# Patient Record
Sex: FEMALE | Race: BLACK OR AFRICAN AMERICAN | Employment: OTHER | URBAN - METROPOLITAN AREA
[De-identification: names, ages, dates, MRNs, and addresses within clinical notes are randomized per-mention and may not be internally consistent; named-entity substitution may affect disease eponyms.]

---

## 2017-02-04 LAB
ALBUMIN SERPL-MCNC: 4.5 G/DL (ref 3.2–5.3)
ALK PHOSPHATASE: 55 IU/L (ref 35–121)
ALT SERPL-CCNC: 22 IU/L (ref 5–59)
ANION GAP SERPL CALCULATED.3IONS-SCNC: 13 MMOL/L
AST SERPL-CCNC: 21 IU/L (ref 10–42)
BILIRUB SERPL-MCNC: 0.8 MG/DL (ref 0.2–1.3)
BUN BLDV-MCNC: 29 MG/DL (ref 9–24)
CALCIUM SERPL-MCNC: 10.3 MG/DL (ref 8.7–10.8)
CHLORIDE BLD-SCNC: 107 MMOL/L (ref 95–111)
CHOLESTEROL/HDL RATIO: 3
CHOLESTEROL: 168 MG/DL
CO2: 28 MMOL/L (ref 21–32)
CREAT SERPL-MCNC: 1.2 MG/DL (ref 0.5–1.3)
EGFR AFRICAN AMERICAN: 53
EGFR IF NONAFRICAN AMERICAN: 43
GLUCOSE: 97 MG/DL (ref 70–100)
HDLC SERPL-MCNC: 56 MG/DL (ref 40–60)
LDL CHOLESTEROL CALCULATED: 101 MG/DL
LDL/HDL RATIO: 1.8
POTASSIUM SERPL-SCNC: 5.1 MMOL/L (ref 3.5–5.4)
SODIUM BLD-SCNC: 143 MMOL/L (ref 134–147)
TOTAL PROTEIN: 7.5 G/DL (ref 5.8–8)
TRIGL SERPL-MCNC: 56 MG/DL
VLDLC SERPL CALC-MCNC: 11 MG/DL

## 2017-02-07 DIAGNOSIS — E78.5 HYPERLIPIDEMIA, UNSPECIFIED HYPERLIPIDEMIA TYPE: ICD-10-CM

## 2017-02-07 DIAGNOSIS — I10 ESSENTIAL HYPERTENSION: ICD-10-CM

## 2017-02-07 LAB
ALBUMIN SERPL-MCNC: 4.5 G/DL
ALP BLD-CCNC: 55 U/L
ALT SERPL-CCNC: 22 U/L
AST SERPL-CCNC: 21 U/L
BILIRUB SERPL-MCNC: 0.8 MG/DL (ref 0.1–1.4)
BUN BLDV-MCNC: 29 MG/DL
CALCIUM SERPL-MCNC: 10.3 MG/DL
CHLORIDE BLD-SCNC: 107 MMOL/L
CHOLESTEROL, TOTAL: 168 MG/DL
CHOLESTEROL/HDL RATIO: 1.8
CO2: 28 MMOL/L
CREAT SERPL-MCNC: 1.2 MG/DL
GFR CALCULATED: NORMAL
GLUCOSE BLD-MCNC: 97 MG/DL
HDLC SERPL-MCNC: 56 MG/DL (ref 35–70)
LDL CHOLESTEROL CALCULATED: 101 MG/DL (ref 0–160)
POTASSIUM SERPL-SCNC: 5.1 MMOL/L
SODIUM BLD-SCNC: 143 MMOL/L
TOTAL PROTEIN: 7.5
TRIGL SERPL-MCNC: 56 MG/DL
TSH SERPL DL<=0.05 MIU/L-ACNC: 1.74 UIU/ML
TSH SERPL DL<=0.05 MIU/L-ACNC: 1.74 UIU/ML (ref 0.4–4.4)
VLDLC SERPL CALC-MCNC: 11 MG/DL

## 2017-02-09 ENCOUNTER — OFFICE VISIT (OUTPATIENT)
Dept: FAMILY MEDICINE CLINIC | Age: 77
End: 2017-02-09

## 2017-02-09 VITALS
SYSTOLIC BLOOD PRESSURE: 136 MMHG | RESPIRATION RATE: 14 BRPM | HEART RATE: 56 BPM | DIASTOLIC BLOOD PRESSURE: 64 MMHG | WEIGHT: 153.25 LBS | BODY MASS INDEX: 28.2 KG/M2 | HEIGHT: 62 IN

## 2017-02-09 DIAGNOSIS — F02.80 LATE ONSET ALZHEIMER'S DISEASE WITHOUT BEHAVIORAL DISTURBANCE (HCC): ICD-10-CM

## 2017-02-09 DIAGNOSIS — I10 ESSENTIAL HYPERTENSION: Primary | ICD-10-CM

## 2017-02-09 DIAGNOSIS — G30.1 LATE ONSET ALZHEIMER'S DISEASE WITHOUT BEHAVIORAL DISTURBANCE (HCC): ICD-10-CM

## 2017-02-09 DIAGNOSIS — E78.5 HYPERLIPIDEMIA, UNSPECIFIED HYPERLIPIDEMIA TYPE: ICD-10-CM

## 2017-02-09 PROCEDURE — 99213 OFFICE O/P EST LOW 20 MIN: CPT | Performed by: EMERGENCY MEDICINE

## 2017-02-09 RX ORDER — ATORVASTATIN CALCIUM 20 MG/1
TABLET, FILM COATED ORAL
Qty: 90 TABLET | Refills: 1 | Status: SHIPPED | OUTPATIENT
Start: 2017-02-09 | End: 2017-10-19 | Stop reason: SDUPTHER

## 2017-02-09 RX ORDER — TRIAMTERENE AND HYDROCHLOROTHIAZIDE 37.5; 25 MG/1; MG/1
TABLET ORAL
Qty: 90 TABLET | Refills: 1 | Status: SHIPPED | OUTPATIENT
Start: 2017-02-09 | End: 2017-10-19 | Stop reason: SDUPTHER

## 2017-02-09 RX ORDER — NAPROXEN 500 MG/1
500 TABLET ORAL 2 TIMES DAILY WITH MEALS
Qty: 180 TABLET | Refills: 1 | Status: SHIPPED | OUTPATIENT
Start: 2017-02-09 | End: 2018-01-30 | Stop reason: SDUPTHER

## 2017-02-09 RX ORDER — LISINOPRIL 10 MG/1
TABLET ORAL
Qty: 90 TABLET | Refills: 1 | Status: SHIPPED | OUTPATIENT
Start: 2017-02-09 | End: 2017-10-18 | Stop reason: SDUPTHER

## 2017-02-09 RX ORDER — CLOPIDOGREL BISULFATE 75 MG/1
TABLET ORAL
Qty: 90 TABLET | Refills: 1 | Status: SHIPPED | OUTPATIENT
Start: 2017-02-09 | End: 2017-10-18 | Stop reason: SDUPTHER

## 2017-02-09 RX ORDER — DONEPEZIL HYDROCHLORIDE 10 MG/1
TABLET, FILM COATED ORAL
Qty: 90 TABLET | Refills: 1 | Status: SHIPPED | OUTPATIENT
Start: 2017-02-09 | End: 2017-10-19 | Stop reason: SDUPTHER

## 2017-02-09 ASSESSMENT — ENCOUNTER SYMPTOMS
ABDOMINAL PAIN: 0
ORTHOPNEA: 0
SHORTNESS OF BREATH: 0
TROUBLE SWALLOWING: 0
RHINORRHEA: 0
CONSTIPATION: 0
COUGH: 0
WHEEZING: 0
NAUSEA: 0
SORE THROAT: 0
BACK PAIN: 0
CHEST TIGHTNESS: 0
VOMITING: 0
SINUS PRESSURE: 0
DIARRHEA: 0
VOICE CHANGE: 0

## 2017-06-08 ENCOUNTER — OFFICE VISIT (OUTPATIENT)
Dept: FAMILY MEDICINE CLINIC | Age: 77
End: 2017-06-08

## 2017-06-08 VITALS
WEIGHT: 156.6 LBS | DIASTOLIC BLOOD PRESSURE: 66 MMHG | RESPIRATION RATE: 16 BRPM | HEIGHT: 62 IN | BODY MASS INDEX: 28.82 KG/M2 | SYSTOLIC BLOOD PRESSURE: 124 MMHG | HEART RATE: 60 BPM

## 2017-06-08 DIAGNOSIS — I10 ESSENTIAL HYPERTENSION: Primary | ICD-10-CM

## 2017-06-08 DIAGNOSIS — H61.23 IMPACTED CERUMEN OF BOTH EARS: ICD-10-CM

## 2017-06-08 DIAGNOSIS — F03.90 DEMENTIA WITHOUT BEHAVIORAL DISTURBANCE, UNSPECIFIED DEMENTIA TYPE: ICD-10-CM

## 2017-06-08 DIAGNOSIS — E78.5 HYPERLIPIDEMIA, UNSPECIFIED HYPERLIPIDEMIA TYPE: ICD-10-CM

## 2017-06-08 PROCEDURE — 99213 OFFICE O/P EST LOW 20 MIN: CPT | Performed by: EMERGENCY MEDICINE

## 2017-06-08 PROCEDURE — 69210 REMOVE IMPACTED EAR WAX UNI: CPT | Performed by: EMERGENCY MEDICINE

## 2017-06-08 ASSESSMENT — ENCOUNTER SYMPTOMS
VOMITING: 0
NAUSEA: 0
BACK PAIN: 0
CHEST TIGHTNESS: 0
ABDOMINAL PAIN: 0
SINUS PRESSURE: 0
DIARRHEA: 0
VOICE CHANGE: 0
SORE THROAT: 0
CONSTIPATION: 0
ORTHOPNEA: 0
WHEEZING: 0
SHORTNESS OF BREATH: 0
RHINORRHEA: 0
TROUBLE SWALLOWING: 0
COUGH: 0

## 2017-10-15 LAB
ALBUMIN SERPL-MCNC: 4.6 G/DL (ref 3.2–5.3)
ALK PHOSPHATASE: 55 IU/L (ref 35–121)
ALT SERPL-CCNC: 37 IU/L (ref 5–59)
ANION GAP SERPL CALCULATED.3IONS-SCNC: 11 MMOL/L
AST SERPL-CCNC: 28 IU/L (ref 10–42)
BILIRUB SERPL-MCNC: 0.8 MG/DL (ref 0.2–1.3)
BUN BLDV-MCNC: 25 MG/DL (ref 9–24)
CALCIUM SERPL-MCNC: 10 MG/DL (ref 8.7–10.8)
CHLORIDE BLD-SCNC: 103 MMOL/L (ref 95–111)
CHOLESTEROL/HDL RATIO: 3
CHOLESTEROL: 148 MG/DL
CO2: 28 MMOL/L (ref 21–32)
CREAT SERPL-MCNC: 1 MG/DL (ref 0.5–1.3)
EGFR AFRICAN AMERICAN: 65
EGFR IF NONAFRICAN AMERICAN: 53
GLUCOSE: 89 MG/DL (ref 70–100)
HDLC SERPL-MCNC: 50 MG/DL (ref 40–60)
LDL CHOLESTEROL CALCULATED: 79 MG/DL
LDL/HDL RATIO: 1.6
POTASSIUM SERPL-SCNC: 4.2 MMOL/L (ref 3.5–5.4)
SODIUM BLD-SCNC: 138 MMOL/L (ref 134–147)
TOTAL PROTEIN: 7.7 G/DL (ref 5.8–8)
TRIGL SERPL-MCNC: 93 MG/DL
VLDLC SERPL CALC-MCNC: 19 MG/DL

## 2017-10-18 RX ORDER — CLOPIDOGREL BISULFATE 75 MG/1
TABLET ORAL
Qty: 90 TABLET | Refills: 1 | Status: SHIPPED | OUTPATIENT
Start: 2017-10-18 | End: 2018-01-30 | Stop reason: SDUPTHER

## 2017-10-18 RX ORDER — LISINOPRIL 10 MG/1
TABLET ORAL
Qty: 90 TABLET | Refills: 1 | Status: SHIPPED | OUTPATIENT
Start: 2017-10-18 | End: 2018-01-30 | Stop reason: SDUPTHER

## 2017-10-19 ENCOUNTER — OFFICE VISIT (OUTPATIENT)
Dept: FAMILY MEDICINE CLINIC | Age: 77
End: 2017-10-19

## 2017-10-19 VITALS
BODY MASS INDEX: 28.89 KG/M2 | HEIGHT: 62 IN | SYSTOLIC BLOOD PRESSURE: 136 MMHG | RESPIRATION RATE: 12 BRPM | HEART RATE: 66 BPM | DIASTOLIC BLOOD PRESSURE: 66 MMHG | WEIGHT: 157 LBS

## 2017-10-19 DIAGNOSIS — I10 ESSENTIAL HYPERTENSION: Primary | ICD-10-CM

## 2017-10-19 DIAGNOSIS — Z23 IMMUNIZATION DUE: ICD-10-CM

## 2017-10-19 DIAGNOSIS — R00.1 BRADYCARDIA, SINUS: ICD-10-CM

## 2017-10-19 DIAGNOSIS — F03.90 DEMENTIA WITHOUT BEHAVIORAL DISTURBANCE, UNSPECIFIED DEMENTIA TYPE: ICD-10-CM

## 2017-10-19 DIAGNOSIS — E78.5 HYPERLIPIDEMIA, UNSPECIFIED HYPERLIPIDEMIA TYPE: ICD-10-CM

## 2017-10-19 PROCEDURE — G0008 ADMIN INFLUENZA VIRUS VAC: HCPCS | Performed by: EMERGENCY MEDICINE

## 2017-10-19 PROCEDURE — 99213 OFFICE O/P EST LOW 20 MIN: CPT | Performed by: EMERGENCY MEDICINE

## 2017-10-19 RX ORDER — ATORVASTATIN CALCIUM 20 MG/1
TABLET, FILM COATED ORAL
Qty: 90 TABLET | Refills: 1 | Status: SHIPPED | OUTPATIENT
Start: 2017-10-19 | End: 2018-01-30 | Stop reason: SDUPTHER

## 2017-10-19 RX ORDER — TRIAMTERENE AND HYDROCHLOROTHIAZIDE 37.5; 25 MG/1; MG/1
TABLET ORAL
Qty: 90 TABLET | Refills: 1 | Status: SHIPPED | OUTPATIENT
Start: 2017-10-19 | End: 2018-01-30 | Stop reason: SDUPTHER

## 2017-10-19 RX ORDER — DONEPEZIL HYDROCHLORIDE 10 MG/1
TABLET, FILM COATED ORAL
Qty: 90 TABLET | Refills: 1 | Status: SHIPPED | OUTPATIENT
Start: 2017-10-19 | End: 2018-09-10 | Stop reason: SDUPTHER

## 2017-10-19 RX ORDER — MOMETASONE FUROATE 50 UG/1
2 SPRAY, METERED NASAL DAILY
Qty: 1 INHALER | Refills: 0 | Status: SHIPPED | OUTPATIENT
Start: 2017-10-19 | End: 2018-01-30 | Stop reason: SDUPTHER

## 2017-10-19 ASSESSMENT — ENCOUNTER SYMPTOMS
VOICE CHANGE: 0
SORE THROAT: 0
SHORTNESS OF BREATH: 0
WHEEZING: 0
CHEST TIGHTNESS: 0
SINUS PRESSURE: 0
CONSTIPATION: 0
VOMITING: 0
NAUSEA: 0
RHINORRHEA: 0
ABDOMINAL PAIN: 0
TROUBLE SWALLOWING: 0
BACK PAIN: 0
ORTHOPNEA: 0
DIARRHEA: 0
COUGH: 0

## 2017-10-19 ASSESSMENT — PATIENT HEALTH QUESTIONNAIRE - PHQ9
1. LITTLE INTEREST OR PLEASURE IN DOING THINGS: 0
1. LITTLE INTEREST OR PLEASURE IN DOING THINGS: 0
2. FEELING DOWN, DEPRESSED OR HOPELESS: 0
SUM OF ALL RESPONSES TO PHQ9 QUESTIONS 1 & 2: 0
SUM OF ALL RESPONSES TO PHQ QUESTIONS 1-9: 0
SUM OF ALL RESPONSES TO PHQ9 QUESTIONS 1 & 2: 0
1. LITTLE INTEREST OR PLEASURE IN DOING THINGS: 0
SUM OF ALL RESPONSES TO PHQ9 QUESTIONS 1 & 2: 0
2. FEELING DOWN, DEPRESSED OR HOPELESS: 0
2. FEELING DOWN, DEPRESSED OR HOPELESS: 0
SUM OF ALL RESPONSES TO PHQ QUESTIONS 1-9: 0
SUM OF ALL RESPONSES TO PHQ QUESTIONS 1-9: 0

## 2017-10-19 NOTE — PROGRESS NOTES
Visit Date: 10/19/2017    Subjective: Grace Brown is a 76 y.o. female who presents today for:  Chief Complaint   Patient presents with    Hypertension    Dementia         HPI:     Doing yard work , no SOB, no chest pain      Hypertension   This is a chronic problem. The problem is controlled. Pertinent negatives include no chest pain, headaches, malaise/fatigue, neck pain, orthopnea, palpitations, peripheral edema or shortness of breath. Hyperlipidemia   This is a chronic problem. The problem is controlled. Recent lipid tests were reviewed and are normal. Pertinent negatives include no chest pain, focal sensory loss, leg pain, myalgias or shortness of breath. Coronary Artery Disease   Pertinent negatives include no chest pain, chest tightness, dizziness, leg swelling, palpitations or shortness of breath. Risk factors include hyperlipidemia. Current Home Medications:  Current Outpatient Prescriptions   Medication Sig Dispense Refill    atorvastatin (LIPITOR) 20 MG tablet TAKE ONE TABLET BY MOUTH ONCE DAILY 90 tablet 1    donepezil (ARICEPT) 10 MG tablet TAKE ONE TABLET BY MOUTH ONCE NIGHTLY 90 tablet 1    mometasone (NASONEX) 50 MCG/ACT nasal spray 2 sprays by Nasal route daily 1 Inhaler 0    triamterene-hydrochlorothiazide (MAXZIDE-25) 37.5-25 MG per tablet TAKE ONE TABLET BY MOUTH EVERY DAY 90 tablet 1    clopidogrel (PLAVIX) 75 MG tablet TAKE ONE TABLET BY MOUTH ONCE DAILY 90 tablet 1    lisinopril (PRINIVIL;ZESTRIL) 10 MG tablet TAKE ONE TABLET BY MOUTH ONCE DAILY 90 tablet 1    naproxen (NAPROSYN) 500 MG tablet Take 1 tablet by mouth 2 times daily (with meals) 180 tablet 1    VIGAMOX 0.5 % ophthalmic solution Place 1 drop into the left eye 3 times daily      Ascorbic Acid (VITAMIN C) 250 MG tablet Take 500 mg by mouth daily      aspirin 81 MG tablet Take 81 mg by mouth daily.  Multiple Vitamin (MULTIVITAMIN PO) Take  by mouth.          No current facility-administered medications for this visit. Subjective:      Review of Systems   Constitutional: Negative for appetite change, chills, diaphoresis, fatigue, fever and malaise/fatigue. HENT: Negative for congestion, ear pain, postnasal drip, rhinorrhea, sinus pressure, sneezing, sore throat, trouble swallowing and voice change. Respiratory: Negative for cough, chest tightness, shortness of breath and wheezing. Cardiovascular: Negative for chest pain, palpitations, orthopnea and leg swelling. Gastrointestinal: Negative for abdominal pain, constipation, diarrhea, nausea and vomiting. Musculoskeletal: Negative for arthralgias, back pain, joint swelling, myalgias, neck pain and neck stiffness. Neurological: Negative for dizziness, syncope, weakness, light-headedness, numbness and headaches. Objective:     /66 (Site: Right Arm, Position: Sitting, Cuff Size: Medium Adult)   Pulse 66   Resp 12   Ht 5' 2\" (1.575 m)   Wt 157 lb (71.2 kg)   Breastfeeding? No   BMI 28.72 kg/m²   BP Readings from Last 3 Encounters:   10/19/17 136/66   06/08/17 124/66   02/09/17 136/64     Wt Readings from Last 3 Encounters:   10/19/17 157 lb (71.2 kg)   06/08/17 156 lb 9.6 oz (71 kg)   02/09/17 153 lb 4 oz (69.5 kg)       Physical Exam   Constitutional: She is oriented to person, place, and time. She appears well-developed and well-nourished. She is cooperative. HENT:   Head: Normocephalic and atraumatic. Right Ear: External ear normal.   Left Ear: External ear normal.   Nose: Nose normal.   Mouth/Throat: Oropharynx is clear and moist.   Eyes: Conjunctivae and EOM are normal. Pupils are equal, round, and reactive to light. No scleral icterus. Neck: Normal range of motion. Neck supple. No JVD present. No thyromegaly present. Cardiovascular: Regular rhythm and intact distal pulses. Bradycardia present. Exam reveals no friction rub. No murmur heard.   Pulmonary/Chest: Effort normal and breath sounds normal. She

## 2017-11-03 ENCOUNTER — HOSPITAL ENCOUNTER (OUTPATIENT)
Dept: MAMMOGRAPHY | Age: 77
Discharge: HOME | End: 2017-11-03
Payer: MEDICARE

## 2017-11-03 DIAGNOSIS — Z12.39 BREAST SCREENING: ICD-10-CM

## 2017-11-03 PROCEDURE — 77063 BREAST TOMOSYNTHESIS BI: CPT

## 2018-01-30 ENCOUNTER — OFFICE VISIT (OUTPATIENT)
Dept: FAMILY MEDICINE CLINIC | Age: 78
End: 2018-01-30

## 2018-01-30 VITALS
RESPIRATION RATE: 12 BRPM | HEART RATE: 52 BPM | BODY MASS INDEX: 28.49 KG/M2 | WEIGHT: 154.8 LBS | HEIGHT: 62 IN | SYSTOLIC BLOOD PRESSURE: 142 MMHG | DIASTOLIC BLOOD PRESSURE: 78 MMHG

## 2018-01-30 DIAGNOSIS — I10 ESSENTIAL HYPERTENSION: ICD-10-CM

## 2018-01-30 DIAGNOSIS — F03.90 DEMENTIA WITHOUT BEHAVIORAL DISTURBANCE, UNSPECIFIED DEMENTIA TYPE: ICD-10-CM

## 2018-01-30 DIAGNOSIS — E78.5 HYPERLIPIDEMIA, UNSPECIFIED HYPERLIPIDEMIA TYPE: ICD-10-CM

## 2018-01-30 PROCEDURE — 99213 OFFICE O/P EST LOW 20 MIN: CPT | Performed by: EMERGENCY MEDICINE

## 2018-01-30 RX ORDER — CLOPIDOGREL BISULFATE 75 MG/1
75 TABLET ORAL DAILY
Qty: 90 TABLET | Refills: 1 | Status: SHIPPED | OUTPATIENT
Start: 2018-01-30 | End: 2019-02-10 | Stop reason: SDUPTHER

## 2018-01-30 RX ORDER — TRIAMTERENE AND HYDROCHLOROTHIAZIDE 37.5; 25 MG/1; MG/1
TABLET ORAL
Qty: 90 TABLET | Refills: 1 | Status: SHIPPED | OUTPATIENT
Start: 2018-01-30 | End: 2019-02-10 | Stop reason: SDUPTHER

## 2018-01-30 RX ORDER — NAPROXEN 500 MG/1
500 TABLET ORAL 2 TIMES DAILY WITH MEALS
Qty: 180 TABLET | Refills: 1 | Status: SHIPPED | OUTPATIENT
Start: 2018-01-30 | End: 2019-02-10 | Stop reason: SDUPTHER

## 2018-01-30 RX ORDER — ATORVASTATIN CALCIUM 20 MG/1
TABLET, FILM COATED ORAL
Qty: 90 TABLET | Refills: 1 | Status: SHIPPED | OUTPATIENT
Start: 2018-01-30 | End: 2019-02-10 | Stop reason: SDUPTHER

## 2018-01-30 RX ORDER — LISINOPRIL 10 MG/1
10 TABLET ORAL DAILY
Qty: 90 TABLET | Refills: 1 | Status: SHIPPED | OUTPATIENT
Start: 2018-01-30 | End: 2019-02-10 | Stop reason: SDUPTHER

## 2018-01-30 RX ORDER — MOMETASONE FUROATE 50 UG/1
2 SPRAY, METERED NASAL DAILY
Qty: 1 INHALER | Refills: 3 | Status: SHIPPED | OUTPATIENT
Start: 2018-01-30 | End: 2018-05-03 | Stop reason: ALTCHOICE

## 2018-01-30 RX ORDER — FLUTICASONE PROPIONATE 50 MCG
2 SPRAY, SUSPENSION (ML) NASAL DAILY
Qty: 1 BOTTLE | Refills: 3 | Status: SHIPPED | OUTPATIENT
Start: 2018-01-30 | End: 2020-05-28 | Stop reason: SDUPTHER

## 2018-01-30 ASSESSMENT — ENCOUNTER SYMPTOMS
ORTHOPNEA: 0
DIARRHEA: 0
CONSTIPATION: 0
SINUS PRESSURE: 0
RHINORRHEA: 0
COUGH: 0
BACK PAIN: 0
NAUSEA: 0
CHEST TIGHTNESS: 0
VOICE CHANGE: 0
WHEEZING: 0
SHORTNESS OF BREATH: 0
TROUBLE SWALLOWING: 0
SORE THROAT: 0
VOMITING: 0
ABDOMINAL PAIN: 0

## 2018-01-30 NOTE — PROGRESS NOTES
Visit Date: 1/30/2018    Subjective: Philip Coombs is a 76 y.o. female who presents today for:  Chief Complaint   Patient presents with    Hypertension    Hyperlipidemia         HPI:     doing well, No SOB, No chest pain , No fatigue. No nausea nor abdominal pain      Hypertension   This is a chronic problem. The problem is controlled. Pertinent negatives include no chest pain, headaches, malaise/fatigue, neck pain, orthopnea, palpitations, peripheral edema or shortness of breath. Hyperlipidemia   This is a chronic problem. The problem is controlled. Recent lipid tests were reviewed and are normal. Pertinent negatives include no chest pain, focal sensory loss, leg pain, myalgias or shortness of breath. Coronary Artery Disease   Pertinent negatives include no chest pain, chest tightness, dizziness, leg swelling, palpitations or shortness of breath. Risk factors include hyperlipidemia. Current Home Medications:  Current Outpatient Prescriptions   Medication Sig Dispense Refill    atorvastatin (LIPITOR) 20 MG tablet TAKE ONE TABLET BY MOUTH ONCE DAILY 90 tablet 1    clopidogrel (PLAVIX) 75 MG tablet Take 1 tablet by mouth daily 90 tablet 1    lisinopril (PRINIVIL;ZESTRIL) 10 MG tablet Take 1 tablet by mouth daily 90 tablet 1    triamterene-hydrochlorothiazide (MAXZIDE-25) 37.5-25 MG per tablet TAKE ONE TABLET BY MOUTH EVERY DAY 90 tablet 1    naproxen (NAPROSYN) 500 MG tablet Take 1 tablet by mouth 2 times daily (with meals) 180 tablet 1    mometasone (NASONEX) 50 MCG/ACT nasal spray 2 sprays by Nasal route daily 1 Inhaler 3    donepezil (ARICEPT) 10 MG tablet TAKE ONE TABLET BY MOUTH ONCE NIGHTLY 90 tablet 1    VIGAMOX 0.5 % ophthalmic solution Place 1 drop into the left eye 3 times daily      Ascorbic Acid (VITAMIN C) 250 MG tablet Take 500 mg by mouth daily      aspirin 81 MG tablet Take 81 mg by mouth daily.  Multiple Vitamin (MULTIVITAMIN PO) Take  by mouth.          No current

## 2018-02-12 ENCOUNTER — TELEPHONE (OUTPATIENT)
Dept: FAMILY MEDICINE CLINIC | Age: 78
End: 2018-02-12

## 2018-02-12 ENCOUNTER — OFFICE VISIT (OUTPATIENT)
Dept: FAMILY MEDICINE CLINIC | Age: 78
End: 2018-02-12

## 2018-02-12 VITALS
HEART RATE: 64 BPM | DIASTOLIC BLOOD PRESSURE: 62 MMHG | WEIGHT: 154.25 LBS | SYSTOLIC BLOOD PRESSURE: 122 MMHG | HEIGHT: 62 IN | RESPIRATION RATE: 14 BRPM | BODY MASS INDEX: 28.39 KG/M2

## 2018-02-12 DIAGNOSIS — H91.93 BILATERAL HEARING LOSS, UNSPECIFIED HEARING LOSS TYPE: Primary | ICD-10-CM

## 2018-02-12 DIAGNOSIS — S50.01XS CONTUSION OF RIGHT ELBOW, SEQUELA: ICD-10-CM

## 2018-02-12 DIAGNOSIS — S80.01XA CONTUSION OF RIGHT KNEE, INITIAL ENCOUNTER: Primary | ICD-10-CM

## 2018-02-12 PROCEDURE — 99213 OFFICE O/P EST LOW 20 MIN: CPT | Performed by: FAMILY MEDICINE

## 2018-02-12 ASSESSMENT — ENCOUNTER SYMPTOMS
SHORTNESS OF BREATH: 0
WHEEZING: 0
CHEST TIGHTNESS: 0
SORE THROAT: 0
NAUSEA: 0
COUGH: 0
CONSTIPATION: 0
EYE PAIN: 0
ABDOMINAL PAIN: 0

## 2018-02-12 NOTE — PROGRESS NOTES
C) 250 MG tablet Take 500 mg by mouth daily      aspirin 81 MG tablet Take 81 mg by mouth daily.  Multiple Vitamin (MULTIVITAMIN PO) Take  by mouth. No current facility-administered medications for this visit. No orders of the defined types were placed in this encounter. No results found for this visit on 02/12/18.        See prn

## 2018-02-13 NOTE — TELEPHONE ENCOUNTER
Internal referral completed. They will call patient to schedule appointment. Left mom for Eric Littlejohn to return call.

## 2018-02-28 ENCOUNTER — HOSPITAL ENCOUNTER (OUTPATIENT)
Dept: AUDIOLOGY | Age: 78
Discharge: HOME | End: 2018-02-28
Payer: MEDICARE

## 2018-02-28 PROCEDURE — 9990000010 HC NO CHARGE VISIT: Performed by: AUDIOLOGIST

## 2018-03-06 ENCOUNTER — OFFICE VISIT (OUTPATIENT)
Dept: FAMILY MEDICINE CLINIC | Age: 78
End: 2018-03-06

## 2018-03-06 VITALS
RESPIRATION RATE: 12 BRPM | DIASTOLIC BLOOD PRESSURE: 60 MMHG | BODY MASS INDEX: 28.52 KG/M2 | WEIGHT: 155 LBS | HEART RATE: 54 BPM | HEIGHT: 62 IN | SYSTOLIC BLOOD PRESSURE: 120 MMHG

## 2018-03-06 DIAGNOSIS — H61.23 IMPACTED CERUMEN OF BOTH EARS: Primary | ICD-10-CM

## 2018-03-06 DIAGNOSIS — H91.93 BILATERAL HEARING LOSS, UNSPECIFIED HEARING LOSS TYPE: ICD-10-CM

## 2018-03-06 DIAGNOSIS — I10 ESSENTIAL HYPERTENSION: ICD-10-CM

## 2018-03-06 PROCEDURE — 69210 REMOVE IMPACTED EAR WAX UNI: CPT | Performed by: EMERGENCY MEDICINE

## 2018-03-06 PROCEDURE — 99213 OFFICE O/P EST LOW 20 MIN: CPT | Performed by: EMERGENCY MEDICINE

## 2018-03-06 ASSESSMENT — ENCOUNTER SYMPTOMS
NAUSEA: 0
ABDOMINAL PAIN: 0
CONSTIPATION: 0
VOMITING: 0
TROUBLE SWALLOWING: 0
SINUS PRESSURE: 0
BACK PAIN: 0
VOICE CHANGE: 0
SORE THROAT: 0
RHINORRHEA: 0
SHORTNESS OF BREATH: 0
COUGH: 0
WHEEZING: 0
CHEST TIGHTNESS: 0
DIARRHEA: 0

## 2018-03-06 NOTE — PROGRESS NOTES
3. Essential hypertension         Plan:      Medications Prescribed:  No orders of the defined types were placed in this encounter. Orders Placed:  Orders Placed This Encounter   Procedures    MA REMOVAL IMPACTED CERUMEN INSTRUMENTATION UNILAT     Ear wax was removed using an ear curette on both ears       No Follow-up on file. Discussed use, benefit, and side effects of prescribed medications. All patient questions answered. Pt voiced understanding. Instructed to continue current medications, diet and exercise. Patient agreed with treatment plan.

## 2018-03-11 LAB
ABSOLUTE BASO #: 0.1 K/UL (ref 0–0.1)
ABSOLUTE EOS #: 0.1 K/UL (ref 0.1–0.4)
ABSOLUTE LYMPH #: 1.8 K/UL (ref 0.8–5.2)
ABSOLUTE MONO #: 0.4 K/UL (ref 0.1–0.9)
ABSOLUTE NEUT #: 1.9 K/UL (ref 1.3–9.1)
ALBUMIN SERPL-MCNC: 4.6 G/DL (ref 3.5–5.2)
ALK PHOSPHATASE: 61 U/L (ref 30–146)
ALT SERPL-CCNC: 20 U/L (ref 5–40)
ANION GAP SERPL CALCULATED.3IONS-SCNC: 15 MEQ/L (ref 10–19)
AST SERPL-CCNC: 22 U/L (ref 9–40)
BASOPHILS RELATIVE PERCENT: 1.2 %
BILIRUB SERPL-MCNC: 1.1 MG/DL
BUN BLDV-MCNC: 18 MG/DL (ref 8–23)
CALCIUM SERPL-MCNC: 9.8 MG/DL (ref 8.5–10.5)
CHLORIDE BLD-SCNC: 100 MEQ/L (ref 95–107)
CHOLESTEROL/HDL RATIO: 3.6
CHOLESTEROL: 189 MG/DL
CO2: 25 MEQ/L (ref 19–31)
CREAT SERPL-MCNC: 1.4 MG/DL (ref 0.6–1.3)
EGFR AFRICAN AMERICAN: 41 ML/MIN/1.73 M2
EGFR IF NONAFRICAN AMERICAN: 35.4 ML/MIN/1.73 M2
EOSINOPHILS RELATIVE PERCENT: 2.3 %
GLUCOSE: 90 MG/DL (ref 70–99)
HCT VFR BLD CALC: 37.3 % (ref 36–48)
HDLC SERPL-MCNC: 53 MG/DL
HEMOGLOBIN: 12 G/DL (ref 12–16)
LDL CHOLESTEROL CALCULATED: 115 MG/DL
LDL/HDL RATIO: 2.2
LYMPHOCYTE %: 41.9 %
MCH RBC QN AUTO: 28.8 PG (ref 27–34)
MCHC RBC AUTO-ENTMCNC: 32.2 G/DL (ref 31–36)
MCV RBC AUTO: 89.4 FL (ref 80–100)
MONOCYTES # BLD: 9.7 %
NEUTROPHILS RELATIVE PERCENT: 44.7 %
PDW BLD-RTO: 13.2 % (ref 10.8–14.8)
PLATELETS: 197 K/UL (ref 150–450)
POTASSIUM SERPL-SCNC: 4.5 MEQ/L (ref 3.5–5.4)
RBC: 4.17 M/UL (ref 4–5.5)
SODIUM BLD-SCNC: 140 MEQ/L (ref 135–146)
TOTAL PROTEIN: 7.5 G/DL (ref 6.1–8.3)
TRIGL SERPL-MCNC: 106 MG/DL
VLDLC SERPL CALC-MCNC: 21 MG/DL
WBC: 4.3 K/UL (ref 3.7–10.8)

## 2018-03-13 ENCOUNTER — TELEPHONE (OUTPATIENT)
Dept: FAMILY MEDICINE CLINIC | Age: 78
End: 2018-03-13

## 2018-03-13 NOTE — TELEPHONE ENCOUNTER
----- Message from Jodi Olivo MD sent at 3/13/2018  1:29 PM EDT -----  Please call patient, all of her laboratories are normal except mildly elevated creatinine of 1.4.   Patient needs to drink a lot of water, 6 glasses per day

## 2018-05-03 ENCOUNTER — OFFICE VISIT (OUTPATIENT)
Dept: FAMILY MEDICINE CLINIC | Age: 78
End: 2018-05-03

## 2018-05-03 VITALS
WEIGHT: 152.2 LBS | HEART RATE: 48 BPM | RESPIRATION RATE: 14 BRPM | HEIGHT: 62 IN | SYSTOLIC BLOOD PRESSURE: 122 MMHG | DIASTOLIC BLOOD PRESSURE: 68 MMHG | BODY MASS INDEX: 28.01 KG/M2

## 2018-05-03 DIAGNOSIS — I10 ESSENTIAL HYPERTENSION: ICD-10-CM

## 2018-05-03 DIAGNOSIS — R10.11 RIGHT UPPER QUADRANT ABDOMINAL PAIN: ICD-10-CM

## 2018-05-03 DIAGNOSIS — E78.5 HYPERLIPIDEMIA, UNSPECIFIED HYPERLIPIDEMIA TYPE: ICD-10-CM

## 2018-05-03 DIAGNOSIS — F03.90 DEMENTIA WITHOUT BEHAVIORAL DISTURBANCE, UNSPECIFIED DEMENTIA TYPE: Primary | ICD-10-CM

## 2018-05-03 PROCEDURE — 99214 OFFICE O/P EST MOD 30 MIN: CPT | Performed by: EMERGENCY MEDICINE

## 2018-05-03 ASSESSMENT — ENCOUNTER SYMPTOMS
SORE THROAT: 0
DIARRHEA: 0
VOMITING: 0
SINUS PRESSURE: 0
WHEEZING: 0
VOICE CHANGE: 0
NAUSEA: 0
COUGH: 0
BACK PAIN: 0
ABDOMINAL PAIN: 0
CHEST TIGHTNESS: 0
RHINORRHEA: 0
CONSTIPATION: 0
SHORTNESS OF BREATH: 0
TROUBLE SWALLOWING: 0

## 2018-06-15 ENCOUNTER — HOSPITAL ENCOUNTER (OUTPATIENT)
Dept: AUDIOLOGY | Age: 78
Discharge: HOME | End: 2018-06-15
Payer: MEDICARE

## 2018-06-15 PROCEDURE — 92557 COMPREHENSIVE HEARING TEST: CPT | Performed by: AUDIOLOGIST

## 2018-08-07 ENCOUNTER — OFFICE VISIT (OUTPATIENT)
Dept: FAMILY MEDICINE CLINIC | Age: 78
End: 2018-08-07

## 2018-08-07 VITALS
WEIGHT: 154.8 LBS | BODY MASS INDEX: 28.49 KG/M2 | HEIGHT: 62 IN | HEART RATE: 72 BPM | RESPIRATION RATE: 14 BRPM | DIASTOLIC BLOOD PRESSURE: 68 MMHG | SYSTOLIC BLOOD PRESSURE: 130 MMHG

## 2018-08-07 DIAGNOSIS — G30.8 ALZHEIMER'S DISEASE OF OTHER ONSET WITHOUT BEHAVIORAL DISTURBANCE: ICD-10-CM

## 2018-08-07 DIAGNOSIS — F02.80 ALZHEIMER'S DISEASE OF OTHER ONSET WITHOUT BEHAVIORAL DISTURBANCE: ICD-10-CM

## 2018-08-07 DIAGNOSIS — E78.5 HYPERLIPIDEMIA, UNSPECIFIED HYPERLIPIDEMIA TYPE: ICD-10-CM

## 2018-08-07 DIAGNOSIS — I10 ESSENTIAL HYPERTENSION: ICD-10-CM

## 2018-08-07 PROCEDURE — 1101F PT FALLS ASSESS-DOCD LE1/YR: CPT | Performed by: EMERGENCY MEDICINE

## 2018-08-07 PROCEDURE — 99213 OFFICE O/P EST LOW 20 MIN: CPT | Performed by: EMERGENCY MEDICINE

## 2018-08-07 ASSESSMENT — ENCOUNTER SYMPTOMS
SINUS PRESSURE: 0
DIARRHEA: 0
CHEST TIGHTNESS: 0
COUGH: 0
ABDOMINAL PAIN: 0
CONSTIPATION: 0
TROUBLE SWALLOWING: 0
BACK PAIN: 0
VOMITING: 0
NAUSEA: 0
ORTHOPNEA: 0
RHINORRHEA: 0
SORE THROAT: 0
SHORTNESS OF BREATH: 0
WHEEZING: 0
VOICE CHANGE: 0

## 2018-08-07 ASSESSMENT — PATIENT HEALTH QUESTIONNAIRE - PHQ9
2. FEELING DOWN, DEPRESSED OR HOPELESS: 0
1. LITTLE INTEREST OR PLEASURE IN DOING THINGS: 0
SUM OF ALL RESPONSES TO PHQ QUESTIONS 1-9: 0
SUM OF ALL RESPONSES TO PHQ9 QUESTIONS 1 & 2: 0

## 2018-08-07 NOTE — PROGRESS NOTES
Subjective:      Review of Systems   Constitutional: Negative for appetite change, chills, diaphoresis, fatigue, fever and malaise/fatigue. HENT: Negative for congestion, ear pain, postnasal drip, rhinorrhea, sinus pressure, sneezing, sore throat, trouble swallowing and voice change. Respiratory: Negative for cough, chest tightness, shortness of breath and wheezing. Cardiovascular: Negative for chest pain, palpitations, orthopnea and leg swelling. Gastrointestinal: Negative for abdominal pain, constipation, diarrhea, nausea and vomiting. Musculoskeletal: Negative for arthralgias, back pain, joint swelling, myalgias, neck pain and neck stiffness. Neurological: Negative for dizziness, syncope, weakness, light-headedness, numbness and headaches. Objective:     /68 (Site: Left Arm, Position: Sitting, Cuff Size: Medium Adult)   Pulse 72   Resp 14   Ht 5' 2\" (1.575 m)   Wt 154 lb 12.8 oz (70.2 kg)   Breastfeeding? No   BMI 28.31 kg/m²   BP Readings from Last 3 Encounters:   08/07/18 130/68   05/03/18 122/68   03/06/18 120/60     Wt Readings from Last 3 Encounters:   08/07/18 154 lb 12.8 oz (70.2 kg)   05/03/18 152 lb 3.2 oz (69 kg)   03/06/18 155 lb (70.3 kg)       Physical Exam   Constitutional: She is oriented to person, place, and time. She appears well-developed and well-nourished. She is cooperative. HENT:   Head: Normocephalic and atraumatic. Right Ear: External ear normal.   Left Ear: External ear normal.   Nose: Nose normal.   Mouth/Throat: Oropharynx is clear and moist.   Eyes: Conjunctivae and EOM are normal. Pupils are equal, round, and reactive to light. No scleral icterus. Neck: Normal range of motion. Neck supple. No JVD present. No thyromegaly present. Cardiovascular: Normal rate, regular rhythm and intact distal pulses. Exam reveals no friction rub. No murmur heard. Pulmonary/Chest: Effort normal and breath sounds normal. She has no wheezes.  She has no rales. She exhibits no tenderness. Abdominal: Soft. Bowel sounds are normal. She exhibits no mass. There is no tenderness. Musculoskeletal: She exhibits no edema. Lymphadenopathy:     She has no cervical adenopathy. Neurological: She is alert and oriented to person, place, and time. Skin: No rash noted. Vitals reviewed. Assessment:       Diagnosis Orders   1. Alzheimer's disease of other onset without behavioral disturbance     2. Essential hypertension  Comprehensive Metabolic Panel    CBC Auto Differential   3. Hyperlipidemia, unspecified hyperlipidemia type  CBC Auto Differential    Lipid Panel       Plan:      Medications Prescribed:  No orders of the defined types were placed in this encounter. Orders Placed:  Orders Placed This Encounter   Procedures    Comprehensive Metabolic Panel     Laboratory Test to be done in 3 months     Standing Status:   Future     Standing Expiration Date:   8/7/2019    CBC Auto Differential     Laboratory Test to be done in 3 months     Standing Status:   Future     Standing Expiration Date:   8/7/2019    Lipid Panel     Laboratory Test to be done in 3 months     Standing Status:   Future     Standing Expiration Date:   8/7/2019     Order Specific Question:   Is Patient Fasting?/# of Hours     Answer:   12     Lab Results   Component Value Date    LABA1C 5.8 04/22/2014    LABA1C 6.2 01/21/2014    LABA1C 6.5 10/17/2013     Lab Results   Component Value Date    LDLCALC 115 03/10/2018    CREATININE 1.4 (H) 03/10/2018       Continue to monitor blood sugars 1 times a day. Return in about 3 months (around 11/8/2018) for HTN. Discussed use, benefit, and side effects of prescribed medications. All patient questions answered. Pt voiced understanding. Instructed to continue current medications, diet and exercise. Patient agreed with treatment plan.

## 2018-09-11 RX ORDER — DONEPEZIL HYDROCHLORIDE 10 MG/1
TABLET, FILM COATED ORAL
Qty: 90 TABLET | Refills: 1 | Status: SHIPPED | OUTPATIENT
Start: 2018-09-11 | End: 2019-05-28 | Stop reason: SDUPTHER

## 2018-11-08 ENCOUNTER — OFFICE VISIT (OUTPATIENT)
Dept: FAMILY MEDICINE CLINIC | Age: 78
End: 2018-11-08

## 2018-11-08 VITALS
WEIGHT: 151 LBS | BODY MASS INDEX: 27.79 KG/M2 | RESPIRATION RATE: 14 BRPM | SYSTOLIC BLOOD PRESSURE: 138 MMHG | HEART RATE: 54 BPM | DIASTOLIC BLOOD PRESSURE: 80 MMHG | HEIGHT: 62 IN

## 2018-11-08 DIAGNOSIS — F01.50 VASCULAR DEMENTIA WITHOUT BEHAVIORAL DISTURBANCE (HCC): ICD-10-CM

## 2018-11-08 DIAGNOSIS — I10 ESSENTIAL HYPERTENSION: Primary | ICD-10-CM

## 2018-11-08 DIAGNOSIS — E78.5 HYPERLIPIDEMIA, UNSPECIFIED HYPERLIPIDEMIA TYPE: ICD-10-CM

## 2018-11-08 DIAGNOSIS — Z23 IMMUNIZATION DUE: ICD-10-CM

## 2018-11-08 PROCEDURE — 1101F PT FALLS ASSESS-DOCD LE1/YR: CPT | Performed by: EMERGENCY MEDICINE

## 2018-11-08 PROCEDURE — 99213 OFFICE O/P EST LOW 20 MIN: CPT | Performed by: EMERGENCY MEDICINE

## 2018-11-08 PROCEDURE — G0008 ADMIN INFLUENZA VIRUS VAC: HCPCS | Performed by: EMERGENCY MEDICINE

## 2018-11-08 PROCEDURE — 90656 IIV3 VACC NO PRSV 0.5 ML IM: CPT | Performed by: EMERGENCY MEDICINE

## 2018-11-08 ASSESSMENT — ENCOUNTER SYMPTOMS
TROUBLE SWALLOWING: 0
ORTHOPNEA: 0
CHEST TIGHTNESS: 0
VOICE CHANGE: 0
WHEEZING: 0
ABDOMINAL PAIN: 0
SINUS PRESSURE: 0
CONSTIPATION: 0
BACK PAIN: 0
SORE THROAT: 0
VOMITING: 0
SHORTNESS OF BREATH: 0
NAUSEA: 0
COUGH: 0
RHINORRHEA: 0
DIARRHEA: 0

## 2018-11-08 NOTE — PROGRESS NOTES
Immunizations     Name Date Dose Route    Influenza, Heidi Garcia, 3 Years and older, IM (Fluzone 3 yrs and older or Afluria 5 yrs and older) 11/8/2018 0.5 mL Intramuscular    Site: Deltoid- Left    Lot: [id number]    Merlyn Levy 45: [id number]

## 2018-11-08 NOTE — PROGRESS NOTES
for this visit. Subjective:      Review of Systems   Constitutional: Negative for appetite change, chills, diaphoresis, fatigue, fever and malaise/fatigue. HENT: Negative for congestion, ear pain, postnasal drip, rhinorrhea, sinus pressure, sneezing, sore throat, trouble swallowing and voice change. Respiratory: Negative for cough, chest tightness, shortness of breath and wheezing. Cardiovascular: Negative for chest pain, palpitations, orthopnea and leg swelling. Gastrointestinal: Negative for abdominal pain, constipation, diarrhea, nausea and vomiting. Musculoskeletal: Negative for arthralgias, back pain, joint swelling, myalgias, neck pain and neck stiffness. Neurological: Negative for dizziness, syncope, weakness, light-headedness, numbness and headaches. Objective:     /80   Pulse 54   Resp 14   Ht 5' 2\" (1.575 m)   Wt 151 lb (68.5 kg)   Breastfeeding? No   BMI 27.62 kg/m²   BP Readings from Last 3 Encounters:   11/08/18 138/80   08/07/18 130/68   05/03/18 122/68     Wt Readings from Last 3 Encounters:   11/08/18 151 lb (68.5 kg)   08/07/18 154 lb 12.8 oz (70.2 kg)   05/03/18 152 lb 3.2 oz (69 kg)       Physical Exam   Constitutional: She is oriented to person, place, and time. She appears well-developed and well-nourished. She is cooperative. HENT:   Head: Normocephalic and atraumatic. Right Ear: External ear normal.   Left Ear: External ear normal.   Nose: Nose normal.   Mouth/Throat: Oropharynx is clear and moist.   Eyes: Pupils are equal, round, and reactive to light. Conjunctivae and EOM are normal. No scleral icterus. Neck: Normal range of motion. Neck supple. No JVD present. No thyromegaly present. Cardiovascular: Normal rate, regular rhythm and intact distal pulses. Exam reveals no friction rub. No murmur heard. Pulmonary/Chest: Effort normal and breath sounds normal. She has no wheezes. She has no rales. She exhibits no tenderness. Abdominal: Soft. Bowel sounds are normal. She exhibits no mass. There is no tenderness. Musculoskeletal: She exhibits no edema. Lymphadenopathy:     She has no cervical adenopathy. Neurological: She is alert and oriented to person, place, and time. Skin: No rash noted. Vitals reviewed. Assessment:         Diagnosis Orders   1. Essential hypertension     2. Vascular dementia without behavioral disturbance     3. Hyperlipidemia, unspecified hyperlipidemia type     4. Immunization due  INFLUENZA, QUADV, 5 YRS AND OLDER, IM, MDV, 0.5ML (AFLURIA QUADV)       Plan:      Medications Prescribed:  No orders of the defined types were placed in this encounter. Orders Placed:  Orders Placed This Encounter   Procedures    INFLUENZA, QUADV, 5 YRS AND OLDER, IM, MDV, 0.5ML (Meme Norris)       Return in about 3 months (around 2/8/2019) for HTN. Discussed use, benefit, and side effects of prescribedmedications. All patient questions answered. Pt voiced understanding. Instructedto continue current medications, diet and exercise. Patient agreed with treatmentplan.

## 2018-11-13 LAB
ABSOLUTE BASO #: 0 K/UL (ref 0–0.1)
ABSOLUTE EOS #: 0.1 K/UL (ref 0.1–0.4)
ABSOLUTE LYMPH #: 1.5 K/UL (ref 0.8–5.2)
ABSOLUTE MONO #: 0.4 K/UL (ref 0.1–0.9)
ABSOLUTE NEUT #: 1.6 K/UL (ref 1.3–9.1)
ALBUMIN SERPL-MCNC: 4.4 G/DL (ref 3.5–5.2)
ALK PHOSPHATASE: 54 U/L (ref 30–146)
ALT SERPL-CCNC: 14 U/L (ref 5–40)
ANION GAP SERPL CALCULATED.3IONS-SCNC: 10 MEQ/L (ref 10–19)
AST SERPL-CCNC: 21 U/L (ref 9–40)
BASOPHILS RELATIVE PERCENT: 1.1 %
BILIRUB SERPL-MCNC: 0.9 MG/DL
BUN BLDV-MCNC: 28 MG/DL (ref 8–23)
CALCIUM SERPL-MCNC: 9.9 MG/DL (ref 8.5–10.5)
CHLORIDE BLD-SCNC: 104 MEQ/L (ref 95–107)
CHOLESTEROL/HDL RATIO: 3.5
CHOLESTEROL: 174 MG/DL
CO2: 28 MEQ/L (ref 19–31)
CREAT SERPL-MCNC: 1.4 MG/DL (ref 0.6–1.3)
EGFR AFRICAN AMERICAN: 41 ML/MIN/1.73 M2
EGFR IF NONAFRICAN AMERICAN: 35.4 ML/MIN/1.73 M2
EOSINOPHILS RELATIVE PERCENT: 1.9 %
GLUCOSE: 86 MG/DL (ref 70–99)
HCT VFR BLD CALC: 35.9 % (ref 36–48)
HDLC SERPL-MCNC: 50.2 MG/DL
HEMOGLOBIN: 11.9 G/DL (ref 12–16)
LDL CHOLESTEROL CALCULATED: 108 MG/DL
LDL/HDL RATIO: 2.1
LYMPHOCYTE %: 41.8 %
MCH RBC QN AUTO: 29.1 PG (ref 27–34)
MCHC RBC AUTO-ENTMCNC: 33.1 G/DL (ref 31–36)
MCV RBC AUTO: 87.8 FL (ref 80–100)
MONOCYTES # BLD: 10.7 %
NEUTROPHILS RELATIVE PERCENT: 44.2 %
PDW BLD-RTO: 13.1 % (ref 10.8–14.8)
PLATELETS: 207 K/UL (ref 150–450)
POTASSIUM SERPL-SCNC: 5.4 MEQ/L (ref 3.5–5.4)
RBC: 4.09 M/UL (ref 4–5.5)
SODIUM BLD-SCNC: 142 MEQ/L (ref 135–146)
TOTAL PROTEIN: 7.5 G/DL (ref 6.1–8.3)
TRIGL SERPL-MCNC: 80 MG/DL
VLDLC SERPL CALC-MCNC: 16 MG/DL
WBC: 3.6 K/UL (ref 3.7–10.8)

## 2018-11-14 ENCOUNTER — HOSPITAL ENCOUNTER (OUTPATIENT)
Dept: WOMENS IMAGING | Age: 78
Discharge: HOME | End: 2018-11-14
Payer: MEDICARE

## 2018-11-14 DIAGNOSIS — Z12.31 VISIT FOR SCREENING MAMMOGRAM: ICD-10-CM

## 2018-11-14 PROCEDURE — 77067 SCR MAMMO BI INCL CAD: CPT

## 2019-02-11 RX ORDER — TRIAMTERENE AND HYDROCHLOROTHIAZIDE 37.5; 25 MG/1; MG/1
TABLET ORAL
Qty: 90 TABLET | Refills: 1 | Status: SHIPPED | OUTPATIENT
Start: 2019-02-11 | End: 2019-05-28 | Stop reason: SDUPTHER

## 2019-02-11 RX ORDER — NAPROXEN 500 MG/1
TABLET ORAL
Qty: 180 TABLET | Refills: 1 | Status: SHIPPED | OUTPATIENT
Start: 2019-02-11 | End: 2020-01-09

## 2019-02-11 RX ORDER — CLOPIDOGREL BISULFATE 75 MG/1
TABLET ORAL
Qty: 90 TABLET | Refills: 1 | Status: SHIPPED | OUTPATIENT
Start: 2019-02-11 | End: 2019-05-28 | Stop reason: SDUPTHER

## 2019-02-11 RX ORDER — LISINOPRIL 10 MG/1
TABLET ORAL
Qty: 90 TABLET | Refills: 1 | Status: SHIPPED | OUTPATIENT
Start: 2019-02-11 | End: 2019-05-28 | Stop reason: SDUPTHER

## 2019-02-11 RX ORDER — ATORVASTATIN CALCIUM 20 MG/1
TABLET, FILM COATED ORAL
Qty: 90 TABLET | Refills: 1 | Status: SHIPPED | OUTPATIENT
Start: 2019-02-11 | End: 2019-05-28 | Stop reason: SDUPTHER

## 2019-02-21 ENCOUNTER — OFFICE VISIT (OUTPATIENT)
Dept: FAMILY MEDICINE CLINIC | Age: 79
End: 2019-02-21

## 2019-02-21 VITALS
SYSTOLIC BLOOD PRESSURE: 130 MMHG | HEART RATE: 62 BPM | WEIGHT: 155 LBS | BODY MASS INDEX: 28.52 KG/M2 | RESPIRATION RATE: 14 BRPM | HEIGHT: 62 IN | DIASTOLIC BLOOD PRESSURE: 72 MMHG

## 2019-02-21 DIAGNOSIS — I10 ESSENTIAL HYPERTENSION: ICD-10-CM

## 2019-02-21 DIAGNOSIS — F01.50 VASCULAR DEMENTIA WITHOUT BEHAVIORAL DISTURBANCE (HCC): ICD-10-CM

## 2019-02-21 DIAGNOSIS — F03.90 DEMENTIA WITHOUT BEHAVIORAL DISTURBANCE, UNSPECIFIED DEMENTIA TYPE: ICD-10-CM

## 2019-02-21 DIAGNOSIS — E78.5 HYPERLIPIDEMIA, UNSPECIFIED HYPERLIPIDEMIA TYPE: ICD-10-CM

## 2019-02-21 PROCEDURE — 1123F ACP DISCUSS/DSCN MKR DOCD: CPT | Performed by: EMERGENCY MEDICINE

## 2019-02-21 PROCEDURE — G8427 DOCREV CUR MEDS BY ELIG CLIN: HCPCS | Performed by: EMERGENCY MEDICINE

## 2019-02-21 PROCEDURE — G8598 ASA/ANTIPLAT THER USED: HCPCS | Performed by: EMERGENCY MEDICINE

## 2019-02-21 PROCEDURE — G8419 CALC BMI OUT NRM PARAM NOF/U: HCPCS | Performed by: EMERGENCY MEDICINE

## 2019-02-21 PROCEDURE — 99213 OFFICE O/P EST LOW 20 MIN: CPT | Performed by: EMERGENCY MEDICINE

## 2019-02-21 PROCEDURE — G8400 PT W/DXA NO RESULTS DOC: HCPCS | Performed by: EMERGENCY MEDICINE

## 2019-02-21 PROCEDURE — 1101F PT FALLS ASSESS-DOCD LE1/YR: CPT | Performed by: EMERGENCY MEDICINE

## 2019-02-21 PROCEDURE — 4040F PNEUMOC VAC/ADMIN/RCVD: CPT | Performed by: EMERGENCY MEDICINE

## 2019-02-21 PROCEDURE — 1090F PRES/ABSN URINE INCON ASSESS: CPT | Performed by: EMERGENCY MEDICINE

## 2019-02-21 PROCEDURE — 1036F TOBACCO NON-USER: CPT | Performed by: EMERGENCY MEDICINE

## 2019-02-21 ASSESSMENT — PATIENT HEALTH QUESTIONNAIRE - PHQ9
SUM OF ALL RESPONSES TO PHQ QUESTIONS 1-9: 0
1. LITTLE INTEREST OR PLEASURE IN DOING THINGS: 0
SUM OF ALL RESPONSES TO PHQ QUESTIONS 1-9: 0
2. FEELING DOWN, DEPRESSED OR HOPELESS: 0
SUM OF ALL RESPONSES TO PHQ9 QUESTIONS 1 & 2: 0

## 2019-02-21 ASSESSMENT — ENCOUNTER SYMPTOMS
COUGH: 0
VOICE CHANGE: 0
ORTHOPNEA: 0
VOMITING: 0
WHEEZING: 0
SINUS PRESSURE: 0
NAUSEA: 0
SORE THROAT: 0
ABDOMINAL PAIN: 0
CHEST TIGHTNESS: 0
DIARRHEA: 0
SHORTNESS OF BREATH: 0
RHINORRHEA: 0
CONSTIPATION: 0
TROUBLE SWALLOWING: 0
BACK PAIN: 0

## 2019-02-22 LAB
ABSOLUTE BASO #: 0 K/UL (ref 0–0.1)
ABSOLUTE EOS #: 0 K/UL (ref 0.1–0.4)
ABSOLUTE LYMPH #: 1.8 K/UL (ref 0.8–5.2)
ABSOLUTE MONO #: 0.4 K/UL (ref 0.1–0.9)
ABSOLUTE NEUT #: 2.2 K/UL (ref 1.3–9.1)
ALBUMIN SERPL-MCNC: 4.5 G/DL (ref 3.5–5.2)
ALK PHOSPHATASE: 52 U/L (ref 30–146)
ALT SERPL-CCNC: 20 U/L (ref 5–40)
ANION GAP SERPL CALCULATED.3IONS-SCNC: 11 MEQ/L (ref 10–19)
AST SERPL-CCNC: 21 U/L (ref 9–40)
BASOPHILS RELATIVE PERCENT: 0.7 %
BILIRUB SERPL-MCNC: 0.9 MG/DL
BUN BLDV-MCNC: 24 MG/DL (ref 8–23)
CALCIUM SERPL-MCNC: 10.2 MG/DL (ref 8.5–10.5)
CHLORIDE BLD-SCNC: 102 MEQ/L (ref 95–107)
CHOLESTEROL/HDL RATIO: 3.2
CHOLESTEROL: 166 MG/DL
CO2: 27 MEQ/L (ref 19–31)
CREAT SERPL-MCNC: 1.4 MG/DL (ref 0.6–1.3)
EGFR AFRICAN AMERICAN: 40.7 ML/MIN/1.73 M2
EGFR IF NONAFRICAN AMERICAN: 35.1 ML/MIN/1.73 M2
EOSINOPHILS RELATIVE PERCENT: 0.9 %
GLUCOSE: 82 MG/DL (ref 70–99)
HCT VFR BLD CALC: 34.7 % (ref 36–48)
HDLC SERPL-MCNC: 51.6 MG/DL
HEMOGLOBIN: 11.4 G/DL (ref 12–16)
LDL CHOLESTEROL CALCULATED: 94 MG/DL
LDL/HDL RATIO: 1.8
LYMPHOCYTE %: 39.4 %
MCH RBC QN AUTO: 29.2 PG (ref 27–34)
MCHC RBC AUTO-ENTMCNC: 32.9 G/DL (ref 31–36)
MCV RBC AUTO: 88.7 FL (ref 80–100)
MONOCYTES # BLD: 8.6 %
NEUTROPHILS RELATIVE PERCENT: 50.2 %
PDW BLD-RTO: 13.1 % (ref 10.8–14.8)
PLATELETS: 211 K/UL (ref 150–450)
POTASSIUM SERPL-SCNC: 4.6 MEQ/L (ref 3.5–5.4)
RBC: 3.91 M/UL (ref 4–5.5)
SODIUM BLD-SCNC: 140 MEQ/L (ref 135–146)
TOTAL PROTEIN: 7.3 G/DL (ref 6.1–8.3)
TRIGL SERPL-MCNC: 104 MG/DL
VLDLC SERPL CALC-MCNC: 21 MG/DL
WBC: 4.4 K/UL (ref 3.7–10.8)

## 2019-02-27 ENCOUNTER — TELEPHONE (OUTPATIENT)
Dept: FAMILY MEDICINE CLINIC | Age: 79
End: 2019-02-27

## 2019-04-10 ENCOUNTER — TELEPHONE (OUTPATIENT)
Dept: FAMILY MEDICINE CLINIC | Age: 79
End: 2019-04-10

## 2019-04-10 NOTE — TELEPHONE ENCOUNTER
Connie Clements called stating pt has had diarrhea several times since yesterday. Offered appt, Connie Clements cannot get pt here today or tomorrow  per Connie Clements. .    Informed Connie Clements to try BRAT diet, push fluids and check with pharmacist if probiotic ok with pt's meds.     Connie Clements informed again appt needed if no better tomorrow

## 2019-04-15 ENCOUNTER — TELEPHONE (OUTPATIENT)
Dept: FAMILY MEDICINE CLINIC | Age: 79
End: 2019-04-15

## 2019-04-15 NOTE — LETTER
[facility]  [address],[place] [id number]  Phone: [phone]  Fax: [phone]    Annalisa Lee MD        April 15, 2019     Patient: Ran Gonzalez   YOB: 1940   Date of Visit: 4/15/2019       To Whom It May Concern: It is my medical opinion that Netta Mckee requires a disability parking placard for the following reasons:  She has limited walking ability due to an arthritic and a neurologic condition. Duration of need: 5 years    If you have any questions or concerns, please don't hesitate to call.     Sincerely,        Annalisa Lee MD

## 2019-05-28 ENCOUNTER — OFFICE VISIT (OUTPATIENT)
Dept: FAMILY MEDICINE CLINIC | Age: 79
End: 2019-05-28

## 2019-05-28 VITALS
RESPIRATION RATE: 14 BRPM | WEIGHT: 152.2 LBS | SYSTOLIC BLOOD PRESSURE: 130 MMHG | HEIGHT: 62 IN | BODY MASS INDEX: 28.01 KG/M2 | HEART RATE: 66 BPM | DIASTOLIC BLOOD PRESSURE: 62 MMHG

## 2019-05-28 DIAGNOSIS — I25.10 CORONARY ARTERY DISEASE INVOLVING NATIVE CORONARY ARTERY WITHOUT ANGINA PECTORIS, UNSPECIFIED WHETHER NATIVE OR TRANSPLANTED HEART: ICD-10-CM

## 2019-05-28 DIAGNOSIS — I10 ESSENTIAL HYPERTENSION: Primary | ICD-10-CM

## 2019-05-28 DIAGNOSIS — F03.90 DEMENTIA WITHOUT BEHAVIORAL DISTURBANCE, UNSPECIFIED DEMENTIA TYPE: ICD-10-CM

## 2019-05-28 DIAGNOSIS — E78.5 HYPERLIPIDEMIA, UNSPECIFIED HYPERLIPIDEMIA TYPE: ICD-10-CM

## 2019-05-28 PROCEDURE — G8598 ASA/ANTIPLAT THER USED: HCPCS | Performed by: EMERGENCY MEDICINE

## 2019-05-28 PROCEDURE — G8419 CALC BMI OUT NRM PARAM NOF/U: HCPCS | Performed by: EMERGENCY MEDICINE

## 2019-05-28 PROCEDURE — G8400 PT W/DXA NO RESULTS DOC: HCPCS | Performed by: EMERGENCY MEDICINE

## 2019-05-28 PROCEDURE — 1123F ACP DISCUSS/DSCN MKR DOCD: CPT | Performed by: EMERGENCY MEDICINE

## 2019-05-28 PROCEDURE — 4040F PNEUMOC VAC/ADMIN/RCVD: CPT | Performed by: EMERGENCY MEDICINE

## 2019-05-28 PROCEDURE — 1090F PRES/ABSN URINE INCON ASSESS: CPT | Performed by: EMERGENCY MEDICINE

## 2019-05-28 PROCEDURE — G8427 DOCREV CUR MEDS BY ELIG CLIN: HCPCS | Performed by: EMERGENCY MEDICINE

## 2019-05-28 PROCEDURE — 99213 OFFICE O/P EST LOW 20 MIN: CPT | Performed by: EMERGENCY MEDICINE

## 2019-05-28 PROCEDURE — 1036F TOBACCO NON-USER: CPT | Performed by: EMERGENCY MEDICINE

## 2019-05-28 RX ORDER — CLOPIDOGREL BISULFATE 75 MG/1
75 TABLET ORAL DAILY
Qty: 90 TABLET | Refills: 1 | Status: SHIPPED | OUTPATIENT
Start: 2019-05-28 | End: 2020-05-12

## 2019-05-28 RX ORDER — LISINOPRIL 10 MG/1
10 TABLET ORAL DAILY
Qty: 90 TABLET | Refills: 1 | Status: SHIPPED | OUTPATIENT
Start: 2019-05-28 | End: 2020-05-12

## 2019-05-28 RX ORDER — DONEPEZIL HYDROCHLORIDE 10 MG/1
10 TABLET, FILM COATED ORAL NIGHTLY
Qty: 90 TABLET | Refills: 1 | Status: SHIPPED | OUTPATIENT
Start: 2019-05-28 | End: 2019-10-08 | Stop reason: SDUPTHER

## 2019-05-28 RX ORDER — TRIAMTERENE AND HYDROCHLOROTHIAZIDE 37.5; 25 MG/1; MG/1
1 TABLET ORAL DAILY
Qty: 90 TABLET | Refills: 1 | Status: SHIPPED | OUTPATIENT
Start: 2019-05-28 | End: 2020-05-12

## 2019-05-28 RX ORDER — ATORVASTATIN CALCIUM 20 MG/1
20 TABLET, FILM COATED ORAL DAILY
Qty: 90 TABLET | Refills: 1 | Status: SHIPPED | OUTPATIENT
Start: 2019-05-28 | End: 2020-05-12

## 2019-05-28 ASSESSMENT — ENCOUNTER SYMPTOMS
VOICE CHANGE: 0
SORE THROAT: 0
COUGH: 0
SINUS PRESSURE: 0
CONSTIPATION: 0
BACK PAIN: 1
SHORTNESS OF BREATH: 0
CHEST TIGHTNESS: 0
NAUSEA: 0
RHINORRHEA: 0
TROUBLE SWALLOWING: 0
VOMITING: 0
ORTHOPNEA: 0
DIARRHEA: 0
ABDOMINAL PAIN: 0
WHEEZING: 0

## 2019-05-28 NOTE — PROGRESS NOTES
No components found for: CHLPL  Lab Results   Component Value Date    TRIG 104 02/21/2019     Lab Results   Component Value Date    HDL 51.6 02/21/2019     Lab Results   Component Value Date    LDLCALC 94 02/21/2019     Lab Results   Component Value Date    LABVLDL 21 02/21/2019       Lab Results   Component Value Date    ALT 20 02/21/2019    AST 21 02/21/2019    ALKPHOS 52 02/21/2019    BILITOT 0.9 02/21/2019           Is patient currently taking any cholesterol medications? Yes   If yes, see med list as above    Is the patient reporting any side effects of cholesterol medications? No    Is the patient taking any over the counter medications? Yes   If yes, see med list as above    Is the patient taking a daily aspirin? Yes      Patient Self-Management Goal for Chronic Condition  Goal: I will take all medications as prescribed by my doctor, and I will call the office if I am having any medication problems. Barriers to success: none  Plan for overcoming my barriers: N/A     Confidence: 10/10  Date goal set: 5/28/19  Date goal attained:     Have you seen any other physician or provider since your last visit no    Have you had any other diagnostic tests since your last visit? no    Have you changed or stopped any medications since your last visit including any over-the-counter medicines, vitamins, or herbal medicines? no     Are you taking all your prescribed medications?  Yes    If NO, why?

## 2019-05-28 NOTE — PROGRESS NOTES
Visit Date: 5/28/2019    Subjective: Lacy Bingham is a 78 y. o.female who presents today for:  Chief Complaint   Patient presents with    Hypertension    Hyperlipidemia         HPI:     Took Naproxen due to intermittentpain on back of thigh     Came in with daughter     Hypertension   This is a chronic problem. The problem is controlled. Pertinent negatives include no chest pain, headaches, malaise/fatigue, neck pain, orthopnea, palpitations, peripheral edema or shortness of breath. Hyperlipidemia   This is a chronic problem. The problem is controlled. Recent lipid tests were reviewed and are normal. Pertinent negatives include no chest pain, focal sensory loss, leg pain, myalgias or shortness of breath. Coronary Artery Disease   Pertinent negatives include no chest pain, chest tightness, dizziness, leg swelling, palpitations or shortness of breath. Risk factors include hyperlipidemia. CurrentHome Medications:  Current Outpatient Medications   Medication Sig Dispense Refill    lisinopril (PRINIVIL;ZESTRIL) 10 MG tablet Take 1 tablet by mouth daily 90 tablet 1    donepezil (ARICEPT) 10 MG tablet Take 1 tablet by mouth nightly 90 tablet 1    atorvastatin (LIPITOR) 20 MG tablet Take 1 tablet by mouth daily 90 tablet 1    clopidogrel (PLAVIX) 75 MG tablet Take 1 tablet by mouth daily 90 tablet 1    triamterene-hydrochlorothiazide (MAXZIDE-25) 37.5-25 MG per tablet Take 1 tablet by mouth daily 90 tablet 1    Carboxymethylcell-Hypromellose (GENTEAL OP) Apply to eye      naproxen (NAPROSYN) 500 MG tablet TAKE ONE TABLET BY MOUTH TWICE DAILY WITH MEALS 180 tablet 1    fluticasone (FLONASE) 50 MCG/ACT nasal spray 2 sprays by Nasal route daily 1 Bottle 3    Ascorbic Acid (VITAMIN C) 250 MG tablet Take 500 mg by mouth daily      aspirin 81 MG tablet Take 81 mg by mouth daily.  Multiple Vitamin (MULTIVITAMIN PO) Take  by mouth. No current facility-administered medications for this visit. Subjective:      Review of Systems   Constitutional: Negative for appetite change, chills, diaphoresis, fatigue, fever and malaise/fatigue. HENT: Negative for congestion, ear pain, postnasal drip, rhinorrhea, sinus pressure, sneezing, sore throat, trouble swallowing and voice change. Respiratory: Negative for cough, chest tightness, shortness of breath and wheezing. Cardiovascular: Negative for chest pain, palpitations, orthopnea and leg swelling. Gastrointestinal: Negative for abdominal pain, constipation, diarrhea, nausea and vomiting. Musculoskeletal: Positive for back pain. Negative for arthralgias, joint swelling, myalgias, neck pain and neck stiffness. Neurological: Negative for dizziness, syncope, weakness, light-headedness, numbness and headaches. Objective:     /62 (Site: Right Upper Arm, Position: Sitting, Cuff Size: Medium Adult)   Pulse 66   Resp 14   Ht 5' 2\" (1.575 m)   Wt 152 lb 3.2 oz (69 kg)   BMI 27.84 kg/m²   BP Readings from Last 3 Encounters:   05/28/19 130/62   02/21/19 130/72   11/08/18 138/80     Wt Readings from Last 3 Encounters:   05/28/19 152 lb 3.2 oz (69 kg)   02/21/19 155 lb (70.3 kg)   11/08/18 151 lb (68.5 kg)       Physical Exam   Constitutional: She is oriented to person, place, and time. She appears well-developed and well-nourished. She is cooperative. HENT:   Head: Normocephalic and atraumatic. Right Ear: External ear normal.   Left Ear: External ear normal.   Nose: Nose normal.   Mouth/Throat: Oropharynx is clear and moist.   Eyes: Pupils are equal, round, and reactive to light. Conjunctivae and EOM are normal. No scleral icterus. Neck: Normal range of motion. Neck supple. No JVD present. No thyromegaly present. Cardiovascular: Normal rate, regular rhythm and intact distal pulses. Exam reveals no friction rub. No murmur heard. Pulmonary/Chest: Effort normal and breath sounds normal. She has no wheezes. She has no rales.  She exhibits no tenderness. Abdominal: Soft. Bowel sounds are normal. She exhibits no mass. There is no tenderness. Musculoskeletal: She exhibits no edema. Lymphadenopathy:     She has no cervical adenopathy. Neurological: She is alert and oriented to person, place, and time. Skin: No rash noted. Vitals reviewed. Assessment:         Diagnosis Orders   1. Essential hypertension  Comprehensive Metabolic Panel    CBC Auto Differential    Lipid Panel   2. Dementia without behavioral disturbance, unspecified dementia type     3. Hyperlipidemia, unspecified hyperlipidemia type  Comprehensive Metabolic Panel    CBC Auto Differential    Lipid Panel   4.  Coronary artery disease involving native coronary artery without angina pectoris, unspecified whether native or transplanted heart         Plan:      Medications Prescribed:  Orders Placed This Encounter   Medications    lisinopril (PRINIVIL;ZESTRIL) 10 MG tablet     Sig: Take 1 tablet by mouth daily     Dispense:  90 tablet     Refill:  1    donepezil (ARICEPT) 10 MG tablet     Sig: Take 1 tablet by mouth nightly     Dispense:  90 tablet     Refill:  1    atorvastatin (LIPITOR) 20 MG tablet     Sig: Take 1 tablet by mouth daily     Dispense:  90 tablet     Refill:  1    clopidogrel (PLAVIX) 75 MG tablet     Sig: Take 1 tablet by mouth daily     Dispense:  90 tablet     Refill:  1    triamterene-hydrochlorothiazide (MAXZIDE-25) 37.5-25 MG per tablet     Sig: Take 1 tablet by mouth daily     Dispense:  90 tablet     Refill:  1     Orders Placed:  Orders Placed This Encounter   Procedures    Comprehensive Metabolic Panel     Laboratory Test to be done in 3 months     Standing Status:   Future     Standing Expiration Date:   5/27/2020    CBC Auto Differential     Laboratory Test to be done in 3 months     Standing Status:   Future     Standing Expiration Date:   5/27/2020    Lipid Panel     Laboratory Test to be done in 3 months     Standing Status: Future     Standing Expiration Date:   5/27/2020     Order Specific Question:   Is Patient Fasting?/# of Hours     Answer:   12        Return in about 3 months (around 8/28/2019), or HTN Dementia. Discussed use, benefit, and side effects of prescribedmedications. All patient questions answered. Pt voiced understanding. Instructedto continue current medications, diet and exercise. Patient agreed with treatmentplan.

## 2019-05-30 LAB
ABSOLUTE BASO #: 0.1 K/UL (ref 0–0.1)
ABSOLUTE EOS #: 0.1 K/UL (ref 0.1–0.4)
ABSOLUTE LYMPH #: 1.9 K/UL (ref 0.8–5.2)
ABSOLUTE MONO #: 0.5 K/UL (ref 0.1–0.9)
ABSOLUTE NEUT #: 2.3 K/UL (ref 1.3–9.1)
ALBUMIN SERPL-MCNC: 4.2 G/DL (ref 3.2–5.3)
ALK PHOSPHATASE: 57 U/L (ref 39–130)
ALT SERPL-CCNC: 14 U/L (ref 0–31)
ANION GAP SERPL CALCULATED.3IONS-SCNC: 9 MMOL/L (ref 4–12)
AST SERPL-CCNC: 16 U/L (ref 0–41)
BASOPHILS RELATIVE PERCENT: 1 %
BILIRUB SERPL-MCNC: 0.8 MG/DL (ref 0.3–1.2)
BUN BLDV-MCNC: 32 MG/DL (ref 5–27)
CALCIUM SERPL-MCNC: 9.6 MG/DL (ref 8.5–10.5)
CHLORIDE BLD-SCNC: 108 MMOL/L (ref 98–109)
CHOLESTEROL/HDL RATIO: 3.3 (ref 1–5)
CHOLESTEROL: 148 MG/DL (ref 150–200)
CO2: 25 MMOL/L (ref 22–32)
CREAT SERPL-MCNC: 1.22 MG/DL (ref 0.4–1)
EGFR AFRICAN AMERICAN: 51 ML/MIN/1.73SQ.M
EGFR IF NONAFRICAN AMERICAN: 42 ML/MIN/1.73SQ.M
EOSINOPHILS RELATIVE PERCENT: 1.3 %
GLUCOSE: 82 MG/DL (ref 65–99)
HCT VFR BLD CALC: 34.1 % (ref 36–48)
HDLC SERPL-MCNC: 45 MG/DL
HEMOGLOBIN: 11.1 G/DL (ref 12–16)
LDL CHOLESTEROL CALCULATED: 83 MG/DL
LDL/HDL RATIO: 1.8
LYMPHOCYTE %: 39.5 %
MCH RBC QN AUTO: 29.2 PG (ref 27–34)
MCHC RBC AUTO-ENTMCNC: 32.6 G/DL (ref 31–36)
MCV RBC AUTO: 89.7 FL (ref 80–100)
MONOCYTES # BLD: 9.8 %
NEUTROPHILS RELATIVE PERCENT: 48.2 %
PDW BLD-RTO: 13.1 % (ref 10.8–14.8)
PLATELETS: 218 K/UL (ref 150–450)
POTASSIUM SERPL-SCNC: 4.7 MMOL/L (ref 3.5–5)
RBC: 3.8 M/UL (ref 4–5.5)
SODIUM BLD-SCNC: 142 MMOL/L (ref 134–146)
TOTAL PROTEIN: 7.3 G/DL (ref 6–8)
TRIGL SERPL-MCNC: 100 MG/DL (ref 27–150)
VLDLC SERPL CALC-MCNC: 20 MG/DL (ref 0–30)
WBC: 4.8 K/UL (ref 3.7–10.8)

## 2019-08-15 ENCOUNTER — OFFICE VISIT (OUTPATIENT)
Dept: FAMILY MEDICINE CLINIC | Age: 79
End: 2019-08-15

## 2019-08-15 VITALS
BODY MASS INDEX: 27.79 KG/M2 | HEIGHT: 62 IN | HEART RATE: 84 BPM | WEIGHT: 151 LBS | RESPIRATION RATE: 16 BRPM | DIASTOLIC BLOOD PRESSURE: 88 MMHG | SYSTOLIC BLOOD PRESSURE: 136 MMHG

## 2019-08-15 DIAGNOSIS — I10 ESSENTIAL HYPERTENSION: Primary | ICD-10-CM

## 2019-08-15 DIAGNOSIS — E78.5 HYPERLIPIDEMIA, UNSPECIFIED HYPERLIPIDEMIA TYPE: ICD-10-CM

## 2019-08-15 DIAGNOSIS — F03.90 DEMENTIA WITHOUT BEHAVIORAL DISTURBANCE, UNSPECIFIED DEMENTIA TYPE: ICD-10-CM

## 2019-08-15 PROCEDURE — 1123F ACP DISCUSS/DSCN MKR DOCD: CPT | Performed by: EMERGENCY MEDICINE

## 2019-08-15 PROCEDURE — G8419 CALC BMI OUT NRM PARAM NOF/U: HCPCS | Performed by: EMERGENCY MEDICINE

## 2019-08-15 PROCEDURE — 4040F PNEUMOC VAC/ADMIN/RCVD: CPT | Performed by: EMERGENCY MEDICINE

## 2019-08-15 PROCEDURE — G8427 DOCREV CUR MEDS BY ELIG CLIN: HCPCS | Performed by: EMERGENCY MEDICINE

## 2019-08-15 PROCEDURE — 1090F PRES/ABSN URINE INCON ASSESS: CPT | Performed by: EMERGENCY MEDICINE

## 2019-08-15 PROCEDURE — G8598 ASA/ANTIPLAT THER USED: HCPCS | Performed by: EMERGENCY MEDICINE

## 2019-08-15 PROCEDURE — G8400 PT W/DXA NO RESULTS DOC: HCPCS | Performed by: EMERGENCY MEDICINE

## 2019-08-15 PROCEDURE — 1036F TOBACCO NON-USER: CPT | Performed by: EMERGENCY MEDICINE

## 2019-08-15 PROCEDURE — 99214 OFFICE O/P EST MOD 30 MIN: CPT | Performed by: EMERGENCY MEDICINE

## 2019-08-15 RX ORDER — MEMANTINE HYDROCHLORIDE 5 MG-10 MG
KIT ORAL SEE ADMIN INSTRUCTIONS
Qty: 1 PACKAGE | Refills: 0 | Status: SHIPPED | OUTPATIENT
Start: 2019-08-15 | End: 2019-10-07 | Stop reason: ALTCHOICE

## 2019-08-15 ASSESSMENT — ENCOUNTER SYMPTOMS
ORTHOPNEA: 0
ABDOMINAL PAIN: 0
VOMITING: 0
COUGH: 0
SHORTNESS OF BREATH: 0
BACK PAIN: 0
CHEST TIGHTNESS: 0
SORE THROAT: 0
DIARRHEA: 0
SINUS PRESSURE: 0
CONSTIPATION: 0
NAUSEA: 0
TROUBLE SWALLOWING: 0
VOICE CHANGE: 0
WHEEZING: 0
RHINORRHEA: 0

## 2019-08-15 NOTE — PROGRESS NOTES
Faxed Namenda RX to [facility] to [phone]
 Ascorbic Acid (VITAMIN C) 250 MG tablet Take 500 mg by mouth daily      aspirin 81 MG tablet Take 81 mg by mouth daily.  Multiple Vitamin (MULTIVITAMIN PO) Take  by mouth. No current facility-administered medications for this visit. Subjective:      Review of Systems   Constitutional: Negative for appetite change, chills, diaphoresis, fatigue, fever and malaise/fatigue. HENT: Negative for congestion, ear pain, postnasal drip, rhinorrhea, sinus pressure, sneezing, sore throat, trouble swallowing and voice change. Respiratory: Negative for cough, chest tightness, shortness of breath and wheezing. Cardiovascular: Negative for chest pain, palpitations, orthopnea and leg swelling. Gastrointestinal: Negative for abdominal pain, constipation, diarrhea, nausea and vomiting. Musculoskeletal: Negative for arthralgias, back pain, joint swelling, myalgias, neck pain and neck stiffness. Neurological: Negative for dizziness, syncope, weakness, light-headedness, numbness and headaches. Objective:     /88 (Site: Right Upper Arm, Position: Sitting, Cuff Size: Medium Adult)   Pulse 84   Resp 16   Ht 5' 2\" (1.575 m)   Wt 151 lb (68.5 kg)   BMI 27.62 kg/m²   BP Readings from Last 3 Encounters:   08/15/19 136/88   05/28/19 130/62   02/21/19 130/72     Wt Readings from Last 3 Encounters:   08/15/19 151 lb (68.5 kg)   05/28/19 152 lb 3.2 oz (69 kg)   02/21/19 155 lb (70.3 kg)       Physical Exam   Constitutional: She is oriented to person, place, and time. She appears well-developed and well-nourished. She is cooperative. HENT:   Head: Normocephalic and atraumatic. Right Ear: External ear normal.   Left Ear: External ear normal.   Nose: Nose normal.   Mouth/Throat: Oropharynx is clear and moist.   Eyes: Pupils are equal, round, and reactive to light. Conjunctivae and EOM are normal. No scleral icterus. Neck: Normal range of motion. Neck supple. No JVD present.  No thyromegaly

## 2019-08-20 ENCOUNTER — TELEPHONE (OUTPATIENT)
Dept: FAMILY MEDICINE CLINIC | Age: 79
End: 2019-08-20

## 2019-08-20 NOTE — TELEPHONE ENCOUNTER
Mick Goldberg called stating that pt was started on Namenda on 08/15/19. They are worried about pt having to take the medication morning and evening. Pt has difficultly taking medication in the evening and hard for someone to get over there to make sure patient is taking the medication in the evening. They have her set up where she is taking all her medication in the morning. Mick Goldberg is wanting to know if pt would be able to take both pills in the morning or if there is something that pt would be able to only take once daily in it's place.     Mick Goldberg may be reached at [phone]

## 2019-10-07 ENCOUNTER — TELEPHONE (OUTPATIENT)
Dept: FAMILY MEDICINE CLINIC | Age: 79
End: 2019-10-07

## 2019-10-07 ENCOUNTER — HOSPITAL ENCOUNTER (OUTPATIENT)
Dept: MAMMOGRAPHY | Age: 79
Discharge: HOME | End: 2019-10-07
Payer: MEDICARE

## 2019-10-07 DIAGNOSIS — Z12.39 SCREENING BREAST EXAMINATION: ICD-10-CM

## 2019-10-07 RX ORDER — MEMANTINE HYDROCHLORIDE 10 MG/1
10 TABLET ORAL 2 TIMES DAILY
Qty: 180 TABLET | Refills: 1 | Status: SHIPPED | OUTPATIENT
Start: 2019-10-07 | End: 2020-05-28 | Stop reason: SDUPTHER

## 2019-10-08 RX ORDER — DONEPEZIL HYDROCHLORIDE 10 MG/1
10 TABLET, FILM COATED ORAL NIGHTLY
Qty: 90 TABLET | Refills: 1 | Status: SHIPPED | OUTPATIENT
Start: 2019-10-08 | End: 2020-05-28 | Stop reason: SDUPTHER

## 2019-11-01 ENCOUNTER — HOSPITAL ENCOUNTER (OUTPATIENT)
Dept: WOMENS IMAGING | Age: 79
Discharge: HOME | End: 2019-11-01
Payer: MEDICARE

## 2019-11-01 DIAGNOSIS — Z12.31 VISIT FOR SCREENING MAMMOGRAM: ICD-10-CM

## 2019-11-01 PROCEDURE — 77063 BREAST TOMOSYNTHESIS BI: CPT

## 2019-11-13 ENCOUNTER — HOSPITAL ENCOUNTER (OUTPATIENT)
Dept: WOMENS IMAGING | Age: 79
Discharge: HOME | End: 2019-11-13
Payer: MEDICARE

## 2019-11-13 DIAGNOSIS — R92.1 BREAST CALCIFICATIONS: ICD-10-CM

## 2019-11-13 PROCEDURE — G0279 TOMOSYNTHESIS, MAMMO: HCPCS

## 2019-11-19 ENCOUNTER — OFFICE VISIT (OUTPATIENT)
Dept: FAMILY MEDICINE CLINIC | Age: 79
End: 2019-11-19

## 2019-11-19 VITALS
SYSTOLIC BLOOD PRESSURE: 98 MMHG | HEIGHT: 62 IN | DIASTOLIC BLOOD PRESSURE: 60 MMHG | RESPIRATION RATE: 14 BRPM | WEIGHT: 144 LBS | HEART RATE: 52 BPM | BODY MASS INDEX: 26.5 KG/M2

## 2019-11-19 DIAGNOSIS — E78.5 HYPERLIPIDEMIA, UNSPECIFIED HYPERLIPIDEMIA TYPE: ICD-10-CM

## 2019-11-19 DIAGNOSIS — F03.90 DEMENTIA WITHOUT BEHAVIORAL DISTURBANCE, UNSPECIFIED DEMENTIA TYPE: ICD-10-CM

## 2019-11-19 DIAGNOSIS — Z23 IMMUNIZATION DUE: ICD-10-CM

## 2019-11-19 DIAGNOSIS — I10 ESSENTIAL HYPERTENSION: Primary | ICD-10-CM

## 2019-11-19 DIAGNOSIS — H61.23 IMPACTED CERUMEN OF BOTH EARS: ICD-10-CM

## 2019-11-19 PROCEDURE — G8417 CALC BMI ABV UP PARAM F/U: HCPCS | Performed by: EMERGENCY MEDICINE

## 2019-11-19 PROCEDURE — 1036F TOBACCO NON-USER: CPT | Performed by: EMERGENCY MEDICINE

## 2019-11-19 PROCEDURE — G0008 ADMIN INFLUENZA VIRUS VAC: HCPCS | Performed by: EMERGENCY MEDICINE

## 2019-11-19 PROCEDURE — 4040F PNEUMOC VAC/ADMIN/RCVD: CPT | Performed by: EMERGENCY MEDICINE

## 2019-11-19 PROCEDURE — 1123F ACP DISCUSS/DSCN MKR DOCD: CPT | Performed by: EMERGENCY MEDICINE

## 2019-11-19 PROCEDURE — G8598 ASA/ANTIPLAT THER USED: HCPCS | Performed by: EMERGENCY MEDICINE

## 2019-11-19 PROCEDURE — 99214 OFFICE O/P EST MOD 30 MIN: CPT | Performed by: EMERGENCY MEDICINE

## 2019-11-19 PROCEDURE — G8427 DOCREV CUR MEDS BY ELIG CLIN: HCPCS | Performed by: EMERGENCY MEDICINE

## 2019-11-19 PROCEDURE — 69210 REMOVE IMPACTED EAR WAX UNI: CPT | Performed by: EMERGENCY MEDICINE

## 2019-11-19 PROCEDURE — 1090F PRES/ABSN URINE INCON ASSESS: CPT | Performed by: EMERGENCY MEDICINE

## 2019-11-19 PROCEDURE — G8400 PT W/DXA NO RESULTS DOC: HCPCS | Performed by: EMERGENCY MEDICINE

## 2019-11-19 PROCEDURE — 90756 CCIIV4 VACC ABX FREE IM: CPT | Performed by: EMERGENCY MEDICINE

## 2019-11-19 RX ORDER — MOXIFLOXACIN 5 MG/ML
SOLUTION/ DROPS OPHTHALMIC
COMMUNITY
Start: 2019-11-18 | End: 2020-02-27 | Stop reason: ALTCHOICE

## 2019-11-19 ASSESSMENT — ENCOUNTER SYMPTOMS
CONSTIPATION: 0
SORE THROAT: 0
DIARRHEA: 0
ABDOMINAL PAIN: 0
BACK PAIN: 0
WHEEZING: 0
NAUSEA: 0
ORTHOPNEA: 0
VOMITING: 0
SINUS PRESSURE: 0
TROUBLE SWALLOWING: 0
RHINORRHEA: 0
COUGH: 0
CHEST TIGHTNESS: 0
SHORTNESS OF BREATH: 0
VOICE CHANGE: 0

## 2019-11-21 LAB
ABSOLUTE BASO #: 0 X10E9/L (ref 0–0.9)
ABSOLUTE EOS #: 0.1 X10E9/L (ref 0–0.4)
ABSOLUTE LYMPH #: 1.7 X10E9/L (ref 1–3.5)
ABSOLUTE MONO #: 0.4 X10E9/L (ref 0–0.9)
ABSOLUTE NEUT #: 2.1 X10E9/L (ref 1.5–6.6)
ALBUMIN SERPL-MCNC: 4.4 G/DL (ref 3.2–5.3)
ALK PHOSPHATASE: 50 U/L (ref 39–130)
ALT SERPL-CCNC: 21 U/L (ref 0–31)
ANION GAP SERPL CALCULATED.3IONS-SCNC: 10 MMOL/L (ref 4–12)
AST SERPL-CCNC: 22 U/L (ref 0–41)
BASOPHILS RELATIVE PERCENT: 0.4 %
BILIRUB SERPL-MCNC: 0.9 MG/DL (ref 0.3–1.2)
BUN BLDV-MCNC: 43 MG/DL (ref 5–27)
CALCIUM SERPL-MCNC: 9.8 MG/DL (ref 8.5–10.5)
CHLORIDE BLD-SCNC: 107 MMOL/L (ref 98–109)
CHOLESTEROL/HDL RATIO: 3.9 (ref 1–5)
CHOLESTEROL: 144 MG/DL (ref 150–200)
CO2: 24 MMOL/L (ref 22–32)
CREAT SERPL-MCNC: 1.56 MG/DL (ref 0.4–1)
EGFR AFRICAN AMERICAN: 39 ML/MIN/1.73SQ.M
EGFR IF NONAFRICAN AMERICAN: 32 ML/MIN/1.73SQ.M
EOSINOPHILS RELATIVE PERCENT: 1.5 %
GLUCOSE: 91 MG/DL (ref 65–99)
HCT VFR BLD CALC: 35.5 % (ref 34–48)
HDLC SERPL-MCNC: 37 MG/DL
HEMOGLOBIN: 11.8 G/DL (ref 11.7–16.1)
LDL CHOLESTEROL CALCULATED: 86 MG/DL
LDL/HDL RATIO: 2.3
LYMPHOCYTE %: 39.3 %
MCH RBC QN AUTO: 30.5 PG (ref 27–35)
MCHC RBC AUTO-ENTMCNC: 33.3 G/DL (ref 31–36)
MCV RBC AUTO: 92 FL (ref 81–101)
MONOCYTES # BLD: 9.5 %
NEUTROPHILS RELATIVE PERCENT: 49.3 %
PDW BLD-RTO: 14.3 % (ref 11.5–14.7)
PLATELETS: 214 X10E9/L (ref 150–450)
PMV BLD AUTO: 10.3 FL (ref 7–12)
POTASSIUM SERPL-SCNC: 4.2 MMOL/L (ref 3.5–5)
RBC: 3.88 X10E12/L (ref 3.3–5.5)
SODIUM BLD-SCNC: 141 MMOL/L (ref 134–146)
TOTAL PROTEIN: 7.7 G/DL (ref 6–8)
TRIGL SERPL-MCNC: 103 MG/DL (ref 27–150)
VLDLC SERPL CALC-MCNC: 21 MG/DL (ref 0–30)
WBC: 4.3 X10E9/L (ref 4–11.8)

## 2019-11-27 ENCOUNTER — TELEPHONE (OUTPATIENT)
Dept: FAMILY MEDICINE CLINIC | Age: 79
End: 2019-11-27

## 2020-01-09 RX ORDER — NAPROXEN 500 MG/1
500 TABLET ORAL 2 TIMES DAILY PRN
Qty: 90 TABLET | Refills: 1 | Status: SHIPPED | OUTPATIENT
Start: 2020-01-09 | End: 2020-05-28 | Stop reason: SDUPTHER

## 2020-02-27 ENCOUNTER — OFFICE VISIT (OUTPATIENT)
Dept: FAMILY MEDICINE CLINIC | Age: 80
End: 2020-02-27

## 2020-02-27 VITALS
DIASTOLIC BLOOD PRESSURE: 74 MMHG | BODY MASS INDEX: 26.18 KG/M2 | SYSTOLIC BLOOD PRESSURE: 124 MMHG | HEART RATE: 52 BPM | RESPIRATION RATE: 14 BRPM | WEIGHT: 142.25 LBS | HEIGHT: 62 IN

## 2020-02-27 PROCEDURE — G0438 PPPS, INITIAL VISIT: HCPCS | Performed by: EMERGENCY MEDICINE

## 2020-02-27 PROCEDURE — 4040F PNEUMOC VAC/ADMIN/RCVD: CPT | Performed by: EMERGENCY MEDICINE

## 2020-02-27 PROCEDURE — 1123F ACP DISCUSS/DSCN MKR DOCD: CPT | Performed by: EMERGENCY MEDICINE

## 2020-02-27 ASSESSMENT — ENCOUNTER SYMPTOMS
NAUSEA: 0
ABDOMINAL PAIN: 0
SINUS PRESSURE: 0
VOMITING: 0
CHEST TIGHTNESS: 0
DIARRHEA: 0
VOICE CHANGE: 0
WHEEZING: 0
SHORTNESS OF BREATH: 0
COUGH: 0
SORE THROAT: 0
BACK PAIN: 0
CONSTIPATION: 0
TROUBLE SWALLOWING: 0
RHINORRHEA: 0

## 2020-02-27 ASSESSMENT — LIFESTYLE VARIABLES: HOW OFTEN DO YOU HAVE A DRINK CONTAINING ALCOHOL: 0

## 2020-02-27 ASSESSMENT — PATIENT HEALTH QUESTIONNAIRE - PHQ9
SUM OF ALL RESPONSES TO PHQ QUESTIONS 1-9: 0
SUM OF ALL RESPONSES TO PHQ QUESTIONS 1-9: 0

## 2020-02-27 NOTE — PATIENT INSTRUCTIONS
Personalized Preventive Plan for Edith Pale - 2/27/2020  Medicare offers a range of preventive health benefits. Some of the tests and screenings are paid in full while other may be subject to a deductible, co-insurance, and/or copay. Some of these benefits include a comprehensive review of your medical history including lifestyle, illnesses that may run in your family, and various assessments and screenings as appropriate. After reviewing your medical record and screening and assessments performed today your provider may have ordered immunizations, labs, imaging, and/or referrals for you. A list of these orders (if applicable) as well as your Preventive Care list are included within your After Visit Summary for your review. Other Preventive Recommendations:    · A preventive eye exam performed by an eye specialist is recommended every 1-2 years to screen for glaucoma; cataracts, macular degeneration, and other eye disorders. · A preventive dental visit is recommended every 6 months. · Try to get at least 150 minutes of exercise per week or 10,000 steps per day on a pedometer . · Order or download the FREE \"Exercise & Physical Activity: Your Everyday Guide\" from [employer]. Call 8-595.260.3947 or search [employer] online. · You need 1441-2508 mg of calcium and 4857-3912 IU of vitamin D per day. It is possible to meet your calcium requirement with diet alone, but a vitamin D supplement is usually necessary to meet this goal.  · When exposed to the sun, use a sunscreen that protects against both UVA and UVB radiation with an SPF of 30 or greater. Reapply every 2 to 3 hours or after sweating, drying off with a towel, or swimming. · Always wear a seat belt when traveling in a car. Always wear a helmet when riding a bicycle or motorcycle.

## 2020-03-04 LAB
ALBUMIN SERPL-MCNC: 4.8 G/DL (ref 3.2–5.3)
ALK PHOSPHATASE: 52 U/L (ref 39–130)
ALT SERPL-CCNC: 20 U/L (ref 0–31)
ANION GAP SERPL CALCULATED.3IONS-SCNC: 8 MMOL/L (ref 5–15)
AST SERPL-CCNC: 21 U/L (ref 0–41)
BILIRUB SERPL-MCNC: 0.5 MG/DL (ref 0.3–1.2)
BUN BLDV-MCNC: 29 MG/DL (ref 5–27)
CALCIUM SERPL-MCNC: 9.8 MG/DL (ref 8.5–10.5)
CHLORIDE BLD-SCNC: 106 MMOL/L (ref 98–109)
CHOLESTEROL/HDL RATIO: 3.1 (ref 1–5)
CHOLESTEROL: 152 MG/DL (ref 150–200)
CO2: 25 MMOL/L (ref 22–32)
CREAT SERPL-MCNC: 1.27 MG/DL (ref 0.4–1)
EGFR AFRICAN AMERICAN: 49 ML/MIN/1.73SQ.M
EGFR IF NONAFRICAN AMERICAN: 40 ML/MIN/1.73SQ.M
FOLATE: 10.1 NG/ML
GLUCOSE: 79 MG/DL (ref 65–99)
HDLC SERPL-MCNC: 49 MG/DL
LDL CHOLESTEROL CALCULATED: 82 MG/DL
POTASSIUM SERPL-SCNC: 4.4 MMOL/L (ref 3.5–5)
SODIUM BLD-SCNC: 139 MMOL/L (ref 134–146)
TOTAL PROTEIN: 7.9 G/DL (ref 6–8)
TRIGL SERPL-MCNC: 104 MG/DL (ref 27–150)
VITAMIN B-12: 295 PG/ML (ref 180–914)
VLDLC SERPL CALC-MCNC: 21 MG/DL (ref 0–30)

## 2020-03-09 ENCOUNTER — TELEPHONE (OUTPATIENT)
Dept: FAMILY MEDICINE CLINIC | Age: 80
End: 2020-03-09

## 2020-03-09 NOTE — TELEPHONE ENCOUNTER
----- Message from Aman Logan MD sent at 3/6/2020  4:02 PM EST -----  Please call the patient and daughter, laboratories are within normal limits kidney function tests is much better with a creatinine of 1.27  from 1.56

## 2020-05-11 NOTE — TELEPHONE ENCOUNTER
Date of last visit:  2/27/2020  Date of next visit:  5/28/2020    Requested Prescriptions     Pending Prescriptions Disp Refills    triamterene-hydrochlorothiazide (MAXZIDE-25) 37.5-25 MG per tablet [Pharmacy Med Name: Triamterene-HCTZ 37.5-25 MG Oral Tablet] 90 tablet 0     Sig: Take 1 tablet by mouth once daily    clopidogrel (PLAVIX) 75 MG tablet [Pharmacy Med Name: Clopidogrel Bisulfate 75 MG Oral Tablet] 90 tablet 0     Sig: Take 1 tablet by mouth once daily    lisinopril (PRINIVIL;ZESTRIL) 10 MG tablet [Pharmacy Med Name: Lisinopril 10 MG Oral Tablet] 90 tablet 0     Sig: Take 1 tablet by mouth once daily    atorvastatin (LIPITOR) 20 MG tablet [Pharmacy Med Name: Atorvastatin Calcium 20 MG Oral Tablet] 90 tablet 0     Sig: Take 1 tablet by mouth once daily

## 2020-05-12 RX ORDER — CLOPIDOGREL BISULFATE 75 MG/1
TABLET ORAL
Qty: 90 TABLET | Refills: 0 | Status: SHIPPED | OUTPATIENT
Start: 2020-05-12 | End: 2020-05-28 | Stop reason: SDUPTHER

## 2020-05-12 RX ORDER — TRIAMTERENE AND HYDROCHLOROTHIAZIDE 37.5; 25 MG/1; MG/1
TABLET ORAL
Qty: 90 TABLET | Refills: 0 | Status: SHIPPED | OUTPATIENT
Start: 2020-05-12 | End: 2020-05-28 | Stop reason: SDUPTHER

## 2020-05-12 RX ORDER — LISINOPRIL 10 MG/1
TABLET ORAL
Qty: 90 TABLET | Refills: 0 | Status: SHIPPED | OUTPATIENT
Start: 2020-05-12 | End: 2020-05-28 | Stop reason: SDUPTHER

## 2020-05-12 RX ORDER — ATORVASTATIN CALCIUM 20 MG/1
TABLET, FILM COATED ORAL
Qty: 90 TABLET | Refills: 0 | Status: SHIPPED | OUTPATIENT
Start: 2020-05-12 | End: 2020-05-28 | Stop reason: SDUPTHER

## 2020-05-28 ENCOUNTER — OFFICE VISIT (OUTPATIENT)
Dept: FAMILY MEDICINE CLINIC | Age: 80
End: 2020-05-28

## 2020-05-28 VITALS
HEART RATE: 58 BPM | BODY MASS INDEX: 26.87 KG/M2 | DIASTOLIC BLOOD PRESSURE: 56 MMHG | RESPIRATION RATE: 16 BRPM | WEIGHT: 146 LBS | SYSTOLIC BLOOD PRESSURE: 138 MMHG | HEIGHT: 62 IN

## 2020-05-28 PROCEDURE — G8417 CALC BMI ABV UP PARAM F/U: HCPCS | Performed by: EMERGENCY MEDICINE

## 2020-05-28 PROCEDURE — 4040F PNEUMOC VAC/ADMIN/RCVD: CPT | Performed by: EMERGENCY MEDICINE

## 2020-05-28 PROCEDURE — 1090F PRES/ABSN URINE INCON ASSESS: CPT | Performed by: EMERGENCY MEDICINE

## 2020-05-28 PROCEDURE — 1036F TOBACCO NON-USER: CPT | Performed by: EMERGENCY MEDICINE

## 2020-05-28 PROCEDURE — 99213 OFFICE O/P EST LOW 20 MIN: CPT | Performed by: EMERGENCY MEDICINE

## 2020-05-28 PROCEDURE — G8400 PT W/DXA NO RESULTS DOC: HCPCS | Performed by: EMERGENCY MEDICINE

## 2020-05-28 PROCEDURE — G8427 DOCREV CUR MEDS BY ELIG CLIN: HCPCS | Performed by: EMERGENCY MEDICINE

## 2020-05-28 PROCEDURE — 1123F ACP DISCUSS/DSCN MKR DOCD: CPT | Performed by: EMERGENCY MEDICINE

## 2020-05-28 RX ORDER — CLOPIDOGREL BISULFATE 75 MG/1
TABLET ORAL
Qty: 90 TABLET | Refills: 1 | Status: SHIPPED | OUTPATIENT
Start: 2020-05-28 | End: 2021-02-22

## 2020-05-28 RX ORDER — MEMANTINE HYDROCHLORIDE 10 MG/1
10 TABLET ORAL 2 TIMES DAILY
Qty: 180 TABLET | Refills: 1 | Status: SHIPPED | OUTPATIENT
Start: 2020-05-28 | End: 2021-01-01

## 2020-05-28 RX ORDER — LISINOPRIL 10 MG/1
TABLET ORAL
Qty: 90 TABLET | Refills: 1 | Status: SHIPPED | OUTPATIENT
Start: 2020-05-28 | End: 2021-02-22

## 2020-05-28 RX ORDER — TRIAMTERENE AND HYDROCHLOROTHIAZIDE 37.5; 25 MG/1; MG/1
TABLET ORAL
Qty: 90 TABLET | Refills: 1 | Status: SHIPPED | OUTPATIENT
Start: 2020-05-28 | End: 2021-02-22

## 2020-05-28 RX ORDER — DONEPEZIL HYDROCHLORIDE 10 MG/1
10 TABLET, FILM COATED ORAL NIGHTLY
Qty: 90 TABLET | Refills: 1 | Status: SHIPPED | OUTPATIENT
Start: 2020-05-28 | End: 2021-02-22

## 2020-05-28 RX ORDER — FLUTICASONE PROPIONATE 50 MCG
2 SPRAY, SUSPENSION (ML) NASAL DAILY
Qty: 1 BOTTLE | Refills: 3 | Status: SHIPPED | OUTPATIENT
Start: 2020-05-28

## 2020-05-28 RX ORDER — ATORVASTATIN CALCIUM 20 MG/1
TABLET, FILM COATED ORAL
Qty: 90 TABLET | Refills: 1 | Status: SHIPPED | OUTPATIENT
Start: 2020-05-28 | End: 2021-02-22

## 2020-05-28 RX ORDER — NAPROXEN 500 MG/1
500 TABLET ORAL 2 TIMES DAILY PRN
Qty: 90 TABLET | Refills: 1 | Status: SHIPPED | OUTPATIENT
Start: 2020-05-28 | End: 2021-02-22

## 2020-05-28 ASSESSMENT — ENCOUNTER SYMPTOMS
CHEST TIGHTNESS: 0
SHORTNESS OF BREATH: 0
ORTHOPNEA: 0

## 2020-05-28 NOTE — PROGRESS NOTES
No components found for: CHLPL  Lab Results   Component Value Date    TRIG 104 03/04/2020     Lab Results   Component Value Date    HDL 49 03/04/2020     Lab Results   Component Value Date    LDLCALC 82 03/04/2020     Lab Results   Component Value Date    LABVLDL 21 03/04/2020       Lab Results   Component Value Date    ALT 20 03/04/2020    AST 21 03/04/2020    ALKPHOS 52 03/04/2020    BILITOT 0.5 03/04/2020           Is patient currently taking any cholesterol medications? Yes   If yes, see med list as above    Is the patient reporting any side effects of cholesterol medications? No    Is the patient taking any over the counter medications? Yes   If yes, see med list as above    Is the patient taking a daily aspirin? Yes      Patient Self-Management Goal for Chronic Condition  Goal: I will take all medications as prescribed by my doctor, and I will call the office if I am having any medication problems. Barriers to success: none  Plan for overcoming my barriers: N/A     Confidence: 10/10  Date goal set: 5/28/20  Date goal attained:     Have you seen any other physician or provider since your last visit no    Have you had any other diagnostic tests since your last visit? no    Have you changed or stopped any medications since your last visit including any over-the-counter medicines, vitamins, or herbal medicines? no     Are you taking all your prescribed medications?  Yes    If NO, why?

## 2020-05-28 NOTE — PROGRESS NOTES
Visit Date: 5/28/2020    Subjective: Lynetta Gottron is a 78 y. o.female who presents today for:  Chief Complaint   Patient presents with    Check-Up         HPI:     Arthritis low back, hip and arm Rx with Naproxen    Hyperlipidemia   This is a chronic problem. The problem is controlled. Recent lipid tests were reviewed and are normal. Pertinent negatives include no chest pain, focal sensory loss, leg pain, myalgias or shortness of breath. Hypertension   This is a chronic problem. The problem is controlled. Pertinent negatives include no chest pain, headaches, malaise/fatigue, neck pain, orthopnea, palpitations, peripheral edema or shortness of breath. Coronary Artery Disease   Pertinent negatives include no chest pain, chest tightness, dizziness, leg swelling, palpitations or shortness of breath. Risk factors include hyperlipidemia. CurrentHome Medications:  Current Outpatient Medications   Medication Sig Dispense Refill    lisinopril (PRINIVIL;ZESTRIL) 10 MG tablet Take 1 tablet by mouth once daily 90 tablet 1    triamterene-hydroCHLOROthiazide (MAXZIDE-25) 37.5-25 MG per tablet Take 1 tablet by mouth once daily 90 tablet 1    naproxen (NAPROSYN) 500 MG tablet Take 1 tablet by mouth 2 times daily as needed for Pain (arthritic pain, take medications with food) 90 tablet 1    clopidogrel (PLAVIX) 75 MG tablet Take 1 tablet by mouth once daily 90 tablet 1    atorvastatin (LIPITOR) 20 MG tablet Take 1 tablet by mouth once daily 90 tablet 1    donepezil (ARICEPT) 10 MG tablet Take 1 tablet by mouth nightly 90 tablet 1    fluticasone (FLONASE) 50 MCG/ACT nasal spray 2 sprays by Nasal route daily 1 Bottle 3    memantine (NAMENDA) 10 MG tablet Take 1 tablet by mouth 2 times daily 180 tablet 1    Carboxymethylcell-Hypromellose (GENTEAL OP) Apply to eye      Ascorbic Acid (VITAMIN C) 250 MG tablet Take 500 mg by mouth daily      aspirin 81 MG tablet Take 81 mg by mouth daily.        No current facility-administered medications for this visit. Subjective:      Review of Systems   Constitutional: Negative for malaise/fatigue. Respiratory: Negative for chest tightness and shortness of breath. Cardiovascular: Negative for chest pain, palpitations, orthopnea and leg swelling. Musculoskeletal: Negative for myalgias and neck pain. Neurological: Negative for dizziness and headaches. Objective:     BP (!) 138/56 (Site: Right Upper Arm, Position: Sitting, Cuff Size: Medium Adult)   Pulse 58   Resp 16   Ht 5' 2\" (1.575 m)   Wt 146 lb (66.2 kg)   BMI 26.70 kg/m²   BP Readings from Last 3 Encounters:   05/28/20 (!) 138/56   02/27/20 124/74   11/19/19 98/60     Wt Readings from Last 3 Encounters:   05/28/20 146 lb (66.2 kg)   02/27/20 142 lb 4 oz (64.5 kg)   11/19/19 144 lb (65.3 kg)       Physical Exam    Assessment:         Diagnosis Orders   1. Essential hypertension  Comprehensive Metabolic Panel    Lipid Panel   2. Hyperlipidemia, unspecified hyperlipidemia type  Comprehensive Metabolic Panel    Lipid Panel   3.  Alzheimer's disease of other onset without behavioral disturbance ([facility]         Plan:      Medications Prescribed:  Orders Placed This Encounter   Medications    lisinopril (PRINIVIL;ZESTRIL) 10 MG tablet     Sig: Take 1 tablet by mouth once daily     Dispense:  90 tablet     Refill:  1    triamterene-hydroCHLOROthiazide (MAXZIDE-25) 37.5-25 MG per tablet     Sig: Take 1 tablet by mouth once daily     Dispense:  90 tablet     Refill:  1    naproxen (NAPROSYN) 500 MG tablet     Sig: Take 1 tablet by mouth 2 times daily as needed for Pain (arthritic pain, take medications with food)     Dispense:  90 tablet     Refill:  1    clopidogrel (PLAVIX) 75 MG tablet     Sig: Take 1 tablet by mouth once daily     Dispense:  90 tablet     Refill:  1    atorvastatin (LIPITOR) 20 MG tablet     Sig: Take 1 tablet by mouth once daily     Dispense:  90 tablet     Refill:  1    donepezil (ARICEPT) 10 MG tablet     Sig: Take 1 tablet by mouth nightly     Dispense:  90 tablet     Refill:  1    fluticasone (FLONASE) 50 MCG/ACT nasal spray     Si sprays by Nasal route daily     Dispense:  1 Bottle     Refill:  3    memantine (NAMENDA) 10 MG tablet     Sig: Take 1 tablet by mouth 2 times daily     Dispense:  180 tablet     Refill:  1     Orders Placed:  Orders Placed This Encounter   Procedures    Comprehensive Metabolic Panel     Laboratory Test to be done in 3 months     Standing Status:   Future     Standing Expiration Date:   2021    Lipid Panel     Laboratory Test to be done in 3 months     Standing Status:   Future     Standing Expiration Date:   2021     Order Specific Question:   Is Patient Fasting?/# of Hours     Answer:   12       Results of Laboratory tests taken 3/4/20  were reviewed with the patient. Results were w/in  acceptable range     Return in about 4 months (around 2020) for HTN. Discussed use, benefit, and side effects of prescribedmedications. All patient questions answered. Pt voiced understanding. Instructedto continue current medications, diet and exercise. Patient agreed with treatmentplan.

## 2020-06-29 ENCOUNTER — TELEMEDICINE (OUTPATIENT)
Dept: FAMILY MEDICINE CLINIC | Age: 80
End: 2020-06-29
Payer: MEDICARE

## 2020-06-29 PROCEDURE — 1090F PRES/ABSN URINE INCON ASSESS: CPT | Performed by: FAMILY MEDICINE

## 2020-06-29 PROCEDURE — G8417 CALC BMI ABV UP PARAM F/U: HCPCS | Performed by: FAMILY MEDICINE

## 2020-06-29 PROCEDURE — 99204 OFFICE O/P NEW MOD 45 MIN: CPT | Performed by: FAMILY MEDICINE

## 2020-06-29 PROCEDURE — 1123F ACP DISCUSS/DSCN MKR DOCD: CPT | Performed by: FAMILY MEDICINE

## 2020-06-29 PROCEDURE — 4040F PNEUMOC VAC/ADMIN/RCVD: CPT | Performed by: FAMILY MEDICINE

## 2020-06-29 PROCEDURE — G8427 DOCREV CUR MEDS BY ELIG CLIN: HCPCS | Performed by: FAMILY MEDICINE

## 2020-06-29 PROCEDURE — 1036F TOBACCO NON-USER: CPT | Performed by: FAMILY MEDICINE

## 2020-06-29 PROCEDURE — G8400 PT W/DXA NO RESULTS DOC: HCPCS | Performed by: FAMILY MEDICINE

## 2020-06-29 NOTE — LETTER
[address] [place] [address],[address] [facility] [address]  Phone: [phone]  Fax: [phone]    Jessica Lara MD        June 29, 2020    [facility] [address]      Dear Rodrigo Estrada:    Here are the lab orders and video notes    If you have any questions or concerns, please don't hesitate to call.     Sincerely,        Jessica Lara MD

## 2020-06-29 NOTE — PROGRESS NOTES
older) 10/19/2017    Influenza, MDCK Brunilda Mayankler, with preserv IM (Flucelvax 4 yrs and older) 11/19/2019    Influenza, Quadv, IM, (6 mo and older Fluzone, Flulaval, Fluarix and 3 yrs and older Afluria) 11/08/2018    Influenza, Quadv, IM, PF (6 mo and older Fluzone, Flulaval, Fluarix, and 3 yrs and older Afluria) 11/03/2016    Pneumococcal Conjugate 13-valent ([id number]) 12/03/2015    Pneumococcal Polysaccharide ([id number]) 08/23/2011        History of PresentIllness:     Nicol's had concerns including Memory Loss (on meds); Numbness; Hypertension; Hyperlipidemia; and Aphasia. Marie Balbuena  presents to the [address] [place] [address] today for;   Chief Complaint   Patient presents with    Memory Loss     on meds    Numbness    Hypertension    Hyperlipidemia    Aphasia   , ,  abnormal labs follow up and these conditions as she  Is looking today for:     1. Essential hypertension    2. Hyperlipidemia, unspecified hyperlipidemia type    3. Alzheimer's disease of other onset without behavioral disturbance ([address]    4. Routine general medical examination at a health care facility    5. Dementia without behavioral disturbance, unspecified dementia type ([address]    6. Coronary artery disease involving native coronary artery without angina pectoris, unspecified whether native or transplanted heart    7. Coronary artery disease involving native coronary artery of native heart without angina pectoris    8. Cerebrovascular accident (CVA), unspecified mechanism ([address]    9. Mixed hyperlipidemia    10. S/P cardiac cath 2012- mild nonobstructive CAD    11. Other intestinal malabsorption    12. Low glucose level      HPI    Subjective:     Review of Systems   Musculoskeletal: Positive for gait problem. Psychiatric/Behavioral: Positive for decreased concentration. All other systems reviewed and are negative. Objective: There were no vitals taken for this visit.   Physical Exam  Constitutional:       Appearance: Normal appearance. She is obese. HENT:      Head: Normocephalic. Pulmonary:      Effort: Pulmonary effort is normal.   Neurological:      Mental Status: She is alert. Psychiatric:         Mood and Affect: Mood normal.         Thought Content: Thought content normal.            Laboratory Data:   Lab results were searched in Care Everywhere and/or those brought by the pateint were reviewed today with Gregor Martinez and she has a copy of their most recent labs to take home with them as notedbelow;       Imaging Data:   Imaging Data:       Assessment & Plan:       Impression:  1. Essential hypertension    2. Hyperlipidemia, unspecified hyperlipidemia type    3. Alzheimer's disease of other onset without behavioral disturbance ([address]    4. Routine general medical examination at a health care facility    5. Dementia without behavioral disturbance, unspecified dementia type ([address]    6. Coronary artery disease involving native coronary artery without angina pectoris, unspecified whether native or transplanted heart    7. Coronary artery disease involving native coronary artery of native heart without angina pectoris    8. Cerebrovascular accident (CVA), unspecified mechanism ([address]    9. Mixed hyperlipidemia    10. S/P cardiac cath 2012- mild nonobstructive CAD    11. Other intestinal malabsorption    12. Low glucose level      Assessment and Plan:  After reviewing the patients chief complaints, reviewing their labfindings in great detail (with the patient and those accompanying them) which correlate to their chief complaints, symptoms, and or medical conditions; suggestions were made relating to changes in diet and or supplementswhich may improve the complaints and which will be reflected in their future lab findings;   Chief Complaint   Patient presents with    Memory Loss     on meds    Numbness    Hypertension    Hyperlipidemia    Aphasia   ;    Plans for the next visits:  - Abnormal and non-optimal Labs were ordered and oats abstracts sheet reviewed and given to the patient today    - 23 Foods containing Latex-like proteins was reviewed and copy to be taken if desired     - Nutrient Supplements list provided and copyto be taken if desired    - [employer] web site offered to patient to review at their convenience by staff with login information    Note:  I have discussed with the patient that with all nutraceuticals, there is often mixed data and emerging research which needs to be monitored; as well as an array of NIHfact sheets on nutrients and supplements. If I have recommended cinnamon at the request of this patient to assist them in control of their blood sugar, triglyceride and or weight issues. I discussed that thepatient's clinical use of cinnamon bark, calcium, magnesium, Vitamin D and pharmaceutical grade CVS #123860 fish oil or triple-strength fish oil, and B-75 two phase time-released B complex by Antionette Glass will be for atime-limited trial to determine their individual effectiveness and safety in this patient. I also referred the patient to the NMCD: Nutrition, Metabolism, and Cardiovascular Diseases (journal) and concerns about long-termuse and hepatotoxicity of cinnamon and other nutrients and suggest they frequently search nih.gov for the latest non-proprietary information on nutriceuticals as well as consider a subscription to [employer] fordetails on reviewed supplements, or at the least review the nutrient files at [address] at [facility], [facility], an insulin mimetic, reduces some High Carbohydrate Dietary Impacts. Methylhydroxychalcone polymers insulin-enhancing properties in fat cells are responsible for enhanced glucose uptake, inhibiting hepatic HMG-CoA reductase and lowers lipids. www.jacn. org/content/20/4/327.full     But cinnamon with additivessuch as Cinnamon Extract are not effective as insulin mimetics. :eStoreDirectory.at     Nutrients for Start up from [employer] or [employer] for ease to get started now ;  Yang Mcneil has some useable products;  - Triple Strength Fish Oil, enteric coated  - Vit D 3 5000 IU gel caps  - Iron ferrous sulfat 325 mg tabs  - Centrum Silver look-a-like for most patients, or  - Centrum plain look-a-like if need iron    Localpharmacies or chains such as [employer], [employer], Wal-mart, have;  - Triple Strength Fish Oil (enteric coated ifavailable) or    If not enteric coated, can take from freezer for less burps  - B-50 or B-100 released balanced B complex tabs  - Cinnamon bark 500 mg (without Chromium or extracts)   some brands list 1000 mg / serving of 2 capsules,    some brands have 1000 mg caps with the undesireable chromium / extract  - Calcium carbonate/citrate, magnesium oxide/citrate, Vit D 3  as 3-4 tabs/caps/serving     Some Local Brands may contain Zincwhich is acceptable for the first bottle or two  - Magnesium oxide 250 mg tabs for those having < 2 bowel movements daily  - Magnesium citrate 200 mg if having > 2bowel movement/day  - Centrum Silver or look-a-like for most patients, Centrum plain or look-a-like with iron  - Vitamin D-3 comes as 1,000 IU or 2,000 IU or 5,000 IU gel caps or Liquid drops      Some brands containing or derived from soy oil or corn oil are OK if not allergic to soy  - Elemental Iron 65 mg tabsat bedtime is available over the counter if need more iron     Usually turns bowel movements grey, green or black but not a concern  - Apricot Kernel Oil (by Now) for dry skin sensitive perineal or perianal area skin    Nutrients for ongoing use by Mail order for less expense from www. [employer] ;  - Strength Fish Oil , 240 Softgels Item [id number]  -B-100 time released balanced B complex Item #[id number]  - Cinnamon bark 500 mg without Chromium or extract Item #[id number]  - Calcium carbonate 1000 mg, Magnesium oxide 500 mg, Vit D 3  400 IU Item #[id number]  - Magnesium oxide 500 mg tabs Item #722676 if less than 2 bowel movements daily  - [employer] Item #665678 for mostpatients, One Daily Item #190961 with iron  - Vit D 3  1,000 Item #076375      2,000 IU Item #894935  ,000 IU Item #[id number]     Some brands containing orderived from soy oil or corn oil are OK if not allergic to soy    Nutrients for Special Needs by Calvin Falk for less expense from www. puritan.com ;  -Elemental Iron 65 mg tabs Item #782816 if need more iron for low iron on labs    Usually turns bowel movements grey, green or black but not a concern  - Time released Niacin 250 mg Item #229162 for cold intolerance, low libido or impotence  - DHEA 50 mg Item #862038 for improving DHEA levels on labs if having Fatigue    If stools too loose substitute for your Magnesium oxide using;   Magnesium citrate 200 mg tabs(NOT liquid) at [employer]   Magnesium gluconate 550 mgby Steffen at [employer] or amazon. com  Magnesium chloride foot soaks or body sprays  www.[employer]   Magnesium chloride flakes 14.99 Item #: [id number] if Backordered get spray    Food Drink Symptom Log;  I asked this patient to track these items and any other symptoms on their list on a weekly basis to documenttheir progress or lack of same. This can be done on the symptom tracking sheet I gave them at today's visit but looks like this:                                                      Rate on scale of 0-10 with zero = notnoticeable  Symptom:                            Week 1               2                 3                 4               Etc            Hair loss    Foot cramps    Paresthesia    Aches    IBS (irritable bowel)    Constipation    Diarrhea  Nocturia    (up to bathroom at night)    Fatigue/Energy level  Stress      On the other side of the sheet they can track their food, drink, environment, activity, symptoms etc      Avoiding Latex-like proteins inmy foods;     Avocados, Bananas, Celery, Figs & Kiwi proteins have latex-like proteins to inflame our immunesystems  How Can I Have A Latex Allergy? Eating foods with latex-like protein exposes us to latex allergies. Our body cannot tell the differencebetween these latex-like proteins and latex from rubber products since many people are allergic to fruit, vegetables and latex. Read labels on pre-packaged foods. This list to avoid is only a guide if you are known allergicto latex or have a latex rash on your chin, cheeks and lines on your neck and chest. The amount of latex is different in each food product or fruit variety. to Avoid out of Season if not grown locally: Melon, Nectarine, Papaya, Cherry, Passion fruit, Plum, Chestnuts, and Tomato. Avocado, Banana, Celery, Figs, and Kiwi always contain Latex-like protein. Whats in Season? Strawberries taste better in June than December because June is strawberry season so buy locally grown produce \"in season\" for the best flavor, cost and less Latex. Locally grown produce notonly tastes great requires little of no ethylene exposure in food distribution so has less latex content. Out of season, use canned, frozen or dried sinceprocessed ripe and are latex lower!!!   Month     [place] Locally[employer]rown Produce  January, February, March: use canned, frozen or dried fruits since lower in latex  April; asparagus, radishes  May; asparagus, broccoli, green onions, greens, peas, radishes,rhubarb  June; asparagus, beets, beans, broccoli, cabbage, cantaloupe, carrots, green onions, greens, lettuce,onions, parsley, peas, radishes, rhubarb, strawberries, watermelons  July; beans, beets, blueberries,broccoli, cabbage, cantaloupe, carrots, cauliflower, celery, cucumbers, eggplant, grapes, green onions, greens, lettuce, onions, parsley, peas, peaches, bell peppers, potatoes, radishes, summer raspberries, squash, sweetcorn, tomatoes, turnips, watermelons  August; apples, beans, beets, blueberries, cabbage, cantaloupe,

## 2020-06-30 LAB
ABSOLUTE BASO #: 0.1 X10E9/L (ref 0–0.9)
ABSOLUTE EOS #: 0 X10E9/L (ref 0–0.4)
ABSOLUTE LYMPH #: 1.4 X10E9/L (ref 1–3.5)
ABSOLUTE MONO #: 0.4 X10E9/L (ref 0–0.9)
ABSOLUTE NEUT #: 3.1 X10E9/L (ref 1.5–6.6)
ALBUMIN SERUM: 4.4 G/DL (ref 3.6–5.1)
AMYLASE: 134 U/L (ref 28–100)
ANA PATTERN: NORMAL
ANA TITER: NORMAL TITER
ANION GAP SERPL CALCULATED.3IONS-SCNC: 10 MMOL/L (ref 5–15)
ANTI-NUCLEAR ANTIBODY (ANA): POSITIVE
AVERAGE GLUCOSE: 123 MG/DL
BASOPHILS RELATIVE PERCENT: 1 %
BUN BLDV-MCNC: 29 MG/DL (ref 5–27)
CALCIUM SERPL-MCNC: 9.6 MG/DL (ref 8.5–10.5)
CHLORIDE BLD-SCNC: 107 MMOL/L (ref 98–109)
CHOLESTEROL/HDL RATIO: 3.1 (ref 1–5)
CHOLESTEROL: 129 MG/DL (ref 150–200)
CO2: 24 MMOL/L (ref 22–32)
CREAT SERPL-MCNC: 1.37 MG/DL (ref 0.4–1)
D-DIMER QUANTITATIVE: 187 NG/ML DDU
DEHYDROEPIANDROSTERONE: 0.7 NG/ML (ref 1.3–9.8)
DHEAS (DHEA SULFATE): 40 UG/DL (ref 7–177)
EGFR AFRICAN AMERICAN: 45 ML/MIN/1.73SQ.M
EGFR IF NONAFRICAN AMERICAN: 37 ML/MIN/1.73SQ.M
EOSINOPHILS RELATIVE PERCENT: 0.9 %
ESTRADIOL LEVEL: <15 PG/ML
FOLLICLE STIMULATING HORMONE: 52.4 MIU/ML
GLIADIN ANTIBODIES IGG: <1 U/ML
GLUCOSE: 80 MG/DL (ref 65–99)
HBA1C MFR BLD: 5.9 % (ref 4.4–6.4)
HCT VFR BLD CALC: 34.9 % (ref 34–48)
HDLC SERPL-MCNC: 42 MG/DL
HEMOGLOBIN: 11.4 G/DL (ref 11.7–16.1)
HIGH SENSITIVE C-REACTIVE PROTEIN: 0.13 MG/DL (ref 0–0.74)
HOMOCYSTINE, SERUM: 17.47 MCMOL/L (ref 3.36–20.44)
IGE: 12 IU/ML (ref 0–165)
IRON SATURATION: 18 % SATURATION (ref 15–50)
IRON, SERUM: 56 UG/DL (ref 50–170)
LDL CHOLESTEROL CALCULATED: 66 MG/DL
LDL/HDL RATIO: 1.6
LH: 32.6 MIU/ML
LIPASE: 91 U/L (ref 11–82)
LYMPHOCYTE %: 28.4 %
MAGNESIUM: 1.8 MG/DL (ref 1.8–2.6)
MCH RBC QN AUTO: 29.6 PG (ref 27–35)
MCHC RBC AUTO-ENTMCNC: 32.7 G/DL (ref 31–36)
MCV RBC AUTO: 91 FL (ref 81–101)
MONOCYTES # BLD: 7.4 %
NEUTROPHILS RELATIVE PERCENT: 62.3 %
PARATHYROID HORMONE INTACT: 108 PG/ML (ref 12–88)
PDW BLD-RTO: 13.9 % (ref 11.5–14.7)
PLATELETS: 186 X10E9/L (ref 150–450)
PMV BLD AUTO: 11 FL (ref 7–12)
POTASSIUM SERPL-SCNC: 4.4 MMOL/L (ref 3.5–5)
PROGESTERONE LEVEL: 0.3 NG/ML
RBC # BLD: ABNORMAL 10*6/UL
RBC: 3.84 X10E12/L (ref 3.3–5.5)
RHEUMATOID FACTOR: <10 IU/ML
SEDIMENTATION RATE, ERYTHROCYTE: 38 MM/H (ref 0–30)
SEX HORMONE BINDING GLOBULIN: 84.8 NMOL/L (ref 17.3–125)
SODIUM BLD-SCNC: 141 MMOL/L (ref 134–146)
T3 FREE: 2.52 PG/ML (ref 2.5–3.9)
T3 TOTAL: 88 NG/DL (ref 87–178)
T4 FREE: 0.79 NG/DL (ref 0.61–1.6)
T4 TOTAL: 7 UG/DL (ref 6.1–12.2)
TESTOSTERONE FREE: 1.4 PG/ML (ref 0.6–3.8)
TESTOSTERONE, LCMS: 15 NG/DL (ref 5–32)
THYROID PEROXIDASE ANTIBODY: 18 IU/ML
TOTAL IRON BINDING CAPACITY: 318 UG/DL (ref 250–425)
TRIGL SERPL-MCNC: 106 MG/DL (ref 27–150)
TSH SERPL DL<=0.05 MIU/L-ACNC: 2 UIU/ML (ref 0.49–4.67)
URIC ACID: 6 MG/DL (ref 2.6–7.2)
VITAMIN D 25-HYDROXY: 12.1 NG/ML (ref 30–100)
VLDLC SERPL CALC-MCNC: 21 MG/DL (ref 0–30)
WBC: 4.9 X10E9/L (ref 4–11.8)

## 2020-08-14 LAB
ALBUMIN SERPL-MCNC: 4.3 G/DL (ref 3.2–5.3)
ALK PHOSPHATASE: 53 U/L (ref 39–130)
ALT SERPL-CCNC: 37 U/L (ref 0–31)
ANION GAP SERPL CALCULATED.3IONS-SCNC: 10 MMOL/L (ref 5–15)
AST SERPL-CCNC: 27 U/L (ref 0–41)
BILIRUB SERPL-MCNC: 0.7 MG/DL (ref 0.3–1.2)
BUN BLDV-MCNC: 25 MG/DL (ref 5–27)
CALCIUM SERPL-MCNC: 9.8 MG/DL (ref 8.5–10.5)
CHLORIDE BLD-SCNC: 107 MMOL/L (ref 98–109)
CO2: 25 MMOL/L (ref 22–32)
CREAT SERPL-MCNC: 1.36 MG/DL (ref 0.4–1)
EGFR AFRICAN AMERICAN: 45 ML/MIN/1.73SQ.M
EGFR IF NONAFRICAN AMERICAN: 37 ML/MIN/1.73SQ.M
GLUCOSE: 121 MG/DL (ref 65–99)
POTASSIUM SERPL-SCNC: 4.8 MMOL/L (ref 3.5–5)
SODIUM BLD-SCNC: 142 MMOL/L (ref 134–146)
TOTAL PROTEIN: 7.4 G/DL (ref 6–8)

## 2020-08-18 ENCOUNTER — OFFICE VISIT (OUTPATIENT)
Dept: FAMILY MEDICINE CLINIC | Age: 80
End: 2020-08-18

## 2020-08-18 VITALS
DIASTOLIC BLOOD PRESSURE: 66 MMHG | WEIGHT: 146.38 LBS | RESPIRATION RATE: 16 BRPM | HEIGHT: 62 IN | BODY MASS INDEX: 26.94 KG/M2 | HEART RATE: 64 BPM | TEMPERATURE: 97.4 F | SYSTOLIC BLOOD PRESSURE: 136 MMHG

## 2020-08-18 ASSESSMENT — ENCOUNTER SYMPTOMS
ABDOMINAL PAIN: 0
CHEST TIGHTNESS: 0
NAUSEA: 0
TROUBLE SWALLOWING: 0
VOMITING: 0
SORE THROAT: 0
BACK PAIN: 0
WHEEZING: 0
SHORTNESS OF BREATH: 0
SINUS PRESSURE: 0
COUGH: 0
RHINORRHEA: 0
CONSTIPATION: 0
VOICE CHANGE: 0
DIARRHEA: 0

## 2020-08-18 NOTE — PROGRESS NOTES
Visit Date: 8/18/2020    Subjective: Kye Smith is a 78 y. o.female who presents today for:  Chief Complaint   Patient presents with    Laceration     right thumb         HPI:     HPI     Laceration of the right thumb yesterday which keep on bleeding      CurrentHome Medications:  Current Outpatient Medications   Medication Sig Dispense Refill    lisinopril (PRINIVIL;ZESTRIL) 10 MG tablet Take 1 tablet by mouth once daily 90 tablet 1    triamterene-hydroCHLOROthiazide (MAXZIDE-25) 37.5-25 MG per tablet Take 1 tablet by mouth once daily 90 tablet 1    naproxen (NAPROSYN) 500 MG tablet Take 1 tablet by mouth 2 times daily as needed for Pain (arthritic pain, take medications with food) 90 tablet 1    clopidogrel (PLAVIX) 75 MG tablet Take 1 tablet by mouth once daily 90 tablet 1    atorvastatin (LIPITOR) 20 MG tablet Take 1 tablet by mouth once daily 90 tablet 1    donepezil (ARICEPT) 10 MG tablet Take 1 tablet by mouth nightly 90 tablet 1    fluticasone (FLONASE) 50 MCG/ACT nasal spray 2 sprays by Nasal route daily 1 Bottle 3    memantine (NAMENDA) 10 MG tablet Take 1 tablet by mouth 2 times daily 180 tablet 1    Carboxymethylcell-Hypromellose (GENTEAL OP) Apply to eye      Ascorbic Acid (VITAMIN C) 250 MG tablet Take 500 mg by mouth daily      aspirin 81 MG tablet Take 81 mg by mouth daily. No current facility-administered medications for this visit. Subjective:      Review of Systems   Constitutional: Negative for appetite change, chills, diaphoresis, fatigue and fever. HENT: Negative for congestion, ear pain, postnasal drip, rhinorrhea, sinus pressure, sneezing, sore throat, trouble swallowing and voice change. Respiratory: Negative for cough, chest tightness, shortness of breath and wheezing. Cardiovascular: Negative for chest pain, palpitations and leg swelling. Gastrointestinal: Negative for abdominal pain, constipation, diarrhea, nausea and vomiting.    Musculoskeletal: Negative for arthralgias, back pain, joint swelling, myalgias, neck pain and neck stiffness. Lacerated thumb right   Neurological: Negative for dizziness, syncope, weakness, light-headedness, numbness and headaches. Objective:     /66 (Site: Left Upper Arm, Position: Sitting, Cuff Size: Medium Adult)   Pulse 64   Temp 97.4 °F (36.3 °C) (Skin)   Resp 16   Ht 5' 2\" (1.575 m)   Wt 146 lb 6 oz (66.4 kg)   BMI 26.77 kg/m²   BP Readings from Last 3 Encounters:   08/18/20 136/66   05/28/20 (!) 138/56   02/27/20 124/74     Wt Readings from Last 3 Encounters:   08/18/20 146 lb 6 oz (66.4 kg)   05/28/20 146 lb (66.2 kg)   02/27/20 142 lb 4 oz (64.5 kg)       Physical Exam  Constitutional:       General: She is not in acute distress. Appearance: She is well-developed. HENT:      Head: Normocephalic and atraumatic. Eyes:      Pupils: Pupils are equal, round, and reactive to light. Neck:      Musculoskeletal: Normal range of motion and neck supple. Cardiovascular:      Rate and Rhythm: Normal rate and regular rhythm. Pulses:           Radial pulses are 2+ on the right side and 2+ on the left side. Dorsalis pedis pulses are 2+ on the right side and 2+ on the left side. Posterior tibial pulses are 2+ on the right side and 2+ on the left side. Heart sounds: Normal heart sounds. Pulmonary:      Effort: Pulmonary effort is normal.      Breath sounds: Normal breath sounds. Abdominal:      Palpations: Abdomen is soft. Tenderness: There is no abdominal tenderness. Musculoskeletal:         General: No tenderness. Comments: Right thumb . 5 cm wound V shaped   Skin:     Findings: No rash. Neurological:      Mental Status: She is alert. Assessment:         Diagnosis Orders   1. Laceration of right thumb without foreign body without damage to nail, initial encounter     2.  Essential hypertension         Plan:      Medications Prescribed:  No orders of the defined types were placed in this encounter. Orders Placed:  No orders of the defined types were placed in this encounter. Repair Lacerated wound:  Informed verbal consent was taken, and patient consented w/ the procedure: The wound was cleaned with Betadine, irrigated the wound with saline. Local anesthesia using 1% Lidocaine was infiltrated around the wound, infiltrating .1 ml. The wound was again cleansed with Betadine and the wound was repaired using # 5 nylon applying 2 Interrupted sutures . The wound edges was approximated well. Blood loss is very minimal. The wound was again cleansed and dressing was applied. Patient tolerated well the procedure. Sent home in stable condition. Complication: None     Return in about 9 days (around 8/27/2020). Discussed use, benefit, and side effects of prescribedmedications. All patient questions answered. Pt voiced understanding. Instructedto continue current medications, diet and exercise. Patient agreed with treatmentplan.

## 2020-08-27 ENCOUNTER — OFFICE VISIT (OUTPATIENT)
Dept: FAMILY MEDICINE CLINIC | Age: 80
End: 2020-08-27

## 2020-08-27 VITALS
SYSTOLIC BLOOD PRESSURE: 102 MMHG | WEIGHT: 148.38 LBS | HEART RATE: 56 BPM | TEMPERATURE: 96.8 F | DIASTOLIC BLOOD PRESSURE: 58 MMHG | RESPIRATION RATE: 16 BRPM | HEIGHT: 61 IN | BODY MASS INDEX: 28.01 KG/M2

## 2020-08-27 PROCEDURE — 99213 OFFICE O/P EST LOW 20 MIN: CPT | Performed by: EMERGENCY MEDICINE

## 2020-08-27 PROCEDURE — 4040F PNEUMOC VAC/ADMIN/RCVD: CPT | Performed by: EMERGENCY MEDICINE

## 2020-08-27 PROCEDURE — 1036F TOBACCO NON-USER: CPT | Performed by: EMERGENCY MEDICINE

## 2020-08-27 PROCEDURE — G8417 CALC BMI ABV UP PARAM F/U: HCPCS | Performed by: EMERGENCY MEDICINE

## 2020-08-27 PROCEDURE — G8400 PT W/DXA NO RESULTS DOC: HCPCS | Performed by: EMERGENCY MEDICINE

## 2020-08-27 PROCEDURE — G8427 DOCREV CUR MEDS BY ELIG CLIN: HCPCS | Performed by: EMERGENCY MEDICINE

## 2020-08-27 PROCEDURE — 1123F ACP DISCUSS/DSCN MKR DOCD: CPT | Performed by: EMERGENCY MEDICINE

## 2020-08-27 PROCEDURE — 1090F PRES/ABSN URINE INCON ASSESS: CPT | Performed by: EMERGENCY MEDICINE

## 2020-08-27 ASSESSMENT — ENCOUNTER SYMPTOMS
NAUSEA: 0
SHORTNESS OF BREATH: 0
VOICE CHANGE: 0
BACK PAIN: 0
WHEEZING: 0
TROUBLE SWALLOWING: 0
SINUS PRESSURE: 0
ABDOMINAL PAIN: 0
RHINORRHEA: 0
CONSTIPATION: 0
CHEST TIGHTNESS: 0
VOMITING: 0
SORE THROAT: 0
DIARRHEA: 0
COUGH: 0

## 2020-08-27 NOTE — PROGRESS NOTES
Visit Date: 8/27/2020    Subjective: Rohan Farris is a 78 y. o.female who presents today for:  Chief Complaint   Patient presents with    Other     follow up from laceration on right thumb. HPI:     HPI    Therefore suture removal. Patient apparently removed the suture by herself. Patient otherwise been doing well she lives alone, she has dementia however she has a caretaker that I tend to occur during the day      CurrentHome Medications:  Current Outpatient Medications   Medication Sig Dispense Refill    lisinopril (PRINIVIL;ZESTRIL) 10 MG tablet Take 1 tablet by mouth once daily 90 tablet 1    triamterene-hydroCHLOROthiazide (MAXZIDE-25) 37.5-25 MG per tablet Take 1 tablet by mouth once daily 90 tablet 1    naproxen (NAPROSYN) 500 MG tablet Take 1 tablet by mouth 2 times daily as needed for Pain (arthritic pain, take medications with food) 90 tablet 1    clopidogrel (PLAVIX) 75 MG tablet Take 1 tablet by mouth once daily 90 tablet 1    atorvastatin (LIPITOR) 20 MG tablet Take 1 tablet by mouth once daily 90 tablet 1    donepezil (ARICEPT) 10 MG tablet Take 1 tablet by mouth nightly 90 tablet 1    fluticasone (FLONASE) 50 MCG/ACT nasal spray 2 sprays by Nasal route daily 1 Bottle 3    memantine (NAMENDA) 10 MG tablet Take 1 tablet by mouth 2 times daily 180 tablet 1    Carboxymethylcell-Hypromellose (GENTEAL OP) Apply to eye      Ascorbic Acid (VITAMIN C) 250 MG tablet Take 500 mg by mouth daily      aspirin 81 MG tablet Take 81 mg by mouth daily. No current facility-administered medications for this visit. Subjective:      Review of Systems   Constitutional: Negative for appetite change, chills, diaphoresis, fatigue and fever. HENT: Negative for congestion, ear pain, postnasal drip, rhinorrhea, sinus pressure, sneezing, sore throat, trouble swallowing and voice change. Respiratory: Negative for cough, chest tightness, shortness of breath and wheezing. Cardiovascular: Negative for chest pain, palpitations and leg swelling. Gastrointestinal: Negative for abdominal pain, constipation, diarrhea, nausea and vomiting. Musculoskeletal: Negative for arthralgias, back pain, joint swelling, myalgias, neck pain and neck stiffness. Lacerated the right thumb   Neurological: Negative for dizziness, syncope, weakness, light-headedness, numbness and headaches. Objective:     BP (!) 102/58 (Site: Left Upper Arm, Position: Sitting, Cuff Size: Medium Adult)   Pulse 56   Temp 96.8 °F (36 °C) (Skin)   Resp 16   Ht 5' 1\" (1.549 m)   Wt 148 lb 6 oz (67.3 kg)   BMI 28.04 kg/m²   BP Readings from Last 3 Encounters:   08/27/20 (!) 102/58   08/18/20 136/66   05/28/20 (!) 138/56     Wt Readings from Last 3 Encounters:   08/27/20 148 lb 6 oz (67.3 kg)   08/18/20 146 lb 6 oz (66.4 kg)   05/28/20 146 lb (66.2 kg)       Physical Exam  Vitals signs reviewed. Constitutional:       Appearance: She is well-developed. HENT:      Head: Normocephalic and atraumatic. Right Ear: External ear normal.      Left Ear: External ear normal.      Nose: Nose normal.   Eyes:      General: No scleral icterus. Conjunctiva/sclera: Conjunctivae normal.      Pupils: Pupils are equal, round, and reactive to light. Neck:      Musculoskeletal: Normal range of motion and neck supple. Thyroid: No thyromegaly. Vascular: No JVD. Cardiovascular:      Rate and Rhythm: Normal rate and regular rhythm. Heart sounds: No murmur. No friction rub. Pulmonary:      Effort: Pulmonary effort is normal.      Breath sounds: Normal breath sounds. No wheezing or rales. Chest:      Chest wall: No tenderness. Abdominal:      General: Bowel sounds are normal.      Palpations: Abdomen is soft. There is no mass. Tenderness: There is no abdominal tenderness.    Musculoskeletal:      Comments: Right thumb laceration healed well without any suture   Lymphadenopathy:      Cervical:

## 2020-08-27 NOTE — PROGRESS NOTES
Visit Date: 8/27/2020    Subjective: Negro Flores is a 78 y. o.female who presents today for:  Chief Complaint   Patient presents with    Other     follow up from laceration on right thumb. HPI:     HPI    CurrentHome Medications:  Current Outpatient Medications   Medication Sig Dispense Refill    lisinopril (PRINIVIL;ZESTRIL) 10 MG tablet Take 1 tablet by mouth once daily 90 tablet 1    triamterene-hydroCHLOROthiazide (MAXZIDE-25) 37.5-25 MG per tablet Take 1 tablet by mouth once daily 90 tablet 1    naproxen (NAPROSYN) 500 MG tablet Take 1 tablet by mouth 2 times daily as needed for Pain (arthritic pain, take medications with food) 90 tablet 1    clopidogrel (PLAVIX) 75 MG tablet Take 1 tablet by mouth once daily 90 tablet 1    atorvastatin (LIPITOR) 20 MG tablet Take 1 tablet by mouth once daily 90 tablet 1    donepezil (ARICEPT) 10 MG tablet Take 1 tablet by mouth nightly 90 tablet 1    fluticasone (FLONASE) 50 MCG/ACT nasal spray 2 sprays by Nasal route daily 1 Bottle 3    memantine (NAMENDA) 10 MG tablet Take 1 tablet by mouth 2 times daily 180 tablet 1    Carboxymethylcell-Hypromellose (GENTEAL OP) Apply to eye      Ascorbic Acid (VITAMIN C) 250 MG tablet Take 500 mg by mouth daily      aspirin 81 MG tablet Take 81 mg by mouth daily. No current facility-administered medications for this visit. Subjective:      Review of Systems   Constitutional: Negative for appetite change, chills, diaphoresis, fatigue and fever. HENT: Negative for congestion, ear pain, postnasal drip, rhinorrhea, sinus pressure, sneezing, sore throat, trouble swallowing and voice change. Respiratory: Negative for cough, chest tightness, shortness of breath and wheezing. Cardiovascular: Negative for chest pain, palpitations and leg swelling. Gastrointestinal: Negative for abdominal pain, constipation, diarrhea, nausea and vomiting.    Musculoskeletal: Negative for arthralgias, back pain, joint swelling, myalgias, neck pain and neck stiffness. Neurological: Negative for dizziness, syncope, weakness, light-headedness, numbness and headaches. Objective:     BP (!) 102/58 (Site: Left Upper Arm, Position: Sitting, Cuff Size: Medium Adult)   Pulse 56   Temp 96.8 °F (36 °C) (Skin)   Resp 16   Ht 5' 1\" (1.549 m)   Wt 148 lb 6 oz (67.3 kg)   BMI 28.04 kg/m²   BP Readings from Last 3 Encounters:   08/27/20 (!) 102/58   08/18/20 136/66   05/28/20 (!) 138/56     Wt Readings from Last 3 Encounters:   08/27/20 148 lb 6 oz (67.3 kg)   08/18/20 146 lb 6 oz (66.4 kg)   05/28/20 146 lb (66.2 kg)       Physical Exam  Vitals signs reviewed. Constitutional:       Appearance: She is well-developed. HENT:      Head: Normocephalic and atraumatic. Right Ear: External ear normal.      Left Ear: External ear normal.      Nose: Nose normal.   Eyes:      General: No scleral icterus. Conjunctiva/sclera: Conjunctivae normal.      Pupils: Pupils are equal, round, and reactive to light. Neck:      Musculoskeletal: Normal range of motion and neck supple. Thyroid: No thyromegaly. Vascular: No JVD. Cardiovascular:      Rate and Rhythm: Normal rate and regular rhythm. Heart sounds: No murmur. No friction rub. Pulmonary:      Effort: Pulmonary effort is normal.      Breath sounds: Normal breath sounds. No wheezing or rales. Chest:      Chest wall: No tenderness. Abdominal:      General: Bowel sounds are normal.      Palpations: Abdomen is soft. There is no mass. Tenderness: There is no abdominal tenderness. Lymphadenopathy:      Cervical: No cervical adenopathy. Skin:     Findings: No rash. Neurological:      Mental Status: She is alert and oriented to person, place, and time. Psychiatric:         Behavior: Behavior is cooperative. Assessment:         Diagnosis Orders   1. Laceration of right thumb without foreign body without damage to nail, initial encounter     2. Essential hypertension     3. Alzheimer's disease of other onset without behavioral disturbance ([facility] [address]         Plan:      Medications Prescribed:  No orders of the defined types were placed in this encounter. Orders Placed:  No orders of the defined types were placed in this encounter. Return in about 1 month (around 9/29/2020). Discussed use, benefit, and side effects of prescribedmedications. All patient questions answered. Pt voiced understanding. Instructedto continue current medications, diet and exercise. Patient agreed with treatmentplan.

## 2020-10-01 ENCOUNTER — OFFICE VISIT (OUTPATIENT)
Dept: FAMILY MEDICINE CLINIC | Age: 80
End: 2020-10-01

## 2020-10-01 VITALS
SYSTOLIC BLOOD PRESSURE: 132 MMHG | WEIGHT: 148.25 LBS | RESPIRATION RATE: 16 BRPM | BODY MASS INDEX: 27.28 KG/M2 | TEMPERATURE: 97.1 F | HEIGHT: 62 IN | DIASTOLIC BLOOD PRESSURE: 58 MMHG | HEART RATE: 45 BPM

## 2020-10-01 PROCEDURE — G8400 PT W/DXA NO RESULTS DOC: HCPCS | Performed by: EMERGENCY MEDICINE

## 2020-10-01 PROCEDURE — 4040F PNEUMOC VAC/ADMIN/RCVD: CPT | Performed by: EMERGENCY MEDICINE

## 2020-10-01 PROCEDURE — G8417 CALC BMI ABV UP PARAM F/U: HCPCS | Performed by: EMERGENCY MEDICINE

## 2020-10-01 PROCEDURE — 1036F TOBACCO NON-USER: CPT | Performed by: EMERGENCY MEDICINE

## 2020-10-01 PROCEDURE — 1090F PRES/ABSN URINE INCON ASSESS: CPT | Performed by: EMERGENCY MEDICINE

## 2020-10-01 PROCEDURE — 99214 OFFICE O/P EST MOD 30 MIN: CPT | Performed by: EMERGENCY MEDICINE

## 2020-10-01 PROCEDURE — 90688 IIV4 VACCINE SPLT 0.5 ML IM: CPT | Performed by: EMERGENCY MEDICINE

## 2020-10-01 PROCEDURE — G0008 ADMIN INFLUENZA VIRUS VAC: HCPCS | Performed by: EMERGENCY MEDICINE

## 2020-10-01 PROCEDURE — G8427 DOCREV CUR MEDS BY ELIG CLIN: HCPCS | Performed by: EMERGENCY MEDICINE

## 2020-10-01 PROCEDURE — 1123F ACP DISCUSS/DSCN MKR DOCD: CPT | Performed by: EMERGENCY MEDICINE

## 2020-10-01 RX ORDER — ERGOCALCIFEROL 1.25 MG/1
50000 CAPSULE ORAL
Qty: 24 CAPSULE | Refills: 1 | Status: SHIPPED | OUTPATIENT
Start: 2020-10-01 | End: 2020-12-21

## 2020-10-01 ASSESSMENT — ENCOUNTER SYMPTOMS
CHEST TIGHTNESS: 0
NAUSEA: 0
ABDOMINAL PAIN: 0
SORE THROAT: 0
WHEEZING: 0
VOICE CHANGE: 0
RHINORRHEA: 0
TROUBLE SWALLOWING: 0
DIARRHEA: 0
SINUS PRESSURE: 0
COUGH: 0
CONSTIPATION: 0
SHORTNESS OF BREATH: 0
VOMITING: 0
BACK PAIN: 1
ORTHOPNEA: 0

## 2020-10-01 NOTE — PROGRESS NOTES
Immunizations Administered     Name Date Dose Route    Influenza, Quadv, IM, (6 mo and older Fluzone, Flulaval, Fluarix and 3 yrs and older Afluria) 10/1/2020 0.5 mL Intramuscular    Site: Deltoid- Right    Lot: [id number]    NDC: 59499-701-85

## 2020-10-01 NOTE — PROGRESS NOTES
Visit Date: 10/1/2020    Subjective: Yoli Mcguire is a 78 y. o.female who presents today for:  Chief Complaint   Patient presents with    Check-Up         HPI:     doing well, No SOB, No chest pain , No fatigue. No nausea nor abdominal pain    Back and hip pain from time to time    No meds side effects    Eating and drink better    Hyperlipidemia   This is a chronic problem. The problem is controlled. Recent lipid tests were reviewed and are normal. Pertinent negatives include no chest pain, focal sensory loss, leg pain, myalgias or shortness of breath. Hypertension   This is a chronic problem. The problem is controlled. Pertinent negatives include no chest pain, headaches, malaise/fatigue, neck pain, orthopnea, palpitations, peripheral edema or shortness of breath. Coronary Artery Disease   Pertinent negatives include no chest pain, chest tightness, dizziness, leg swelling, palpitations or shortness of breath. Risk factors include hyperlipidemia.         CurrentHome Medications:  Current Outpatient Medications   Medication Sig Dispense Refill    vitamin D (ERGOCALCIFEROL) 1.25 MG (62828 UT) CAPS capsule Take 1 capsule by mouth twice a week 24 capsule 1    lisinopril (PRINIVIL;ZESTRIL) 10 MG tablet Take 1 tablet by mouth once daily 90 tablet 1    triamterene-hydroCHLOROthiazide (MAXZIDE-25) 37.5-25 MG per tablet Take 1 tablet by mouth once daily 90 tablet 1    naproxen (NAPROSYN) 500 MG tablet Take 1 tablet by mouth 2 times daily as needed for Pain (arthritic pain, take medications with food) 90 tablet 1    clopidogrel (PLAVIX) 75 MG tablet Take 1 tablet by mouth once daily 90 tablet 1    atorvastatin (LIPITOR) 20 MG tablet Take 1 tablet by mouth once daily 90 tablet 1    donepezil (ARICEPT) 10 MG tablet Take 1 tablet by mouth nightly 90 tablet 1    fluticasone (FLONASE) 50 MCG/ACT nasal spray 2 sprays by Nasal route daily 1 Bottle 3    memantine (NAMENDA) 10 MG tablet Take 1 tablet by mouth 2 times daily 180 tablet 1    Carboxymethylcell-Hypromellose (GENTEAL OP) Apply to eye      Ascorbic Acid (VITAMIN C) 250 MG tablet Take 500 mg by mouth daily      aspirin 81 MG tablet Take 81 mg by mouth daily. No current facility-administered medications for this visit. Subjective:      Review of Systems   Constitutional: Negative for appetite change, chills, diaphoresis, fatigue, fever and malaise/fatigue. HENT: Negative for congestion, ear pain, postnasal drip, rhinorrhea, sinus pressure, sneezing, sore throat, trouble swallowing and voice change. Respiratory: Negative for cough, chest tightness, shortness of breath and wheezing. Cardiovascular: Negative for chest pain, palpitations, orthopnea and leg swelling. Gastrointestinal: Negative for abdominal pain, constipation, diarrhea, nausea and vomiting. Musculoskeletal: Positive for arthralgias and back pain. Negative for joint swelling, myalgias, neck pain and neck stiffness. Neurological: Negative for dizziness, syncope, weakness, light-headedness, numbness and headaches. Objective:     BP (!) 132/58 (Site: Right Upper Arm, Position: Sitting, Cuff Size: Medium Adult)   Pulse (!) 45   Temp 97.1 °F (36.2 °C) (Skin)   Resp 16   Ht 5' 2\" (1.575 m)   Wt 148 lb 4 oz (67.2 kg)   BMI 27.12 kg/m²   BP Readings from Last 3 Encounters:   10/01/20 (!) 132/58   08/27/20 (!) 102/58   08/18/20 136/66     Wt Readings from Last 3 Encounters:   10/01/20 148 lb 4 oz (67.2 kg)   08/27/20 148 lb 6 oz (67.3 kg)   08/18/20 146 lb 6 oz (66.4 kg)       Physical Exam  Vitals signs reviewed. Constitutional:       Appearance: She is well-developed. HENT:      Head: Normocephalic and atraumatic. Right Ear: External ear normal.      Left Ear: External ear normal.      Nose: Nose normal.   Eyes:      General: No scleral icterus. Conjunctiva/sclera: Conjunctivae normal.      Pupils: Pupils are equal, round, and reactive to light.    Neck: Musculoskeletal: Normal range of motion and neck supple. Thyroid: No thyromegaly. Vascular: No JVD. Cardiovascular:      Rate and Rhythm: Normal rate and regular rhythm. Heart sounds: No murmur. No friction rub. Pulmonary:      Effort: Pulmonary effort is normal.      Breath sounds: Normal breath sounds. No wheezing or rales. Chest:      Chest wall: No tenderness. Abdominal:      General: Bowel sounds are normal.      Palpations: Abdomen is soft. There is no mass. Tenderness: There is no abdominal tenderness. Lymphadenopathy:      Cervical: No cervical adenopathy. Skin:     Findings: No rash. Neurological:      Mental Status: She is alert and oriented to person, place, and time. Psychiatric:         Behavior: Behavior is cooperative. Assessment:         Diagnosis Orders   1. Essential hypertension     2. Need for influenza vaccination  INFLUENZA, QUADV, 0.5ML, 6 MO AND OLDER, IM, MDV, (Angelique Leeds)   3. Alzheimer's disease of other onset without behavioral disturbance ([facility] [address]     4. Hyperlipidemia, unspecified hyperlipidemia type     5. Lupus ([address]  Kristine Farrell MD, Rheumatology, [facility] [place] [address]   6. Hyperglycemia         Plan:      Medications Prescribed:  Orders Placed This Encounter   Medications    vitamin D (ERGOCALCIFEROL) 1.25 MG (92838 UT) CAPS capsule     Sig: Take 1 capsule by mouth twice a week     Dispense:  24 capsule     Refill:  1     Orders Placed:  Orders Placed This Encounter   Procedures    INFLUENZA, QUADV, 0.5ML, 6 MO AND OLDER, IM, MDV, (Angelique Leeds)   Madge Paget, MD, Rheumatology, [facility] [address]     Referral Priority:   Routine     Referral Type:   Eval and Treat     Referral Reason:   Specialty Services Required     Referred to Provider:   Odalis Eddy MD     Requested Specialty:   Rheumatology     Number of Visits Requested:   1       Results of Laboratory tests taken  6/29/20 were reviewed with the patient.  Results were w/in  acceptable

## 2020-11-04 ENCOUNTER — HOSPITAL ENCOUNTER (OUTPATIENT)
Dept: MAMMOGRAPHY | Age: 80
Discharge: HOME | End: 2020-11-04
Payer: MEDICARE

## 2020-11-04 PROCEDURE — 77063 BREAST TOMOSYNTHESIS BI: CPT

## 2021-01-01 ENCOUNTER — HOSPITAL ENCOUNTER (OUTPATIENT)
Dept: WOMENS IMAGING | Age: 81
Discharge: HOME | End: 2021-11-19
Payer: MEDICARE

## 2021-01-01 ENCOUNTER — HOSPITAL ENCOUNTER (OUTPATIENT)
Dept: WOMENS IMAGING | Age: 81
Discharge: HOME | End: 2021-11-08
Payer: MEDICARE

## 2021-01-01 ENCOUNTER — HOSPITAL ENCOUNTER (OUTPATIENT)
Dept: AUDIOLOGY | Age: 81
Discharge: HOME | End: 2021-12-17
Payer: MEDICARE

## 2021-01-01 ENCOUNTER — TELEPHONE (OUTPATIENT)
Dept: FAMILY MEDICINE CLINIC | Age: 81
End: 2021-01-01

## 2021-01-01 ENCOUNTER — OFFICE VISIT (OUTPATIENT)
Dept: FAMILY MEDICINE CLINIC | Age: 81
End: 2021-01-01

## 2021-01-01 VITALS
WEIGHT: 147.25 LBS | HEART RATE: 66 BPM | SYSTOLIC BLOOD PRESSURE: 122 MMHG | DIASTOLIC BLOOD PRESSURE: 60 MMHG | TEMPERATURE: 96.3 F | RESPIRATION RATE: 14 BRPM | HEIGHT: 62 IN | BODY MASS INDEX: 27.1 KG/M2

## 2021-01-01 VITALS
TEMPERATURE: 97.2 F | DIASTOLIC BLOOD PRESSURE: 60 MMHG | RESPIRATION RATE: 16 BRPM | SYSTOLIC BLOOD PRESSURE: 120 MMHG | WEIGHT: 151.13 LBS | HEART RATE: 56 BPM | BODY MASS INDEX: 27.81 KG/M2 | HEIGHT: 62 IN

## 2021-01-01 VITALS
SYSTOLIC BLOOD PRESSURE: 108 MMHG | DIASTOLIC BLOOD PRESSURE: 56 MMHG | HEIGHT: 61 IN | WEIGHT: 148 LBS | HEART RATE: 54 BPM | BODY MASS INDEX: 27.94 KG/M2 | RESPIRATION RATE: 16 BRPM

## 2021-01-01 DIAGNOSIS — E78.5 HYPERLIPIDEMIA, UNSPECIFIED HYPERLIPIDEMIA TYPE: ICD-10-CM

## 2021-01-01 DIAGNOSIS — H61.21 IMPACTED CERUMEN OF RIGHT EAR: Primary | ICD-10-CM

## 2021-01-01 DIAGNOSIS — H90.3 SENSORINEURAL HEARING LOSS (SNHL) OF BOTH EARS: Primary | ICD-10-CM

## 2021-01-01 DIAGNOSIS — I10 ESSENTIAL HYPERTENSION: ICD-10-CM

## 2021-01-01 DIAGNOSIS — F03.90 DEMENTIA WITHOUT BEHAVIORAL DISTURBANCE, UNSPECIFIED DEMENTIA TYPE: ICD-10-CM

## 2021-01-01 DIAGNOSIS — H61.23 IMPACTED CERUMEN OF BOTH EARS: Primary | ICD-10-CM

## 2021-01-01 DIAGNOSIS — R92.0 MICROCALCIFICATION OF LEFT BREAST ON MAMMOGRAM: ICD-10-CM

## 2021-01-01 DIAGNOSIS — Z12.31 VISIT FOR SCREENING MAMMOGRAM: ICD-10-CM

## 2021-01-01 DIAGNOSIS — Z23 NEED FOR INFLUENZA VACCINATION: ICD-10-CM

## 2021-01-01 DIAGNOSIS — I10 ESSENTIAL HYPERTENSION: Primary | ICD-10-CM

## 2021-01-01 LAB
ALBUMIN SERPL-MCNC: 4.1 G/DL (ref 3.2–5.3)
ALK PHOSPHATASE: 32 U/L (ref 39–130)
ALT SERPL-CCNC: 27 U/L (ref 0–31)
ANION GAP SERPL CALCULATED.3IONS-SCNC: 9 MMOL/L (ref 5–15)
AST SERPL-CCNC: 20 U/L (ref 0–41)
BILIRUB SERPL-MCNC: 0.7 MG/DL (ref 0.3–1.2)
BUN BLDV-MCNC: 33 MG/DL (ref 5–27)
CALCIUM SERPL-MCNC: 9.7 MG/DL (ref 8.5–10.5)
CHLORIDE BLD-SCNC: 111 MMOL/L (ref 98–109)
CHOLESTEROL/HDL RATIO: 3.5 (ref 1–5)
CHOLESTEROL: 140 MG/DL (ref 150–200)
CO2: 25 MMOL/L (ref 22–32)
CREAT SERPL-MCNC: 1.72 MG/DL (ref 0.4–1)
EGFR AFRICAN AMERICAN: 34 ML/MIN/1.73SQ.M
EGFR IF NONAFRICAN AMERICAN: 28 ML/MIN/1.73SQ.M
GLUCOSE: 104 MG/DL (ref 65–99)
HDLC SERPL-MCNC: 40 MG/DL
LDL CHOLESTEROL CALCULATED: 77 MG/DL
LDL/HDL RATIO: 1.9
POTASSIUM SERPL-SCNC: 4.9 MMOL/L (ref 3.5–5)
SODIUM BLD-SCNC: 145 MMOL/L (ref 134–146)
TOTAL PROTEIN: 7.2 G/DL (ref 6–8)
TRIGL SERPL-MCNC: 114 MG/DL (ref 27–150)
VLDLC SERPL CALC-MCNC: 23 MG/DL (ref 0–30)

## 2021-01-01 PROCEDURE — 92567 TYMPANOMETRY: CPT | Performed by: AUDIOLOGIST

## 2021-01-01 PROCEDURE — G0008 ADMIN INFLUENZA VIRUS VAC: HCPCS | Performed by: EMERGENCY MEDICINE

## 2021-01-01 PROCEDURE — 4040F PNEUMOC VAC/ADMIN/RCVD: CPT | Performed by: EMERGENCY MEDICINE

## 2021-01-01 PROCEDURE — 90688 IIV4 VACCINE SPLT 0.5 ML IM: CPT | Performed by: EMERGENCY MEDICINE

## 2021-01-01 PROCEDURE — 1123F ACP DISCUSS/DSCN MKR DOCD: CPT | Performed by: EMERGENCY MEDICINE

## 2021-01-01 PROCEDURE — G8427 DOCREV CUR MEDS BY ELIG CLIN: HCPCS | Performed by: EMERGENCY MEDICINE

## 2021-01-01 PROCEDURE — G8400 PT W/DXA NO RESULTS DOC: HCPCS | Performed by: EMERGENCY MEDICINE

## 2021-01-01 PROCEDURE — 1090F PRES/ABSN URINE INCON ASSESS: CPT | Performed by: EMERGENCY MEDICINE

## 2021-01-01 PROCEDURE — G8417 CALC BMI ABV UP PARAM F/U: HCPCS | Performed by: EMERGENCY MEDICINE

## 2021-01-01 PROCEDURE — 92556 SPEECH AUDIOMETRY COMPLETE: CPT | Performed by: AUDIOLOGIST

## 2021-01-01 PROCEDURE — 69210 REMOVE IMPACTED EAR WAX UNI: CPT | Performed by: EMERGENCY MEDICINE

## 2021-01-01 PROCEDURE — 99213 OFFICE O/P EST LOW 20 MIN: CPT | Performed by: EMERGENCY MEDICINE

## 2021-01-01 PROCEDURE — 99214 OFFICE O/P EST MOD 30 MIN: CPT | Performed by: EMERGENCY MEDICINE

## 2021-01-01 PROCEDURE — 77063 BREAST TOMOSYNTHESIS BI: CPT

## 2021-01-01 PROCEDURE — 1036F TOBACCO NON-USER: CPT | Performed by: EMERGENCY MEDICINE

## 2021-01-01 PROCEDURE — G0279 TOMOSYNTHESIS, MAMMO: HCPCS

## 2021-01-01 RX ORDER — ACETAMINOPHEN 500 MG
500 TABLET ORAL EVERY 6 HOURS PRN
Status: ON HOLD | COMMUNITY
End: 2022-01-01

## 2021-01-01 RX ORDER — DONEPEZIL HYDROCHLORIDE 10 MG/1
10 TABLET, FILM COATED ORAL NIGHTLY
Qty: 90 TABLET | Refills: 1 | Status: SHIPPED | OUTPATIENT
Start: 2021-01-01

## 2021-01-01 SDOH — ECONOMIC STABILITY: FOOD INSECURITY: WITHIN THE PAST 12 MONTHS, YOU WORRIED THAT YOUR FOOD WOULD RUN OUT BEFORE YOU GOT MONEY TO BUY MORE.: NEVER TRUE

## 2021-01-01 SDOH — ECONOMIC STABILITY: FOOD INSECURITY: WITHIN THE PAST 12 MONTHS, THE FOOD YOU BOUGHT JUST DIDN'T LAST AND YOU DIDN'T HAVE MONEY TO GET MORE.: NEVER TRUE

## 2021-01-01 ASSESSMENT — PATIENT HEALTH QUESTIONNAIRE - PHQ9
1. LITTLE INTEREST OR PLEASURE IN DOING THINGS: 0
2. FEELING DOWN, DEPRESSED OR HOPELESS: 0
SUM OF ALL RESPONSES TO PHQ QUESTIONS 1-9: 0
SUM OF ALL RESPONSES TO PHQ QUESTIONS 1-9: 0
SUM OF ALL RESPONSES TO PHQ9 QUESTIONS 1 & 2: 0
SUM OF ALL RESPONSES TO PHQ QUESTIONS 1-9: 0

## 2021-01-01 ASSESSMENT — ENCOUNTER SYMPTOMS
RHINORRHEA: 0
VOMITING: 0
NAUSEA: 0
VOICE CHANGE: 0
DIARRHEA: 0
WHEEZING: 0
DIARRHEA: 0
CHEST TIGHTNESS: 0
SORE THROAT: 0
ORTHOPNEA: 0
SHORTNESS OF BREATH: 0
ABDOMINAL PAIN: 0
CONSTIPATION: 0
NAUSEA: 0
VOMITING: 0
BACK PAIN: 0
ABDOMINAL PAIN: 0
NAUSEA: 0
WHEEZING: 0
BACK PAIN: 0
SINUS PRESSURE: 0
TROUBLE SWALLOWING: 0
SINUS PRESSURE: 0
SORE THROAT: 0
VOICE CHANGE: 0
CONSTIPATION: 0
TROUBLE SWALLOWING: 0
COUGH: 0
VOMITING: 0
SINUS PRESSURE: 0
CHEST TIGHTNESS: 0
RHINORRHEA: 0
CONSTIPATION: 0
ABDOMINAL PAIN: 0
RHINORRHEA: 0
VOICE CHANGE: 0
TROUBLE SWALLOWING: 0
SHORTNESS OF BREATH: 0
SHORTNESS OF BREATH: 0
COUGH: 0
WHEEZING: 0
DIARRHEA: 0
CHEST TIGHTNESS: 0
BACK PAIN: 0
COUGH: 0
SORE THROAT: 0

## 2021-01-01 ASSESSMENT — SOCIAL DETERMINANTS OF HEALTH (SDOH): HOW HARD IS IT FOR YOU TO PAY FOR THE VERY BASICS LIKE FOOD, HOUSING, MEDICAL CARE, AND HEATING?: NOT HARD AT ALL

## 2021-01-07 ENCOUNTER — OFFICE VISIT (OUTPATIENT)
Dept: FAMILY MEDICINE CLINIC | Age: 81
End: 2021-01-07

## 2021-01-07 VITALS
DIASTOLIC BLOOD PRESSURE: 60 MMHG | HEART RATE: 60 BPM | BODY MASS INDEX: 26.39 KG/M2 | TEMPERATURE: 96.8 F | WEIGHT: 143.38 LBS | SYSTOLIC BLOOD PRESSURE: 132 MMHG | RESPIRATION RATE: 16 BRPM | HEIGHT: 62 IN

## 2021-01-07 DIAGNOSIS — F03.90 DEMENTIA WITHOUT BEHAVIORAL DISTURBANCE, UNSPECIFIED DEMENTIA TYPE: ICD-10-CM

## 2021-01-07 DIAGNOSIS — R73.9 HYPERGLYCEMIA: ICD-10-CM

## 2021-01-07 DIAGNOSIS — E78.5 HYPERLIPIDEMIA, UNSPECIFIED HYPERLIPIDEMIA TYPE: ICD-10-CM

## 2021-01-07 DIAGNOSIS — E55.9 VITAMIN D DEFICIENCY: ICD-10-CM

## 2021-01-07 DIAGNOSIS — I10 ESSENTIAL HYPERTENSION: Primary | ICD-10-CM

## 2021-01-07 LAB
CHP ED QC CHECK: ABNORMAL
GLUCOSE BLD-MCNC: 142 MG/DL
HBA1C MFR BLD: 6.1 %

## 2021-01-07 PROCEDURE — G8417 CALC BMI ABV UP PARAM F/U: HCPCS | Performed by: EMERGENCY MEDICINE

## 2021-01-07 PROCEDURE — 4040F PNEUMOC VAC/ADMIN/RCVD: CPT | Performed by: EMERGENCY MEDICINE

## 2021-01-07 PROCEDURE — 1090F PRES/ABSN URINE INCON ASSESS: CPT | Performed by: EMERGENCY MEDICINE

## 2021-01-07 PROCEDURE — 82962 GLUCOSE BLOOD TEST: CPT | Performed by: EMERGENCY MEDICINE

## 2021-01-07 PROCEDURE — G8400 PT W/DXA NO RESULTS DOC: HCPCS | Performed by: EMERGENCY MEDICINE

## 2021-01-07 PROCEDURE — 83036 HEMOGLOBIN GLYCOSYLATED A1C: CPT | Performed by: EMERGENCY MEDICINE

## 2021-01-07 PROCEDURE — G8427 DOCREV CUR MEDS BY ELIG CLIN: HCPCS | Performed by: EMERGENCY MEDICINE

## 2021-01-07 PROCEDURE — 1123F ACP DISCUSS/DSCN MKR DOCD: CPT | Performed by: EMERGENCY MEDICINE

## 2021-01-07 PROCEDURE — 1036F TOBACCO NON-USER: CPT | Performed by: EMERGENCY MEDICINE

## 2021-01-07 PROCEDURE — 99213 OFFICE O/P EST LOW 20 MIN: CPT | Performed by: EMERGENCY MEDICINE

## 2021-01-07 RX ORDER — PHENOL 1.4 %
1 AEROSOL, SPRAY (ML) MUCOUS MEMBRANE DAILY
COMMUNITY

## 2021-01-07 RX ORDER — POTASSIUM GLUCONATE 595(99)MG
TABLET ORAL
Status: ON HOLD | COMMUNITY
End: 2022-01-01

## 2021-01-07 ASSESSMENT — PATIENT HEALTH QUESTIONNAIRE - PHQ9
1. LITTLE INTEREST OR PLEASURE IN DOING THINGS: 0
SUM OF ALL RESPONSES TO PHQ QUESTIONS 1-9: 0
SUM OF ALL RESPONSES TO PHQ9 QUESTIONS 1 & 2: 0
SUM OF ALL RESPONSES TO PHQ QUESTIONS 1-9: 0

## 2021-01-07 ASSESSMENT — ENCOUNTER SYMPTOMS
BACK PAIN: 0
ABDOMINAL PAIN: 0
ORTHOPNEA: 0
RHINORRHEA: 0
WHEEZING: 0
VOMITING: 0
COUGH: 0
SHORTNESS OF BREATH: 0
DIARRHEA: 0
SINUS PRESSURE: 0
CHEST TIGHTNESS: 0
NAUSEA: 0
CONSTIPATION: 0
TROUBLE SWALLOWING: 0
VOICE CHANGE: 0
SORE THROAT: 0

## 2021-01-07 NOTE — PROGRESS NOTES
Date: 1/7/2021    : Eleanor Maravilla is a 78 y. o.female who presents today for:  Chief Complaint   Patient presents with    Check-Up    Hyperglycemia         HPI:     Like V8 drink    No meds side effects    Eating and drink better    Hyperglycemia  Pertinent negatives include no abdominal pain, arthralgias, chest pain, chills, congestion, coughing, diaphoresis, fatigue, fever, headaches, joint swelling, myalgias, nausea, neck pain, numbness, sore throat, vomiting or weakness. Hyperlipidemia  This is a chronic problem. The problem is controlled. Recent lipid tests were reviewed and are normal. Pertinent negatives include no chest pain, focal sensory loss, leg pain, myalgias or shortness of breath. Hypertension  This is a chronic problem. The problem is controlled. Pertinent negatives include no chest pain, headaches, malaise/fatigue, neck pain, orthopnea, palpitations, peripheral edema or shortness of breath. Coronary Artery Disease  Pertinent negatives include no chest pain, chest tightness, dizziness, leg swelling, palpitations or shortness of breath. Risk factors include hyperlipidemia.        CurrentHome Medications:  Current Outpatient Medications   Medication Sig Dispense Refill    calcium carbonate 600 MG TABS tablet Take 1 tablet by mouth daily      Cod Liver Oil 10 MINIM CAPS Take by mouth      vitamin D (ERGOCALCIFEROL) 1.25 MG (53277 UT) CAPS capsule TAKE 1 CAPSULE BY MOUTH TWICE A WEEK 24 capsule 1    lisinopril (PRINIVIL;ZESTRIL) 10 MG tablet Take 1 tablet by mouth once daily 90 tablet 1    triamterene-hydroCHLOROthiazide (MAXZIDE-25) 37.5-25 MG per tablet Take 1 tablet by mouth once daily 90 tablet 1    naproxen (NAPROSYN) 500 MG tablet Take 1 tablet by mouth 2 times daily as needed for Pain (arthritic pain, take medications with food) 90 tablet 1    clopidogrel (PLAVIX) 75 MG tablet Take 1 tablet by mouth once daily 90 tablet 1    atorvastatin (LIPITOR) 20 MG tablet Take 1 tablet by ear normal.      Left Ear: External ear normal.      Nose: Nose normal.   Eyes:      General: No scleral icterus. Conjunctiva/sclera: Conjunctivae normal.      Pupils: Pupils are equal, round, and reactive to light. Neck:      Musculoskeletal: Normal range of motion and neck supple. Thyroid: No thyromegaly. Vascular: No JVD. Cardiovascular:      Rate and Rhythm: Normal rate and regular rhythm. Heart sounds: No murmur. No friction rub. Pulmonary:      Effort: Pulmonary effort is normal.      Breath sounds: Normal breath sounds. No wheezing or rales. Chest:      Chest wall: No tenderness. Abdominal:      General: Bowel sounds are normal.      Palpations: Abdomen is soft. There is no mass. Tenderness: There is no abdominal tenderness. Lymphadenopathy:      Cervical: No cervical adenopathy. Skin:     Findings: No rash. Neurological:      Mental Status: She is alert and oriented to person, place, and time. Psychiatric:         Behavior: Behavior is cooperative. Assessment:         Diagnosis Orders   1. Essential hypertension  CBC Auto Differential    Comprehensive Metabolic Panel    Lipid Panel   2. Hyperglycemia  POCT Glucose    POCT glycosylated hemoglobin (Hb A1C)   3. Hyperlipidemia, unspecified hyperlipidemia type  Comprehensive Metabolic Panel    Lipid Panel   4. Dementia without behavioral disturbance, unspecified dementia type [facility]     5. Vitamin D deficiency  Vitamin D 25 Hydroxy       :      Medications Prescribed:  No orders of the defined types were placed in this encounter.     Orders Placed:  Orders Placed This Encounter   Procedures    CBC Auto Differential     Laboratory Test to be done in 3 months     Standing Status:   Future     Standing Expiration Date:   1/7/2022    Comprehensive Metabolic Panel     Laboratory Test to be done in 3 months     Standing Status:   Future     Standing Expiration Date:   1/7/2022    Lipid Panel     Laboratory Test to be done in 3 months     Standing Status:   Future     Standing Expiration Date:   1/7/2022     Order Specific Question:   Is Patient Fasting?/# of Hours     Answer:   15    Vitamin D 25 Hydroxy     To be done in 3 months. Standing Status:   Future     Standing Expiration Date:   1/7/2022    POCT Glucose    POCT glycosylated hemoglobin (Hb A1C)       Lab Results   Component Value Date    LABA1C 6.1 01/07/2021    LABA1C 5.9 06/29/2020    LABA1C 5.8 04/22/2014     Lab Results   Component Value Date    LDLCALC 66 06/29/2020    CREATININE 1.36 (H) 08/13/2020       Return in about 13 weeks (around 4/8/2021) for HTN Dementia. Discussed use, benefit, and side effects of prescribedmedications. All patient questions answered. Pt voiced understanding. Instructedto continue current medications, diet and exercise. Patient agreed with treatmentplan.

## 2021-04-17 LAB
ABSOLUTE BASO #: 0.1 X10E9/L (ref 0–0.2)
ABSOLUTE EOS #: 0.1 X10E9/L (ref 0–0.4)
ABSOLUTE LYMPH #: 1.7 X10E9/L (ref 1–3.5)
ABSOLUTE MONO #: 0.3 X10E9/L (ref 0–0.9)
ABSOLUTE NEUT #: 2.4 X10E9/L (ref 1.5–6.6)
ALBUMIN SERPL-MCNC: 4.2 G/DL (ref 3.2–5.3)
ALK PHOSPHATASE: 37 U/L (ref 39–130)
ALT SERPL-CCNC: 21 U/L (ref 0–31)
ANION GAP SERPL CALCULATED.3IONS-SCNC: 10 MMOL/L (ref 5–15)
AST SERPL-CCNC: 21 U/L (ref 0–41)
BASOPHILS RELATIVE PERCENT: 1.4 %
BILIRUB SERPL-MCNC: 0.7 MG/DL (ref 0.3–1.2)
BUN BLDV-MCNC: 36 MG/DL (ref 5–27)
CALCIUM SERPL-MCNC: 9.4 MG/DL (ref 8.5–10.5)
CHLORIDE BLD-SCNC: 105 MMOL/L (ref 98–109)
CHOLESTEROL/HDL RATIO: 3.1 (ref 1–5)
CHOLESTEROL: 134 MG/DL (ref 150–200)
CO2: 25 MMOL/L (ref 22–32)
CREAT SERPL-MCNC: 1.58 MG/DL (ref 0.4–1)
EGFR AFRICAN AMERICAN: 38 ML/MIN/1.73SQ.M
EGFR IF NONAFRICAN AMERICAN: 31 ML/MIN/1.73SQ.M
EOSINOPHILS RELATIVE PERCENT: 1.2 %
GLUCOSE: 127 MG/DL (ref 65–99)
HCT VFR BLD CALC: 33.7 % (ref 35–47)
HDLC SERPL-MCNC: 43 MG/DL
HEMOGLOBIN: 11.1 G/DL (ref 11.7–15.5)
LDL CHOLESTEROL CALCULATED: 65 MG/DL
LDL/HDL RATIO: 1.5
LYMPHOCYTE %: 36.9 %
MCH RBC QN AUTO: 30.9 PG (ref 27–34)
MCHC RBC AUTO-ENTMCNC: 32.9 G/DL (ref 32–36)
MCV RBC AUTO: 94 FL (ref 80–100)
MONOCYTES # BLD: 7.5 %
NEUTROPHILS RELATIVE PERCENT: 53 %
PDW BLD-RTO: 14.7 % (ref 11.5–15)
PLATELETS: 200 X10E9/L (ref 150–450)
PMV BLD AUTO: 9.8 FL (ref 7–12)
POTASSIUM SERPL-SCNC: 4.1 MMOL/L (ref 3.5–5)
RBC: 3.59 X10E12/L (ref 3.8–5.2)
SODIUM BLD-SCNC: 140 MMOL/L (ref 134–146)
TOTAL PROTEIN: 7.3 G/DL (ref 6–8)
TRIGL SERPL-MCNC: 131 MG/DL (ref 27–150)
VITAMIN D 25-HYDROXY: 80.6 NG/ML (ref 30–100)
VLDLC SERPL CALC-MCNC: 26 MG/DL (ref 0–30)
WBC: 4.5 X10E9/L (ref 4–11)

## 2021-04-29 ENCOUNTER — OFFICE VISIT (OUTPATIENT)
Dept: FAMILY MEDICINE CLINIC | Age: 81
End: 2021-04-29

## 2021-04-29 VITALS
RESPIRATION RATE: 14 BRPM | BODY MASS INDEX: 27.1 KG/M2 | TEMPERATURE: 95.9 F | HEART RATE: 66 BPM | WEIGHT: 147.25 LBS | SYSTOLIC BLOOD PRESSURE: 130 MMHG | DIASTOLIC BLOOD PRESSURE: 68 MMHG | HEIGHT: 62 IN

## 2021-04-29 DIAGNOSIS — E78.2 MIXED HYPERLIPIDEMIA: ICD-10-CM

## 2021-04-29 DIAGNOSIS — I25.10 CORONARY ARTERY DISEASE INVOLVING NATIVE CORONARY ARTERY OF NATIVE HEART WITHOUT ANGINA PECTORIS: ICD-10-CM

## 2021-04-29 DIAGNOSIS — R73.9 HYPERGLYCEMIA: Primary | ICD-10-CM

## 2021-04-29 DIAGNOSIS — F03.90 DEMENTIA WITHOUT BEHAVIORAL DISTURBANCE, UNSPECIFIED DEMENTIA TYPE: ICD-10-CM

## 2021-04-29 DIAGNOSIS — I10 ESSENTIAL HYPERTENSION: ICD-10-CM

## 2021-04-29 PROBLEM — Z23 ENCOUNTER FOR IMMUNIZATION: Status: ACTIVE | Noted: 2021-03-15

## 2021-04-29 LAB
CHP ED QC CHECK: NORMAL
GLUCOSE BLD-MCNC: 78 MG/DL
HBA1C MFR BLD: 5.9 %

## 2021-04-29 PROCEDURE — 83036 HEMOGLOBIN GLYCOSYLATED A1C: CPT | Performed by: EMERGENCY MEDICINE

## 2021-04-29 PROCEDURE — G8427 DOCREV CUR MEDS BY ELIG CLIN: HCPCS | Performed by: EMERGENCY MEDICINE

## 2021-04-29 PROCEDURE — 99213 OFFICE O/P EST LOW 20 MIN: CPT | Performed by: EMERGENCY MEDICINE

## 2021-04-29 PROCEDURE — 82962 GLUCOSE BLOOD TEST: CPT | Performed by: EMERGENCY MEDICINE

## 2021-04-29 PROCEDURE — 1090F PRES/ABSN URINE INCON ASSESS: CPT | Performed by: EMERGENCY MEDICINE

## 2021-04-29 PROCEDURE — G8417 CALC BMI ABV UP PARAM F/U: HCPCS | Performed by: EMERGENCY MEDICINE

## 2021-04-29 PROCEDURE — 1036F TOBACCO NON-USER: CPT | Performed by: EMERGENCY MEDICINE

## 2021-04-29 PROCEDURE — 1123F ACP DISCUSS/DSCN MKR DOCD: CPT | Performed by: EMERGENCY MEDICINE

## 2021-04-29 PROCEDURE — G8400 PT W/DXA NO RESULTS DOC: HCPCS | Performed by: EMERGENCY MEDICINE

## 2021-04-29 PROCEDURE — 4040F PNEUMOC VAC/ADMIN/RCVD: CPT | Performed by: EMERGENCY MEDICINE

## 2021-04-29 ASSESSMENT — ENCOUNTER SYMPTOMS
CHEST TIGHTNESS: 0
SHORTNESS OF BREATH: 0
TROUBLE SWALLOWING: 0
NAUSEA: 0
DIARRHEA: 0
COUGH: 0
WHEEZING: 0
CONSTIPATION: 0
VOICE CHANGE: 0
VOMITING: 0
RHINORRHEA: 0
BACK PAIN: 0
SINUS PRESSURE: 0
ABDOMINAL PAIN: 0
SORE THROAT: 0
ORTHOPNEA: 0

## 2021-04-29 NOTE — PROGRESS NOTES
Visit Date: 4/29/2021    Subjective: Caesar Hawkins is a 78 y. o.female who presents today for:  Chief Complaint   Patient presents with    Dementia         HPI:       doing well, No SOB, No chest pain , No fatigue. No nausea nor abdominal pain      Hyperglycemia  Pertinent negatives include no abdominal pain, arthralgias, chest pain, chills, congestion, coughing, diaphoresis, fatigue, fever, headaches, joint swelling, myalgias, nausea, neck pain, numbness, sore throat, vomiting or weakness. Hyperlipidemia  This is a chronic problem. The problem is controlled. Recent lipid tests were reviewed and are normal. Pertinent negatives include no chest pain, focal sensory loss, leg pain, myalgias or shortness of breath. Hypertension  This is a chronic problem. The problem is controlled. Pertinent negatives include no chest pain, headaches, malaise/fatigue, neck pain, orthopnea, palpitations, peripheral edema or shortness of breath. Coronary Artery Disease  Pertinent negatives include no chest pain, chest tightness, dizziness, leg swelling, palpitations or shortness of breath. Risk factors include hyperlipidemia.        CurrentHome Medications:  Current Outpatient Medications   Medication Sig Dispense Refill    triamterene-hydroCHLOROthiazide (MAXZIDE-25) 37.5-25 MG per tablet Take 1 tablet by mouth once daily 90 tablet 1    lisinopril (PRINIVIL;ZESTRIL) 10 MG tablet Take 1 tablet by mouth once daily 90 tablet 1    atorvastatin (LIPITOR) 20 MG tablet Take 1 tablet by mouth once daily 90 tablet 1    clopidogrel (PLAVIX) 75 MG tablet Take 1 tablet by mouth once daily 90 tablet 1    donepezil (ARICEPT) 10 MG tablet Take 1 tablet by mouth nightly 90 tablet 1    naproxen (NAPROSYN) 500 MG tablet TAKE 1 TABLET BY MOUTH TWICE DAILY AS NEEDED FOR PAIN (ARTHRITIC  PAIN,  TAKE  MEDICATIONS  WITH  FOOD) 90 tablet 1    calcium carbonate 600 MG TABS tablet Take 1 tablet by mouth daily      vitamin D (ERGOCALCIFEROL) Right Ear: External ear normal.      Left Ear: External ear normal.      Nose: Nose normal.   Eyes:      General: No scleral icterus. Conjunctiva/sclera: Conjunctivae normal.      Pupils: Pupils are equal, round, and reactive to light. Neck:      Musculoskeletal: Normal range of motion and neck supple. Thyroid: No thyromegaly. Vascular: No JVD. Cardiovascular:      Rate and Rhythm: Normal rate and regular rhythm. Heart sounds: No murmur. No friction rub. Pulmonary:      Effort: Pulmonary effort is normal.      Breath sounds: Normal breath sounds. No wheezing or rales. Chest:      Chest wall: No tenderness. Abdominal:      General: Bowel sounds are normal.      Palpations: Abdomen is soft. There is no mass. Tenderness: There is no abdominal tenderness. Lymphadenopathy:      Cervical: No cervical adenopathy. Skin:     Findings: No rash. Neurological:      Mental Status: She is alert and oriented to person, place, and time. Psychiatric:         Behavior: Behavior is cooperative. Assessment:         Diagnosis Orders   1. Hyperglycemia  POCT glycosylated hemoglobin (Hb A1C)    POCT Glucose   2. Essential hypertension     3. Dementia without behavioral disturbance, unspecified dementia type ([facility] [address]     4. Mixed hyperlipidemia     5. Coronary artery disease involving native coronary artery of native heart without angina pectoris         Plan:      Medications Prescribed:  No orders of the defined types were placed in this encounter. Orders Placed:  Orders Placed This Encounter   Procedures    POCT glycosylated hemoglobin (Hb A1C)    POCT Glucose     Lab Results   Component Value Date    LABA1C 5.9 04/29/2021    LABA1C 6.1 01/07/2021    LABA1C 5.9 06/29/2020     Lab Results   Component Value Date    LDLCALC 65 04/16/2021    CREATININE 1.58 (H) 04/16/2021     Cut down Naproxen change to Tylenol    Results of Laboratory tests taken  4/16/20  were reviewed with the patient.

## 2021-07-29 NOTE — PROGRESS NOTES
Review of Systems   Constitutional: Negative for appetite change, chills, diaphoresis, fatigue and fever. HENT: Positive for hearing loss. Negative for congestion, ear pain, postnasal drip, rhinorrhea, sinus pressure, sneezing, sore throat, trouble swallowing and voice change. Respiratory: Negative for cough, chest tightness, shortness of breath and wheezing. Cardiovascular: Negative for chest pain, palpitations and leg swelling. Gastrointestinal: Negative for abdominal pain, constipation, diarrhea, nausea and vomiting. Musculoskeletal: Negative for arthralgias, back pain, joint swelling, myalgias, neck pain and neck stiffness. Neurological: Negative for dizziness, syncope, weakness, light-headedness, numbness and headaches. Objective:     /60   Pulse 56   Temp 97.2 °F (36.2 °C) (Skin)   Resp 16   Ht 5' 2\" (1.575 m)   Wt 151 lb 2 oz (68.5 kg)   BMI 27.64 kg/m²   BP Readings from Last 3 Encounters:   07/29/21 120/60   04/29/21 130/68   01/07/21 132/60     Wt Readings from Last 3 Encounters:   07/29/21 151 lb 2 oz (68.5 kg)   04/29/21 147 lb 4 oz (66.8 kg)   01/07/21 143 lb 6 oz (65 kg)       Physical Exam  Vitals reviewed. Constitutional:       Appearance: She is well-developed. HENT:      Head: Normocephalic and atraumatic. Right Ear: External ear normal. There is impacted cerumen. Left Ear: External ear normal. There is impacted cerumen. Nose: Nose normal.   Eyes:      General: No scleral icterus. Conjunctiva/sclera: Conjunctivae normal.      Pupils: Pupils are equal, round, and reactive to light. Neck:      Thyroid: No thyromegaly. Vascular: No JVD. Cardiovascular:      Rate and Rhythm: Normal rate and regular rhythm. Heart sounds: No murmur heard. No friction rub. Pulmonary:      Effort: Pulmonary effort is normal.      Breath sounds: Normal breath sounds. No wheezing or rales. Chest:      Chest wall: No tenderness.    Abdominal: questions answered. Pt voiced understanding. Instructedto continue current medications, diet and exercise. Patient agreed with treatmentplan.

## 2021-08-12 NOTE — PROGRESS NOTES
Visit Date: 8/12/2021    Subjective: Jessica Emerson is a 78 y. o.female who presents today for:  Chief Complaint   Patient presents with    Cerumen Impaction         HPI:     HPI     Right ear cerumen waxes treated with Debrox. Patient can hear better feeling well. Patient is going to an assisted living according to the daughter      CurrentHome Medications:  Current Outpatient Medications   Medication Sig Dispense Refill    acetaminophen (TYLENOL) 500 MG tablet Take 500 mg by mouth every 6 hours as needed for Pain      carbamide peroxide (DEBROX) 6.5 % otic solution Place 4 drops into the right ear 2 times daily 15 mL 0    memantine (NAMENDA) 10 MG tablet Take 1 tablet by mouth twice daily 180 tablet 1    triamterene-hydroCHLOROthiazide (MAXZIDE-25) 37.5-25 MG per tablet Take 1 tablet by mouth once daily 90 tablet 1    lisinopril (PRINIVIL;ZESTRIL) 10 MG tablet Take 1 tablet by mouth once daily 90 tablet 1    atorvastatin (LIPITOR) 20 MG tablet Take 1 tablet by mouth once daily 90 tablet 1    clopidogrel (PLAVIX) 75 MG tablet Take 1 tablet by mouth once daily 90 tablet 1    donepezil (ARICEPT) 10 MG tablet Take 1 tablet by mouth nightly 90 tablet 1    naproxen (NAPROSYN) 500 MG tablet TAKE 1 TABLET BY MOUTH TWICE DAILY AS NEEDED FOR PAIN (ARTHRITIC  PAIN,  TAKE  MEDICATIONS  WITH  FOOD) 90 tablet 1    calcium carbonate 600 MG TABS tablet Take 1 tablet by mouth daily      Cod Liver Oil 10 MINIM CAPS Take by mouth      vitamin D (ERGOCALCIFEROL) 1.25 MG (13104 UT) CAPS capsule TAKE 1 CAPSULE BY MOUTH TWICE A WEEK 24 capsule 1    fluticasone (FLONASE) 50 MCG/ACT nasal spray 2 sprays by Nasal route daily 1 Bottle 3    Carboxymethylcell-Hypromellose (GENTEAL OP) Apply to eye      Ascorbic Acid (VITAMIN C) 250 MG tablet Take 500 mg by mouth daily      aspirin 81 MG tablet Take 81 mg by mouth daily. No current facility-administered medications for this visit.        Subjective:      Review of Systems   Constitutional: Negative for appetite change, chills, diaphoresis, fatigue and fever. HENT: Negative for congestion, ear pain, postnasal drip, rhinorrhea, sinus pressure, sneezing, sore throat, trouble swallowing and voice change. Respiratory: Negative for cough, chest tightness, shortness of breath and wheezing. Cardiovascular: Negative for chest pain, palpitations and leg swelling. Gastrointestinal: Negative for abdominal pain, constipation, diarrhea, nausea and vomiting. Musculoskeletal: Negative for arthralgias, back pain, joint swelling, myalgias, neck pain and neck stiffness. Neurological: Negative for dizziness, syncope, weakness, light-headedness, numbness and headaches. Objective:     /60 (Site: Right Upper Arm, Position: Sitting, Cuff Size: Medium Adult)   Pulse 66   Temp 96.3 °F (35.7 °C) (Skin)   Resp 14   Ht 5' 2\" (1.575 m)   Wt 147 lb 4 oz (66.8 kg)   BMI 26.93 kg/m²   BP Readings from Last 3 Encounters:   08/12/21 122/60   07/29/21 120/60   04/29/21 130/68     Wt Readings from Last 3 Encounters:   08/12/21 147 lb 4 oz (66.8 kg)   07/29/21 151 lb 2 oz (68.5 kg)   04/29/21 147 lb 4 oz (66.8 kg)       Physical Exam  Vitals reviewed. Constitutional:       Appearance: She is well-developed. HENT:      Head: Normocephalic and atraumatic. Right Ear: External ear normal.      Left Ear: External ear normal.      Ears:      Comments: Right ear showed a small amount of wax about 10% at the bottom otherwise tympanic membrane is free and normal     Nose: Nose normal.   Eyes:      General: No scleral icterus. Conjunctiva/sclera: Conjunctivae normal.      Pupils: Pupils are equal, round, and reactive to light. Neck:      Thyroid: No thyromegaly. Vascular: No JVD. Cardiovascular:      Rate and Rhythm: Normal rate and regular rhythm. Heart sounds: No murmur heard. No friction rub.    Pulmonary:      Effort: Pulmonary effort is normal.      Breath sounds: Normal breath sounds. No wheezing or rales. Chest:      Chest wall: No tenderness. Abdominal:      General: Bowel sounds are normal.      Palpations: Abdomen is soft. There is no mass. Tenderness: There is no abdominal tenderness. Musculoskeletal:      Cervical back: Normal range of motion and neck supple. Lymphadenopathy:      Cervical: No cervical adenopathy. Skin:     Findings: No rash. Neurological:      Mental Status: She is alert and oriented to person, place, and time. Psychiatric:         Behavior: Behavior is cooperative. Assessment:         Diagnosis Orders   1. Impacted cerumen of right ear     2. Essential hypertension     3. Dementia without behavioral disturbance, unspecified dementia type ([facility]         Plan:      Medications Prescribed:  No orders of the defined types were placed in this encounter. Orders Placed:  No orders of the defined types were placed in this encounter. Return in about 3 months (around 11/12/2021) for awv. Discussed use, benefit, and side effects of prescribedmedications. All patient questions answered. Pt voiced understanding. Instructedto continue current medications, diet and exercise. Patient agreed with treatmentplan.

## 2021-09-13 NOTE — TELEPHONE ENCOUNTER
Date of last visit:  8/12/2021   Date of next visit:  10/14/2021    Requested Prescriptions     Pending Prescriptions Disp Refills    donepezil (ARICEPT) 10 MG tablet [Pharmacy Med Name: Donepezil HCl 10 MG Oral Tablet] 90 tablet 1     Sig: Take 1 tablet by mouth nightly

## 2021-10-14 NOTE — PROGRESS NOTES
Immunizations Administered     Name Date Dose Route    Influenza, Quadv, IM, (6 mo and older Fluzone, Flulaval, Fluarix and 3 yrs and older Afluria) 10/14/2021 0.5 mL Intramuscular    Site: Deltoid- Right    Lot: [id number]    NDC: 08530-386-77

## 2021-10-14 NOTE — PROGRESS NOTES
Visit Date: 10/14/2021    Subjective: Bell Wren is a 78 y. o.female who presents today for:  Chief Complaint   Patient presents with    Check-Up    Immunizations     flu         HPI:     Came here with daughter    Hyperglycemia  Pertinent negatives include no abdominal pain, arthralgias, chest pain, chills, congestion, coughing, diaphoresis, fatigue, fever, headaches, joint swelling, myalgias, nausea, neck pain, numbness, sore throat, vomiting or weakness. Hyperlipidemia  This is a chronic problem. The problem is controlled. Recent lipid tests were reviewed and are normal. Pertinent negatives include no chest pain, focal sensory loss, leg pain, myalgias or shortness of breath. Hypertension  This is a chronic problem. The problem is controlled. Pertinent negatives include no chest pain, headaches, malaise/fatigue, neck pain, orthopnea, palpitations, peripheral edema or shortness of breath. Coronary Artery Disease  Pertinent negatives include no chest pain, chest tightness, dizziness, leg swelling, palpitations or shortness of breath. Risk factors include hyperlipidemia.        CurrentHome Medications:  Current Outpatient Medications   Medication Sig Dispense Refill    donepezil (ARICEPT) 10 MG tablet Take 1 tablet by mouth nightly 90 tablet 1    triamterene-hydroCHLOROthiazide (MAXZIDE-25) 37.5-25 MG per tablet Take 1 tablet by mouth once daily 90 tablet 1    vitamin D (ERGOCALCIFEROL) 1.25 MG (40751 UT) CAPS capsule TAKE 1 CAPSULE BY MOUTH TWICE A WEEK 24 capsule 1    atorvastatin (LIPITOR) 20 MG tablet Take 1 tablet by mouth once daily 90 tablet 1    lisinopril (PRINIVIL;ZESTRIL) 10 MG tablet Take 1 tablet by mouth once daily 90 tablet 1    clopidogrel (PLAVIX) 75 MG tablet Take 1 tablet by mouth once daily 90 tablet 1    acetaminophen (TYLENOL) 500 MG tablet Take 500 mg by mouth every 6 hours as needed for Pain      memantine (NAMENDA) 10 MG tablet Take 1 tablet by mouth twice daily 180 tablet 1    naproxen (NAPROSYN) 500 MG tablet TAKE 1 TABLET BY MOUTH TWICE DAILY AS NEEDED FOR PAIN (ARTHRITIC  PAIN,  TAKE  MEDICATIONS  WITH  FOOD) 90 tablet 1    calcium carbonate 600 MG TABS tablet Take 1 tablet by mouth daily      Cod Liver Oil 10 MINIM CAPS Take by mouth      fluticasone (FLONASE) 50 MCG/ACT nasal spray 2 sprays by Nasal route daily 1 Bottle 3    Carboxymethylcell-Hypromellose (GENTEAL OP) Apply to eye      Ascorbic Acid (VITAMIN C) 250 MG tablet Take 500 mg by mouth daily      aspirin 81 MG tablet Take 81 mg by mouth daily. No current facility-administered medications for this visit. Subjective:      Review of Systems   Constitutional: Negative for appetite change, chills, diaphoresis, fatigue, fever and malaise/fatigue. HENT: Negative for congestion, ear pain, postnasal drip, rhinorrhea, sinus pressure, sneezing, sore throat, trouble swallowing and voice change. Respiratory: Negative for cough, chest tightness, shortness of breath and wheezing. Cardiovascular: Negative for chest pain, palpitations, orthopnea and leg swelling. Gastrointestinal: Negative for abdominal pain, constipation, diarrhea, nausea and vomiting. Musculoskeletal: Negative for arthralgias, back pain, joint swelling, myalgias, neck pain and neck stiffness. Neurological: Negative for dizziness, syncope, weakness, light-headedness, numbness and headaches. Objective:     BP (!) 108/56 (Site: Right Upper Arm, Position: Sitting, Cuff Size: Medium Adult)   Pulse 54   Resp 16   Ht 5' 1\" (1.549 m)   Wt 148 lb (67.1 kg)   BMI 27.96 kg/m²   BP Readings from Last 3 Encounters:   10/14/21 (!) 108/56   08/12/21 122/60   07/29/21 120/60     Wt Readings from Last 3 Encounters:   10/14/21 148 lb (67.1 kg)   08/12/21 147 lb 4 oz (66.8 kg)   07/29/21 151 lb 2 oz (68.5 kg)       Physical Exam  Vitals reviewed. Constitutional:       Appearance: She is well-developed.    HENT:      Head: Normocephalic and atraumatic. Right Ear: External ear normal.      Left Ear: External ear normal.      Nose: Nose normal.   Eyes:      General: No scleral icterus. Conjunctiva/sclera: Conjunctivae normal.      Pupils: Pupils are equal, round, and reactive to light. Neck:      Thyroid: No thyromegaly. Vascular: No JVD. Cardiovascular:      Rate and Rhythm: Normal rate and regular rhythm. Heart sounds: No murmur heard. No friction rub. Pulmonary:      Effort: Pulmonary effort is normal.      Breath sounds: Normal breath sounds. No wheezing or rales. Chest:      Chest wall: No tenderness. Abdominal:      General: Bowel sounds are normal.      Palpations: Abdomen is soft. There is no mass. Tenderness: There is no abdominal tenderness. Musculoskeletal:      Cervical back: Normal range of motion and neck supple. Lymphadenopathy:      Cervical: No cervical adenopathy. Skin:     Findings: No rash. Neurological:      Mental Status: She is alert and oriented to person, place, and time. Psychiatric:         Behavior: Behavior is cooperative. Assessment:         Diagnosis Orders   1. Essential hypertension     2. Need for influenza vaccination  INFLUENZA, QUADV, 3 YRS AND OLDER, IM, MDV, 0.5ML ([facility] [place])   3. Hyperlipidemia, unspecified hyperlipidemia type     4. Dementia without behavioral disturbance, unspecified dementia type ([facility]         Plan:      Medications Prescribed:  No orders of the defined types were placed in this encounter. Orders Placed:  Orders Placed This Encounter   Procedures    INFLUENZA, QUADV, 3 YRS AND OLDER, IM, MDV, 0.5ML ([facility] [place])       Results of Laboratory tests taken  8/9/21  were reviewed with the patient. Results were w/in  acceptable range     Return in about 3 months (around 1/18/2022) for HTN, awv. Discussed use, benefit, and side effects of prescribedmedications. All patient questions answered. Pt voiced understanding. Instructedto continue current medications, diet and exercise. Patient agreed with treatmentplan.

## 2021-12-07 NOTE — TELEPHONE ENCOUNTER
Pt daughter, [facility], called. Stated they are noticing the pt having increased hearing loss. Stated since pt is in assisted living now they think it would increase quality of life for pt to have a hearing eval and potentially hearing aides. [facility] is asking for a referral to [facility] and to have them call [facility] to schedule.  [phone]

## 2021-12-17 NOTE — PROGRESS NOTES
AUDIOLOGICAL EVALUATION      REASON FOR TESTING:  Audiometric evaluation per the request of Chris Bourne MD, due to the diagnosis of hearing loss. The patient was accompanied today by her daughter as well as a caretaker, who reported concerns for recently decreased hearing sensitivity due to lack of responsiveness from the patient. They noted she has a history of cerumen impaction requiring ear cleaning approximately 2-3 times per year. The daughter denied any known history of ear infections or previous ear surgery. OTOSCOPY: Mostly-occluding cerumen obstructing visualization of the tympanic membrane, bilaterally. AUDIOGRAM            Reliability: Poor  Audiometer Used:  [id number]        COMMENTS: Patient unresponsive to pure tone stimuli. Questionable speech reception threshold in the right ear is consistent with a mild degree of hearing loss at the primary speech frequencies. Fair word recognition ability in the right ear with speech presented at an average conversation level in quiet. Speech audiometry was attempted on the left ear. A minimal response was obtained on the order of 45dBHL with some subjective conversational ability at 75dBHL; unfortunately, the patient's word understanding score could not be reliably calculated due to decreasing patient responsiveness as she appeared to be falling asleep during the evaluation. Tympanometry indicated normal ear canal volumes and peak pressure with slightly reduced compliance on the left (0.3ml) compared to the right (0.4ml). No further testing was performed. RECOMMENDATION(S):   1. Follow up with Dr. Noemi Louis regarding today's results.   2. Consider referral to ENT specialist for bilateral cerumen removal followed by repeat attempt at obtaining audiogram.

## 2022-01-01 ENCOUNTER — APPOINTMENT (OUTPATIENT)
Dept: INTERVENTIONAL RADIOLOGY/VASCULAR | Age: 82
DRG: 682 | End: 2022-01-01
Payer: MEDICARE

## 2022-01-01 ENCOUNTER — OFFICE VISIT (OUTPATIENT)
Dept: FAMILY MEDICINE CLINIC | Age: 82
End: 2022-01-01

## 2022-01-01 ENCOUNTER — OFFICE VISIT (OUTPATIENT)
Dept: CARDIOLOGY CLINIC | Age: 82
End: 2022-01-01
Payer: MEDICARE

## 2022-01-01 ENCOUNTER — APPOINTMENT (OUTPATIENT)
Dept: ULTRASOUND IMAGING | Age: 82
DRG: 871 | End: 2022-01-01
Payer: MEDICARE

## 2022-01-01 ENCOUNTER — ANESTHESIA (OUTPATIENT)
Dept: ENDOSCOPY | Age: 82
DRG: 682 | End: 2022-01-01
Payer: MEDICARE

## 2022-01-01 ENCOUNTER — HOSPITAL ENCOUNTER (INPATIENT)
Age: 82
LOS: 12 days | Discharge: HOSPICE | DRG: 871 | End: 2022-05-11
Attending: EMERGENCY MEDICINE | Admitting: INTERNAL MEDICINE
Payer: MEDICARE

## 2022-01-01 ENCOUNTER — TELEPHONE (OUTPATIENT)
Dept: SPIRITUAL SERVICES | Facility: CLINIC | Age: 82
End: 2022-01-01

## 2022-01-01 ENCOUNTER — HOSPITAL ENCOUNTER (INPATIENT)
Age: 82
LOS: 2 days | Discharge: NURSING, REHAB OR LONG-TERM CARE | DRG: 308 | End: 2022-04-07
Attending: EMERGENCY MEDICINE | Admitting: EMERGENCY MEDICINE
Payer: MEDICARE

## 2022-01-01 ENCOUNTER — TELEPHONE (OUTPATIENT)
Dept: FAMILY MEDICINE CLINIC | Age: 82
End: 2022-01-01

## 2022-01-01 ENCOUNTER — APPOINTMENT (OUTPATIENT)
Dept: GENERAL RADIOLOGY | Age: 82
DRG: 308 | End: 2022-01-01
Payer: MEDICARE

## 2022-01-01 ENCOUNTER — ANESTHESIA EVENT (OUTPATIENT)
Dept: ENDOSCOPY | Age: 82
DRG: 682 | End: 2022-01-01
Payer: MEDICARE

## 2022-01-01 ENCOUNTER — APPOINTMENT (OUTPATIENT)
Dept: CT IMAGING | Age: 82
DRG: 871 | End: 2022-01-01
Payer: MEDICARE

## 2022-01-01 ENCOUNTER — APPOINTMENT (OUTPATIENT)
Dept: GENERAL RADIOLOGY | Age: 82
DRG: 871 | End: 2022-01-01
Payer: MEDICARE

## 2022-01-01 ENCOUNTER — APPOINTMENT (OUTPATIENT)
Dept: CT IMAGING | Age: 82
DRG: 682 | End: 2022-01-01
Payer: MEDICARE

## 2022-01-01 ENCOUNTER — HOSPITAL ENCOUNTER (INPATIENT)
Age: 82
LOS: 10 days | Discharge: HOME | DRG: 682 | End: 2022-03-10
Attending: FAMILY MEDICINE | Admitting: EMERGENCY MEDICINE
Payer: MEDICARE

## 2022-01-01 ENCOUNTER — TELEPHONE (OUTPATIENT)
Dept: CARDIOLOGY CLINIC | Age: 82
End: 2022-01-01

## 2022-01-01 VITALS
DIASTOLIC BLOOD PRESSURE: 66 MMHG | HEIGHT: 61 IN | SYSTOLIC BLOOD PRESSURE: 114 MMHG | TEMPERATURE: 97 F | RESPIRATION RATE: 12 BRPM | HEART RATE: 68 BPM | WEIGHT: 139.8 LBS | BODY MASS INDEX: 26.39 KG/M2

## 2022-01-01 VITALS
HEIGHT: 61 IN | SYSTOLIC BLOOD PRESSURE: 130 MMHG | DIASTOLIC BLOOD PRESSURE: 72 MMHG | WEIGHT: 139.13 LBS | HEART RATE: 60 BPM | TEMPERATURE: 97 F | BODY MASS INDEX: 26.27 KG/M2 | RESPIRATION RATE: 12 BRPM

## 2022-01-01 VITALS
OXYGEN SATURATION: 100 % | DIASTOLIC BLOOD PRESSURE: 73 MMHG | SYSTOLIC BLOOD PRESSURE: 159 MMHG | RESPIRATION RATE: 19 BRPM

## 2022-01-01 VITALS
BODY MASS INDEX: 24.1 KG/M2 | WEIGHT: 136 LBS | TEMPERATURE: 98.4 F | DIASTOLIC BLOOD PRESSURE: 83 MMHG | HEART RATE: 75 BPM | RESPIRATION RATE: 16 BRPM | OXYGEN SATURATION: 99 % | SYSTOLIC BLOOD PRESSURE: 139 MMHG | HEIGHT: 63 IN

## 2022-01-01 VITALS
OXYGEN SATURATION: 99 % | DIASTOLIC BLOOD PRESSURE: 85 MMHG | RESPIRATION RATE: 18 BRPM | TEMPERATURE: 97.3 F | SYSTOLIC BLOOD PRESSURE: 100 MMHG | HEIGHT: 63 IN | WEIGHT: 128.53 LBS | HEART RATE: 124 BPM | BODY MASS INDEX: 22.77 KG/M2

## 2022-01-01 VITALS
HEIGHT: 61 IN | RESPIRATION RATE: 12 BRPM | HEART RATE: 72 BPM | DIASTOLIC BLOOD PRESSURE: 70 MMHG | TEMPERATURE: 97.3 F | SYSTOLIC BLOOD PRESSURE: 130 MMHG | BODY MASS INDEX: 26.73 KG/M2 | WEIGHT: 141.6 LBS

## 2022-01-01 VITALS
OXYGEN SATURATION: 98 % | WEIGHT: 139 LBS | HEART RATE: 61 BPM | TEMPERATURE: 97.5 F | SYSTOLIC BLOOD PRESSURE: 174 MMHG | RESPIRATION RATE: 18 BRPM | BODY MASS INDEX: 26.24 KG/M2 | DIASTOLIC BLOOD PRESSURE: 73 MMHG | HEIGHT: 61 IN

## 2022-01-01 VITALS
WEIGHT: 136.13 LBS | HEART RATE: 64 BPM | RESPIRATION RATE: 12 BRPM | HEIGHT: 63 IN | SYSTOLIC BLOOD PRESSURE: 136 MMHG | BODY MASS INDEX: 24.12 KG/M2 | DIASTOLIC BLOOD PRESSURE: 82 MMHG

## 2022-01-01 VITALS
HEART RATE: 80 BPM | HEIGHT: 63 IN | WEIGHT: 129.6 LBS | SYSTOLIC BLOOD PRESSURE: 140 MMHG | BODY MASS INDEX: 22.96 KG/M2 | DIASTOLIC BLOOD PRESSURE: 78 MMHG

## 2022-01-01 VITALS
HEART RATE: 60 BPM | TEMPERATURE: 96.6 F | HEIGHT: 61 IN | SYSTOLIC BLOOD PRESSURE: 118 MMHG | BODY MASS INDEX: 24.78 KG/M2 | DIASTOLIC BLOOD PRESSURE: 70 MMHG | RESPIRATION RATE: 12 BRPM | WEIGHT: 131.25 LBS
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
## 2022-01-01 DIAGNOSIS — I10 ESSENTIAL HYPERTENSION: ICD-10-CM

## 2022-01-01 DIAGNOSIS — G20 DEMENTIA DUE TO PARKINSON'S DISEASE WITHOUT BEHAVIORAL DISTURBANCE (HCC): ICD-10-CM

## 2022-01-01 DIAGNOSIS — F03.90 DEMENTIA WITHOUT BEHAVIORAL DISTURBANCE, UNSPECIFIED DEMENTIA TYPE: ICD-10-CM

## 2022-01-01 DIAGNOSIS — H61.23 IMPACTED CERUMEN OF BOTH EARS: ICD-10-CM

## 2022-01-01 DIAGNOSIS — H61.22 IMPACTED CERUMEN OF LEFT EAR: Primary | ICD-10-CM

## 2022-01-01 DIAGNOSIS — R60.0 EDEMA OF BOTH LEGS: ICD-10-CM

## 2022-01-01 DIAGNOSIS — N39.0 URINARY TRACT INFECTION WITHOUT HEMATURIA, SITE UNSPECIFIED: Primary | ICD-10-CM

## 2022-01-01 DIAGNOSIS — I48.91 ATRIAL FIBRILLATION, RAPID (HCC): ICD-10-CM

## 2022-01-01 DIAGNOSIS — E78.5 HYPERLIPIDEMIA, UNSPECIFIED HYPERLIPIDEMIA TYPE: ICD-10-CM

## 2022-01-01 DIAGNOSIS — K21.00 HIATAL HERNIA WITH GASTROESOPHAGEAL REFLUX DISEASE AND ESOPHAGITIS: ICD-10-CM

## 2022-01-01 DIAGNOSIS — I49.9 CARDIAC ARRHYTHMIA, UNSPECIFIED CARDIAC ARRHYTHMIA TYPE: ICD-10-CM

## 2022-01-01 DIAGNOSIS — M54.40 LOW BACK PAIN WITH SCIATICA, SCIATICA LATERALITY UNSPECIFIED, UNSPECIFIED BACK PAIN LATERALITY, UNSPECIFIED CHRONICITY: ICD-10-CM

## 2022-01-01 DIAGNOSIS — K92.0 HEMATEMESIS WITHOUT NAUSEA: ICD-10-CM

## 2022-01-01 DIAGNOSIS — I10 ESSENTIAL HYPERTENSION: Primary | ICD-10-CM

## 2022-01-01 DIAGNOSIS — I25.10 CORONARY ARTERY DISEASE INVOLVING NATIVE CORONARY ARTERY OF NATIVE HEART WITHOUT ANGINA PECTORIS: ICD-10-CM

## 2022-01-01 DIAGNOSIS — K44.9 HIATAL HERNIA WITH GASTROESOPHAGEAL REFLUX DISEASE AND ESOPHAGITIS: ICD-10-CM

## 2022-01-01 DIAGNOSIS — N17.9 ACUTE RENAL FAILURE, UNSPECIFIED ACUTE RENAL FAILURE TYPE (HCC): Primary | ICD-10-CM

## 2022-01-01 DIAGNOSIS — F02.80 DEMENTIA DUE TO PARKINSON'S DISEASE WITHOUT BEHAVIORAL DISTURBANCE (HCC): ICD-10-CM

## 2022-01-01 DIAGNOSIS — M54.50 BILATERAL LOW BACK PAIN WITHOUT SCIATICA, UNSPECIFIED CHRONICITY: ICD-10-CM

## 2022-01-01 DIAGNOSIS — R60.9 PERIPHERAL EDEMA: Primary | ICD-10-CM

## 2022-01-01 DIAGNOSIS — D64.9 ANEMIA, UNSPECIFIED TYPE: ICD-10-CM

## 2022-01-01 DIAGNOSIS — I50.9 CONGESTIVE HEART FAILURE, UNSPECIFIED HF CHRONICITY, UNSPECIFIED HEART FAILURE TYPE (HCC): ICD-10-CM

## 2022-01-01 DIAGNOSIS — E87.20 METABOLIC ACIDOSIS: ICD-10-CM

## 2022-01-01 DIAGNOSIS — R60.0 BILATERAL LEG EDEMA: ICD-10-CM

## 2022-01-01 DIAGNOSIS — D64.9 ANEMIA, UNSPECIFIED TYPE: Primary | ICD-10-CM

## 2022-01-01 DIAGNOSIS — S00.83XA CONTUSION OF FOREHEAD, INITIAL ENCOUNTER: ICD-10-CM

## 2022-01-01 DIAGNOSIS — I50.9 ACUTE ON CHRONIC CONGESTIVE HEART FAILURE, UNSPECIFIED HEART FAILURE TYPE (HCC): ICD-10-CM

## 2022-01-01 DIAGNOSIS — Z09 HOSPITAL DISCHARGE FOLLOW-UP: ICD-10-CM

## 2022-01-01 DIAGNOSIS — I48.0 PAROXYSMAL ATRIAL FIBRILLATION (HCC): Primary | ICD-10-CM

## 2022-01-01 DIAGNOSIS — I50.31 ACUTE DIASTOLIC CONGESTIVE HEART FAILURE (HCC): ICD-10-CM

## 2022-01-01 DIAGNOSIS — N17.9 AKI (ACUTE KIDNEY INJURY) (HCC): ICD-10-CM

## 2022-01-01 DIAGNOSIS — I48.0 PAROXYSMAL ATRIAL FIBRILLATION WITH RVR (HCC): ICD-10-CM

## 2022-01-01 DIAGNOSIS — N17.9 ACUTE RENAL FAILURE, UNSPECIFIED ACUTE RENAL FAILURE TYPE (HCC): ICD-10-CM

## 2022-01-01 DIAGNOSIS — R55 SYNCOPE AND COLLAPSE: ICD-10-CM

## 2022-01-01 DIAGNOSIS — I48.0 PAF (PAROXYSMAL ATRIAL FIBRILLATION) (HCC): Primary | ICD-10-CM

## 2022-01-01 DIAGNOSIS — D64.9 CHRONIC ANEMIA: ICD-10-CM

## 2022-01-01 DIAGNOSIS — S09.90XA CLOSED HEAD INJURY, INITIAL ENCOUNTER: Primary | ICD-10-CM

## 2022-01-01 LAB
ABO: NORMAL
ABO: NORMAL
ABSOLUTE BASO #: 0.1 X10E9/L (ref 0–0.2)
ABSOLUTE EOS #: 0 X10E9/L (ref 0–0.4)
ABSOLUTE LYMPH #: 1.6 X10E9/L (ref 1–3.5)
ABSOLUTE MONO #: 0.4 X10E9/L (ref 0–0.9)
ABSOLUTE NEUT #: 3.3 X10E9/L (ref 1.5–6.6)
ABSOLUTE RETIC #: 51 THOU/MM3 (ref 20–115)
ALBUMIN SERPL-MCNC: 2.8 G/DL (ref 3.5–5.1)
ALBUMIN SERPL-MCNC: 3.1 G/DL (ref 3.5–5.1)
ALBUMIN SERPL-MCNC: 3.2 G/DL (ref 3.5–5.1)
ALBUMIN SERPL-MCNC: 3.3 G/DL
ALBUMIN SERPL-MCNC: 3.5 G/DL (ref 3.5–5.1)
ALBUMIN SERPL-MCNC: 3.7 G/DL (ref 3.5–5.1)
ALBUMIN SERPL-MCNC: 3.8 G/DL (ref 3.5–5.1)
ALBUMIN SERPL-MCNC: 4.1 G/DL (ref 3.5–5.1)
ALBUMIN SERPL-MCNC: 4.2 G/DL (ref 3.5–5.1)
ALLEN TEST: POSITIVE
ALP BLD-CCNC: 262 U/L (ref 38–126)
ALP BLD-CCNC: 269 U/L (ref 38–126)
ALP BLD-CCNC: 280 U/L (ref 38–126)
ALP BLD-CCNC: 292 U/L (ref 38–126)
ALP BLD-CCNC: 304 U/L (ref 38–126)
ALP BLD-CCNC: 309 U/L (ref 38–126)
ALP BLD-CCNC: 321 U/L (ref 38–126)
ALP BLD-CCNC: 329 U/L (ref 38–126)
ALP BLD-CCNC: 40 U/L
ALP BLD-CCNC: 42 U/L (ref 38–126)
ALP BLD-CCNC: 73 U/L (ref 38–126)
ALT SERPL-CCNC: 12 U/L
ALT SERPL-CCNC: 124 U/L (ref 11–66)
ALT SERPL-CCNC: 1262 U/L (ref 11–66)
ALT SERPL-CCNC: 1363 U/L (ref 11–66)
ALT SERPL-CCNC: 1364 U/L (ref 11–66)
ALT SERPL-CCNC: 138 U/L (ref 11–66)
ALT SERPL-CCNC: 15 U/L (ref 11–66)
ALT SERPL-CCNC: 1842 U/L (ref 11–66)
ALT SERPL-CCNC: 2026 U/L (ref 11–66)
ALT SERPL-CCNC: 224 U/L (ref 11–66)
ALT SERPL-CCNC: 23 U/L (ref 11–66)
AMMONIA: 18 UMOL/L (ref 11–60)
AMMONIA: 29 UMOL/L (ref 11–60)
AMPHETAMINE+METHAMPHETAMINE URINE SCREEN: NEGATIVE
ANA SCREEN: NORMAL
ANGLE TEG: 78 DEG (ref 53–72)
ANION GAP SERPL CALCULATED.3IONS-SCNC: 10 MEQ/L (ref 8–16)
ANION GAP SERPL CALCULATED.3IONS-SCNC: 10 MEQ/L (ref 8–16)
ANION GAP SERPL CALCULATED.3IONS-SCNC: 10 MMOL/L (ref 5–15)
ANION GAP SERPL CALCULATED.3IONS-SCNC: 11 MEQ/L (ref 8–16)
ANION GAP SERPL CALCULATED.3IONS-SCNC: 13 MEQ/L (ref 8–16)
ANION GAP SERPL CALCULATED.3IONS-SCNC: 13 MEQ/L (ref 8–16)
ANION GAP SERPL CALCULATED.3IONS-SCNC: 14 MEQ/L (ref 8–16)
ANION GAP SERPL CALCULATED.3IONS-SCNC: 16 MEQ/L (ref 8–16)
ANION GAP SERPL CALCULATED.3IONS-SCNC: 19 MEQ/L (ref 8–16)
ANION GAP SERPL CALCULATED.3IONS-SCNC: 20 MEQ/L (ref 8–16)
ANION GAP SERPL CALCULATED.3IONS-SCNC: 20 MEQ/L (ref 8–16)
ANION GAP SERPL CALCULATED.3IONS-SCNC: 22 MEQ/L (ref 8–16)
ANION GAP SERPL CALCULATED.3IONS-SCNC: 22 MEQ/L (ref 8–16)
ANION GAP SERPL CALCULATED.3IONS-SCNC: 24 MEQ/L (ref 8–16)
ANION GAP SERPL CALCULATED.3IONS-SCNC: 25 MEQ/L (ref 8–16)
ANION GAP SERPL CALCULATED.3IONS-SCNC: 7 MEQ/L (ref 8–16)
ANION GAP SERPL CALCULATED.3IONS-SCNC: 9 MEQ/L (ref 8–16)
ANION GAP SERPL CALCULATED.3IONS-SCNC: NORMAL MMOL/L
ANTIBODY SCREEN: NORMAL
ANTIBODY SCREEN: NORMAL
APTT: 30.8 SECONDS (ref 22–38)
AST SERPL-CCNC: 1036 U/L (ref 5–40)
AST SERPL-CCNC: 117 U/L (ref 5–40)
AST SERPL-CCNC: 14 U/L
AST SERPL-CCNC: 14 U/L (ref 5–40)
AST SERPL-CCNC: 144 U/L (ref 5–40)
AST SERPL-CCNC: 20 U/L (ref 5–40)
AST SERPL-CCNC: 2486 U/L (ref 5–40)
AST SERPL-CCNC: 2677 U/L (ref 5–40)
AST SERPL-CCNC: 2762 U/L (ref 5–40)
AST SERPL-CCNC: 323 U/L (ref 5–40)
AST SERPL-CCNC: 3329 U/L (ref 5–40)
BACTERIA URINE, POC: ABNORMAL
BACTERIA: ABNORMAL /HPF
BARBITURATE QUANTITATIVE URINE: NEGATIVE
BASE EXCESS (CALCULATED): -15.8 MMOL/L (ref -2.5–2.5)
BASE EXCESS MIXED: -12.8 MMOL/L (ref -2–3)
BASE EXCESS MIXED: -5.4 MMOL/L (ref -2–3)
BASOPHILIA: ABNORMAL
BASOPHILIC STIPPLING: ABNORMAL
BASOPHILS # BLD: 0 %
BASOPHILS # BLD: 0.1 %
BASOPHILS # BLD: 0.2 %
BASOPHILS # BLD: 0.3 %
BASOPHILS # BLD: 0.6 %
BASOPHILS # BLD: 0.7 %
BASOPHILS ABSOLUTE: 0 THOU/MM3 (ref 0–0.1)
BASOPHILS ABSOLUTE: ABNORMAL
BASOPHILS RELATIVE PERCENT: 0.6 %
BASOPHILS RELATIVE PERCENT: 1.3 %
BENZODIAZEPINE QUANTITATIVE URINE: NEGATIVE
BILIRUB SERPL-MCNC: 0.7 MG/DL (ref 0.1–1.4)
BILIRUB SERPL-MCNC: 0.7 MG/DL (ref 0.3–1.2)
BILIRUB SERPL-MCNC: 0.8 MG/DL (ref 0.3–1.2)
BILIRUB SERPL-MCNC: 2.8 MG/DL (ref 0.3–1.2)
BILIRUB SERPL-MCNC: 2.9 MG/DL (ref 0.3–1.2)
BILIRUB SERPL-MCNC: 3.1 MG/DL (ref 0.3–1.2)
BILIRUB SERPL-MCNC: 3.2 MG/DL (ref 0.3–1.2)
BILIRUB SERPL-MCNC: 3.4 MG/DL (ref 0.3–1.2)
BILIRUB SERPL-MCNC: 5.1 MG/DL (ref 0.3–1.2)
BILIRUB SERPL-MCNC: 7.7 MG/DL (ref 0.3–1.2)
BILIRUB SERPL-MCNC: 7.8 MG/DL (ref 0.3–1.2)
BILIRUBIN DIRECT: 1.2 MG/DL (ref 0–0.3)
BILIRUBIN DIRECT: 1.5 MG/DL (ref 0–0.3)
BILIRUBIN DIRECT: 2 MG/DL (ref 0–0.3)
BILIRUBIN DIRECT: 2.5 MG/DL (ref 0–0.3)
BILIRUBIN DIRECT: 3.1 MG/DL (ref 0–0.3)
BILIRUBIN DIRECT: 3.9 MG/DL (ref 0–0.3)
BILIRUBIN DIRECT: 4.2 MG/DL (ref 0–0.3)
BILIRUBIN URINE: 17 MG/DL
BILIRUBIN URINE: ABNORMAL
BILIRUBIN URINE: NEGATIVE
BLOOD CULTURE, ROUTINE: NORMAL
BLOOD, URINE: NEGATIVE
BUN BLDV-MCNC: 10 MG/DL (ref 7–22)
BUN BLDV-MCNC: 12 MG/DL (ref 7–22)
BUN BLDV-MCNC: 15 MG/DL (ref 7–22)
BUN BLDV-MCNC: 18 MG/DL (ref 7–22)
BUN BLDV-MCNC: 21 MG/DL (ref 5–27)
BUN BLDV-MCNC: 22 MG/DL (ref 7–22)
BUN BLDV-MCNC: 24 MG/DL
BUN BLDV-MCNC: 35 MG/DL (ref 7–22)
BUN BLDV-MCNC: 41 MG/DL (ref 7–22)
BUN BLDV-MCNC: 56 MG/DL (ref 7–22)
BUN BLDV-MCNC: 59 MG/DL (ref 7–22)
BUN BLDV-MCNC: 60 MG/DL (ref 7–22)
BUN BLDV-MCNC: 60 MG/DL (ref 7–22)
BUN BLDV-MCNC: 62 MG/DL (ref 7–22)
BUN BLDV-MCNC: 68 MG/DL (ref 7–22)
BUN BLDV-MCNC: 69 MG/DL (ref 7–22)
BUN BLDV-MCNC: 70 MG/DL (ref 7–22)
CALCIUM IONIZED: 1.03 MMOL/L (ref 1.12–1.32)
CALCIUM SERPL-MCNC: 10.4 MG/DL (ref 8.5–10.5)
CALCIUM SERPL-MCNC: 7.7 MG/DL (ref 8.5–10.5)
CALCIUM SERPL-MCNC: 8 MG/DL (ref 8.5–10.5)
CALCIUM SERPL-MCNC: 8.1 MG/DL (ref 8.5–10.5)
CALCIUM SERPL-MCNC: 8.2 MG/DL (ref 8.5–10.5)
CALCIUM SERPL-MCNC: 8.4 MG/DL (ref 8.5–10.5)
CALCIUM SERPL-MCNC: 8.6 MG/DL (ref 8.5–10.5)
CALCIUM SERPL-MCNC: 8.8 MG/DL
CALCIUM SERPL-MCNC: 8.9 MG/DL (ref 8.5–10.5)
CALCIUM SERPL-MCNC: 8.9 MG/DL (ref 8.5–10.5)
CALCIUM SERPL-MCNC: 9 MG/DL (ref 8.5–10.5)
CALCIUM SERPL-MCNC: 9.1 MG/DL (ref 8.5–10.5)
CALCIUM SERPL-MCNC: 9.2 MG/DL (ref 8.5–10.5)
CALCIUM SERPL-MCNC: 9.5 MG/DL (ref 8.5–10.5)
CALCIUM SERPL-MCNC: 9.5 MG/DL (ref 8.5–10.5)
CALCIUM SERPL-MCNC: 9.7 MG/DL (ref 8.5–10.5)
CALCIUM SERPL-MCNC: 9.8 MG/DL (ref 8.5–10.5)
CANNABINOID QUANTITATIVE URINE: NEGATIVE
CASTS 2: ABNORMAL /LPF
CASTS UA: ABNORMAL /LPF
CASTS URINE, POC: ABNORMAL
CERULOPLASMIN: 36 MG/DL (ref 16–45)
CHARACTER, URINE: CLEAR
CHARACTER, URINE: CLEAR
CHLORIDE BLD-SCNC: 102 MEQ/L (ref 98–111)
CHLORIDE BLD-SCNC: 103 MEQ/L (ref 98–111)
CHLORIDE BLD-SCNC: 104 MEQ/L (ref 98–111)
CHLORIDE BLD-SCNC: 105 MMOL/L (ref 98–109)
CHLORIDE BLD-SCNC: 106 MEQ/L (ref 98–111)
CHLORIDE BLD-SCNC: 107 MEQ/L (ref 98–111)
CHLORIDE BLD-SCNC: 108 MEQ/L (ref 98–111)
CHLORIDE BLD-SCNC: 109 MEQ/L (ref 98–111)
CHLORIDE BLD-SCNC: 111 MEQ/L (ref 98–111)
CHLORIDE BLD-SCNC: 111 MMOL/L
CHLORIDE BLD-SCNC: 112 MEQ/L (ref 98–111)
CHLORIDE BLD-SCNC: 112 MEQ/L (ref 98–111)
CHLORIDE BLD-SCNC: 114 MEQ/L (ref 98–111)
CHLORIDE, URINE: 21 MEQ/L
CHOLESTEROL, TOTAL: 135 MG/DL
CHOLESTEROL/HDL RATIO: 135
CLARITY: ABNORMAL
CO2: 10 MEQ/L (ref 23–33)
CO2: 12 MEQ/L (ref 23–33)
CO2: 12 MEQ/L (ref 23–33)
CO2: 16 MEQ/L (ref 23–33)
CO2: 16 MEQ/L (ref 23–33)
CO2: 17 MEQ/L (ref 23–33)
CO2: 20 MEQ/L (ref 23–33)
CO2: 20 MMOL/L
CO2: 21 MEQ/L (ref 23–33)
CO2: 22 MEQ/L (ref 23–33)
CO2: 24 MEQ/L (ref 23–33)
CO2: 26 MEQ/L (ref 23–33)
CO2: 26 MEQ/L (ref 23–33)
CO2: 28 MMOL/L (ref 22–32)
COCAINE METABOLITE QUANTITATIVE URINE: NEGATIVE
COLLECTED BY:: ABNORMAL
COLOR: ABNORMAL
COLOR: YELLOW
COLOR: YELLOW
CREAT SERPL-MCNC: 1 MG/DL (ref 0.4–1.2)
CREAT SERPL-MCNC: 1.26 MG/DL (ref 0.4–1)
CREAT SERPL-MCNC: 1.3 MG/DL
CREAT SERPL-MCNC: 1.3 MG/DL (ref 0.4–1.2)
CREAT SERPL-MCNC: 1.4 MG/DL (ref 0.4–1.2)
CREAT SERPL-MCNC: 1.4 MG/DL (ref 0.4–1.2)
CREAT SERPL-MCNC: 1.5 MG/DL (ref 0.4–1.2)
CREAT SERPL-MCNC: 1.6 MG/DL (ref 0.4–1.2)
CREAT SERPL-MCNC: 1.6 MG/DL (ref 0.4–1.2)
CREAT SERPL-MCNC: 2.1 MG/DL (ref 0.4–1.2)
CREAT SERPL-MCNC: 2.2 MG/DL (ref 0.4–1.2)
CREAT SERPL-MCNC: 2.4 MG/DL (ref 0.4–1.2)
CREAT SERPL-MCNC: 2.4 MG/DL (ref 0.4–1.2)
CREAT SERPL-MCNC: 2.6 MG/DL (ref 0.4–1.2)
CREAT SERPL-MCNC: 2.7 MG/DL (ref 0.4–1.2)
CREAT SERPL-MCNC: 2.9 MG/DL (ref 0.4–1.2)
CREATININE URINE: 196.9 MG/DL
CRENATED RBC'S: ABNORMAL
CRYSTALS URINE, POC: ABNORMAL
CRYSTALS, UA: ABNORMAL
DEVICE: ABNORMAL
EGFR AFRICAN AMERICAN: 49 ML/MIN/1.73SQ.M
EGFR IF NONAFRICAN AMERICAN: 40 ML/MIN/1.73SQ.M
EKG ATRIAL RATE: 72 BPM
EKG Q-T INTERVAL: 286 MS
EKG Q-T INTERVAL: 310 MS
EKG Q-T INTERVAL: 386 MS
EKG Q-T INTERVAL: 408 MS
EKG Q-T INTERVAL: 410 MS
EKG Q-T INTERVAL: 424 MS
EKG Q-T INTERVAL: 492 MS
EKG QRS DURATION: 120 MS
EKG QRS DURATION: 122 MS
EKG QRS DURATION: 124 MS
EKG QRS DURATION: 124 MS
EKG QRS DURATION: 126 MS
EKG QRS DURATION: 128 MS
EKG QRS DURATION: 132 MS
EKG QTC CALCULATION (BAZETT): 405 MS
EKG QTC CALCULATION (BAZETT): 420 MS
EKG QTC CALCULATION (BAZETT): 437 MS
EKG QTC CALCULATION (BAZETT): 478 MS
EKG QTC CALCULATION (BAZETT): 485 MS
EKG QTC CALCULATION (BAZETT): 489 MS
EKG QTC CALCULATION (BAZETT): 504 MS
EKG R AXIS: -102 DEGREES
EKG R AXIS: -20 DEGREES
EKG R AXIS: -28 DEGREES
EKG R AXIS: -29 DEGREES
EKG R AXIS: -52 DEGREES
EKG R AXIS: 63 DEGREES
EKG R AXIS: 8 DEGREES
EKG T AXIS: -58 DEGREES
EKG T AXIS: -77 DEGREES
EKG T AXIS: 25 DEGREES
EKG T AXIS: 39 DEGREES
EKG T AXIS: 45 DEGREES
EKG T AXIS: 54 DEGREES
EKG VENTRICULAR RATE: 130 BPM
EKG VENTRICULAR RATE: 150 BPM
EKG VENTRICULAR RATE: 55 BPM
EKG VENTRICULAR RATE: 57 BPM
EKG VENTRICULAR RATE: 69 BPM
EKG VENTRICULAR RATE: 91 BPM
EKG VENTRICULAR RATE: 95 BPM
EOSINOPHIL # BLD: 0 %
EOSINOPHIL # BLD: 0.5 %
EOSINOPHIL # BLD: 0.7 %
EOSINOPHIL # BLD: 1.2 %
EOSINOPHIL # BLD: 1.3 %
EOSINOPHIL # BLD: 1.8 %
EOSINOPHIL SMEAR, URINE: NORMAL
EOSINOPHILS ABSOLUTE: 0 THOU/MM3 (ref 0–0.4)
EOSINOPHILS ABSOLUTE: 0.1 THOU/MM3 (ref 0–0.4)
EOSINOPHILS ABSOLUTE: ABNORMAL
EOSINOPHILS RELATIVE PERCENT: 0.8 %
EOSINOPHILS RELATIVE PERCENT: 1.3 %
EPI CELLS URINE, POC: ABNORMAL
EPITHELIAL CELLS, UA: ABNORMAL /HPF
EPL-TEG: 0 %
ERYTHROCYTE [DISTWIDTH] IN BLOOD BY AUTOMATED COUNT: 13.2 % (ref 11.5–14.5)
ERYTHROCYTE [DISTWIDTH] IN BLOOD BY AUTOMATED COUNT: 13.6 % (ref 11.5–14.5)
ERYTHROCYTE [DISTWIDTH] IN BLOOD BY AUTOMATED COUNT: 14.6 % (ref 11.5–14.5)
ERYTHROCYTE [DISTWIDTH] IN BLOOD BY AUTOMATED COUNT: 14.8 % (ref 11.5–14.5)
ERYTHROCYTE [DISTWIDTH] IN BLOOD BY AUTOMATED COUNT: 15.5 % (ref 11.5–14.5)
ERYTHROCYTE [DISTWIDTH] IN BLOOD BY AUTOMATED COUNT: 15.6 % (ref 11.5–14.5)
ERYTHROCYTE [DISTWIDTH] IN BLOOD BY AUTOMATED COUNT: 15.9 % (ref 11.5–14.5)
ERYTHROCYTE [DISTWIDTH] IN BLOOD BY AUTOMATED COUNT: 16.5 % (ref 11.5–14.5)
ERYTHROCYTE [DISTWIDTH] IN BLOOD BY AUTOMATED COUNT: 16.6 % (ref 11.5–14.5)
ERYTHROCYTE [DISTWIDTH] IN BLOOD BY AUTOMATED COUNT: 17.2 % (ref 11.5–14.5)
ERYTHROCYTE [DISTWIDTH] IN BLOOD BY AUTOMATED COUNT: 46.4 FL (ref 35–45)
ERYTHROCYTE [DISTWIDTH] IN BLOOD BY AUTOMATED COUNT: 49.8 FL (ref 35–45)
ERYTHROCYTE [DISTWIDTH] IN BLOOD BY AUTOMATED COUNT: 50.9 FL (ref 35–45)
ERYTHROCYTE [DISTWIDTH] IN BLOOD BY AUTOMATED COUNT: 53 FL (ref 35–45)
ERYTHROCYTE [DISTWIDTH] IN BLOOD BY AUTOMATED COUNT: 53.6 FL (ref 35–45)
ERYTHROCYTE [DISTWIDTH] IN BLOOD BY AUTOMATED COUNT: 53.8 FL (ref 35–45)
ERYTHROCYTE [DISTWIDTH] IN BLOOD BY AUTOMATED COUNT: 54.5 FL (ref 35–45)
ERYTHROCYTE [DISTWIDTH] IN BLOOD BY AUTOMATED COUNT: 54.9 FL (ref 35–45)
ERYTHROCYTE [DISTWIDTH] IN BLOOD BY AUTOMATED COUNT: 55.4 FL (ref 35–45)
ERYTHROCYTE [DISTWIDTH] IN BLOOD BY AUTOMATED COUNT: 58.4 FL (ref 35–45)
ETHYL ALCOHOL, SERUM: < 0.01 %
FERRITIN: 104 NG/ML (ref 10–291)
FERRITIN: 182 NG/ML (ref 11–307)
FIO2, MIXED VENOUS: 21
FLU A ANTIGEN: NEGATIVE
FLU B ANTIGEN: NEGATIVE
FOLATE: 11.9 NG/ML (ref 4.8–24.2)
GFR CALCULATED: 47
GFR SERPL CREATININE-BSD FRML MDRD: 19 ML/MIN/1.73M2
GFR SERPL CREATININE-BSD FRML MDRD: 20 ML/MIN/1.73M2
GFR SERPL CREATININE-BSD FRML MDRD: 21 ML/MIN/1.73M2
GFR SERPL CREATININE-BSD FRML MDRD: 23 ML/MIN/1.73M2
GFR SERPL CREATININE-BSD FRML MDRD: 23 ML/MIN/1.73M2
GFR SERPL CREATININE-BSD FRML MDRD: 26 ML/MIN/1.73M2
GFR SERPL CREATININE-BSD FRML MDRD: 27 ML/MIN/1.73M2
GFR SERPL CREATININE-BSD FRML MDRD: 37 ML/MIN/1.73M2
GFR SERPL CREATININE-BSD FRML MDRD: 37 ML/MIN/1.73M2
GFR SERPL CREATININE-BSD FRML MDRD: 40 ML/MIN/1.73M2
GFR SERPL CREATININE-BSD FRML MDRD: 43 ML/MIN/1.73M2
GFR SERPL CREATININE-BSD FRML MDRD: 43 ML/MIN/1.73M2
GFR SERPL CREATININE-BSD FRML MDRD: 47 ML/MIN/1.73M2
GFR SERPL CREATININE-BSD FRML MDRD: 64 ML/MIN/1.73M2
GLUCOSE BLD-MCNC: 100 MG/DL (ref 70–108)
GLUCOSE BLD-MCNC: 101 MG/DL (ref 70–108)
GLUCOSE BLD-MCNC: 101 MG/DL (ref 70–108)
GLUCOSE BLD-MCNC: 111 MG/DL (ref 70–108)
GLUCOSE BLD-MCNC: 113 MG/DL (ref 70–108)
GLUCOSE BLD-MCNC: 118 MG/DL (ref 70–108)
GLUCOSE BLD-MCNC: 125 MG/DL (ref 70–108)
GLUCOSE BLD-MCNC: 127 MG/DL (ref 70–108)
GLUCOSE BLD-MCNC: 129 MG/DL (ref 70–108)
GLUCOSE BLD-MCNC: 139 MG/DL (ref 70–108)
GLUCOSE BLD-MCNC: 140 MG/DL (ref 70–108)
GLUCOSE BLD-MCNC: 141 MG/DL (ref 70–108)
GLUCOSE BLD-MCNC: 144 MG/DL (ref 70–108)
GLUCOSE BLD-MCNC: 145 MG/DL (ref 70–108)
GLUCOSE BLD-MCNC: 151 MG/DL (ref 70–108)
GLUCOSE BLD-MCNC: 153 MG/DL (ref 70–108)
GLUCOSE BLD-MCNC: 155 MG/DL (ref 70–108)
GLUCOSE BLD-MCNC: 160 MG/DL (ref 70–108)
GLUCOSE BLD-MCNC: 165 MG/DL (ref 70–108)
GLUCOSE BLD-MCNC: 166 MG/DL (ref 70–108)
GLUCOSE BLD-MCNC: 170 MG/DL (ref 70–108)
GLUCOSE BLD-MCNC: 173 MG/DL (ref 70–108)
GLUCOSE BLD-MCNC: 175 MG/DL (ref 70–108)
GLUCOSE BLD-MCNC: 176 MG/DL (ref 70–108)
GLUCOSE BLD-MCNC: 203 MG/DL (ref 70–108)
GLUCOSE BLD-MCNC: 204 MG/DL (ref 70–108)
GLUCOSE BLD-MCNC: 244 MG/DL (ref 70–108)
GLUCOSE BLD-MCNC: 287 MG/DL (ref 70–108)
GLUCOSE BLD-MCNC: 49 MG/DL (ref 70–108)
GLUCOSE BLD-MCNC: 76 MG/DL (ref 70–108)
GLUCOSE BLD-MCNC: 81 MG/DL
GLUCOSE BLD-MCNC: 82 MG/DL (ref 70–108)
GLUCOSE BLD-MCNC: 84 MG/DL (ref 70–108)
GLUCOSE BLD-MCNC: 86 MG/DL (ref 70–108)
GLUCOSE BLD-MCNC: 89 MG/DL (ref 70–108)
GLUCOSE BLD-MCNC: 90 MG/DL (ref 70–108)
GLUCOSE BLD-MCNC: 93 MG/DL (ref 70–108)
GLUCOSE BLD-MCNC: 93 MG/DL (ref 70–108)
GLUCOSE BLD-MCNC: 97 MG/DL (ref 70–108)
GLUCOSE URINE: ABNORMAL
GLUCOSE URINE: NEGATIVE MG/DL
GLUCOSE URINE: NEGATIVE MG/DL
GLUCOSE, WHOLE BLOOD: 204 MG/DL (ref 70–108)
GLUCOSE: 78 MG/DL (ref 65–99)
HAV AB SERPL IA-ACNC: NONREACTIVE
HAV IGM SER IA-ACNC: NEGATIVE
HBV SURFACE AB TITR SER: POSITIVE {TITER}
HCO3, MIXED: 17 MMOL/L (ref 23–28)
HCO3, MIXED: 9 MMOL/L (ref 23–28)
HCO3: 8 MMOL/L (ref 23–28)
HCT VFR BLD CALC: 21.5 % (ref 37–47)
HCT VFR BLD CALC: 22 % (ref 37–47)
HCT VFR BLD CALC: 23.6 % (ref 37–47)
HCT VFR BLD CALC: 23.7 % (ref 37–47)
HCT VFR BLD CALC: 23.8 % (ref 37–47)
HCT VFR BLD CALC: 24.8 % (ref 37–47)
HCT VFR BLD CALC: 25.1 % (ref 37–47)
HCT VFR BLD CALC: 25.1 % (ref 37–47)
HCT VFR BLD CALC: 25.2 % (ref 37–47)
HCT VFR BLD CALC: 25.2 % (ref 37–47)
HCT VFR BLD CALC: 25.5 % (ref 37–47)
HCT VFR BLD CALC: 26.5 % (ref 35–47)
HCT VFR BLD CALC: 26.6 % (ref 37–47)
HCT VFR BLD CALC: 27.4 % (ref 37–47)
HCT VFR BLD CALC: 27.8 % (ref 37–47)
HCT VFR BLD CALC: 30.3 % (ref 37–47)
HCT VFR BLD CALC: 30.5 % (ref 36–46)
HCT VFR BLD CALC: 31.1 % (ref 37–47)
HCT VFR BLD CALC: 31.4 % (ref 37–47)
HCT VFR BLD CALC: 34.8 % (ref 37–47)
HCT VFR BLD CALC: 35.2 % (ref 37–47)
HDLC SERPL-MCNC: 36 MG/DL (ref 35–70)
HEMOCCULT STL QL: POSITIVE
HEMOGLOBIN: 10 GM/DL (ref 12–16)
HEMOGLOBIN: 10.1 GM/DL (ref 12–16)
HEMOGLOBIN: 6.5 GM/DL (ref 12–16)
HEMOGLOBIN: 6.5 GM/DL (ref 12–16)
HEMOGLOBIN: 6.9 GM/DL (ref 12–16)
HEMOGLOBIN: 7.1 GM/DL (ref 12–16)
HEMOGLOBIN: 7.3 GM/DL (ref 12–16)
HEMOGLOBIN: 7.4 GM/DL (ref 12–16)
HEMOGLOBIN: 7.5 GM/DL (ref 12–16)
HEMOGLOBIN: 7.6 GM/DL (ref 12–16)
HEMOGLOBIN: 7.7 GM/DL (ref 12–16)
HEMOGLOBIN: 7.7 GM/DL (ref 12–16)
HEMOGLOBIN: 7.8 GM/DL (ref 12–16)
HEMOGLOBIN: 8 GM/DL (ref 12–16)
HEMOGLOBIN: 8.4 GM/DL (ref 12–16)
HEMOGLOBIN: 8.6 G/DL (ref 11.7–15.5)
HEMOGLOBIN: 8.7 GM/DL (ref 12–16)
HEMOGLOBIN: 9.2 GM/DL (ref 12–16)
HEMOGLOBIN: 9.3 GM/DL (ref 12–16)
HEMOGLOBIN: 9.9 G/DL (ref 12–16)
HEPARIN THERAPY: NO
HEPATITIS B CORE IGM ANTIBODY: NEGATIVE
HEPATITIS B CORE TOTAL ANTIBODY: NONREACTIVE
HEPATITIS B SURFACE ANTIGEN: NEGATIVE
HEPATITIS C ANTIBODY: NEGATIVE
HYPOCHROMIA: PRESENT
HYPOCHROMIA: PRESENT
ICTOTEST: NEGATIVE
IMMATURE GRANS (ABS): 0.01 THOU/MM3 (ref 0–0.07)
IMMATURE GRANS (ABS): 0.02 THOU/MM3 (ref 0–0.07)
IMMATURE GRANS (ABS): 0.02 THOU/MM3 (ref 0–0.07)
IMMATURE GRANS (ABS): 0.03 THOU/MM3 (ref 0–0.07)
IMMATURE GRANS (ABS): 0.03 THOU/MM3 (ref 0–0.07)
IMMATURE GRANS (ABS): 0.05 THOU/MM3 (ref 0–0.07)
IMMATURE GRANULOCYTES: 0.2 %
IMMATURE GRANULOCYTES: 0.3 %
IMMATURE GRANULOCYTES: 0.4 %
IMMATURE GRANULOCYTES: 0.5 %
IMMATURE RETIC FRACT: 16.3 % (ref 3–15.9)
INHIBITION AA TEG: 100 %
INHIBITION ADP TEG: 100 %
INR BLD: 1.17 (ref 0.85–1.13)
INR BLD: 1.73 (ref 0.85–1.13)
INR BLD: 2.31 (ref 0.85–1.13)
INR BLD: 3.37 (ref 0.85–1.13)
INR BLD: 3.9 (ref 0.85–1.13)
INR BLD: 4.27 (ref 0.85–1.13)
INR BLD: 4.29 (ref 0.85–1.13)
IRON SATURATION: 21 % (ref 20–50)
IRON SATURATION: 31 % (ref 20–50)
IRON, SERUM: 46 UG/DL (ref 37–145)
IRON: 16 UG/DL (ref 50–170)
IRON: 63 UG/DL (ref 50–170)
KETONES, URINE: ABNORMAL
KETONES, URINE: ABNORMAL
KETONES, URINE: NEGATIVE
KINETICS TEG: 0.8 MINUTES (ref 1–3)
LACTIC ACID, SEPSIS: 10.9 MMOL/L (ref 0.5–1.9)
LACTIC ACID, SEPSIS: 4.1 MMOL/L (ref 0.5–1.9)
LACTIC ACID, SEPSIS: 5.2 MMOL/L (ref 0.5–1.9)
LACTIC ACID, SEPSIS: 9 MMOL/L (ref 0.5–1.9)
LACTIC ACID: 10.8 MMOL/L (ref 0.5–2)
LACTIC ACID: 12.1 MMOL/L (ref 0.5–2)
LACTIC ACID: 5.6 MMOL/L (ref 0.5–2)
LACTIC ACID: 6 MMOL/L (ref 0.5–2)
LACTIC ACID: 6.3 MMOL/L (ref 0.5–2)
LACTIC ACID: 7.1 MMOL/L (ref 0.5–2)
LACTIC ACID: 7.9 MMOL/L (ref 0.5–2)
LACTIC ACID: 8.7 MMOL/L (ref 0.5–2)
LACTIC ACID: 9 MMOL/L (ref 0.5–2)
LDL CHOLESTEROL CALCULATED: 85 MG/DL (ref 0–160)
LEGIONELLA PNEUMOPHILIA AG, URINE: NEGATIVE
LEUKOCYTE EST, POC: ABNORMAL
LEUKOCYTE ESTERASE, URINE: NEGATIVE
LEUKOCYTE ESTERASE, URINE: NEGATIVE
LIPASE: 144.4 U/L (ref 5.6–51.3)
LV EF: 55 %
LVEF MODALITY: NORMAL
LY30 (LYSIS) TEG: 0 % (ref 0–7.5)
LYMPHOCYTE %: 29 %
LYMPHOCYTES # BLD: 22.9 %
LYMPHOCYTES # BLD: 25.4 %
LYMPHOCYTES # BLD: 26.9 %
LYMPHOCYTES # BLD: 27.6 %
LYMPHOCYTES # BLD: 29.1 %
LYMPHOCYTES # BLD: 8.8 %
LYMPHOCYTES ABSOLUTE: 0.9 THOU/MM3 (ref 1–4.8)
LYMPHOCYTES ABSOLUTE: 1.3 THOU/MM3 (ref 1–4.8)
LYMPHOCYTES ABSOLUTE: 1.4 /ΜL
LYMPHOCYTES ABSOLUTE: 1.4 THOU/MM3 (ref 1–4.8)
LYMPHOCYTES ABSOLUTE: 1.5 THOU/MM3 (ref 1–4.8)
LYMPHOCYTES ABSOLUTE: 1.5 THOU/MM3 (ref 1–4.8)
LYMPHOCYTES ABSOLUTE: 1.6 THOU/MM3 (ref 1–4.8)
LYMPHOCYTES RELATIVE PERCENT: 40.8 %
MA (MAX CLOT) TEG: 72.6 MM (ref 50–70)
MA(AA) TEG: 15.2 MM
MA(ACTIVATED) TEG: 16.1 MM
MA(ADP) TEG: 14.8 MM
MAGNESIUM: 1.4 MG/DL (ref 1.6–2.4)
MAGNESIUM: 1.8 MG/DL (ref 1.6–2.4)
MAGNESIUM: 2.1 MG/DL (ref 1.6–2.4)
MCH RBC QN AUTO: 26.7 PG (ref 26–33)
MCH RBC QN AUTO: 27 PG (ref 26–33)
MCH RBC QN AUTO: 28.2 PG (ref 26–33)
MCH RBC QN AUTO: 28.5 PG (ref 26–33)
MCH RBC QN AUTO: 28.7 PG (ref 26–33)
MCH RBC QN AUTO: 28.8 PG (ref 26–33)
MCH RBC QN AUTO: 29 PG (ref 27–34)
MCH RBC QN AUTO: 29.5 PG
MCH RBC QN AUTO: 29.6 PG (ref 26–33)
MCH RBC QN AUTO: 30 PG (ref 26–33)
MCHC RBC AUTO-ENTMCNC: 28.7 GM/DL (ref 32.2–35.5)
MCHC RBC AUTO-ENTMCNC: 28.7 GM/DL (ref 32.2–35.5)
MCHC RBC AUTO-ENTMCNC: 29.3 GM/DL (ref 32.2–35.5)
MCHC RBC AUTO-ENTMCNC: 29.6 GM/DL (ref 32.2–35.5)
MCHC RBC AUTO-ENTMCNC: 29.8 GM/DL (ref 32.2–35.5)
MCHC RBC AUTO-ENTMCNC: 30.3 GM/DL (ref 32.2–35.5)
MCHC RBC AUTO-ENTMCNC: 30.4 GM/DL (ref 32.2–35.5)
MCHC RBC AUTO-ENTMCNC: 30.8 GM/DL (ref 32.2–35.5)
MCHC RBC AUTO-ENTMCNC: 32.4 G/DL (ref 32–36)
MCHC RBC AUTO-ENTMCNC: 32.6 G/DL
MCV RBC AUTO: 100.4 FL (ref 81–99)
MCV RBC AUTO: 90 FL (ref 80–100)
MCV RBC AUTO: 90.6 FL
MCV RBC AUTO: 92.6 FL (ref 81–99)
MCV RBC AUTO: 92.8 FL (ref 81–99)
MCV RBC AUTO: 94.1 FL (ref 81–99)
MCV RBC AUTO: 94.7 FL (ref 81–99)
MCV RBC AUTO: 94.7 FL (ref 81–99)
MCV RBC AUTO: 96.3 FL (ref 81–99)
MCV RBC AUTO: 96.9 FL (ref 81–99)
MCV RBC AUTO: 97.2 FL (ref 81–99)
MCV RBC AUTO: 97.4 FL (ref 81–99)
MISCELLANEOUS 2: ABNORMAL
MITOCHONDRIAL M2 AB, IGG: 1.5 U/ML (ref 0–4)
MONOCYTES # BLD: 10 %
MONOCYTES # BLD: 10.4 %
MONOCYTES # BLD: 11.2 %
MONOCYTES # BLD: 5.9 %
MONOCYTES # BLD: 7.7 %
MONOCYTES # BLD: 8.7 %
MONOCYTES # BLD: 9.8 %
MONOCYTES ABSOLUTE: 0.04 /ΜL
MONOCYTES ABSOLUTE: 0.5 THOU/MM3 (ref 0.4–1.3)
MONOCYTES ABSOLUTE: 0.6 THOU/MM3 (ref 0.4–1.3)
MONOCYTES RELATIVE PERCENT: 11.4 %
MYOGLOBIN URINE: NEGATIVE
NEUTROPHILS ABSOLUTE: 1.6 /ΜL
NEUTROPHILS RELATIVE PERCENT: 45.9 %
NEUTROPHILS RELATIVE PERCENT: 61.2 %
NITRITE, URINE: NEGATIVE
NONHDLC SERPL-MCNC: NORMAL MG/DL
NUCLEATED RED BLOOD CELLS: 0 /100 WBC
NUCLEATED RED BLOOD CELLS: 6 /100 WBC
NUCLEATED RED BLOOD CELLS: 6 /100 WBC
O2 SAT, MIXED: 55 %
O2 SAT, MIXED: 82 %
O2 SATURATION: 98 %
OPIATES, URINE: NEGATIVE
OSMOLALITY CALCULATION: 281.4 MOSMOL/KG (ref 275–300)
OSMOLALITY CALCULATION: 313.5 MOSMOL/KG (ref 275–300)
OSMOLALITY CALCULATION: 316.7 MOSMOL/KG (ref 275–300)
OSMOLALITY: 329 MOSMOL/KG (ref 275–295)
OXYCODONE: NEGATIVE
PCO2, MIXED VENOUS: 13 MMHG (ref 41–51)
PCO2, MIXED VENOUS: 22 MMHG (ref 41–51)
PCO2: 16 MMHG (ref 35–45)
PDW BLD-RTO: 16 % (ref 11.5–15)
PDW BLD-RTO: ABNORMAL %
PH BLOOD GAS: 7.32 (ref 7.35–7.45)
PH UA: 5 (ref 4.5–8)
PH UA: 5 (ref 5–9)
PH UA: 5 (ref 5–9)
PH VENOUS: 7.19 (ref 7.31–7.41)
PH, MIXED: 7.48 (ref 7.31–7.41)
PH, MIXED: 7.48 (ref 7.31–7.41)
PHENCYCLIDINE QUANTITATIVE URINE: NEGATIVE
PHOSPHORUS: 3.9 MG/DL (ref 2.4–4.7)
PHOSPHORUS: 4.4 MG/DL (ref 2.4–4.7)
PLATELET # BLD: 135 THOU/MM3 (ref 130–400)
PLATELET # BLD: 144 THOU/MM3 (ref 130–400)
PLATELET # BLD: 145 THOU/MM3 (ref 130–400)
PLATELET # BLD: 149 K/ΜL
PLATELET # BLD: 151 THOU/MM3 (ref 130–400)
PLATELET # BLD: 155 THOU/MM3 (ref 130–400)
PLATELET # BLD: 164 THOU/MM3 (ref 130–400)
PLATELET # BLD: 176 THOU/MM3 (ref 130–400)
PLATELET # BLD: 179 THOU/MM3 (ref 130–400)
PLATELET # BLD: 207 THOU/MM3 (ref 130–400)
PLATELET # BLD: 212 THOU/MM3 (ref 130–400)
PLATELET ESTIMATE: ADEQUATE
PLATELETS: 228 X10E9/L (ref 150–450)
PMV BLD AUTO: 10.3 FL (ref 7–12)
PMV BLD AUTO: 10.6 FL
PMV BLD AUTO: 11.1 FL (ref 9.4–12.4)
PMV BLD AUTO: 11.3 FL (ref 9.4–12.4)
PMV BLD AUTO: 11.4 FL (ref 9.4–12.4)
PMV BLD AUTO: 11.5 FL (ref 9.4–12.4)
PMV BLD AUTO: 11.6 FL (ref 9.4–12.4)
PMV BLD AUTO: 12 FL (ref 9.4–12.4)
PMV BLD AUTO: 12.1 FL (ref 9.4–12.4)
PMV BLD AUTO: 12.4 FL (ref 9.4–12.4)
PMV BLD AUTO: 12.4 FL (ref 9.4–12.4)
PMV BLD AUTO: 12.6 FL (ref 9.4–12.4)
PO2 MIXED: 26 MMHG (ref 25–40)
PO2 MIXED: 41 MMHG (ref 25–40)
PO2: 101 MMHG (ref 71–104)
POTASSIUM REFLEX MAGNESIUM: 2.6 MEQ/L (ref 3.5–5.2)
POTASSIUM REFLEX MAGNESIUM: 3.7 MEQ/L (ref 3.5–5.2)
POTASSIUM REFLEX MAGNESIUM: 3.7 MEQ/L (ref 3.5–5.2)
POTASSIUM REFLEX MAGNESIUM: 4.1 MEQ/L (ref 3.5–5.2)
POTASSIUM REFLEX MAGNESIUM: 4.2 MEQ/L (ref 3.5–5.2)
POTASSIUM REFLEX MAGNESIUM: 4.3 MEQ/L (ref 3.5–5.2)
POTASSIUM REFLEX MAGNESIUM: 4.5 MEQ/L (ref 3.5–5.2)
POTASSIUM REFLEX MAGNESIUM: 4.7 MEQ/L (ref 3.5–5.2)
POTASSIUM SERPL-SCNC: 3.1 MEQ/L (ref 3.5–5.2)
POTASSIUM SERPL-SCNC: 3.4 MEQ/L (ref 3.5–5.2)
POTASSIUM SERPL-SCNC: 3.7 MEQ/L (ref 3.5–5.2)
POTASSIUM SERPL-SCNC: 3.7 MMOL/L (ref 3.5–5)
POTASSIUM SERPL-SCNC: 3.8 MEQ/L (ref 3.5–5.2)
POTASSIUM SERPL-SCNC: 3.8 MEQ/L (ref 3.5–5.2)
POTASSIUM SERPL-SCNC: 4.2 MEQ/L (ref 3.5–5.2)
POTASSIUM SERPL-SCNC: 4.2 MMOL/L
POTASSIUM SERPL-SCNC: 4.3 MEQ/L (ref 3.5–5.2)
POTASSIUM SERPL-SCNC: 4.3 MEQ/L (ref 3.5–5.2)
POTASSIUM SERPL-SCNC: 4.4 MEQ/L (ref 3.5–5.2)
POTASSIUM SERPL-SCNC: 4.7 MEQ/L (ref 3.5–5.2)
POTASSIUM SERPL-SCNC: 5.1 MEQ/L (ref 3.5–5.2)
POTASSIUM, URINE: 72.6 MEQ/L
PRO-BNP: 3391 PG/ML (ref 0–1800)
PRO-BNP: 4091 PG/ML (ref 0–1800)
PRO-BNP: 9935 PG/ML (ref 0–1800)
PROCALCITONIN: 0.35 NG/ML (ref 0.01–0.09)
PROCALCITONIN: 0.35 NG/ML (ref 0.01–0.09)
PROTEIN UA: 100
PROTEIN UA: NEGATIVE
PROTEIN UA: POSITIVE
RBC # BLD: 2.37 MILL/MM3 (ref 4.2–5.4)
RBC # BLD: 2.56 MILL/MM3 (ref 4.2–5.4)
RBC # BLD: 2.6 MILL/MM3 (ref 4.2–5.4)
RBC # BLD: 2.62 MILL/MM3 (ref 4.2–5.4)
RBC # BLD: 2.65 MILL/MM3 (ref 4.2–5.4)
RBC # BLD: 3.11 MILL/MM3 (ref 4.2–5.4)
RBC # BLD: 3.23 MILL/MM3 (ref 4.2–5.4)
RBC # BLD: 3.23 MILL/MM3 (ref 4.2–5.4)
RBC # BLD: 3.36 10^6/ΜL
RBC # BLD: 3.74 MILL/MM3 (ref 4.2–5.4)
RBC # BLD: 3.75 MILL/MM3 (ref 4.2–5.4)
RBC URINE, POC: ABNORMAL
RBC URINE: ABNORMAL /HPF
RBC: 2.96 X10E12/L (ref 3.8–5.2)
REACTION TIME TEG: 2.6 MINUTES (ref 5–10)
REASON FOR REJECTION: NORMAL
REASON FOR REJECTION: NORMAL
REJECTED TEST: NORMAL
REJECTED TEST: NORMAL
RENAL EPITHELIAL, UA: ABNORMAL
REPORT STATUS: NORMAL
RETIC HEMOGLOBIN: 31.5 PG (ref 28.2–35.7)
RETIC: 1.2 % (ref 0.4–2.2)
RETICULOCYTE ABSOLUTE COUNT: 2.2 % (ref 0.5–2)
RH FACTOR: NORMAL
RH FACTOR: NORMAL
SARS-COV-2, NAAT: NOT  DETECTED
SCAN OF BLOOD SMEAR: NORMAL
SEG NEUTROPHILS: 58.3 %
SEG NEUTROPHILS: 58.8 %
SEG NEUTROPHILS: 61.4 %
SEG NEUTROPHILS: 64.2 %
SEG NEUTROPHILS: 66.1 %
SEG NEUTROPHILS: 84.7 %
SEGMENTED NEUTROPHILS ABSOLUTE COUNT: 2.7 THOU/MM3 (ref 1.8–7.7)
SEGMENTED NEUTROPHILS ABSOLUTE COUNT: 3.1 THOU/MM3 (ref 1.8–7.7)
SEGMENTED NEUTROPHILS ABSOLUTE COUNT: 3.6 THOU/MM3 (ref 1.8–7.7)
SEGMENTED NEUTROPHILS ABSOLUTE COUNT: 3.8 THOU/MM3 (ref 1.8–7.7)
SEGMENTED NEUTROPHILS ABSOLUTE COUNT: 3.9 THOU/MM3 (ref 1.8–7.7)
SEGMENTED NEUTROPHILS ABSOLUTE COUNT: 9 THOU/MM3 (ref 1.8–7.7)
SITE/TYPE: NORMAL
SMOOTH MUSCLE AB IGG TITER: NORMAL
SODIUM BLD-SCNC: 137 MEQ/L (ref 135–145)
SODIUM BLD-SCNC: 138 MEQ/L (ref 135–145)
SODIUM BLD-SCNC: 140 MEQ/L (ref 135–145)
SODIUM BLD-SCNC: 140 MEQ/L (ref 135–145)
SODIUM BLD-SCNC: 141 MEQ/L (ref 135–145)
SODIUM BLD-SCNC: 141 MMOL/L
SODIUM BLD-SCNC: 142 MEQ/L (ref 135–145)
SODIUM BLD-SCNC: 142 MEQ/L (ref 135–145)
SODIUM BLD-SCNC: 143 MEQ/L (ref 135–145)
SODIUM BLD-SCNC: 143 MMOL/L (ref 134–146)
SODIUM BLD-SCNC: 144 MEQ/L (ref 135–145)
SODIUM BLD-SCNC: 144 MEQ/L (ref 135–145)
SODIUM BLD-SCNC: 146 MEQ/L (ref 135–145)
SODIUM BLD-SCNC: 148 MEQ/L (ref 135–145)
SODIUM BLD-SCNC: 149 MEQ/L (ref 135–145)
SODIUM URINE: 65 MEQ/L
SOLUBLE TRANSFERRIN RECEPT: 2.2 MG/L (ref 1.9–4.4)
SOURCE, BLOOD GAS: ABNORMAL
SPECIFIC GRAVITY UA: 1.02 (ref 1–1.03)
SPECIFIC GRAVITY, URINE: 1.02 (ref 1–1.03)
SPECIFIC GRAVITY, URINE: 1.02 (ref 1–1.03)
STREP PNEUMO AG, UR: NEGATIVE
TOTAL CK: 151 U/L (ref 30–135)
TOTAL CK: 96 U/L (ref 30–135)
TOTAL IRON BINDING CAPACITY: 206 UG/DL (ref 171–450)
TOTAL IRON BINDING CAPACITY: 222 UG/DL (ref 250–450)
TOTAL IRON BINDING CAPACITY: 256 UG/DL (ref 171–450)
TOTAL PROTEIN: 5.5 G/DL (ref 6.1–8)
TOTAL PROTEIN: 5.5 G/DL (ref 6.1–8)
TOTAL PROTEIN: 5.6
TOTAL PROTEIN: 5.6 G/DL (ref 6.1–8)
TOTAL PROTEIN: 5.7 G/DL (ref 6.1–8)
TOTAL PROTEIN: 6 G/DL (ref 6.1–8)
TOTAL PROTEIN: 6.2 G/DL (ref 6.1–8)
TOTAL PROTEIN: 6.8 G/DL (ref 6.1–8)
TOTAL PROTEIN: 6.9 G/DL (ref 6.1–8)
TOTAL PROTEIN: 7 G/DL (ref 6.1–8)
TOTAL PROTEIN: 7.2 G/DL (ref 6.1–8)
TRIGL SERPL-MCNC: 70 MG/DL
TROPONIN T: 0.01 NG/ML
TROPONIN T: 0.01 NG/ML
TROPONIN T: 0.02 NG/ML
TROPONIN T: 0.02 NG/ML
TROPONIN T: < 0.01 NG/ML
TSH SERPL DL<=0.05 MIU/L-ACNC: 1.23 UIU/ML (ref 0.4–4.2)
TSH SERPL DL<=0.05 MIU/L-ACNC: 2.28 UIU/ML (ref 0.4–4.2)
UNSATURATED IRON BINDING CAPACITY: 176 UG/DL (ref 112–347)
URINE CULTURE, ROUTINE: NORMAL
UROBILINOGEN, URINE: 0.2 EU/DL (ref 0–1)
UROBILINOGEN, URINE: 1 EU/DL (ref 0–1)
UROBILINOGEN, URINE: ABNORMAL
VITAMIN B-12: 369
VITAMIN B-12: 484 PG/ML (ref 211–911)
VLDLC SERPL CALC-MCNC: 14 MG/DL
WBC # BLD: 10.6 THOU/MM3 (ref 4.8–10.8)
WBC # BLD: 3.4 10^3/ML
WBC # BLD: 4.6 THOU/MM3 (ref 4.8–10.8)
WBC # BLD: 4.7 THOU/MM3 (ref 4.8–10.8)
WBC # BLD: 4.9 THOU/MM3 (ref 4.8–10.8)
WBC # BLD: 5.3 THOU/MM3 (ref 4.8–10.8)
WBC # BLD: 5.9 THOU/MM3 (ref 4.8–10.8)
WBC # BLD: 6.1 THOU/MM3 (ref 4.8–10.8)
WBC UA: ABNORMAL /HPF
WBC URINE, POC: ABNORMAL
WBC: 5.5 X10E9/L (ref 4–11)
YEAST URINE, POC: ABNORMAL
YEAST: ABNORMAL

## 2022-01-01 PROCEDURE — 83880 ASSAY OF NATRIURETIC PEPTIDE: CPT

## 2022-01-01 PROCEDURE — 76770 US EXAM ABDO BACK WALL COMP: CPT

## 2022-01-01 PROCEDURE — 1200000000 HC SEMI PRIVATE

## 2022-01-01 PROCEDURE — 99232 SBSQ HOSP IP/OBS MODERATE 35: CPT | Performed by: STUDENT IN AN ORGANIZED HEALTH CARE EDUCATION/TRAINING PROGRAM

## 2022-01-01 PROCEDURE — 6370000000 HC RX 637 (ALT 250 FOR IP): Performed by: EMERGENCY MEDICINE

## 2022-01-01 PROCEDURE — 36415 COLL VENOUS BLD VENIPUNCTURE: CPT

## 2022-01-01 PROCEDURE — 6370000000 HC RX 637 (ALT 250 FOR IP): Performed by: INTERNAL MEDICINE

## 2022-01-01 PROCEDURE — 2580000003 HC RX 258: Performed by: NURSE PRACTITIONER

## 2022-01-01 PROCEDURE — 2500000003 HC RX 250 WO HCPCS: Performed by: EMERGENCY MEDICINE

## 2022-01-01 PROCEDURE — 2500000003 HC RX 250 WO HCPCS: Performed by: STUDENT IN AN ORGANIZED HEALTH CARE EDUCATION/TRAINING PROGRAM

## 2022-01-01 PROCEDURE — 2580000003 HC RX 258: Performed by: EMERGENCY MEDICINE

## 2022-01-01 PROCEDURE — P9040 RBC LEUKOREDUCED IRRADIATED: HCPCS

## 2022-01-01 PROCEDURE — 85027 COMPLETE CBC AUTOMATED: CPT

## 2022-01-01 PROCEDURE — G8400 PT W/DXA NO RESULTS DOC: HCPCS | Performed by: EMERGENCY MEDICINE

## 2022-01-01 PROCEDURE — 84484 ASSAY OF TROPONIN QUANT: CPT

## 2022-01-01 PROCEDURE — 84100 ASSAY OF PHOSPHORUS: CPT

## 2022-01-01 PROCEDURE — 93005 ELECTROCARDIOGRAM TRACING: CPT | Performed by: EMERGENCY MEDICINE

## 2022-01-01 PROCEDURE — 2500000003 HC RX 250 WO HCPCS: Performed by: INTERNAL MEDICINE

## 2022-01-01 PROCEDURE — G8417 CALC BMI ABV UP PARAM F/U: HCPCS | Performed by: EMERGENCY MEDICINE

## 2022-01-01 PROCEDURE — G8420 CALC BMI NORM PARAMETERS: HCPCS | Performed by: EMERGENCY MEDICINE

## 2022-01-01 PROCEDURE — 99283 EMERGENCY DEPT VISIT LOW MDM: CPT | Performed by: SURGERY

## 2022-01-01 PROCEDURE — 89190 NASAL SMEAR FOR EOSINOPHILS: CPT

## 2022-01-01 PROCEDURE — 82948 REAGENT STRIP/BLOOD GLUCOSE: CPT

## 2022-01-01 PROCEDURE — 86901 BLOOD TYPING SEROLOGIC RH(D): CPT

## 2022-01-01 PROCEDURE — 82803 BLOOD GASES ANY COMBINATION: CPT

## 2022-01-01 PROCEDURE — 93306 TTE W/DOPPLER COMPLETE: CPT

## 2022-01-01 PROCEDURE — 2580000003 HC RX 258: Performed by: INTERNAL MEDICINE

## 2022-01-01 PROCEDURE — 1090F PRES/ABSN URINE INCON ASSESS: CPT | Performed by: EMERGENCY MEDICINE

## 2022-01-01 PROCEDURE — 71045 X-RAY EXAM CHEST 1 VIEW: CPT

## 2022-01-01 PROCEDURE — 2720000010 HC SURG SUPPLY STERILE: Performed by: INTERNAL MEDICINE

## 2022-01-01 PROCEDURE — 2140000000 HC CCU INTERMEDIATE R&B

## 2022-01-01 PROCEDURE — 97535 SELF CARE MNGMENT TRAINING: CPT

## 2022-01-01 PROCEDURE — 85018 HEMOGLOBIN: CPT

## 2022-01-01 PROCEDURE — 81003 URINALYSIS AUTO W/O SCOPE: CPT

## 2022-01-01 PROCEDURE — 80048 BASIC METABOLIC PNL TOTAL CA: CPT

## 2022-01-01 PROCEDURE — 99214 OFFICE O/P EST MOD 30 MIN: CPT | Performed by: EMERGENCY MEDICINE

## 2022-01-01 PROCEDURE — 99232 SBSQ HOSP IP/OBS MODERATE 35: CPT | Performed by: INTERNAL MEDICINE

## 2022-01-01 PROCEDURE — 97530 THERAPEUTIC ACTIVITIES: CPT

## 2022-01-01 PROCEDURE — C9113 INJ PANTOPRAZOLE SODIUM, VIA: HCPCS | Performed by: INTERNAL MEDICINE

## 2022-01-01 PROCEDURE — 93010 ELECTROCARDIOGRAM REPORT: CPT | Performed by: INTERNAL MEDICINE

## 2022-01-01 PROCEDURE — 81000 URINALYSIS NONAUTO W/SCOPE: CPT | Performed by: EMERGENCY MEDICINE

## 2022-01-01 PROCEDURE — 85025 COMPLETE CBC W/AUTO DIFF WBC: CPT

## 2022-01-01 PROCEDURE — 86256 FLUORESCENT ANTIBODY TITER: CPT

## 2022-01-01 PROCEDURE — 85014 HEMATOCRIT: CPT

## 2022-01-01 PROCEDURE — 85610 PROTHROMBIN TIME: CPT

## 2022-01-01 PROCEDURE — 82570 ASSAY OF URINE CREATININE: CPT

## 2022-01-01 PROCEDURE — 1036F TOBACCO NON-USER: CPT | Performed by: EMERGENCY MEDICINE

## 2022-01-01 PROCEDURE — 2709999900 HC NON-CHARGEABLE SUPPLY: Performed by: INTERNAL MEDICINE

## 2022-01-01 PROCEDURE — 86900 BLOOD TYPING SEROLOGIC ABO: CPT

## 2022-01-01 PROCEDURE — 82248 BILIRUBIN DIRECT: CPT

## 2022-01-01 PROCEDURE — 7100000010 HC PHASE II RECOVERY - FIRST 15 MIN: Performed by: INTERNAL MEDICINE

## 2022-01-01 PROCEDURE — 6360000002 HC RX W HCPCS: Performed by: NURSE PRACTITIONER

## 2022-01-01 PROCEDURE — 3609012400 HC EGD TRANSORAL BIOPSY SINGLE/MULTIPLE: Performed by: INTERNAL MEDICINE

## 2022-01-01 PROCEDURE — 83605 ASSAY OF LACTIC ACID: CPT

## 2022-01-01 PROCEDURE — 6360000002 HC RX W HCPCS: Performed by: INTERNAL MEDICINE

## 2022-01-01 PROCEDURE — 99223 1ST HOSP IP/OBS HIGH 75: CPT | Performed by: INTERNAL MEDICINE

## 2022-01-01 PROCEDURE — APPSS180 APP SPLIT SHARED TIME > 60 MINUTES: Performed by: NURSE PRACTITIONER

## 2022-01-01 PROCEDURE — 93005 ELECTROCARDIOGRAM TRACING: CPT | Performed by: FAMILY MEDICINE

## 2022-01-01 PROCEDURE — 82550 ASSAY OF CK (CPK): CPT

## 2022-01-01 PROCEDURE — 83550 IRON BINDING TEST: CPT

## 2022-01-01 PROCEDURE — 93880 EXTRACRANIAL BILAT STUDY: CPT

## 2022-01-01 PROCEDURE — 82800 BLOOD PH: CPT

## 2022-01-01 PROCEDURE — 84145 PROCALCITONIN (PCT): CPT

## 2022-01-01 PROCEDURE — 94760 N-INVAS EAR/PLS OXIMETRY 1: CPT

## 2022-01-01 PROCEDURE — 92610 EVALUATE SWALLOWING FUNCTION: CPT

## 2022-01-01 PROCEDURE — 80053 COMPREHEN METABOLIC PANEL: CPT

## 2022-01-01 PROCEDURE — 82077 ASSAY SPEC XCP UR&BREATH IA: CPT

## 2022-01-01 PROCEDURE — 83735 ASSAY OF MAGNESIUM: CPT

## 2022-01-01 PROCEDURE — 74176 CT ABD & PELVIS W/O CONTRAST: CPT

## 2022-01-01 PROCEDURE — 97166 OT EVAL MOD COMPLEX 45 MIN: CPT

## 2022-01-01 PROCEDURE — 82272 OCCULT BLD FECES 1-3 TESTS: CPT

## 2022-01-01 PROCEDURE — C9113 INJ PANTOPRAZOLE SODIUM, VIA: HCPCS | Performed by: NURSE PRACTITIONER

## 2022-01-01 PROCEDURE — 1123F ACP DISCUSS/DSCN MKR DOCD: CPT | Performed by: INTERNAL MEDICINE

## 2022-01-01 PROCEDURE — 1090F PRES/ABSN URINE INCON ASSESS: CPT | Performed by: INTERNAL MEDICINE

## 2022-01-01 PROCEDURE — 6370000000 HC RX 637 (ALT 250 FOR IP): Performed by: FAMILY MEDICINE

## 2022-01-01 PROCEDURE — 0DB68ZX EXCISION OF STOMACH, VIA NATURAL OR ARTIFICIAL OPENING ENDOSCOPIC, DIAGNOSTIC: ICD-10-PCS | Performed by: EMERGENCY MEDICINE

## 2022-01-01 PROCEDURE — 82140 ASSAY OF AMMONIA: CPT

## 2022-01-01 PROCEDURE — 99284 EMERGENCY DEPT VISIT MOD MDM: CPT

## 2022-01-01 PROCEDURE — 36430 TRANSFUSION BLD/BLD COMPNT: CPT

## 2022-01-01 PROCEDURE — 86923 COMPATIBILITY TEST ELECTRIC: CPT

## 2022-01-01 PROCEDURE — 96375 TX/PRO/DX INJ NEW DRUG ADDON: CPT

## 2022-01-01 PROCEDURE — 86706 HEP B SURFACE ANTIBODY: CPT

## 2022-01-01 PROCEDURE — 2580000003 HC RX 258: Performed by: STUDENT IN AN ORGANIZED HEALTH CARE EDUCATION/TRAINING PROGRAM

## 2022-01-01 PROCEDURE — 69210 REMOVE IMPACTED EAR WAX UNI: CPT | Performed by: EMERGENCY MEDICINE

## 2022-01-01 PROCEDURE — 51702 INSERT TEMP BLADDER CATH: CPT

## 2022-01-01 PROCEDURE — 85730 THROMBOPLASTIN TIME PARTIAL: CPT

## 2022-01-01 PROCEDURE — 87635 SARS-COV-2 COVID-19 AMP PRB: CPT

## 2022-01-01 PROCEDURE — 72125 CT NECK SPINE W/O DYE: CPT

## 2022-01-01 PROCEDURE — 1123F ACP DISCUSS/DSCN MKR DOCD: CPT | Performed by: EMERGENCY MEDICINE

## 2022-01-01 PROCEDURE — 86850 RBC ANTIBODY SCREEN: CPT

## 2022-01-01 PROCEDURE — 2580000003 HC RX 258

## 2022-01-01 PROCEDURE — 82728 ASSAY OF FERRITIN: CPT

## 2022-01-01 PROCEDURE — P9017 PLASMA 1 DONOR FRZ W/IN 8 HR: HCPCS

## 2022-01-01 PROCEDURE — 86709 HEPATITIS A IGM ANTIBODY: CPT

## 2022-01-01 PROCEDURE — 1111F DSCHRG MED/CURRENT MED MERGE: CPT | Performed by: INTERNAL MEDICINE

## 2022-01-01 PROCEDURE — G8427 DOCREV CUR MEDS BY ELIG CLIN: HCPCS | Performed by: EMERGENCY MEDICINE

## 2022-01-01 PROCEDURE — 83690 ASSAY OF LIPASE: CPT

## 2022-01-01 PROCEDURE — 99285 EMERGENCY DEPT VISIT HI MDM: CPT

## 2022-01-01 PROCEDURE — 93270 REMOTE 30 DAY ECG REV/REPORT: CPT

## 2022-01-01 PROCEDURE — 83540 ASSAY OF IRON: CPT

## 2022-01-01 PROCEDURE — 4040F PNEUMOC VAC/ADMIN/RCVD: CPT | Performed by: INTERNAL MEDICINE

## 2022-01-01 PROCEDURE — 82330 ASSAY OF CALCIUM: CPT

## 2022-01-01 PROCEDURE — C9113 INJ PANTOPRAZOLE SODIUM, VIA: HCPCS | Performed by: EMERGENCY MEDICINE

## 2022-01-01 PROCEDURE — 96374 THER/PROPH/DIAG INJ IV PUSH: CPT

## 2022-01-01 PROCEDURE — 6370000000 HC RX 637 (ALT 250 FOR IP): Performed by: PHYSICIAN ASSISTANT

## 2022-01-01 PROCEDURE — 84300 ASSAY OF URINE SODIUM: CPT

## 2022-01-01 PROCEDURE — 2580000003 HC RX 258: Performed by: FAMILY MEDICINE

## 2022-01-01 PROCEDURE — 80307 DRUG TEST PRSMV CHEM ANLYZR: CPT

## 2022-01-01 PROCEDURE — 88305 TISSUE EXAM BY PATHOLOGIST: CPT

## 2022-01-01 PROCEDURE — 6820000002 HC L2 INJURY CALL ACTIVATION: Performed by: SURGERY

## 2022-01-01 PROCEDURE — 84443 ASSAY THYROID STIM HORMONE: CPT

## 2022-01-01 PROCEDURE — 94640 AIRWAY INHALATION TREATMENT: CPT

## 2022-01-01 PROCEDURE — 86038 ANTINUCLEAR ANTIBODIES: CPT

## 2022-01-01 PROCEDURE — 82390 ASSAY OF CERULOPLASMIN: CPT

## 2022-01-01 PROCEDURE — 97116 GAIT TRAINING THERAPY: CPT

## 2022-01-01 PROCEDURE — 82947 ASSAY GLUCOSE BLOOD QUANT: CPT

## 2022-01-01 PROCEDURE — 87449 NOS EACH ORGANISM AG IA: CPT

## 2022-01-01 PROCEDURE — 86803 HEPATITIS C AB TEST: CPT

## 2022-01-01 PROCEDURE — 93005 ELECTROCARDIOGRAM TRACING: CPT | Performed by: INTERNAL MEDICINE

## 2022-01-01 PROCEDURE — P9016 RBC LEUKOCYTES REDUCED: HCPCS

## 2022-01-01 PROCEDURE — 81001 URINALYSIS AUTO W/SCOPE: CPT

## 2022-01-01 PROCEDURE — G8400 PT W/DXA NO RESULTS DOC: HCPCS | Performed by: INTERNAL MEDICINE

## 2022-01-01 PROCEDURE — 82436 ASSAY OF URINE CHLORIDE: CPT

## 2022-01-01 PROCEDURE — G8427 DOCREV CUR MEDS BY ELIG CLIN: HCPCS | Performed by: INTERNAL MEDICINE

## 2022-01-01 PROCEDURE — 70486 CT MAXILLOFACIAL W/O DYE: CPT

## 2022-01-01 PROCEDURE — 82746 ASSAY OF FOLIC ACID SERUM: CPT

## 2022-01-01 PROCEDURE — 6360000002 HC RX W HCPCS: Performed by: EMERGENCY MEDICINE

## 2022-01-01 PROCEDURE — 93975 VASCULAR STUDY: CPT

## 2022-01-01 PROCEDURE — 0DB48ZX EXCISION OF ESOPHAGOGASTRIC JUNCTION, VIA NATURAL OR ARTIFICIAL OPENING ENDOSCOPIC, DIAGNOSTIC: ICD-10-PCS | Performed by: EMERGENCY MEDICINE

## 2022-01-01 PROCEDURE — 2500000003 HC RX 250 WO HCPCS: Performed by: PHYSICIAN ASSISTANT

## 2022-01-01 PROCEDURE — 88342 IMHCHEM/IMCYTCHM 1ST ANTB: CPT

## 2022-01-01 PROCEDURE — 80051 ELECTROLYTE PANEL: CPT

## 2022-01-01 PROCEDURE — 3700000000 HC ANESTHESIA ATTENDED CARE: Performed by: INTERNAL MEDICINE

## 2022-01-01 PROCEDURE — 3700000001 HC ADD 15 MINUTES (ANESTHESIA): Performed by: INTERNAL MEDICINE

## 2022-01-01 PROCEDURE — 99233 SBSQ HOSP IP/OBS HIGH 50: CPT | Performed by: INTERNAL MEDICINE

## 2022-01-01 PROCEDURE — 87899 AGENT NOS ASSAY W/OPTIC: CPT

## 2022-01-01 PROCEDURE — 70450 CT HEAD/BRAIN W/O DYE: CPT

## 2022-01-01 PROCEDURE — 6370000000 HC RX 637 (ALT 250 FOR IP): Performed by: NURSE PRACTITIONER

## 2022-01-01 PROCEDURE — 6360000002 HC RX W HCPCS

## 2022-01-01 PROCEDURE — 86705 HEP B CORE ANTIBODY IGM: CPT

## 2022-01-01 PROCEDURE — 97162 PT EVAL MOD COMPLEX 30 MIN: CPT

## 2022-01-01 PROCEDURE — 84295 ASSAY OF SERUM SODIUM: CPT

## 2022-01-01 PROCEDURE — 82607 VITAMIN B-12: CPT

## 2022-01-01 PROCEDURE — 83516 IMMUNOASSAY NONANTIBODY: CPT

## 2022-01-01 PROCEDURE — 6360000002 HC RX W HCPCS: Performed by: REGISTERED NURSE

## 2022-01-01 PROCEDURE — 93307 TTE W/O DOPPLER COMPLETE: CPT

## 2022-01-01 PROCEDURE — 83874 ASSAY OF MYOGLOBIN: CPT

## 2022-01-01 PROCEDURE — 76705 ECHO EXAM OF ABDOMEN: CPT

## 2022-01-01 PROCEDURE — 85046 RETICYTE/HGB CONCENTRATE: CPT

## 2022-01-01 PROCEDURE — 97110 THERAPEUTIC EXERCISES: CPT

## 2022-01-01 PROCEDURE — 86704 HEP B CORE ANTIBODY TOTAL: CPT

## 2022-01-01 PROCEDURE — 1036F TOBACCO NON-USER: CPT | Performed by: INTERNAL MEDICINE

## 2022-01-01 PROCEDURE — 87804 INFLUENZA ASSAY W/OPTIC: CPT

## 2022-01-01 PROCEDURE — 87340 HEPATITIS B SURFACE AG IA: CPT

## 2022-01-01 PROCEDURE — 83930 ASSAY OF BLOOD OSMOLALITY: CPT

## 2022-01-01 PROCEDURE — 93000 ELECTROCARDIOGRAM COMPLETE: CPT | Performed by: EMERGENCY MEDICINE

## 2022-01-01 PROCEDURE — 84133 ASSAY OF URINE POTASSIUM: CPT

## 2022-01-01 PROCEDURE — G8420 CALC BMI NORM PARAMETERS: HCPCS | Performed by: INTERNAL MEDICINE

## 2022-01-01 PROCEDURE — 85576 BLOOD PLATELET AGGREGATION: CPT

## 2022-01-01 PROCEDURE — 99214 OFFICE O/P EST MOD 30 MIN: CPT | Performed by: INTERNAL MEDICINE

## 2022-01-01 PROCEDURE — C9113 INJ PANTOPRAZOLE SODIUM, VIA: HCPCS

## 2022-01-01 PROCEDURE — 36600 WITHDRAWAL OF ARTERIAL BLOOD: CPT

## 2022-01-01 PROCEDURE — 86708 HEPATITIS A ANTIBODY: CPT

## 2022-01-01 PROCEDURE — 87040 BLOOD CULTURE FOR BACTERIA: CPT

## 2022-01-01 RX ORDER — DEXTROSE AND SODIUM CHLORIDE 5; .9 G/100ML; G/100ML
INJECTION, SOLUTION INTRAVENOUS CONTINUOUS
Status: DISCONTINUED | OUTPATIENT
Start: 2022-01-01 | End: 2022-01-01 | Stop reason: HOSPADM

## 2022-01-01 RX ORDER — DIPHENHYDRAMINE HYDROCHLORIDE 50 MG/ML
12.5 INJECTION INTRAMUSCULAR; INTRAVENOUS EVERY 6 HOURS PRN
Status: DISCONTINUED | OUTPATIENT
Start: 2022-01-01 | End: 2022-01-01 | Stop reason: HOSPADM

## 2022-01-01 RX ORDER — NICOTINE POLACRILEX 4 MG
15 LOZENGE BUCCAL PRN
Status: DISCONTINUED | OUTPATIENT
Start: 2022-01-01 | End: 2022-01-01 | Stop reason: RX

## 2022-01-01 RX ORDER — CALCIUM CARBONATE 500(1250)
1 TABLET ORAL DAILY
Status: DISCONTINUED | OUTPATIENT
Start: 2022-01-01 | End: 2022-01-01 | Stop reason: HOSPADM

## 2022-01-01 RX ORDER — DEXTROSE MONOHYDRATE 50 MG/ML
INJECTION, SOLUTION INTRAVENOUS CONTINUOUS
Status: DISCONTINUED | OUTPATIENT
Start: 2022-01-01 | End: 2022-01-01

## 2022-01-01 RX ORDER — INSULIN LISPRO 100 [IU]/ML
0-6 INJECTION, SOLUTION INTRAVENOUS; SUBCUTANEOUS
Status: DISCONTINUED | OUTPATIENT
Start: 2022-01-01 | End: 2022-01-01 | Stop reason: HOSPADM

## 2022-01-01 RX ORDER — SODIUM CHLORIDE 0.9 % (FLUSH) 0.9 %
5-40 SYRINGE (ML) INJECTION EVERY 12 HOURS SCHEDULED
Status: DISCONTINUED | OUTPATIENT
Start: 2022-01-01 | End: 2022-01-01 | Stop reason: HOSPADM

## 2022-01-01 RX ORDER — LISINOPRIL 5 MG/1
5 TABLET ORAL DAILY
Status: DISCONTINUED | OUTPATIENT
Start: 2022-01-01 | End: 2022-01-01 | Stop reason: HOSPADM

## 2022-01-01 RX ORDER — ASPIRIN 81 MG/1
81 TABLET ORAL DAILY
Status: DISCONTINUED | OUTPATIENT
Start: 2022-01-01 | End: 2022-01-01 | Stop reason: HOSPADM

## 2022-01-01 RX ORDER — ATORVASTATIN CALCIUM 20 MG/1
20 TABLET, FILM COATED ORAL NIGHTLY
Status: CANCELLED | OUTPATIENT
Start: 2022-01-01

## 2022-01-01 RX ORDER — SODIUM CHLORIDE 9 MG/ML
25 INJECTION, SOLUTION INTRAVENOUS PRN
Status: CANCELLED | OUTPATIENT
Start: 2022-01-01

## 2022-01-01 RX ORDER — POLYETHYLENE GLYCOL 3350 17 G/17G
17 POWDER, FOR SOLUTION ORAL DAILY PRN
Status: DISCONTINUED | OUTPATIENT
Start: 2022-01-01 | End: 2022-01-01 | Stop reason: HOSPADM

## 2022-01-01 RX ORDER — BUMETANIDE 0.25 MG/ML
1 INJECTION, SOLUTION INTRAMUSCULAR; INTRAVENOUS ONCE
Status: COMPLETED | OUTPATIENT
Start: 2022-01-01 | End: 2022-01-01

## 2022-01-01 RX ORDER — SODIUM CHLORIDE 9 MG/ML
INJECTION, SOLUTION INTRAVENOUS PRN
Status: DISCONTINUED | OUTPATIENT
Start: 2022-01-01 | End: 2022-01-01 | Stop reason: HOSPADM

## 2022-01-01 RX ORDER — ACETAMINOPHEN 650 MG/1
650 SUPPOSITORY RECTAL EVERY 6 HOURS PRN
Status: DISCONTINUED | OUTPATIENT
Start: 2022-01-01 | End: 2022-01-01 | Stop reason: HOSPADM

## 2022-01-01 RX ORDER — MEMANTINE HYDROCHLORIDE 10 MG/1
10 TABLET ORAL 2 TIMES DAILY
Status: DISCONTINUED | OUTPATIENT
Start: 2022-01-01 | End: 2022-01-01 | Stop reason: HOSPADM

## 2022-01-01 RX ORDER — ATORVASTATIN CALCIUM 20 MG/1
20 TABLET, FILM COATED ORAL NIGHTLY
Status: DISCONTINUED | OUTPATIENT
Start: 2022-01-01 | End: 2022-01-01 | Stop reason: HOSPADM

## 2022-01-01 RX ORDER — DIGOXIN 0.25 MG/ML
250 INJECTION INTRAMUSCULAR; INTRAVENOUS ONCE
Status: COMPLETED | OUTPATIENT
Start: 2022-01-01 | End: 2022-01-01

## 2022-01-01 RX ORDER — LORAZEPAM 2 MG/ML
0.5 INJECTION INTRAMUSCULAR ONCE
Status: DISCONTINUED | OUTPATIENT
Start: 2022-01-01 | End: 2022-01-01 | Stop reason: HOSPADM

## 2022-01-01 RX ORDER — DONEPEZIL HYDROCHLORIDE 10 MG/1
10 TABLET, FILM COATED ORAL NIGHTLY
Status: DISCONTINUED | OUTPATIENT
Start: 2022-01-01 | End: 2022-01-01 | Stop reason: HOSPADM

## 2022-01-01 RX ORDER — FERROUS SULFATE 325(65) MG
325 TABLET ORAL
Status: DISCONTINUED | OUTPATIENT
Start: 2022-01-01 | End: 2022-01-01 | Stop reason: HOSPADM

## 2022-01-01 RX ORDER — FLUTICASONE PROPIONATE 50 MCG
2 SPRAY, SUSPENSION (ML) NASAL DAILY
Status: DISCONTINUED | OUTPATIENT
Start: 2022-01-01 | End: 2022-01-01 | Stop reason: HOSPADM

## 2022-01-01 RX ORDER — LOPERAMIDE HYDROCHLORIDE 2 MG/1
2 CAPSULE ORAL DAILY PRN
COMMUNITY

## 2022-01-01 RX ORDER — FERROUS SULFATE 325(65) MG
325 TABLET ORAL
Qty: 90 TABLET | Refills: 1 | Status: SHIPPED | OUTPATIENT
Start: 2022-01-01

## 2022-01-01 RX ORDER — METOPROLOL TARTRATE 100 MG/1
100 TABLET ORAL 2 TIMES DAILY
Status: DISCONTINUED | OUTPATIENT
Start: 2022-01-01 | End: 2022-01-01 | Stop reason: HOSPADM

## 2022-01-01 RX ORDER — PANTOPRAZOLE SODIUM 40 MG/1
40 TABLET, DELAYED RELEASE ORAL
Status: DISCONTINUED | OUTPATIENT
Start: 2022-01-01 | End: 2022-01-01 | Stop reason: HOSPADM

## 2022-01-01 RX ORDER — ACETAMINOPHEN 325 MG/1
650 TABLET ORAL EVERY 6 HOURS PRN
Status: DISCONTINUED | OUTPATIENT
Start: 2022-01-01 | End: 2022-01-01

## 2022-01-01 RX ORDER — SENNOSIDES 8.6 MG
650 CAPSULE ORAL DAILY
Status: ON HOLD | COMMUNITY
End: 2022-01-01 | Stop reason: HOSPADM

## 2022-01-01 RX ORDER — SODIUM CHLORIDE 0.9 % (FLUSH) 0.9 %
5-40 SYRINGE (ML) INJECTION PRN
Status: DISCONTINUED | OUTPATIENT
Start: 2022-01-01 | End: 2022-01-01 | Stop reason: HOSPADM

## 2022-01-01 RX ORDER — METOPROLOL TARTRATE 50 MG/1
50 TABLET, FILM COATED ORAL 2 TIMES DAILY
Qty: 60 TABLET | Refills: 5 | Status: SHIPPED | OUTPATIENT
Start: 2022-01-01 | End: 2022-01-01

## 2022-01-01 RX ORDER — SODIUM CHLORIDE 9 MG/ML
INJECTION, SOLUTION INTRAVENOUS CONTINUOUS
Status: DISCONTINUED | OUTPATIENT
Start: 2022-01-01 | End: 2022-01-01

## 2022-01-01 RX ORDER — FERROUS SULFATE 325(65) MG
325 TABLET ORAL
Status: CANCELLED | OUTPATIENT
Start: 2022-01-01

## 2022-01-01 RX ORDER — POLYVINYL ALCOHOL 14 MG/ML
1 SOLUTION/ DROPS OPHTHALMIC 2 TIMES DAILY
Status: DISCONTINUED | OUTPATIENT
Start: 2022-01-01 | End: 2022-01-01 | Stop reason: HOSPADM

## 2022-01-01 RX ORDER — 0.9 % SODIUM CHLORIDE 0.9 %
500 INTRAVENOUS SOLUTION INTRAVENOUS ONCE
Status: COMPLETED | OUTPATIENT
Start: 2022-01-01 | End: 2022-01-01

## 2022-01-01 RX ORDER — METOPROLOL TARTRATE 100 MG/1
100 TABLET ORAL 2 TIMES DAILY
Qty: 60 TABLET | Refills: 3 | Status: SHIPPED | OUTPATIENT
Start: 2022-01-01

## 2022-01-01 RX ORDER — FLUTICASONE PROPIONATE 50 MCG
2 SPRAY, SUSPENSION (ML) NASAL DAILY
Status: CANCELLED | OUTPATIENT
Start: 2022-01-01

## 2022-01-01 RX ORDER — PROPOFOL 10 MG/ML
INJECTION, EMULSION INTRAVENOUS PRN
Status: DISCONTINUED | OUTPATIENT
Start: 2022-01-01 | End: 2022-01-01 | Stop reason: SDUPTHER

## 2022-01-01 RX ORDER — METOPROLOL TARTRATE 50 MG/1
50 TABLET, FILM COATED ORAL 2 TIMES DAILY
Status: CANCELLED | OUTPATIENT
Start: 2022-01-01

## 2022-01-01 RX ORDER — SODIUM CHLORIDE 0.9 % (FLUSH) 0.9 %
5-40 SYRINGE (ML) INJECTION EVERY 12 HOURS SCHEDULED
Status: CANCELLED | OUTPATIENT
Start: 2022-01-01

## 2022-01-01 RX ORDER — POTASSIUM CHLORIDE 7.45 MG/ML
10 INJECTION INTRAVENOUS PRN
Status: DISCONTINUED | OUTPATIENT
Start: 2022-01-01 | End: 2022-01-01 | Stop reason: HOSPADM

## 2022-01-01 RX ORDER — DEXTROSE AND SODIUM CHLORIDE 5; .9 G/100ML; G/100ML
INJECTION, SOLUTION INTRAVENOUS CONTINUOUS
Status: DISCONTINUED | OUTPATIENT
Start: 2022-01-01 | End: 2022-01-01

## 2022-01-01 RX ORDER — PANTOPRAZOLE SODIUM 40 MG/10ML
40 INJECTION, POWDER, LYOPHILIZED, FOR SOLUTION INTRAVENOUS 2 TIMES DAILY
Status: DISCONTINUED | OUTPATIENT
Start: 2022-01-01 | End: 2022-01-01

## 2022-01-01 RX ORDER — IPRATROPIUM BROMIDE AND ALBUTEROL SULFATE 2.5; .5 MG/3ML; MG/3ML
1 SOLUTION RESPIRATORY (INHALATION)
Status: CANCELLED | OUTPATIENT
Start: 2022-01-01

## 2022-01-01 RX ORDER — POTASSIUM CHLORIDE 7.45 MG/ML
10 INJECTION INTRAVENOUS
Status: COMPLETED | OUTPATIENT
Start: 2022-01-01 | End: 2022-01-01

## 2022-01-01 RX ORDER — MAGNESIUM SULFATE IN WATER 40 MG/ML
2000 INJECTION, SOLUTION INTRAVENOUS ONCE
Status: COMPLETED | OUTPATIENT
Start: 2022-01-01 | End: 2022-01-01

## 2022-01-01 RX ORDER — ATORVASTATIN CALCIUM 20 MG/1
20 TABLET, FILM COATED ORAL DAILY
Status: DISCONTINUED | OUTPATIENT
Start: 2022-01-01 | End: 2022-01-01 | Stop reason: HOSPADM

## 2022-01-01 RX ORDER — 0.9 % SODIUM CHLORIDE 0.9 %
1000 INTRAVENOUS SOLUTION INTRAVENOUS ONCE
Status: DISCONTINUED | OUTPATIENT
Start: 2022-01-01 | End: 2022-01-01 | Stop reason: HOSPADM

## 2022-01-01 RX ORDER — ACETAMINOPHEN 325 MG/1
650 TABLET ORAL EVERY 6 HOURS PRN
Status: CANCELLED | OUTPATIENT
Start: 2022-01-01

## 2022-01-01 RX ORDER — POLYVINYL ALCOHOL 14 MG/ML
1 SOLUTION/ DROPS OPHTHALMIC DAILY
Status: DISCONTINUED | OUTPATIENT
Start: 2022-01-01 | End: 2022-01-01 | Stop reason: HOSPADM

## 2022-01-01 RX ORDER — ACETAMINOPHEN 325 MG/1
650 TABLET ORAL EVERY 6 HOURS PRN
Status: DISCONTINUED | OUTPATIENT
Start: 2022-01-01 | End: 2022-01-01 | Stop reason: HOSPADM

## 2022-01-01 RX ORDER — DEXTROSE MONOHYDRATE 50 MG/ML
100 INJECTION, SOLUTION INTRAVENOUS PRN
Status: DISCONTINUED | OUTPATIENT
Start: 2022-01-01 | End: 2022-01-01 | Stop reason: HOSPADM

## 2022-01-01 RX ORDER — ACETAMINOPHEN 500 MG
500 TABLET ORAL EVERY 6 HOURS PRN
Qty: 120 TABLET | Refills: 3 | COMMUNITY
Start: 2022-01-01 | End: 2022-01-01 | Stop reason: ALTCHOICE

## 2022-01-01 RX ORDER — METOPROLOL TARTRATE 5 MG/5ML
10 INJECTION INTRAVENOUS EVERY 4 HOURS PRN
Status: DISCONTINUED | OUTPATIENT
Start: 2022-01-01 | End: 2022-01-01

## 2022-01-01 RX ORDER — PANTOPRAZOLE SODIUM 40 MG/1
40 TABLET, DELAYED RELEASE ORAL
Qty: 60 TABLET | Refills: 3 | Status: SHIPPED | OUTPATIENT
Start: 2022-01-01

## 2022-01-01 RX ORDER — SODIUM CHLORIDE 450 MG/100ML
INJECTION, SOLUTION INTRAVENOUS CONTINUOUS
Status: DISCONTINUED | OUTPATIENT
Start: 2022-01-01 | End: 2022-01-01

## 2022-01-01 RX ORDER — QUETIAPINE FUMARATE 25 MG/1
25 TABLET, FILM COATED ORAL ONCE
Status: COMPLETED | OUTPATIENT
Start: 2022-01-01 | End: 2022-01-01

## 2022-01-01 RX ORDER — BISACODYL 10 MG
10 SUPPOSITORY, RECTAL RECTAL DAILY
Qty: 2 SUPPOSITORY | Refills: 0 | Status: SHIPPED | OUTPATIENT
Start: 2022-01-01 | End: 2022-01-01

## 2022-01-01 RX ORDER — ONDANSETRON 4 MG/1
4 TABLET, ORALLY DISINTEGRATING ORAL EVERY 8 HOURS PRN
Status: DISCONTINUED | OUTPATIENT
Start: 2022-01-01 | End: 2022-01-01 | Stop reason: HOSPADM

## 2022-01-01 RX ORDER — FERROUS SULFATE 325(65) MG
325 TABLET ORAL
Status: DISCONTINUED | OUTPATIENT
Start: 2022-01-01 | End: 2022-01-01

## 2022-01-01 RX ORDER — ACETAMINOPHEN 650 MG/1
650 SUPPOSITORY RECTAL EVERY 4 HOURS PRN
Status: DISCONTINUED | OUTPATIENT
Start: 2022-01-01 | End: 2022-01-01

## 2022-01-01 RX ORDER — SODIUM CHLORIDE 9 MG/ML
1000 INJECTION, SOLUTION INTRAVENOUS CONTINUOUS
Status: DISCONTINUED | OUTPATIENT
Start: 2022-01-01 | End: 2022-01-01

## 2022-01-01 RX ORDER — POTASSIUM CHLORIDE 20 MEQ/1
40 TABLET, EXTENDED RELEASE ORAL PRN
Status: DISCONTINUED | OUTPATIENT
Start: 2022-01-01 | End: 2022-01-01 | Stop reason: HOSPADM

## 2022-01-01 RX ORDER — PHENOL 1.4 %
1 AEROSOL, SPRAY (ML) MUCOUS MEMBRANE DAILY
Status: CANCELLED | OUTPATIENT
Start: 2022-01-01

## 2022-01-01 RX ORDER — PANTOPRAZOLE SODIUM 40 MG/1
40 TABLET, DELAYED RELEASE ORAL
Status: CANCELLED | OUTPATIENT
Start: 2022-01-01

## 2022-01-01 RX ORDER — ONDANSETRON 2 MG/ML
4 INJECTION INTRAMUSCULAR; INTRAVENOUS EVERY 6 HOURS PRN
Status: DISCONTINUED | OUTPATIENT
Start: 2022-01-01 | End: 2022-01-01 | Stop reason: HOSPADM

## 2022-01-01 RX ORDER — METOPROLOL TARTRATE 50 MG/1
50 TABLET, FILM COATED ORAL 2 TIMES DAILY
Status: DISCONTINUED | OUTPATIENT
Start: 2022-01-01 | End: 2022-01-01

## 2022-01-01 RX ORDER — LISINOPRIL 10 MG/1
10 TABLET ORAL DAILY
Status: DISCONTINUED | OUTPATIENT
Start: 2022-01-01 | End: 2022-01-01 | Stop reason: HOSPADM

## 2022-01-01 RX ORDER — PANTOPRAZOLE SODIUM 40 MG/1
40 TABLET, DELAYED RELEASE ORAL
Status: DISCONTINUED | OUTPATIENT
Start: 2022-01-01 | End: 2022-01-01

## 2022-01-01 RX ORDER — BISACODYL 10 MG
10 SUPPOSITORY, RECTAL RECTAL DAILY
Status: DISCONTINUED | OUTPATIENT
Start: 2022-01-01 | End: 2022-01-01 | Stop reason: HOSPADM

## 2022-01-01 RX ORDER — DONEPEZIL HYDROCHLORIDE 10 MG/1
10 TABLET, FILM COATED ORAL NIGHTLY
Status: CANCELLED | OUTPATIENT
Start: 2022-01-01

## 2022-01-01 RX ORDER — CLONIDINE 0.1 MG/24H
1 PATCH, EXTENDED RELEASE TRANSDERMAL WEEKLY
Status: DISCONTINUED | OUTPATIENT
Start: 2022-01-01 | End: 2022-01-01 | Stop reason: HOSPADM

## 2022-01-01 RX ORDER — IPRATROPIUM BROMIDE AND ALBUTEROL SULFATE 2.5; .5 MG/3ML; MG/3ML
1 SOLUTION RESPIRATORY (INHALATION) 2 TIMES DAILY
Status: DISCONTINUED | OUTPATIENT
Start: 2022-01-01 | End: 2022-01-01

## 2022-01-01 RX ORDER — METOPROLOL TARTRATE 5 MG/5ML
5 INJECTION INTRAVENOUS ONCE
Status: COMPLETED | OUTPATIENT
Start: 2022-01-01 | End: 2022-01-01

## 2022-01-01 RX ORDER — POTASSIUM CHLORIDE AND SODIUM CHLORIDE 450; 150 MG/100ML; MG/100ML
INJECTION, SOLUTION INTRAVENOUS CONTINUOUS
Status: DISCONTINUED | OUTPATIENT
Start: 2022-01-01 | End: 2022-01-01

## 2022-01-01 RX ORDER — PANTOPRAZOLE SODIUM 40 MG/10ML
40 INJECTION, POWDER, LYOPHILIZED, FOR SOLUTION INTRAVENOUS DAILY
Status: DISCONTINUED | OUTPATIENT
Start: 2022-01-01 | End: 2022-01-01

## 2022-01-01 RX ORDER — IPRATROPIUM BROMIDE AND ALBUTEROL SULFATE 2.5; .5 MG/3ML; MG/3ML
1 SOLUTION RESPIRATORY (INHALATION) EVERY 4 HOURS PRN
Status: DISCONTINUED | OUTPATIENT
Start: 2022-01-01 | End: 2022-01-01 | Stop reason: HOSPADM

## 2022-01-01 RX ORDER — METOPROLOL TARTRATE 50 MG/1
50 TABLET, FILM COATED ORAL 2 TIMES DAILY
Status: DISCONTINUED | OUTPATIENT
Start: 2022-01-01 | End: 2022-01-01 | Stop reason: HOSPADM

## 2022-01-01 RX ORDER — FENTANYL CITRATE 50 UG/ML
25 INJECTION, SOLUTION INTRAMUSCULAR; INTRAVENOUS ONCE
Status: COMPLETED | OUTPATIENT
Start: 2022-01-01 | End: 2022-01-01

## 2022-01-01 RX ORDER — ERGOCALCIFEROL 1.25 MG/1
50000 CAPSULE ORAL
Status: DISCONTINUED | OUTPATIENT
Start: 2022-01-01 | End: 2022-01-01 | Stop reason: HOSPADM

## 2022-01-01 RX ORDER — SODIUM CHLORIDE 0.9 % (FLUSH) 0.9 %
5-40 SYRINGE (ML) INJECTION PRN
Status: CANCELLED | OUTPATIENT
Start: 2022-01-01

## 2022-01-01 RX ORDER — AZITHROMYCIN 250 MG/1
500 TABLET, FILM COATED ORAL EVERY 24 HOURS
Status: CANCELLED | OUTPATIENT
Start: 2022-01-01 | End: 2022-01-01

## 2022-01-01 RX ORDER — ACETAMINOPHEN 500 MG
500 TABLET ORAL EVERY 6 HOURS PRN
Status: DISCONTINUED | OUTPATIENT
Start: 2022-01-01 | End: 2022-01-01 | Stop reason: ALTCHOICE

## 2022-01-01 RX ORDER — SODIUM CHLORIDE 450 MG/100ML
INJECTION, SOLUTION INTRAVENOUS CONTINUOUS
Status: CANCELLED | OUTPATIENT
Start: 2022-01-01

## 2022-01-01 RX ORDER — POTASSIUM CHLORIDE 7.45 MG/ML
10 INJECTION INTRAVENOUS
Status: DISCONTINUED | OUTPATIENT
Start: 2022-01-01 | End: 2022-01-01

## 2022-01-01 RX ORDER — ONDANSETRON 4 MG/1
4 TABLET, ORALLY DISINTEGRATING ORAL EVERY 8 HOURS PRN
Status: CANCELLED | OUTPATIENT
Start: 2022-01-01

## 2022-01-01 RX ORDER — POTASSIUM CHLORIDE 7.45 MG/ML
40 INJECTION INTRAVENOUS ONCE
Status: DISCONTINUED | OUTPATIENT
Start: 2022-01-01 | End: 2022-01-01 | Stop reason: ALTCHOICE

## 2022-01-01 RX ORDER — METOPROLOL TARTRATE 50 MG/1
50 TABLET, FILM COATED ORAL 2 TIMES DAILY
Qty: 60 TABLET | Refills: 5 | Status: ON HOLD | OUTPATIENT
Start: 2022-01-01 | End: 2022-01-01 | Stop reason: HOSPADM

## 2022-01-01 RX ORDER — METOPROLOL TARTRATE 5 MG/5ML
5 INJECTION INTRAVENOUS EVERY 6 HOURS PRN
Status: DISCONTINUED | OUTPATIENT
Start: 2022-01-01 | End: 2022-01-01

## 2022-01-01 RX ORDER — ERGOCALCIFEROL 1.25 MG/1
50000 CAPSULE ORAL
Status: CANCELLED | OUTPATIENT
Start: 2022-01-01

## 2022-01-01 RX ORDER — PANTOPRAZOLE SODIUM 40 MG/10ML
40 INJECTION, POWDER, LYOPHILIZED, FOR SOLUTION INTRAVENOUS 2 TIMES DAILY
Status: DISCONTINUED | OUTPATIENT
Start: 2022-01-01 | End: 2022-01-01 | Stop reason: HOSPADM

## 2022-01-01 RX ORDER — AZITHROMYCIN 250 MG/1
500 TABLET, FILM COATED ORAL EVERY 24 HOURS
Status: DISPENSED | OUTPATIENT
Start: 2022-01-01 | End: 2022-01-01

## 2022-01-01 RX ORDER — SENNOSIDES 8.6 MG
650 CAPSULE ORAL DAILY PRN
Status: ON HOLD | COMMUNITY
End: 2022-01-01 | Stop reason: HOSPADM

## 2022-01-01 RX ORDER — ACETAMINOPHEN 325 MG/1
650 TABLET ORAL EVERY 6 HOURS PRN
Qty: 120 TABLET | Refills: 0 | COMMUNITY
Start: 2022-01-01

## 2022-01-01 RX ORDER — INSULIN LISPRO 100 [IU]/ML
0-3 INJECTION, SOLUTION INTRAVENOUS; SUBCUTANEOUS NIGHTLY
Status: DISCONTINUED | OUTPATIENT
Start: 2022-01-01 | End: 2022-01-01 | Stop reason: HOSPADM

## 2022-01-01 RX ORDER — DEXTROSE MONOHYDRATE 25 G/50ML
12.5 INJECTION, SOLUTION INTRAVENOUS PRN
Status: DISCONTINUED | OUTPATIENT
Start: 2022-01-01 | End: 2022-01-01 | Stop reason: RX

## 2022-01-01 RX ORDER — IPRATROPIUM BROMIDE AND ALBUTEROL SULFATE 2.5; .5 MG/3ML; MG/3ML
1 SOLUTION RESPIRATORY (INHALATION)
Status: DISCONTINUED | OUTPATIENT
Start: 2022-01-01 | End: 2022-01-01

## 2022-01-01 RX ORDER — SODIUM CHLORIDE 9 MG/ML
25 INJECTION, SOLUTION INTRAVENOUS PRN
Status: DISCONTINUED | OUTPATIENT
Start: 2022-01-01 | End: 2022-01-01 | Stop reason: HOSPADM

## 2022-01-01 RX ORDER — METOPROLOL TARTRATE 50 MG/1
50 TABLET, FILM COATED ORAL 2 TIMES DAILY
Qty: 60 TABLET | Refills: 3 | Status: SHIPPED | OUTPATIENT
Start: 2022-01-01 | End: 2022-01-01

## 2022-01-01 RX ORDER — POLYETHYLENE GLYCOL 3350 17 G/17G
17 POWDER, FOR SOLUTION ORAL DAILY PRN
Qty: 527 G | Refills: 0 | Status: SHIPPED | OUTPATIENT
Start: 2022-01-01 | End: 2022-01-01

## 2022-01-01 RX ORDER — LISINOPRIL 10 MG/1
TABLET ORAL
Status: CANCELLED | OUTPATIENT
Start: 2022-01-01

## 2022-01-01 RX ORDER — LORAZEPAM 2 MG/ML
0.5 CONCENTRATE ORAL
Status: DISCONTINUED | OUTPATIENT
Start: 2022-01-01 | End: 2022-01-01 | Stop reason: HOSPADM

## 2022-01-01 RX ORDER — METOPROLOL TARTRATE 5 MG/5ML
5 INJECTION INTRAVENOUS EVERY 6 HOURS
Status: CANCELLED | OUTPATIENT
Start: 2022-01-01

## 2022-01-01 RX ORDER — ONDANSETRON 2 MG/ML
4 INJECTION INTRAMUSCULAR; INTRAVENOUS EVERY 6 HOURS PRN
Status: CANCELLED | OUTPATIENT
Start: 2022-01-01

## 2022-01-01 RX ORDER — OXYCODONE HYDROCHLORIDE 5 MG/1
2.5 TABLET ORAL
Status: DISCONTINUED | OUTPATIENT
Start: 2022-01-01 | End: 2022-01-01 | Stop reason: HOSPADM

## 2022-01-01 RX ORDER — MEMANTINE HYDROCHLORIDE 5 MG/1
5 TABLET ORAL 2 TIMES DAILY
Status: DISCONTINUED | OUTPATIENT
Start: 2022-01-01 | End: 2022-01-01 | Stop reason: HOSPADM

## 2022-01-01 RX ORDER — METOPROLOL TARTRATE 5 MG/5ML
10 INJECTION INTRAVENOUS EVERY 6 HOURS
Status: DISCONTINUED | OUTPATIENT
Start: 2022-01-01 | End: 2022-01-01 | Stop reason: SDUPTHER

## 2022-01-01 RX ORDER — QUETIAPINE FUMARATE 25 MG/1
25 TABLET, FILM COATED ORAL 2 TIMES DAILY
Status: DISCONTINUED | OUTPATIENT
Start: 2022-01-01 | End: 2022-01-01

## 2022-01-01 RX ORDER — MEMANTINE HYDROCHLORIDE 10 MG/1
10 TABLET ORAL 2 TIMES DAILY
Status: CANCELLED | OUTPATIENT
Start: 2022-01-01

## 2022-01-01 RX ORDER — CLONIDINE 0.1 MG/24H
1 PATCH, EXTENDED RELEASE TRANSDERMAL WEEKLY
Qty: 4 PATCH | Refills: 0 | Status: SHIPPED | OUTPATIENT
Start: 2022-01-01

## 2022-01-01 RX ADMIN — DEXTROSE AND SODIUM CHLORIDE: 5; 900 INJECTION, SOLUTION INTRAVENOUS at 23:38

## 2022-01-01 RX ADMIN — PANTOPRAZOLE SODIUM 40 MG: 40 TABLET, DELAYED RELEASE ORAL at 17:19

## 2022-01-01 RX ADMIN — POLYVINYL ALCOHOL 1 DROP: 14 SOLUTION/ DROPS OPHTHALMIC at 09:04

## 2022-01-01 RX ADMIN — METOPROLOL TARTRATE 10 MG: 1 INJECTION, SOLUTION INTRAVENOUS at 20:06

## 2022-01-01 RX ADMIN — DEXTROSE MONOHYDRATE: 50 INJECTION, SOLUTION INTRAVENOUS at 23:38

## 2022-01-01 RX ADMIN — METOPROLOL TARTRATE 10 MG: 1 INJECTION, SOLUTION INTRAVENOUS at 16:45

## 2022-01-01 RX ADMIN — SODIUM CHLORIDE: 9 INJECTION, SOLUTION INTRAVENOUS at 07:27

## 2022-01-01 RX ADMIN — SODIUM CHLORIDE, PRESERVATIVE FREE 10 ML: 5 INJECTION INTRAVENOUS at 08:54

## 2022-01-01 RX ADMIN — ACETAMINOPHEN 650 MG: 325 TABLET ORAL at 21:20

## 2022-01-01 RX ADMIN — ATORVASTATIN CALCIUM 20 MG: 20 TABLET, FILM COATED ORAL at 14:24

## 2022-01-01 RX ADMIN — DONEPEZIL HYDROCHLORIDE 10 MG: 10 TABLET, FILM COATED ORAL at 21:19

## 2022-01-01 RX ADMIN — SODIUM CHLORIDE 500 ML: 9 INJECTION, SOLUTION INTRAVENOUS at 10:06

## 2022-01-01 RX ADMIN — DILTIAZEM HYDROCHLORIDE 2.5 MG/HR: 5 INJECTION, SOLUTION INTRAVENOUS at 15:20

## 2022-01-01 RX ADMIN — MEMANTINE HYDROCHLORIDE 10 MG: 10 TABLET ORAL at 08:38

## 2022-01-01 RX ADMIN — DEXTROSE AND SODIUM CHLORIDE: 5; 900 INJECTION, SOLUTION INTRAVENOUS at 13:31

## 2022-01-01 RX ADMIN — PIPERACILLIN AND TAZOBACTAM 3375 MG: 3; .375 INJECTION, POWDER, LYOPHILIZED, FOR SOLUTION INTRAVENOUS at 13:31

## 2022-01-01 RX ADMIN — SODIUM CHLORIDE, PRESERVATIVE FREE 10 ML: 5 INJECTION INTRAVENOUS at 10:34

## 2022-01-01 RX ADMIN — ATORVASTATIN CALCIUM 20 MG: 20 TABLET, FILM COATED ORAL at 10:25

## 2022-01-01 RX ADMIN — LISINOPRIL 10 MG: 10 TABLET ORAL at 08:06

## 2022-01-01 RX ADMIN — ATORVASTATIN CALCIUM 20 MG: 20 TABLET, FILM COATED ORAL at 08:38

## 2022-01-01 RX ADMIN — SODIUM BICARBONATE: 84 INJECTION, SOLUTION INTRAVENOUS at 12:31

## 2022-01-01 RX ADMIN — MEMANTINE HYDROCHLORIDE 10 MG: 10 TABLET ORAL at 09:52

## 2022-01-01 RX ADMIN — POLYVINYL ALCOHOL 1 DROP: 14 SOLUTION/ DROPS OPHTHALMIC at 09:03

## 2022-01-01 RX ADMIN — SODIUM CHLORIDE: 4.5 INJECTION, SOLUTION INTRAVENOUS at 06:18

## 2022-01-01 RX ADMIN — SODIUM CHLORIDE: 9 INJECTION, SOLUTION INTRAVENOUS at 03:35

## 2022-01-01 RX ADMIN — METOPROLOL TARTRATE 10 MG: 1 INJECTION, SOLUTION INTRAVENOUS at 08:31

## 2022-01-01 RX ADMIN — MEMANTINE HYDROCHLORIDE 10 MG: 10 TABLET ORAL at 20:15

## 2022-01-01 RX ADMIN — CALCIUM 500 MG: 500 TABLET ORAL at 08:51

## 2022-01-01 RX ADMIN — DONEPEZIL HYDROCHLORIDE 10 MG: 10 TABLET, FILM COATED ORAL at 20:59

## 2022-01-01 RX ADMIN — MEMANTINE HYDROCHLORIDE 10 MG: 10 TABLET ORAL at 21:20

## 2022-01-01 RX ADMIN — SODIUM CHLORIDE, PRESERVATIVE FREE 10 ML: 5 INJECTION INTRAVENOUS at 20:26

## 2022-01-01 RX ADMIN — PIPERACILLIN AND TAZOBACTAM 3375 MG: 3; .375 INJECTION, POWDER, LYOPHILIZED, FOR SOLUTION INTRAVENOUS at 01:56

## 2022-01-01 RX ADMIN — POTASSIUM CHLORIDE 10 MEQ: 7.46 INJECTION, SOLUTION INTRAVENOUS at 12:16

## 2022-01-01 RX ADMIN — MEMANTINE HYDROCHLORIDE 10 MG: 10 TABLET ORAL at 08:06

## 2022-01-01 RX ADMIN — PANTOPRAZOLE SODIUM 40 MG: 40 TABLET, DELAYED RELEASE ORAL at 05:57

## 2022-01-01 RX ADMIN — FLUTICASONE PROPIONATE 2 SPRAY: 50 SPRAY, METERED NASAL at 08:35

## 2022-01-01 RX ADMIN — METOPROLOL TARTRATE 5 MG: 5 INJECTION INTRAVENOUS at 21:01

## 2022-01-01 RX ADMIN — PANTOPRAZOLE SODIUM 40 MG: 40 INJECTION, POWDER, FOR SOLUTION INTRAVENOUS at 09:45

## 2022-01-01 RX ADMIN — METOPROLOL TARTRATE 10 MG: 1 INJECTION, SOLUTION INTRAVENOUS at 10:49

## 2022-01-01 RX ADMIN — SODIUM CHLORIDE 8 MG/HR: 9 INJECTION, SOLUTION INTRAVENOUS at 21:18

## 2022-01-01 RX ADMIN — POTASSIUM CHLORIDE 10 MEQ: 7.46 INJECTION, SOLUTION INTRAVENOUS at 10:52

## 2022-01-01 RX ADMIN — DEXTROSE MONOHYDRATE: 50 INJECTION, SOLUTION INTRAVENOUS at 16:27

## 2022-01-01 RX ADMIN — SODIUM CHLORIDE AND POTASSIUM CHLORIDE: 4.5; 1.49 INJECTION, SOLUTION INTRAVENOUS at 08:20

## 2022-01-01 RX ADMIN — SODIUM CHLORIDE, PRESERVATIVE FREE 10 ML: 5 INJECTION INTRAVENOUS at 09:20

## 2022-01-01 RX ADMIN — PANTOPRAZOLE SODIUM 40 MG: 40 INJECTION, POWDER, FOR SOLUTION INTRAVENOUS at 10:14

## 2022-01-01 RX ADMIN — POTASSIUM CHLORIDE 10 MEQ: 7.46 INJECTION, SOLUTION INTRAVENOUS at 14:02

## 2022-01-01 RX ADMIN — SODIUM CHLORIDE, PRESERVATIVE FREE 10 ML: 5 INJECTION INTRAVENOUS at 09:39

## 2022-01-01 RX ADMIN — POLYVINYL ALCOHOL 1 DROP: 14 SOLUTION/ DROPS OPHTHALMIC at 08:28

## 2022-01-01 RX ADMIN — ATORVASTATIN CALCIUM 20 MG: 20 TABLET, FILM COATED ORAL at 08:06

## 2022-01-01 RX ADMIN — SODIUM CHLORIDE: 9 INJECTION, SOLUTION INTRAVENOUS at 21:42

## 2022-01-01 RX ADMIN — DIGOXIN 250 MCG: 0.25 INJECTION INTRAMUSCULAR; INTRAVENOUS at 18:37

## 2022-01-01 RX ADMIN — SODIUM CHLORIDE AND POTASSIUM CHLORIDE: 4.5; 1.49 INJECTION, SOLUTION INTRAVENOUS at 19:54

## 2022-01-01 RX ADMIN — FLUTICASONE PROPIONATE 2 SPRAY: 50 SPRAY, METERED NASAL at 14:27

## 2022-01-01 RX ADMIN — SODIUM CHLORIDE, PRESERVATIVE FREE 10 ML: 5 INJECTION INTRAVENOUS at 09:55

## 2022-01-01 RX ADMIN — LISINOPRIL 10 MG: 10 TABLET ORAL at 08:39

## 2022-01-01 RX ADMIN — FERROUS SULFATE TAB 325 MG (65 MG ELEMENTAL FE) 325 MG: 325 (65 FE) TAB at 14:25

## 2022-01-01 RX ADMIN — METOPROLOL TARTRATE 25 MG: 25 TABLET, FILM COATED ORAL at 14:26

## 2022-01-01 RX ADMIN — PIPERACILLIN AND TAZOBACTAM 2250 MG: 2; .25 INJECTION, POWDER, LYOPHILIZED, FOR SOLUTION INTRAVENOUS at 15:57

## 2022-01-01 RX ADMIN — DEXTROSE AND SODIUM CHLORIDE: 5; 900 INJECTION, SOLUTION INTRAVENOUS at 08:02

## 2022-01-01 RX ADMIN — DONEPEZIL HYDROCHLORIDE 10 MG: 10 TABLET, FILM COATED ORAL at 20:53

## 2022-01-01 RX ADMIN — METOPROLOL TARTRATE 10 MG: 5 INJECTION INTRAVENOUS at 08:44

## 2022-01-01 RX ADMIN — SODIUM CHLORIDE 8 MG/HR: 9 INJECTION, SOLUTION INTRAVENOUS at 13:29

## 2022-01-01 RX ADMIN — PANTOPRAZOLE SODIUM 40 MG: 40 TABLET, DELAYED RELEASE ORAL at 05:17

## 2022-01-01 RX ADMIN — DONEPEZIL HYDROCHLORIDE 10 MG: 10 TABLET, FILM COATED ORAL at 20:37

## 2022-01-01 RX ADMIN — SODIUM CHLORIDE AND POTASSIUM CHLORIDE: 4.5; 1.49 INJECTION, SOLUTION INTRAVENOUS at 09:55

## 2022-01-01 RX ADMIN — PANTOPRAZOLE SODIUM 40 MG: 40 INJECTION, POWDER, FOR SOLUTION INTRAVENOUS at 10:34

## 2022-01-01 RX ADMIN — POLYVINYL ALCOHOL 1 DROP: 14 SOLUTION/ DROPS OPHTHALMIC at 20:09

## 2022-01-01 RX ADMIN — DEXTROSE MONOHYDRATE: 50 INJECTION, SOLUTION INTRAVENOUS at 06:31

## 2022-01-01 RX ADMIN — MEMANTINE HYDROCHLORIDE 10 MG: 10 TABLET ORAL at 20:58

## 2022-01-01 RX ADMIN — POTASSIUM CHLORIDE 10 MEQ: 7.46 INJECTION, SOLUTION INTRAVENOUS at 09:54

## 2022-01-01 RX ADMIN — POLYVINYL ALCOHOL 1 DROP: 14 SOLUTION/ DROPS OPHTHALMIC at 08:39

## 2022-01-01 RX ADMIN — IPRATROPIUM BROMIDE AND ALBUTEROL SULFATE 1 AMPULE: .5; 3 SOLUTION RESPIRATORY (INHALATION) at 09:52

## 2022-01-01 RX ADMIN — SODIUM CHLORIDE, PRESERVATIVE FREE 10 ML: 5 INJECTION INTRAVENOUS at 20:37

## 2022-01-01 RX ADMIN — PIPERACILLIN AND TAZOBACTAM 3375 MG: 3; .375 INJECTION, POWDER, LYOPHILIZED, FOR SOLUTION INTRAVENOUS at 14:19

## 2022-01-01 RX ADMIN — SODIUM CHLORIDE, PRESERVATIVE FREE 10 ML: 5 INJECTION INTRAVENOUS at 21:00

## 2022-01-01 RX ADMIN — SODIUM BICARBONATE: 84 INJECTION, SOLUTION INTRAVENOUS at 13:42

## 2022-01-01 RX ADMIN — SODIUM CHLORIDE: 9 INJECTION, SOLUTION INTRAVENOUS at 10:30

## 2022-01-01 RX ADMIN — PANTOPRAZOLE SODIUM 40 MG: 40 TABLET, DELAYED RELEASE ORAL at 06:31

## 2022-01-01 RX ADMIN — PANTOPRAZOLE SODIUM 40 MG: 40 INJECTION, POWDER, FOR SOLUTION INTRAVENOUS at 09:39

## 2022-01-01 RX ADMIN — SODIUM BICARBONATE: 84 INJECTION, SOLUTION INTRAVENOUS at 01:04

## 2022-01-01 RX ADMIN — POTASSIUM CHLORIDE 40 MEQ: 1500 TABLET, EXTENDED RELEASE ORAL at 09:56

## 2022-01-01 RX ADMIN — MAGNESIUM SULFATE HEPTAHYDRATE 2000 MG: 40 INJECTION, SOLUTION INTRAVENOUS at 11:31

## 2022-01-01 RX ADMIN — SODIUM CHLORIDE, PRESERVATIVE FREE 10 ML: 5 INJECTION INTRAVENOUS at 14:29

## 2022-01-01 RX ADMIN — METOPROLOL TARTRATE 25 MG: 25 TABLET, FILM COATED ORAL at 19:43

## 2022-01-01 RX ADMIN — METOPROLOL TARTRATE 25 MG: 25 TABLET, FILM COATED ORAL at 14:03

## 2022-01-01 RX ADMIN — SODIUM CHLORIDE 1000 ML: 9 INJECTION, SOLUTION INTRAVENOUS at 10:03

## 2022-01-01 RX ADMIN — LISINOPRIL 10 MG: 10 TABLET ORAL at 09:52

## 2022-01-01 RX ADMIN — FLUTICASONE PROPIONATE 2 SPRAY: 50 SPRAY, METERED NASAL at 15:00

## 2022-01-01 RX ADMIN — FERROUS SULFATE TAB 325 MG (65 MG ELEMENTAL FE) 325 MG: 325 (65 FE) TAB at 08:51

## 2022-01-01 RX ADMIN — METOPROLOL TARTRATE 10 MG: 1 INJECTION, SOLUTION INTRAVENOUS at 02:29

## 2022-01-01 RX ADMIN — LISINOPRIL 10 MG: 10 TABLET ORAL at 10:25

## 2022-01-01 RX ADMIN — METOPROLOL TARTRATE 5 MG: 5 INJECTION INTRAVENOUS at 21:49

## 2022-01-01 RX ADMIN — SODIUM CHLORIDE, PRESERVATIVE FREE 10 ML: 5 INJECTION INTRAVENOUS at 20:54

## 2022-01-01 RX ADMIN — Medication 1000 ML: at 08:00

## 2022-01-01 RX ADMIN — PIPERACILLIN AND TAZOBACTAM 2250 MG: 2; .25 INJECTION, POWDER, LYOPHILIZED, FOR SOLUTION INTRAVENOUS at 21:06

## 2022-01-01 RX ADMIN — DEXTROSE AND SODIUM CHLORIDE: 5; 900 INJECTION, SOLUTION INTRAVENOUS at 01:16

## 2022-01-01 RX ADMIN — DONEPEZIL HYDROCHLORIDE 10 MG: 10 TABLET, FILM COATED ORAL at 20:26

## 2022-01-01 RX ADMIN — LISINOPRIL 10 MG: 10 TABLET ORAL at 08:35

## 2022-01-01 RX ADMIN — SODIUM CHLORIDE 8 MG/HR: 9 INJECTION, SOLUTION INTRAVENOUS at 08:45

## 2022-01-01 RX ADMIN — SODIUM CHLORIDE, PRESERVATIVE FREE 10 ML: 5 INJECTION INTRAVENOUS at 10:50

## 2022-01-01 RX ADMIN — METOPROLOL TARTRATE 5 MG: 5 INJECTION INTRAVENOUS at 09:42

## 2022-01-01 RX ADMIN — PANTOPRAZOLE SODIUM 40 MG: 40 INJECTION, POWDER, FOR SOLUTION INTRAVENOUS at 20:04

## 2022-01-01 RX ADMIN — FENTANYL CITRATE 25 MCG: 50 INJECTION, SOLUTION INTRAMUSCULAR; INTRAVENOUS at 05:31

## 2022-01-01 RX ADMIN — SODIUM CHLORIDE, PRESERVATIVE FREE 10 ML: 5 INJECTION INTRAVENOUS at 09:02

## 2022-01-01 RX ADMIN — MEMANTINE HYDROCHLORIDE 10 MG: 10 TABLET ORAL at 22:07

## 2022-01-01 RX ADMIN — POLYVINYL ALCOHOL 1 DROP: 14 SOLUTION/ DROPS OPHTHALMIC at 20:26

## 2022-01-01 RX ADMIN — DONEPEZIL HYDROCHLORIDE 10 MG: 10 TABLET, FILM COATED ORAL at 20:08

## 2022-01-01 RX ADMIN — PANTOPRAZOLE SODIUM 40 MG: 40 INJECTION, POWDER, FOR SOLUTION INTRAVENOUS at 20:24

## 2022-01-01 RX ADMIN — METOPROLOL TARTRATE 25 MG: 25 TABLET, FILM COATED ORAL at 08:51

## 2022-01-01 RX ADMIN — POLYVINYL ALCOHOL 1 DROP: 14 SOLUTION/ DROPS OPHTHALMIC at 09:20

## 2022-01-01 RX ADMIN — IPRATROPIUM BROMIDE AND ALBUTEROL SULFATE 1 AMPULE: .5; 3 SOLUTION RESPIRATORY (INHALATION) at 20:25

## 2022-01-01 RX ADMIN — POLYVINYL ALCOHOL 1 DROP: 14 SOLUTION/ DROPS OPHTHALMIC at 10:25

## 2022-01-01 RX ADMIN — POLYVINYL ALCOHOL 1 DROP: 14 SOLUTION/ DROPS OPHTHALMIC at 20:15

## 2022-01-01 RX ADMIN — MEMANTINE HYDROCHLORIDE 10 MG: 10 TABLET ORAL at 20:08

## 2022-01-01 RX ADMIN — FLUTICASONE PROPIONATE 2 SPRAY: 50 SPRAY, METERED NASAL at 08:27

## 2022-01-01 RX ADMIN — POLYVINYL ALCOHOL 1 DROP: 14 SOLUTION/ DROPS OPHTHALMIC at 08:35

## 2022-01-01 RX ADMIN — ATORVASTATIN CALCIUM 20 MG: 20 TABLET, FILM COATED ORAL at 09:52

## 2022-01-01 RX ADMIN — MEMANTINE HYDROCHLORIDE 10 MG: 10 TABLET ORAL at 08:39

## 2022-01-01 RX ADMIN — ATORVASTATIN CALCIUM 20 MG: 20 TABLET, FILM COATED ORAL at 08:39

## 2022-01-01 RX ADMIN — MEMANTINE HYDROCHLORIDE 10 MG: 10 TABLET ORAL at 08:51

## 2022-01-01 RX ADMIN — PANTOPRAZOLE SODIUM 40 MG: 40 TABLET, DELAYED RELEASE ORAL at 16:15

## 2022-01-01 RX ADMIN — IPRATROPIUM BROMIDE AND ALBUTEROL SULFATE 1 AMPULE: .5; 3 SOLUTION RESPIRATORY (INHALATION) at 10:10

## 2022-01-01 RX ADMIN — PROPOFOL 50 MG: 10 INJECTION, EMULSION INTRAVENOUS at 11:19

## 2022-01-01 RX ADMIN — SODIUM CHLORIDE: 9 INJECTION, SOLUTION INTRAVENOUS at 13:25

## 2022-01-01 RX ADMIN — MEMANTINE HYDROCHLORIDE 10 MG: 10 TABLET ORAL at 20:37

## 2022-01-01 RX ADMIN — SODIUM CHLORIDE, PRESERVATIVE FREE 10 ML: 5 INJECTION INTRAVENOUS at 08:45

## 2022-01-01 RX ADMIN — MEMANTINE HYDROCHLORIDE 10 MG: 10 TABLET ORAL at 09:03

## 2022-01-01 RX ADMIN — DONEPEZIL HYDROCHLORIDE 10 MG: 10 TABLET, FILM COATED ORAL at 21:37

## 2022-01-01 RX ADMIN — POLYVINYL ALCOHOL 1 DROP: 14 SOLUTION/ DROPS OPHTHALMIC at 08:06

## 2022-01-01 RX ADMIN — METOPROLOL TARTRATE 5 MG: 5 INJECTION INTRAVENOUS at 04:34

## 2022-01-01 RX ADMIN — IPRATROPIUM BROMIDE AND ALBUTEROL SULFATE 1 AMPULE: .5; 3 SOLUTION RESPIRATORY (INHALATION) at 18:51

## 2022-01-01 RX ADMIN — CEFTRIAXONE SODIUM 1000 MG: 10 INJECTION, POWDER, FOR SOLUTION INTRAVENOUS at 04:15

## 2022-01-01 RX ADMIN — POLYVINYL ALCOHOL 1 DROP: 14 SOLUTION/ DROPS OPHTHALMIC at 08:44

## 2022-01-01 RX ADMIN — ACETAMINOPHEN 325 MG: 325 SUPPOSITORY RECTAL at 05:21

## 2022-01-01 RX ADMIN — METOPROLOL TARTRATE 5 MG: 5 INJECTION INTRAVENOUS at 18:24

## 2022-01-01 RX ADMIN — PANTOPRAZOLE SODIUM 40 MG: 40 TABLET, DELAYED RELEASE ORAL at 16:12

## 2022-01-01 RX ADMIN — PANTOPRAZOLE SODIUM 40 MG: 40 INJECTION, POWDER, FOR SOLUTION INTRAVENOUS at 08:15

## 2022-01-01 RX ADMIN — DEXTROSE AND SODIUM CHLORIDE: 5; 900 INJECTION, SOLUTION INTRAVENOUS at 07:47

## 2022-01-01 RX ADMIN — DONEPEZIL HYDROCHLORIDE 10 MG: 10 TABLET, FILM COATED ORAL at 22:06

## 2022-01-01 RX ADMIN — LISINOPRIL 5 MG: 5 TABLET ORAL at 14:25

## 2022-01-01 RX ADMIN — SODIUM CHLORIDE, PRESERVATIVE FREE 10 ML: 5 INJECTION INTRAVENOUS at 08:35

## 2022-01-01 RX ADMIN — POLYVINYL ALCOHOL 1 DROP: 14 SOLUTION/ DROPS OPHTHALMIC at 20:59

## 2022-01-01 RX ADMIN — MEMANTINE HYDROCHLORIDE 10 MG: 10 TABLET ORAL at 20:53

## 2022-01-01 RX ADMIN — LISINOPRIL 10 MG: 10 TABLET ORAL at 09:02

## 2022-01-01 RX ADMIN — SODIUM CHLORIDE, PRESERVATIVE FREE 10 ML: 5 INJECTION INTRAVENOUS at 21:20

## 2022-01-01 RX ADMIN — MEMANTINE HYDROCHLORIDE 10 MG: 10 TABLET ORAL at 09:02

## 2022-01-01 RX ADMIN — METOPROLOL TARTRATE 5 MG: 5 INJECTION INTRAVENOUS at 02:12

## 2022-01-01 RX ADMIN — POLYVINYL ALCOHOL 1 DROP: 14 SOLUTION/ DROPS OPHTHALMIC at 10:51

## 2022-01-01 RX ADMIN — SODIUM BICARBONATE: 84 INJECTION, SOLUTION INTRAVENOUS at 00:30

## 2022-01-01 RX ADMIN — PANTOPRAZOLE SODIUM 40 MG: 40 INJECTION, POWDER, FOR SOLUTION INTRAVENOUS at 08:44

## 2022-01-01 RX ADMIN — LISINOPRIL 10 MG: 10 TABLET ORAL at 15:00

## 2022-01-01 RX ADMIN — SODIUM CHLORIDE 8 MG/HR: 9 INJECTION, SOLUTION INTRAVENOUS at 08:55

## 2022-01-01 RX ADMIN — MEMANTINE HYDROCHLORIDE 10 MG: 10 TABLET ORAL at 07:55

## 2022-01-01 RX ADMIN — MEMANTINE HYDROCHLORIDE 10 MG: 10 TABLET ORAL at 20:26

## 2022-01-01 RX ADMIN — MEMANTINE HYDROCHLORIDE 10 MG: 10 TABLET ORAL at 14:26

## 2022-01-01 RX ADMIN — PIPERACILLIN AND TAZOBACTAM 3375 MG: 3; .375 INJECTION, POWDER, LYOPHILIZED, FOR SOLUTION INTRAVENOUS at 01:34

## 2022-01-01 RX ADMIN — POLYVINYL ALCOHOL 1 DROP: 14 SOLUTION/ DROPS OPHTHALMIC at 20:37

## 2022-01-01 RX ADMIN — IPRATROPIUM BROMIDE AND ALBUTEROL SULFATE 1 AMPULE: .5; 3 SOLUTION RESPIRATORY (INHALATION) at 12:12

## 2022-01-01 RX ADMIN — PANTOPRAZOLE SODIUM 40 MG: 40 INJECTION, POWDER, FOR SOLUTION INTRAVENOUS at 20:55

## 2022-01-01 RX ADMIN — PIPERACILLIN AND TAZOBACTAM 2250 MG: 2; .25 INJECTION, POWDER, LYOPHILIZED, FOR SOLUTION INTRAVENOUS at 05:40

## 2022-01-01 RX ADMIN — ATORVASTATIN CALCIUM 20 MG: 20 TABLET, FILM COATED ORAL at 08:55

## 2022-01-01 RX ADMIN — POLYVINYL ALCOHOL 1 DROP: 14 SOLUTION/ DROPS OPHTHALMIC at 21:20

## 2022-01-01 RX ADMIN — POLYVINYL ALCOHOL 1 DROP: 14 SOLUTION/ DROPS OPHTHALMIC at 20:53

## 2022-01-01 RX ADMIN — ACETAMINOPHEN 650 MG: 325 TABLET ORAL at 09:02

## 2022-01-01 RX ADMIN — ATORVASTATIN CALCIUM 20 MG: 20 TABLET, FILM COATED ORAL at 09:21

## 2022-01-01 RX ADMIN — METOPROLOL TARTRATE 5 MG: 5 INJECTION INTRAVENOUS at 20:38

## 2022-01-01 RX ADMIN — METOPROLOL TARTRATE 10 MG: 1 INJECTION, SOLUTION INTRAVENOUS at 18:16

## 2022-01-01 RX ADMIN — ASPIRIN 81 MG: 81 TABLET, COATED ORAL at 07:55

## 2022-01-01 RX ADMIN — MEMANTINE HYDROCHLORIDE 5 MG: 5 TABLET ORAL at 20:54

## 2022-01-01 RX ADMIN — MEMANTINE HYDROCHLORIDE 5 MG: 5 TABLET ORAL at 08:00

## 2022-01-01 RX ADMIN — SODIUM BICARBONATE: 84 INJECTION, SOLUTION INTRAVENOUS at 04:29

## 2022-01-01 RX ADMIN — AZITHROMYCIN MONOHYDRATE 500 MG: 250 TABLET ORAL at 05:21

## 2022-01-01 RX ADMIN — METOPROLOL TARTRATE 50 MG: 50 TABLET, FILM COATED ORAL at 20:53

## 2022-01-01 RX ADMIN — LISINOPRIL 10 MG: 10 TABLET ORAL at 09:20

## 2022-01-01 RX ADMIN — FLUTICASONE PROPIONATE 2 SPRAY: 50 SPRAY, METERED NASAL at 09:04

## 2022-01-01 RX ADMIN — METOPROLOL TARTRATE 10 MG: 1 INJECTION, SOLUTION INTRAVENOUS at 01:12

## 2022-01-01 RX ADMIN — DONEPEZIL HYDROCHLORIDE 10 MG: 10 TABLET, FILM COATED ORAL at 20:15

## 2022-01-01 RX ADMIN — CEFTRIAXONE SODIUM 1000 MG: 10 INJECTION, POWDER, FOR SOLUTION INTRAVENOUS at 03:29

## 2022-01-01 RX ADMIN — POTASSIUM BICARBONATE 40 MEQ: 782 TABLET, EFFERVESCENT ORAL at 07:36

## 2022-01-01 RX ADMIN — METOPROLOL TARTRATE 5 MG: 5 INJECTION INTRAVENOUS at 16:25

## 2022-01-01 RX ADMIN — LISINOPRIL 10 MG: 10 TABLET ORAL at 08:38

## 2022-01-01 RX ADMIN — POLYVINYL ALCOHOL 1 DROP: 14 SOLUTION/ DROPS OPHTHALMIC at 21:37

## 2022-01-01 RX ADMIN — SODIUM CHLORIDE, PRESERVATIVE FREE 10 ML: 5 INJECTION INTRAVENOUS at 20:23

## 2022-01-01 RX ADMIN — PANTOPRAZOLE SODIUM 40 MG: 40 INJECTION, POWDER, FOR SOLUTION INTRAVENOUS at 21:19

## 2022-01-01 RX ADMIN — METOPROLOL TARTRATE 5 MG: 5 INJECTION INTRAVENOUS at 16:23

## 2022-01-01 RX ADMIN — ATORVASTATIN CALCIUM 20 MG: 20 TABLET, FILM COATED ORAL at 15:00

## 2022-01-01 RX ADMIN — POLYVINYL ALCOHOL 1 DROP: 14 SOLUTION/ DROPS OPHTHALMIC at 14:28

## 2022-01-01 RX ADMIN — SODIUM CHLORIDE, PRESERVATIVE FREE 10 ML: 5 INJECTION INTRAVENOUS at 08:29

## 2022-01-01 RX ADMIN — PANTOPRAZOLE SODIUM 40 MG: 40 TABLET, DELAYED RELEASE ORAL at 06:35

## 2022-01-01 RX ADMIN — DONEPEZIL HYDROCHLORIDE 10 MG: 10 TABLET, FILM COATED ORAL at 19:44

## 2022-01-01 RX ADMIN — POLYVINYL ALCOHOL 1 DROP: 14 SOLUTION/ DROPS OPHTHALMIC at 08:51

## 2022-01-01 RX ADMIN — DEXTROSE AND SODIUM CHLORIDE: 5; 900 INJECTION, SOLUTION INTRAVENOUS at 19:05

## 2022-01-01 RX ADMIN — MEMANTINE HYDROCHLORIDE 10 MG: 10 TABLET ORAL at 10:25

## 2022-01-01 RX ADMIN — METOPROLOL TARTRATE 100 MG: 50 TABLET, FILM COATED ORAL at 07:59

## 2022-01-01 RX ADMIN — PANTOPRAZOLE SODIUM 40 MG: 40 TABLET, DELAYED RELEASE ORAL at 08:54

## 2022-01-01 RX ADMIN — ATORVASTATIN CALCIUM 20 MG: 20 TABLET, FILM COATED ORAL at 09:02

## 2022-01-01 RX ADMIN — PANTOPRAZOLE SODIUM 40 MG: 40 TABLET, DELAYED RELEASE ORAL at 06:15

## 2022-01-01 RX ADMIN — METOPROLOL TARTRATE 10 MG: 1 INJECTION, SOLUTION INTRAVENOUS at 20:57

## 2022-01-01 RX ADMIN — ATORVASTATIN CALCIUM 20 MG: 20 TABLET, FILM COATED ORAL at 08:35

## 2022-01-01 RX ADMIN — QUETIAPINE FUMARATE 25 MG: 25 TABLET ORAL at 23:19

## 2022-01-01 RX ADMIN — ASPIRIN 81 MG: 81 TABLET, COATED ORAL at 15:00

## 2022-01-01 RX ADMIN — POTASSIUM CHLORIDE 10 MEQ: 7.46 INJECTION, SOLUTION INTRAVENOUS at 07:54

## 2022-01-01 RX ADMIN — MEMANTINE HYDROCHLORIDE 10 MG: 10 TABLET ORAL at 09:21

## 2022-01-01 RX ADMIN — BISACODYL 10 MG: 10 SUPPOSITORY RECTAL at 08:15

## 2022-01-01 RX ADMIN — METOPROLOL TARTRATE 5 MG: 1 INJECTION, SOLUTION INTRAVENOUS at 07:36

## 2022-01-01 RX ADMIN — ATORVASTATIN CALCIUM 20 MG: 20 TABLET, FILM COATED ORAL at 09:03

## 2022-01-01 RX ADMIN — MEMANTINE HYDROCHLORIDE 10 MG: 10 TABLET ORAL at 08:35

## 2022-01-01 RX ADMIN — METOPROLOL TARTRATE 10 MG: 5 INJECTION INTRAVENOUS at 00:31

## 2022-01-01 RX ADMIN — MEMANTINE HYDROCHLORIDE 10 MG: 10 TABLET ORAL at 21:19

## 2022-01-01 RX ADMIN — POLYVINYL ALCOHOL 1 DROP: 14 SOLUTION/ DROPS OPHTHALMIC at 09:52

## 2022-01-01 RX ADMIN — LISINOPRIL 5 MG: 5 TABLET ORAL at 08:51

## 2022-01-01 RX ADMIN — DEXTROSE MONOHYDRATE 250 ML: 100 INJECTION, SOLUTION INTRAVENOUS at 20:04

## 2022-01-01 RX ADMIN — CALCIUM 500 MG: 500 TABLET ORAL at 14:24

## 2022-01-01 RX ADMIN — POTASSIUM CHLORIDE 10 MEQ: 7.46 INJECTION, SOLUTION INTRAVENOUS at 06:19

## 2022-01-01 RX ADMIN — IPRATROPIUM BROMIDE AND ALBUTEROL SULFATE 1 AMPULE: .5; 3 SOLUTION RESPIRATORY (INHALATION) at 08:38

## 2022-01-01 RX ADMIN — PANTOPRAZOLE SODIUM 40 MG: 40 TABLET, DELAYED RELEASE ORAL at 05:21

## 2022-01-01 RX ADMIN — SODIUM CHLORIDE: 9 INJECTION, SOLUTION INTRAVENOUS at 20:51

## 2022-01-01 RX ADMIN — SODIUM CHLORIDE: 9 INJECTION, SOLUTION INTRAVENOUS at 09:02

## 2022-01-01 RX ADMIN — PANTOPRAZOLE SODIUM 40 MG: 40 TABLET, DELAYED RELEASE ORAL at 17:06

## 2022-01-01 RX ADMIN — POLYVINYL ALCOHOL 1 DROP: 14 SOLUTION/ DROPS OPHTHALMIC at 07:56

## 2022-01-01 RX ADMIN — BUMETANIDE 1 MG: 0.25 INJECTION, SOLUTION INTRAMUSCULAR; INTRAVENOUS at 19:42

## 2022-01-01 RX ADMIN — PANTOPRAZOLE SODIUM 40 MG: 40 TABLET, DELAYED RELEASE ORAL at 17:12

## 2022-01-01 RX ADMIN — SODIUM CHLORIDE 500 ML: 9 INJECTION, SOLUTION INTRAVENOUS at 14:48

## 2022-01-01 RX ADMIN — IPRATROPIUM BROMIDE AND ALBUTEROL SULFATE 1 AMPULE: .5; 3 SOLUTION RESPIRATORY (INHALATION) at 16:33

## 2022-01-01 RX ADMIN — PANTOPRAZOLE SODIUM 40 MG: 40 TABLET, DELAYED RELEASE ORAL at 17:07

## 2022-01-01 RX ADMIN — METOPROLOL TARTRATE 25 MG: 25 TABLET, FILM COATED ORAL at 22:46

## 2022-01-01 RX ADMIN — SODIUM CHLORIDE, PRESERVATIVE FREE 10 ML: 5 INJECTION INTRAVENOUS at 09:03

## 2022-01-01 RX ADMIN — SODIUM CHLORIDE, PRESERVATIVE FREE 10 ML: 5 INJECTION INTRAVENOUS at 19:44

## 2022-01-01 RX ADMIN — DONEPEZIL HYDROCHLORIDE 10 MG: 10 TABLET, FILM COATED ORAL at 21:20

## 2022-01-01 RX ADMIN — FERROUS SULFATE TAB 325 MG (65 MG ELEMENTAL FE) 325 MG: 325 (65 FE) TAB at 07:55

## 2022-01-01 RX ADMIN — FLUTICASONE PROPIONATE 2 SPRAY: 50 SPRAY, METERED NASAL at 09:20

## 2022-01-01 RX ADMIN — MEMANTINE HYDROCHLORIDE 10 MG: 10 TABLET ORAL at 21:37

## 2022-01-01 RX ADMIN — MEMANTINE HYDROCHLORIDE 10 MG: 10 TABLET ORAL at 19:44

## 2022-01-01 RX ADMIN — ATORVASTATIN CALCIUM 20 MG: 20 TABLET, FILM COATED ORAL at 07:55

## 2022-01-01 RX ADMIN — POLYVINYL ALCOHOL 1 DROP: 14 SOLUTION/ DROPS OPHTHALMIC at 22:06

## 2022-01-01 ASSESSMENT — ENCOUNTER SYMPTOMS
NAUSEA: 0
CHEST TIGHTNESS: 0
SHORTNESS OF BREATH: 0
NAUSEA: 0
SORE THROAT: 0
VOICE CHANGE: 0
ABDOMINAL PAIN: 0
BACK PAIN: 0
VOICE CHANGE: 0
COUGH: 0
BACK PAIN: 0
RHINORRHEA: 0
SINUS PRESSURE: 0
DIARRHEA: 0
VOICE CHANGE: 0
ABDOMINAL PAIN: 0
SHORTNESS OF BREATH: 0
BACK PAIN: 0
VOMITING: 1
COUGH: 0
NAUSEA: 0
VOMITING: 0
CHEST TIGHTNESS: 0
DIARRHEA: 0
RHINORRHEA: 0
CONSTIPATION: 0
BACK PAIN: 0
RHINORRHEA: 0
TROUBLE SWALLOWING: 0
WHEEZING: 0
VOMITING: 0
SORE THROAT: 0
EYE PAIN: 0
SORE THROAT: 0
COUGH: 0
VOMITING: 0
ABDOMINAL PAIN: 0
SINUS PRESSURE: 0
CONSTIPATION: 0
VOICE CHANGE: 0
TROUBLE SWALLOWING: 0
TROUBLE SWALLOWING: 0
FACIAL SWELLING: 0
ABDOMINAL PAIN: 0
SHORTNESS OF BREATH: 0
CHEST TIGHTNESS: 0
APNEA: 0
EYE ITCHING: 0
STRIDOR: 0
COUGH: 0
SHORTNESS OF BREATH: 0
CONSTIPATION: 0
DIARRHEA: 0
CONSTIPATION: 0
SHORTNESS OF BREATH: 0
COUGH: 0
COLOR CHANGE: 0
SINUS PRESSURE: 0
DIARRHEA: 0
BACK PAIN: 0
SINUS PRESSURE: 0
BLOOD IN STOOL: 1
ABDOMINAL PAIN: 0
VOMITING: 0
WHEEZING: 0
CHEST TIGHTNESS: 0
TROUBLE SWALLOWING: 0
SORE THROAT: 0
RHINORRHEA: 0
CHOKING: 0
CONSTIPATION: 0
VOICE CHANGE: 0
DIARRHEA: 0
SINUS PRESSURE: 0
ORTHOPNEA: 0
ABDOMINAL PAIN: 0
SHORTNESS OF BREATH: 0
CHEST TIGHTNESS: 0
DIARRHEA: 0
CONSTIPATION: 0
CHEST TIGHTNESS: 0
EYE DISCHARGE: 0
NAUSEA: 0
EYE REDNESS: 0
TROUBLE SWALLOWING: 0
WHEEZING: 0
WHEEZING: 0
VOMITING: 0
SINUS PRESSURE: 0
RHINORRHEA: 0
WHEEZING: 0
BACK PAIN: 0
NAUSEA: 0
VOICE CHANGE: 0
TROUBLE SWALLOWING: 0
SORE THROAT: 0
WHEEZING: 0
PHOTOPHOBIA: 0
SORE THROAT: 0
RHINORRHEA: 0
NAUSEA: 0
COUGH: 0
ABDOMINAL DISTENTION: 0

## 2022-01-01 ASSESSMENT — PAIN SCALES - GENERAL
PAINLEVEL_OUTOF10: 0
PAINLEVEL_OUTOF10: 6
PAINLEVEL_OUTOF10: 0
PAINLEVEL_OUTOF10: 3
PAINLEVEL_OUTOF10: 0

## 2022-01-01 ASSESSMENT — PAIN SCALES - PAIN ASSESSMENT IN ADVANCED DEMENTIA (PAINAD)
FACIALEXPRESSION: 0
TOTALSCORE: 0
BREATHING: 0
BREATHING: 0
CONSOLABILITY: 0
BODYLANGUAGE: 0
FACIALEXPRESSION: 0
NEGVOCALIZATION: 0
TOTALSCORE: 0
BODYLANGUAGE: 0
FACIALEXPRESSION: 0
NEGVOCALIZATION: 0
BODYLANGUAGE: 0
TOTALSCORE: 0
FACIALEXPRESSION: 0
BREATHING: 0
BODYLANGUAGE: 1
BREATHING: 0
BREATHING: 0
TOTALSCORE: 0
CONSOLABILITY: 0
NEGVOCALIZATION: 0
NEGVOCALIZATION: 0
BREATHING: 0
NEGVOCALIZATION: 0
FACIALEXPRESSION: 0
FACIALEXPRESSION: 0
BODYLANGUAGE: 0
TOTALSCORE: 0
BREATHING: 0
NEGVOCALIZATION: 0
BODYLANGUAGE: 0
BODYLANGUAGE: 0
TOTALSCORE: 0
NEGVOCALIZATION: 0
FACIALEXPRESSION: 0
BREATHING: 0
BODYLANGUAGE: 0
FACIALEXPRESSION: 0
TOTALSCORE: 0
BODYLANGUAGE: 0
TOTALSCORE: 0
TOTALSCORE: 0
NEGVOCALIZATION: 1
NEGVOCALIZATION: 1
BREATHING: 0
TOTALSCORE: 0
NEGVOCALIZATION: 0
NEGVOCALIZATION: 0
BODYLANGUAGE: 0
CONSOLABILITY: 0
FACIALEXPRESSION: 0
TOTALSCORE: 0
BREATHING: 0
BREATHING: 0
BODYLANGUAGE: 0
TOTALSCORE: 0
CONSOLABILITY: 0
BODYLANGUAGE: 1
TOTALSCORE: 0
BREATHING: 1
TOTALSCORE: 0
CONSOLABILITY: 0
BODYLANGUAGE: 0
CONSOLABILITY: 0
NEGVOCALIZATION: 0
BODYLANGUAGE: 0
NEGVOCALIZATION: 0
FACIALEXPRESSION: 0
TOTALSCORE: 0
FACIALEXPRESSION: 0
TOTALSCORE: 1
CONSOLABILITY: 0
CONSOLABILITY: 0
FACIALEXPRESSION: 0
BREATHING: 0
BODYLANGUAGE: 0
TOTALSCORE: 0
FACIALEXPRESSION: 0
NEGVOCALIZATION: 0
FACIALEXPRESSION: 0
TOTALSCORE: 0
FACIALEXPRESSION: 0
TOTALSCORE: 0
CONSOLABILITY: 0
TOTALSCORE: 2
BREATHING: 0
TOTALSCORE: 0
FACIALEXPRESSION: 0
CONSOLABILITY: 0
CONSOLABILITY: 0
TOTALSCORE: 0
FACIALEXPRESSION: 0
BODYLANGUAGE: 0
NEGVOCALIZATION: 0
FACIALEXPRESSION: 0
CONSOLABILITY: 0
NEGVOCALIZATION: 0
CONSOLABILITY: 0
NEGVOCALIZATION: 0
BREATHING: 0
BREATHING: 0
NEGVOCALIZATION: 0
BREATHING: 1
TOTALSCORE: 0
BREATHING: 0
CONSOLABILITY: 0
BREATHING: 0
BREATHING: 1
NEGVOCALIZATION: 0
FACIALEXPRESSION: 0
FACIALEXPRESSION: 0
CONSOLABILITY: 0
BREATHING: 0
NEGVOCALIZATION: 0
CONSOLABILITY: 0
NEGVOCALIZATION: 0
BREATHING: 0
BODYLANGUAGE: 0
CONSOLABILITY: 0
BODYLANGUAGE: 0
CONSOLABILITY: 0
BREATHING: 1
BREATHING: 0
CONSOLABILITY: 0
CONSOLABILITY: 0
TOTALSCORE: 0
NEGVOCALIZATION: 0
FACIALEXPRESSION: 0
BREATHING: 0
FACIALEXPRESSION: 0
TOTALSCORE: 0
NEGVOCALIZATION: 0
NEGVOCALIZATION: 0
TOTALSCORE: 0
FACIALEXPRESSION: 0
TOTALSCORE: 3
FACIALEXPRESSION: 0
CONSOLABILITY: 0
BODYLANGUAGE: 0
NEGVOCALIZATION: 0
FACIALEXPRESSION: 0
FACIALEXPRESSION: 0
CONSOLABILITY: 0
CONSOLABILITY: 0
TOTALSCORE: 0
BREATHING: 0
NEGVOCALIZATION: 0
NEGVOCALIZATION: 0
BREATHING: 0
BODYLANGUAGE: 0
FACIALEXPRESSION: 0
TOTALSCORE: 0
CONSOLABILITY: 0
TOTALSCORE: 0
BREATHING: 0
FACIALEXPRESSION: 0
FACIALEXPRESSION: 0
BODYLANGUAGE: 0
TOTALSCORE: 0
FACIALEXPRESSION: 0
NEGVOCALIZATION: 1
BREATHING: 0
TOTALSCORE: 4
BREATHING: 0
CONSOLABILITY: 0
FACIALEXPRESSION: 0
FACIALEXPRESSION: 0
TOTALSCORE: 0
TOTALSCORE: 0
CONSOLABILITY: 0
CONSOLABILITY: 0
CONSOLABILITY: 1
BREATHING: 0
TOTALSCORE: 0
NEGVOCALIZATION: 0
BODYLANGUAGE: 0
CONSOLABILITY: 0
NEGVOCALIZATION: 0
TOTALSCORE: 1
BREATHING: 0
BREATHING: 0
BODYLANGUAGE: 0
NEGVOCALIZATION: 0
FACIALEXPRESSION: 0
TOTALSCORE: 0
TOTALSCORE: 1
BREATHING: 0
FACIALEXPRESSION: 0
CONSOLABILITY: 0
BREATHING: 0
BODYLANGUAGE: 0
NEGVOCALIZATION: 0
TOTALSCORE: 0
CONSOLABILITY: 0
CONSOLABILITY: 0
BODYLANGUAGE: 0
NEGVOCALIZATION: 0
FACIALEXPRESSION: 0
CONSOLABILITY: 0
BREATHING: 0
NEGVOCALIZATION: 0
TOTALSCORE: 0
BODYLANGUAGE: 0
FACIALEXPRESSION: 0
CONSOLABILITY: 0
CONSOLABILITY: 0
NEGVOCALIZATION: 0
TOTALSCORE: 0
FACIALEXPRESSION: 0
CONSOLABILITY: 0
BREATHING: 1
NEGVOCALIZATION: 0
FACIALEXPRESSION: 0
NEGVOCALIZATION: 0
FACIALEXPRESSION: 0
TOTALSCORE: 0
NEGVOCALIZATION: 0
FACIALEXPRESSION: 0
FACIALEXPRESSION: 0
BREATHING: 0
NEGVOCALIZATION: 0
NEGVOCALIZATION: 0
BODYLANGUAGE: 0
BODYLANGUAGE: 0
FACIALEXPRESSION: 0
CONSOLABILITY: 0
FACIALEXPRESSION: 0
NEGVOCALIZATION: 0
TOTALSCORE: 0
BODYLANGUAGE: 0
BREATHING: 0
BREATHING: 0
NEGVOCALIZATION: 0
NEGVOCALIZATION: 0
BREATHING: 0
BODYLANGUAGE: 0
CONSOLABILITY: 0
CONSOLABILITY: 0
NEGVOCALIZATION: 0
BODYLANGUAGE: 0
BODYLANGUAGE: 0
FACIALEXPRESSION: 0
BREATHING: 0
BODYLANGUAGE: 0
TOTALSCORE: 4
BREATHING: 0
BODYLANGUAGE: 0
BODYLANGUAGE: 0
NEGVOCALIZATION: 0
FACIALEXPRESSION: 0
FACIALEXPRESSION: 0
CONSOLABILITY: 0
BODYLANGUAGE: 0
TOTALSCORE: 0
CONSOLABILITY: 0
NEGVOCALIZATION: 0
NEGVOCALIZATION: 0
TOTALSCORE: 0
NEGVOCALIZATION: 0
CONSOLABILITY: 0
CONSOLABILITY: 0
BODYLANGUAGE: 0
BREATHING: 0
NEGVOCALIZATION: 0
FACIALEXPRESSION: 0
BODYLANGUAGE: 0
CONSOLABILITY: 0
BODYLANGUAGE: 1
BODYLANGUAGE: 0
TOTALSCORE: 0
BREATHING: 0
BODYLANGUAGE: 0
CONSOLABILITY: 0
BREATHING: 0
TOTALSCORE: 0
CONSOLABILITY: 0
BREATHING: 0
FACIALEXPRESSION: 0
NEGVOCALIZATION: 0
BREATHING: 0
FACIALEXPRESSION: 0
BREATHING: 0
BODYLANGUAGE: 0
BREATHING: 0
TOTALSCORE: 0
CONSOLABILITY: 0
BODYLANGUAGE: 0
FACIALEXPRESSION: 0
BODYLANGUAGE: 0
FACIALEXPRESSION: 0
TOTALSCORE: 0
BODYLANGUAGE: 0
BODYLANGUAGE: 0
NEGVOCALIZATION: 0
CONSOLABILITY: 1
BODYLANGUAGE: 1
CONSOLABILITY: 0
TOTALSCORE: 0
CONSOLABILITY: 0
NEGVOCALIZATION: 0
BREATHING: 1
TOTALSCORE: 0
TOTALSCORE: 0
BODYLANGUAGE: 0
NEGVOCALIZATION: 0
TOTALSCORE: 0
BODYLANGUAGE: 0
BODYLANGUAGE: 0
TOTALSCORE: 0
FACIALEXPRESSION: 0
TOTALSCORE: 0
CONSOLABILITY: 0
FACIALEXPRESSION: 0
NEGVOCALIZATION: 0
TOTALSCORE: 0
FACIALEXPRESSION: 0
CONSOLABILITY: 0
NEGVOCALIZATION: 0
CONSOLABILITY: 0
BODYLANGUAGE: 0
FACIALEXPRESSION: 0
NEGVOCALIZATION: 0
CONSOLABILITY: 0
BREATHING: 0
BREATHING: 0
CONSOLABILITY: 0
NEGVOCALIZATION: 1
NEGVOCALIZATION: 0
BREATHING: 0
BREATHING: 1
NEGVOCALIZATION: 0
CONSOLABILITY: 0
TOTALSCORE: 0
FACIALEXPRESSION: 0
CONSOLABILITY: 0
BODYLANGUAGE: 0
TOTALSCORE: 0
CONSOLABILITY: 1
NEGVOCALIZATION: 0
BODYLANGUAGE: 0
BODYLANGUAGE: 0
NEGVOCALIZATION: 0
FACIALEXPRESSION: 0
TOTALSCORE: 0
CONSOLABILITY: 0
BODYLANGUAGE: 0
CONSOLABILITY: 0
BREATHING: 0
CONSOLABILITY: 0
TOTALSCORE: 0
TOTALSCORE: 2
TOTALSCORE: 0
CONSOLABILITY: 0
FACIALEXPRESSION: 0
BREATHING: 0
BREATHING: 0
NEGVOCALIZATION: 0
FACIALEXPRESSION: 0
BREATHING: 0
BODYLANGUAGE: 0
BREATHING: 0
FACIALEXPRESSION: 0
TOTALSCORE: 1
CONSOLABILITY: 0
BODYLANGUAGE: 0
NEGVOCALIZATION: 0
CONSOLABILITY: 0
TOTALSCORE: 0
BREATHING: 0
CONSOLABILITY: 0
BREATHING: 0
NEGVOCALIZATION: 0
BODYLANGUAGE: 0
NEGVOCALIZATION: 0
BODYLANGUAGE: 0
NEGVOCALIZATION: 0
FACIALEXPRESSION: 0
TOTALSCORE: 0
FACIALEXPRESSION: 0
BODYLANGUAGE: 0
BODYLANGUAGE: 0
CONSOLABILITY: 0
BODYLANGUAGE: 0
NEGVOCALIZATION: 0
TOTALSCORE: 0
BODYLANGUAGE: 0
TOTALSCORE: 0
BREATHING: 0
BODYLANGUAGE: 0
NEGVOCALIZATION: 0
BREATHING: 0
CONSOLABILITY: 0
FACIALEXPRESSION: 0
BREATHING: 0
NEGVOCALIZATION: 1
BREATHING: 0
BREATHING: 0
NEGVOCALIZATION: 0
BREATHING: 0
FACIALEXPRESSION: 0
FACIALEXPRESSION: 0
TOTALSCORE: 0
FACIALEXPRESSION: 0
CONSOLABILITY: 0
NEGVOCALIZATION: 0
NEGVOCALIZATION: 0
CONSOLABILITY: 0
NEGVOCALIZATION: 0
BODYLANGUAGE: 0
FACIALEXPRESSION: 0
BREATHING: 0
CONSOLABILITY: 0
NEGVOCALIZATION: 0
TOTALSCORE: 0
BREATHING: 0
TOTALSCORE: 0
BREATHING: 0
TOTALSCORE: 0
NEGVOCALIZATION: 0
FACIALEXPRESSION: 0
BODYLANGUAGE: 0
CONSOLABILITY: 0
BREATHING: 0
BODYLANGUAGE: 0
TOTALSCORE: 0
BODYLANGUAGE: 0
BODYLANGUAGE: 0
BREATHING: 0
NEGVOCALIZATION: 0
BREATHING: 0
TOTALSCORE: 0
TOTALSCORE: 0
CONSOLABILITY: 0
CONSOLABILITY: 0
BODYLANGUAGE: 0
NEGVOCALIZATION: 0

## 2022-01-01 ASSESSMENT — PAIN SCALES - WONG BAKER
WONGBAKER_NUMERICALRESPONSE: 0
WONGBAKER_NUMERICALRESPONSE: 6
WONGBAKER_NUMERICALRESPONSE: 0

## 2022-01-01 ASSESSMENT — PAIN DESCRIPTION - LOCATION
LOCATION: GENERALIZED
LOCATION: OTHER (COMMENT)

## 2022-01-01 ASSESSMENT — PAIN DESCRIPTION - ORIENTATION: ORIENTATION: OTHER (COMMENT)

## 2022-01-01 ASSESSMENT — PAIN DESCRIPTION - PAIN TYPE: TYPE: ACUTE PAIN

## 2022-01-01 ASSESSMENT — PAIN - FUNCTIONAL ASSESSMENT: PAIN_FUNCTIONAL_ASSESSMENT: 0-10

## 2022-01-20 NOTE — PROGRESS NOTES
Visit Date: 1/20/2022    Subjective: Yoshi Montalvo is a 78 y. o.female who presents today for:  Chief Complaint   Patient presents with    Hypertension    Dementia         HPI:     [facility] at the [facility] and not eating appropirately    Hypertension  This is a chronic problem. The problem is controlled. Pertinent negatives include no chest pain, headaches, malaise/fatigue, neck pain, orthopnea, palpitations, peripheral edema or shortness of breath. Hyperglycemia  Pertinent negatives include no abdominal pain, arthralgias, chest pain, chills, congestion, coughing, diaphoresis, fatigue, fever, headaches, joint swelling, myalgias, nausea, neck pain, numbness, sore throat, vomiting or weakness. Hyperlipidemia  This is a chronic problem. The problem is controlled. Recent lipid tests were reviewed and are normal. Pertinent negatives include no chest pain, focal sensory loss, leg pain, myalgias or shortness of breath. Coronary Artery Disease  Pertinent negatives include no chest pain, chest tightness, dizziness, leg swelling, palpitations or shortness of breath. Risk factors include hyperlipidemia.        CurrentHome Medications:  Current Outpatient Medications   Medication Sig Dispense Refill    carbamide peroxide (DEBROX) 6.5 % otic solution Place 5 drops in ear(s) 2 times daily 15 mL 0    lisinopril (PRINIVIL;ZESTRIL) 10 MG tablet Take 1 tablet by mouth once daily 90 tablet 1    donepezil (ARICEPT) 10 MG tablet Take 1 tablet by mouth nightly 90 tablet 1    triamterene-hydroCHLOROthiazide (MAXZIDE-25) 37.5-25 MG per tablet Take 1 tablet by mouth once daily 90 tablet 1    vitamin D (ERGOCALCIFEROL) 1.25 MG (85516 UT) CAPS capsule TAKE 1 CAPSULE BY MOUTH TWICE A WEEK 24 capsule 1    atorvastatin (LIPITOR) 20 MG tablet Take 1 tablet by mouth once daily 90 tablet 1    clopidogrel (PLAVIX) 75 MG tablet Take 1 tablet by mouth once daily 90 tablet 1    acetaminophen (TYLENOL) 500 MG tablet Take 500 mg by mouth every 6 hours as needed for Pain      memantine (NAMENDA) 10 MG tablet Take 1 tablet by mouth twice daily 180 tablet 1    naproxen (NAPROSYN) 500 MG tablet TAKE 1 TABLET BY MOUTH TWICE DAILY AS NEEDED FOR PAIN (ARTHRITIC  PAIN,  TAKE  MEDICATIONS  WITH  FOOD) 90 tablet 1    calcium carbonate 600 MG TABS tablet Take 1 tablet by mouth daily      Cod Liver Oil 10 MINIM CAPS Take by mouth      fluticasone (FLONASE) 50 MCG/ACT nasal spray 2 sprays by Nasal route daily 1 Bottle 3    Carboxymethylcell-Hypromellose (GENTEAL OP) Apply to eye      Ascorbic Acid (VITAMIN C) 250 MG tablet Take 500 mg by mouth daily      aspirin 81 MG tablet Take 81 mg by mouth daily. No current facility-administered medications for this visit. Subjective:      Review of Systems   Constitutional: Negative for appetite change, chills, diaphoresis, fatigue, fever and malaise/fatigue. HENT: Negative for congestion, ear pain, postnasal drip, rhinorrhea, sinus pressure, sneezing, sore throat, trouble swallowing and voice change. Respiratory: Negative for cough, chest tightness, shortness of breath and wheezing. Cardiovascular: Negative for chest pain, palpitations, orthopnea and leg swelling. Gastrointestinal: Negative for abdominal pain, constipation, diarrhea, nausea and vomiting. Musculoskeletal: Negative for arthralgias, back pain, joint swelling, myalgias, neck pain and neck stiffness. Neurological: Negative for dizziness, syncope, weakness, light-headedness, numbness and headaches. Objective:     /70   Pulse 60   Temp 96.6 °F (35.9 °C) (Skin)   Resp 12   Ht 5' 1\" (1.549 m)   Wt 131 lb 4 oz (59.5 kg)   BMI 24.80 kg/m²   BP Readings from Last 3 Encounters:   01/20/22 118/70   10/14/21 (!) 108/56   08/12/21 122/60     Wt Readings from Last 3 Encounters:   01/20/22 131 lb 4 oz (59.5 kg)   10/14/21 148 lb (67.1 kg)   08/12/21 147 lb 4 oz (66.8 kg)       Physical Exam  Vitals reviewed. Constitutional:       Appearance: She is well-developed. HENT:      Head: Normocephalic and atraumatic. Right Ear: External ear normal. There is impacted cerumen. Left Ear: External ear normal. There is impacted cerumen. Nose: Nose normal.   Eyes:      General: No scleral icterus. Conjunctiva/sclera: Conjunctivae normal.      Pupils: Pupils are equal, round, and reactive to light. Neck:      Thyroid: No thyromegaly. Vascular: No JVD. Cardiovascular:      Rate and Rhythm: Normal rate and regular rhythm. Heart sounds: No murmur heard. No friction rub. Pulmonary:      Effort: Pulmonary effort is normal.      Breath sounds: Normal breath sounds. No wheezing or rales. Chest:      Chest wall: No tenderness. Abdominal:      General: Bowel sounds are normal.      Palpations: Abdomen is soft. There is no mass. Tenderness: There is no abdominal tenderness. Musculoskeletal:      Cervical back: Normal range of motion and neck supple. Lymphadenopathy:      Cervical: No cervical adenopathy. Skin:     Findings: No rash. Neurological:      Mental Status: She is alert and oriented to person, place, and time. Psychiatric:         Behavior: Behavior is cooperative. Assessment:         Diagnosis Orders   1. Essential hypertension  CBC Auto Differential    Comprehensive Metabolic Panel    Lipid Panel    TSH with Reflex    Vitamin B12    Folate   2. Dementia without behavioral disturbance, unspecified dementia type ([facility])  TSH with Reflex    Vitamin B12    Folate   3. Hyperlipidemia, unspecified hyperlipidemia type     4. Impacted cerumen of both ears  CA REMOVAL IMPACTED CERUMEN INSTRUMENTATION UNILAT   5.  Coronary artery disease involving native coronary artery of native heart without angina pectoris         Plan:      Medications Prescribed:  Orders Placed This Encounter   Medications    carbamide peroxide (DEBROX) 6.5 % otic solution     Sig: Place 5 drops in ear(s) 2 times daily     Dispense:  15 mL     Refill:  0     Orders Placed:  Orders Placed This Encounter   Procedures    CBC Auto Differential     Standing Status:   Future     Standing Expiration Date:   1/20/2023    Comprehensive Metabolic Panel     Standing Status:   Future     Standing Expiration Date:   1/20/2023    Lipid Panel     Standing Status:   Future     Standing Expiration Date:   1/20/2023     Order Specific Question:   Is Patient Fasting?/# of Hours     Answer:   yes 12 hours    TSH with Reflex     Standing Status:   Future     Standing Expiration Date:   1/20/2023    Vitamin B12     Standing Status:   Future     Standing Expiration Date:   1/20/2023    Folate     Standing Status:   Future     Standing Expiration Date:   1/20/2023    NV REMOVAL IMPACTED CERUMEN INSTRUMENTATION UNILAT     Ear wax was removed using an ear curette on both ears        Return in about 3 weeks (around 2/10/2022) for awv. Discussed use, benefit, and side effects of prescribedmedications. All patient questions answered. Pt voiced understanding. Instructedto continue current medications, diet and exercise. Patient agreed with treatmentplan.

## 2022-02-07 NOTE — TELEPHONE ENCOUNTER
----- Message from Moo Schmidt MD sent at 2/5/2022  7:42 AM EST -----  Please call her daughter , patient's hemoglobin dropped too 9.9 she is more anemic, needs to have another labs to check for Iron, B12 ETC and folllow up after.  Needs to check for black and bloody stool    Moo Schmidt MD  [address] been ordered

## 2022-02-10 NOTE — PROGRESS NOTES
daily. No current facility-administered medications for this visit. Subjective:      Review of Systems   Constitutional: Negative for appetite change, chills, diaphoresis, fatigue and fever. HENT: Negative for congestion, ear pain, postnasal drip, rhinorrhea, sinus pressure, sneezing, sore throat, trouble swallowing and voice change. Respiratory: Negative for cough, chest tightness, shortness of breath and wheezing. Cardiovascular: Negative for chest pain, palpitations and leg swelling. Gastrointestinal: Negative for abdominal pain, constipation, diarrhea, nausea and vomiting. Musculoskeletal: Negative for arthralgias, back pain, joint swelling, myalgias, neck pain and neck stiffness. Neurological: Negative for dizziness, syncope, weakness, light-headedness, numbness and headaches. Psychiatric/Behavioral: Positive for behavioral problems and confusion. Objective:     /72 (Site: Right Upper Arm, Position: Sitting, Cuff Size: Medium Adult)   Pulse 60   Temp 97 °F (36.1 °C) (Skin)   Resp 12   Ht 5' 1\" (1.549 m)   Wt 139 lb 2 oz (63.1 kg)   BMI 26.29 kg/m²   BP Readings from Last 3 Encounters:   02/10/22 130/72   01/20/22 118/70   10/14/21 (!) 108/56     Wt Readings from Last 3 Encounters:   02/10/22 139 lb 2 oz (63.1 kg)   01/20/22 131 lb 4 oz (59.5 kg)   10/14/21 148 lb (67.1 kg)       Physical Exam  Vitals reviewed. Constitutional:       Appearance: She is well-developed. HENT:      Head: Normocephalic and atraumatic. Right Ear: External ear normal.      Left Ear: External ear normal. There is impacted cerumen. Nose: Nose normal.   Eyes:      General: No scleral icterus. Conjunctiva/sclera: Conjunctivae normal.      Pupils: Pupils are equal, round, and reactive to light. Neck:      Thyroid: No thyromegaly. Vascular: No JVD. Cardiovascular:      Rate and Rhythm: Normal rate and regular rhythm. Heart sounds: No murmur heard. No friction rub. Pulmonary:      Effort: Pulmonary effort is normal.      Breath sounds: Normal breath sounds. No wheezing or rales. Chest:      Chest wall: No tenderness. Abdominal:      General: Bowel sounds are normal.      Palpations: Abdomen is soft. There is no mass. Tenderness: There is no abdominal tenderness. Musculoskeletal:      Cervical back: Normal range of motion and neck supple. Lymphadenopathy:      Cervical: No cervical adenopathy. Skin:     Findings: No rash. Neurological:      Mental Status: She is alert and oriented to person, place, and time. Psychiatric:         Behavior: Behavior is cooperative. Assessment:         Diagnosis Orders   1. Impacted cerumen of left ear  OK REMOVAL IMPACTED CERUMEN INSTRUMENTATION UNILAT   2. Anemia, unspecified type  CBC Auto Differential   3. Dementia due to Parkinson's disease without behavioral disturbance ([facility] [address]         Plan:      Medications Prescribed:  Orders Placed This Encounter   Medications    ferrous sulfate (IRON 325) 325 (65 Fe) MG tablet     Sig: Take 1 tablet by mouth daily (with breakfast)     Dispense:  90 tablet     Refill:  1     Orders Placed:  Orders Placed This Encounter   Procedures    CBC Auto Differential     Standing Status:   Future     Standing Expiration Date:   2/10/2023    OK REMOVAL IMPACTED CERUMEN INSTRUMENTATION UNILAT     Ear wax was removed using an ear curette on left ear       Results of Laboratory tests taken 1/27/22were reviewed with the patient. Results were w/in  acceptable range    Patient was sent home container to collect the stool and bring it here for Hemoccult. Discussed with the options and the daughter opted to start on iron pill and be rechecked in 2 months rather than going to endoscopy with gastroenterology     Return in about 13 weeks (around 5/12/2022) for Anemia. Discussed use, benefit, and side effects of prescribedmedications. All patient questions answered. Pt voiced understanding. Instructedto continue current medications, diet and exercise. Patient agreed with treatmentplan.

## 2022-02-11 NOTE — TELEPHONE ENCOUNTER
Anshul Parada called said that [facility] needs something showing that pt is on iron and that  approves it.

## 2022-02-28 PROBLEM — R55 SYNCOPE AND COLLAPSE: Status: ACTIVE | Noted: 2022-01-01

## 2022-02-28 PROBLEM — Z23 ENCOUNTER FOR IMMUNIZATION: Status: RESOLVED | Noted: 2021-03-15 | Resolved: 2022-01-01

## 2022-02-28 PROBLEM — I95.89 HYPOTENSION DUE TO HYPOVOLEMIA: Status: ACTIVE | Noted: 2022-01-01

## 2022-02-28 PROBLEM — R19.5 OCCULT BLOOD POSITIVE STOOL: Status: ACTIVE | Noted: 2022-01-01

## 2022-02-28 PROBLEM — N17.9 AKI (ACUTE KIDNEY INJURY) (HCC): Status: ACTIVE | Noted: 2022-01-01

## 2022-02-28 PROBLEM — E86.1 HYPOTENSION DUE TO HYPOVOLEMIA: Status: ACTIVE | Noted: 2022-01-01

## 2022-02-28 PROBLEM — Z79.02 ANTIPLATELET OR ANTITHROMBOTIC LONG-TERM USE: Status: ACTIVE | Noted: 2021-03-15

## 2022-02-28 PROBLEM — D64.9 ANEMIA: Status: ACTIVE | Noted: 2022-01-01

## 2022-02-28 PROBLEM — S80.01XA TRAUMATIC ECCHYMOSIS OF RIGHT KNEE: Status: ACTIVE | Noted: 2022-01-01
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
## 2022-02-28 PROBLEM — S00.83XA TRAUMATIC HEMATOMA OF FOREHEAD: Status: ACTIVE | Noted: 2022-01-01
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
## 2022-02-28 NOTE — ED NOTES
Called [facility] and updated them on patient's admission status.       Artemus Osgood, RN  02/28/22 [id number]

## 2022-02-28 NOTE — CONSULTS
Level 2 called because found on floor is on Plavix with head injury they found her on the floor with some blood by her mouth  I arrived 0830  Patient brought in was less than responsive than her usual according per the daughter  Last seen normal sometime yesterday probably when the assisted living to their final checks not found till this morning  On exam she has what appears to be older looking bruising right frontal forehead left supraorbital ridge  They do appear tender but not necessarily acute  The patient did respond to pain apparently  She did keep her eyes closed tight and would not open them on command  Clinically I did not think that she had any facial fractures on exam    Ordered stat CT head no contrast cervical spine facial CT scan along with tagged platelet mapping and other labs and a urine myoglobin. With a history of hematemesis found in a trash can I suspect that she is a GI bleed and possibly syncope from hypotension because she was relatively hypotensive in the trauma bay based on any physical findings and a negative FAST exam.  Patient seen and examined independently by me 2/28/2022    Patient seen and examined independently by me 2/28/2022     I personally supervised the PA/NP in the evaluation, management and development of the treatment plan for Fausto Lo    on the same date of service as above. I personally interviewed Fausto Lo   and  discussed his review of symptoms as able due to the patient's condition, as well as performed an individual physical exam on the same date of service as above. In addition I discussed the patient's condition and treatment options with the patient, if able, and designated family if available. I have also reviewed and agree with the past medical,  family and social history updates as well as care plans unless otherwise noted below. All questions were answered.   I examined independently and reviewed relevant data myself and may have done so in the context of team rounds. A full chart review was performed. I attest that this medical record entry accurately reflects signatures and notations that I made in my capacity as an M. D. when I treated and diagnosed Steffanie Ceron on the date of service above     I was responsible for all medical decision making involving this encounter. I identified and/or confirmed all problems associated with this patient encounter by my own direct physical examination of this patient and review of all radiology studies and labwork  that were available ordered and available. I  discussed the management of all of the identified problems with the APN or PA. I formulated the treatment plan for all identified problems and discussed those with the APN or PA . This management plan was then carried out and the patient's orders for care by the APN or PA. Please see our orders if there are any new ones for the updated patient care plan. Above discussed and I agree with documentation and orders placed by Shelly Gambino PA    See my additional comments below for any other updated orders and plans.

## 2022-02-28 NOTE — ED NOTES
Warm blanket applied to patient, patient states they are helping her comfort.      Jocelynn Roche RN  02/28/22 2938

## 2022-02-28 NOTE — ED NOTES
Patient to room 3 via LACP as a Level 2 trauma following an unwitnessed fall at [facility]. Per report, patient was found down on the floor with drops of blood surrounding her and was initially unresponsive, but then was able to be aroused by staff members. Patient was also noted to have blood near her nose and a hematoma on her head. Patient is reportedly on Plavix. She has a history of dementia. Patient noted to have some constant tremoring, EMS states that this was present on arrival however it worsened en route due to the cold weather. EKG completed.       Kemp Alpers, RN  02/28/22 7652

## 2022-02-28 NOTE — ED NOTES
EMS states that emesis was noted in patient's trash can at the facility, emesis had a coffee ground appearance.      Tucker Luis RN  02/28/22 5420

## 2022-02-28 NOTE — ED NOTES
Unable to obtain reliable pulse oximetry reading at this time, patient placed on 6L via nasal cannula after discussing with provider.      Spencer Martínez RN  02/28/22 2702

## 2022-02-28 NOTE — ED NOTES
Attempted to titrate oxygen down to 4L via nasal cannula, saturation fluctuating down to 87. Titrated back up to 6L.      Marie Montenegro RN  02/28/22 [id number]

## 2022-02-28 NOTE — ED NOTES
In to collect urine via straight cath, patient tolerated well. Warm blankets provided for patient's comfort. Dr. Precious Ayoub in to update family on admission.      Jocelynn Roche RN  02/28/22 1264

## 2022-02-28 NOTE — CONSULTS
Devon Gann     Patient: Martha Koch  Admit date: 2/28/2022   YOB: 1940  Date of Evaluation: 2/28/2022  MRN: [id number]   Acct: [de-identified]    Injury Date:2/28/2022  Injury time:unknown   PCP: Ramesh Murdock MD   Referring physician: Dr. Gigi Fischer    Time of Trauma Surgeon Notification:  0820  Time of Trama SUSANA Arrival: 2120  Time of Trauma Surgeon Arrival:  0830    Assessment:      Active Hospital Problems    Diagnosis Date Noted    Fall at home, initial encounter [W19. Cathye Spar, W14.976] 02/28/2022    Traumatic hematoma of forehead [S00.83XA] 02/28/2022    Traumatic ecchymosis of right knee [S80.01XA] 02/28/2022    DARIUS (acute kidney injury) ([facility] [address] [N17.9] 02/28/2022       Plan:    No traumatic injuries requiring admission to the trauma service. Patient to be admitted to the medicine service for DARIUS and GI bleeding.       Activation: []Level I (Trauma Alert) [x]Level II (Injury Call) []Level III (Trauma Consult) []Downgraded  Mode of Arrival: EMS transportation  Referring Facility: N/A   Loss of Consciousness []No []Yes[x]Unknown  Duration(min)  Mechanism of Injury:  []Motor Vehicle crash   []Single Vehicle [] []Passenger []Scene Fatality []Front Seat  []Restrained   []Air Bag Deployed   []Ejected []Rollover []Pedestrian []Trapped   Type of vehicle:   Protective Devices:   []Motorcycle  Wearing Helmet []Yes []No  []Bicycle  Wearing Helmet []Yes []No  [x]Fall   Distance - ground level    []Assault    Abuse Reported []Yes []No  []Gunshot  []Stabbing  []Work Related  []Burn: []Flame []Scald []Electrical []Chemical []Contact []Inhalation []House Fire  []Other:   Patient Active Problem List   Diagnosis    CVA (cerebral vascular accident)-2002    CAD (coronary artery disease)-mild NONobstructive max 30% ostail, LCX, mid lad and diffuse mild luminal irregularites-per cath 08/08/12    S/P cardiac cath 2012- mild nonobstructive CAD    Essential hypertension    Hyperlipidemia    Dementia without behavioral disturbance ([facility] [address]    Encounter for immunization    Fall at home, initial encounter    Traumatic hematoma of forehead    Traumatic ecchymosis of right knee     Subjective   Chief Complaint: Fall    History of Present Illness: Brittanie Lepe is an 78year old female with PMH significant for CAD, CVA, dementia, HLD, and HTN presenting to the Emergency Department via EMS for evaluation after being found down at the Assisted Living where she resides. It is presumed that she fell at some point during the night. She was found on the floor by staff this morning with blood around her. She was initially unresponsive but then was able to be aroused by staff members. She had an emesis shortly after she came to and it reportedly had a coffee ground appearance. She has dried blood around her mouth and nose. She takes Plavix and Aspirin. There is a hematoma to her forehead and her left eye and cheek. She has a history of dementia and is not able to provide any history. Review of Systems:   Review of Systems   Constitutional: Positive for chills. Negative for activity change, appetite change, diaphoresis, fatigue, fever and unexpected weight change. HENT: Positive for nosebleeds. Negative for congestion, dental problem, drooling, ear discharge, ear pain, facial swelling, hearing loss, mouth sores, postnasal drip, rhinorrhea, sinus pressure, sneezing, sore throat, tinnitus, trouble swallowing and voice change. Eyes: Negative for photophobia, pain, discharge, redness, itching and visual disturbance. Respiratory: Negative for apnea, cough, choking, chest tightness, shortness of breath, wheezing and stridor. Cardiovascular: Negative for chest pain, palpitations and leg swelling. Gastrointestinal: Positive for blood in stool and vomiting. Negative for abdominal distention, abdominal pain, constipation, diarrhea and nausea.    Endocrine: Negative for cold intolerance, heat intolerance, polydipsia, polyphagia and polyuria. Genitourinary: Negative for difficulty urinating, dysuria, flank pain, frequency, hematuria and urgency. Musculoskeletal: Positive for myalgias. Negative for arthralgias, back pain, gait problem, joint swelling, neck pain and neck stiffness. Skin: Positive for wound. Negative for color change, pallor and rash. Allergic/Immunologic: Negative for environmental allergies, food allergies and immunocompromised state. Neurological: Positive for weakness and headaches. Negative for dizziness, tremors, seizures, syncope, facial asymmetry, speech difficulty, light-headedness and numbness. Hematological: Negative for adenopathy. Does not bruise/bleed easily. Psychiatric/Behavioral: Positive for confusion. Negative for agitation, behavioral problems, decreased concentration, dysphoric mood, hallucinations, self-injury, sleep disturbance and suicidal ideas. The patient is not nervous/anxious and is not hyperactive.         Morphine  Past Surgical History:   Procedure Laterality Date    BLADDER REPAIR      CARDIAC CATHETERIZATION  8-8-12    CHOLECYSTECTOMY      COLONOSCOPY  4/24/2012    Dr. Katerine Valera  07/2016    corneal related - Dr Akiko Flowers       Past Medical History:   Diagnosis Date    CAD (coronary artery disease) 8/8/12    non-obstructive 20-30% stenosis LAD and circomflex    Colon polyps 4/25/12    Tubular adenoma Dr. Lulú Mccann CVA (cerebral vascular accident) [facility]) 2004    Degenerative arthritis of cervical spine 2002    Dementia ([facility] [address] 12/14    Hyperlipidemia     Hypertension     Psoriasis      Past Surgical History:   Procedure Laterality Date    BLADDER REPAIR      CARDIAC CATHETERIZATION  8-8-12    CHOLECYSTECTOMY      COLONOSCOPY  4/24/2012    Dr. Katerine Valera  07/2016    corneal related - Dr Trista Coats Marital status:      Spouse name: Not on file    Number of children: Not on file    Years of education: Not on file    Highest education level: Not on file   Occupational History    Not on file   Tobacco Use    Smoking status: Never Smoker    Smokeless tobacco: Never Used   Vaping Use    Vaping Use: Never used   Substance and Sexual Activity    Alcohol use: No    Drug use: No    Sexual activity: Never   Other Topics Concern    Not on file   Social History Narrative    Not on file     Social Determinants of Health     Financial Resource Strain: Low Risk     Difficulty of Paying Living Expenses: Not hard at all   Food Insecurity: No Food Insecurity    Worried About Running Out of Food in the Last Year: Never true    Elvia of Food in the Last Year: Never true   Transportation Needs:     Lack of Transportation (Medical): Not on file    Lack of Transportation (Non-Medical):  Not on file   Physical Activity:     Days of Exercise per Week: Not on file    Minutes of Exercise per Session: Not on file   Stress:     Feeling of Stress : Not on file   Social Connections:     Frequency of Communication with Friends and Family: Not on file    Frequency of Social Gatherings with Friends and Family: Not on file    Attends Mandaeism Services: Not on file    Active Member of [address] [place] [address] [employer] or Organizations: Not on file    Attends Club or Organization Meetings: Not on file    Marital Status: Not on file   Intimate Partner Violence:     Fear of Current or Ex-Partner: Not on file    Emotionally Abused: Not on file    Physically Abused: Not on file    Sexually Abused: Not on file   Housing Stability:     Unable to Pay for Housing in the Last Year: Not on file    Number of [place] in the Last Year: Not on file    Unstable Housing in the Last Year: Not on file     Family History   Problem Relation Age of Onset    Heart Disease Brother     Breast Cancer Daughter 54       Home medications:    Previous Medications    ACETAMINOPHEN (TYLENOL) 500 MG TABLET    Take 500 mg by mouth every 6 hours as needed for Pain    ASCORBIC ACID (VITAMIN C) 250 MG TABLET    Take 500 mg by mouth daily    ASPIRIN 81 MG TABLET    Take 81 mg by mouth daily. ATORVASTATIN (LIPITOR) 20 MG TABLET    Take 1 tablet by mouth once daily    CALCIUM CARBONATE 600 MG TABS TABLET    Take 1 tablet by mouth daily    CARBOXYMETHYLCELL-HYPROMELLOSE (GENTEAL OP)    Apply to eye    CLOPIDOGREL (PLAVIX) 75 MG TABLET    Take 1 tablet by mouth once daily    COD LIVER OIL 10 MINIM CAPS    Take by mouth    DONEPEZIL (ARICEPT) 10 MG TABLET    Take 1 tablet by mouth nightly    FERROUS SULFATE (IRON 325) 325 (65 FE) MG TABLET    Take 1 tablet by mouth daily (with breakfast)    FLUTICASONE (FLONASE) 50 MCG/ACT NASAL SPRAY    2 sprays by Nasal route daily    LISINOPRIL (PRINIVIL;ZESTRIL) 10 MG TABLET    Take 1 tablet by mouth once daily    MEMANTINE (NAMENDA) 10 MG TABLET    Take 1 tablet by mouth twice daily    NAPROXEN (NAPROSYN) 500 MG TABLET    TAKE 1 TABLET BY MOUTH TWICE DAILY AS NEEDED FOR PAIN (ARTHRITIC  PAIN,  TAKE  MEDICATIONS  WITH  FOOD)    TRIAMTERENE-HYDROCHLOROTHIAZIDE (MAXZIDE-25) 37.5-25 MG PER TABLET    Take 1 tablet by mouth once daily    VITAMIN D (ERGOCALCIFEROL) 1.25 MG (36056 UT) CAPS CAPSULE    TAKE 1 CAPSULE BY MOUTH TWICE A WEEK       Hospital medications:  Scheduled Meds:   sodium chloride  500 mL IntraVENous Once     Continuous Infusions:   sodium chloride       PRN Meds:  Objective   ED TRIAGE VITALS  BP: 110/62, Temp: 96.6 °F (35.9 °C), Pulse: 60, Resp: 16, SpO2: 100 %  [place] Coma Scale  Eye Opening:  To speech  Best Verbal Response: Oriented  Best Motor Response: Obeys commands  Moe Coma Scale Score: 14  Results for orders placed or performed during the hospital encounter of 02/28/22   CK   Result Value Ref Range    Total CK 96 30 - 135 U/L   Lactic Acid   Result Value Ref Range    Lactic Acid 6.3 (H) 0.5 - 2.0 mmol/L Comprehensive Metabolic Panel   Result Value Ref Range    Glucose 244 (H) 70 - 108 mg/dL    CREATININE 2.1 (H) 0.4 - 1.2 mg/dL    BUN 70 (H) 7 - 22 mg/dL    Sodium 143 135 - 145 meq/L    Potassium 5.1 3.5 - 5.2 meq/L    Chloride 103 98 - 111 meq/L    CO2 20 (L) 23 - 33 meq/L    Calcium 10.4 8.5 - 10.5 mg/dL    AST 14 5 - 40 U/L    Alkaline Phosphatase 42 38 - 126 U/L    Total Protein 7.2 6.1 - 8.0 g/dL    Albumin 4.2 3.5 - 5.1 g/dL    Total Bilirubin 0.7 0.3 - 1.2 mg/dL    ALT 15 11 - 66 U/L   CBC with Auto Differential   Result Value Ref Range    WBC 10.6 4.8 - 10.8 thou/mm3    RBC 3.11 (L) 4.20 - 5.40 mill/mm3    Hemoglobin 9.2 (L) 12.0 - 16.0 gm/dl    Hematocrit 30.3 (L) 37.0 - 47.0 %    MCV 97.4 81.0 - 99.0 fL    MCH 29.6 26.0 - 33.0 pg    MCHC 30.4 (L) 32.2 - 35.5 gm/dl    RDW-CV 13.2 11.5 - 14.5 %    RDW-SD 46.4 (H) 35.0 - 45.0 fL    Platelets 770 192 - 722 thou/mm3    MPV 12.1 9.4 - 12.4 fL    Seg Neutrophils 84.7 %    Lymphocytes 8.8 %    Monocytes 5.9 %    Eosinophils 0.0 %    Basophils 0.1 %    Immature Granulocytes 0.5 %    Segs Absolute 9.0 (H) 1.8 - 7.7 thou/mm3    Lymphocytes Absolute 0.9 (L) 1.0 - 4.8 thou/mm3    Monocytes Absolute 0.6 0.4 - 1.3 thou/mm3    Eosinophils Absolute 0.0 0.0 - 0.4 thou/mm3    Basophils Absolute 0.0 0.0 - 0.1 thou/mm3    Immature Grans (Abs) 0.05 0.00 - 0.07 thou/mm3    nRBC 0 /100 wbc   Ethanol   Result Value Ref Range    ETHYL ALCOHOL, SERUM < 0.01 0.00 %   Blood occult stool screen #1   Result Value Ref Range    OCCULT BLOOD FECAL Positive    Anion Gap   Result Value Ref Range    Anion Gap 20.0 (H) 8.0 - 16.0 meq/L   Osmolality   Result Value Ref Range    Osmolality Calc 313.5 (H) 275.0 - 300.0 mOsmol/kg   Glomerular Filtration Rate, Estimated   Result Value Ref Range    Est, Glom Filt Rate 27 (A) ml/min/1.73m2       Physical Exam:  Patient Vitals for the past 24 hrs:   BP Temp Temp src Pulse Resp SpO2 Height Weight   02/28/22 0934 110/62   60 16 100 %     02/28/22 0920 102/74   70 18      02/28/22 0905 90/73   59 20      02/28/22 0836 90/71   57 18      02/28/22 0830 87/65 96.6 °F (35.9 °C) Axillary 60 19 94 % 5' 1\" (1.549 m) 139 lb (63 kg)     Primary Assessment:  Airway: Patent, trachea midline  Breathing: Breath sounds present and equal bilaterally, spontaneous, and unlabored  Circulation: Hemodynamically stable, 2+ central and peripheral pulses. Disability: CISNEROS x 4, following commands. GCS =14    Secondary Assessment:  General: Alert, NAD. Head: Normocephalic, mid face stable. Hematoma to forehead. Tympanic membranes intact. Nares patent bilaterally, dried blood noted to bilateral nares and around mouth. Mouth clear of foreign bodies, no lacerations or abrasions. Eyes: PERRLA. EOMI. Hematoma noted to left eye. Neurologic: A & O x3. Following commands. CN 2-12 intact  Neck:  trachea midline. Cervical spines NTTP midline, without step-offs, crepitus or deformity. Back:TL spines are NTTP midline, without step-offs, crepitus or deformity. No abrasions, contusions, or ecchymosis noted. Lungs: Clear to auscultation bilaterally. Chest Wall: Chest rise symmetrical.  Chest wall without tenderness to palpation. No crepitus, deformities, lacerations, or abrasions. Heart: RRR. Normal S1/S2. No obvious M/G/R. Abdomen:  Soft, NTTP. No guarding. Non-peritoneal.  Pelvis:  NTTP, stable to compression. Femoral pulses 2+. GI/: No blood at the urinary meatus. No gross hematuria. Extremities: No gross deformities. PMS intact. Radial /DP/PT pulses 2+ bilaterally. Skin: Skin warm and dry. Normal for ethnicity. Radiology:     CT HEAD WO CONTRAST   Final Result   1. No mass effect or acute hemorrhage. 2. Chronic periventricular small vessel ischemic changes and cerebral atrophy. **This report has been created using voice recognition software. It may contain minor errors which are inherent in voice recognition technology. **      Final report electronically signed by Dr. Marybeth Henderson on 2/28/2022 9:30 AM      CT CERVICAL SPINE WO CONTRAST   Final Result       1. No evidence of acute osseous injury of the cervical spine. 2. Mild reversed lordosis nonspecific and can be positional or may be secondary to muscle spasm. 3. Multilevel degenerative changes of the cervical spine most pronounced at C4-5 where there is mild spinal canal stenosis and up to severe neural foraminal stenosis on the right. **This report has been created using voice recognition software. It may contain minor errors which are inherent in voice recognition technology. **      Final report electronically signed by Dr. Mar Veloz MD on 2/28/2022 9:32 AM      CT FACIAL BONES WO CONTRAST   Final Result      No facial bone fracture.       Final report electronically signed by Dr. Marybeth Henderson on 2/28/2022 9:33 AM        Fast Exam: Yes - negative    Electronically signed by LUZ Florentino - CNP on 2/28/2022 at 9:59 AM

## 2022-02-28 NOTE — ED NOTES
Chief Complaint:   Chief Complaint   Patient presents with   • Breast Problem       History of Present Illness: Tanesha Salguero is a 27 year old female who presents today with right breast pain. The patient reports her symptoms started yesterday in the late afternoon. There is an area of fullness, tenderness, and slight erythema of the R superior breast.  The patient is currently nursing her almost-year-old daughter about 5 times a day.  She has had clogged ducts in the past, but they usually respond quickly to massage and frequent feeding. This is not helping today. Patient denies fevers or chills. She feels a little fatigued, but otherwise well.  She is able to express milk from the nipple which appears normal. No blood or discoloration.    ROS: Pertinent positive and negative review of systems detailed in HPI    ALLERGIES:  Not on File    Medications:   Current Outpatient Prescriptions   Medication Sig Dispense Refill   • levonorgestrel (MIRENA, 52 MG,) 20 MCG/24HR IUD 1 each by Intrauterine route once.       No current facility-administered medications for this visit.        Histories:  No past medical history on file.    No past surgical history on file.    Social History:  reports that she has been smoking.  She has been smoking about 0.10 packs per day. She has never used smokeless tobacco.    Physical Exam:   Vital Signs:   Visit Vitals  /60 ([place] [facility] [place]itting, Cuff Size: Regular)   Pulse 68   Temp 98.5 °F (36.9 °C) (Oral)   Resp 16   Ht 5' 4\" (1.626 m)   Wt 56.2 kg   SpO2 100%   BMI 21.28 kg/m²     Gen: Alert and oriented x3, no acute distress  Breasts: Bilateral breasts appear normal on visual inspection, there is an ~2x3cm area of fullness, tenderness, and mild erythema on the right upper/outer breast at the 11 to 12 o'clock position just adjacent to the areola, no fluctuance or signs of an abscess, no axillary lymphadenopathy, nipple appears normal, no cracking or  Patient returns to ED with this nurse and trauma CNP. Patient is much more alert at this time, talking to family members.       Thuy Boogie RN  02/28/22 9820 drainage    Assessment:  1. Acute mastitis of right breast      Plan:   Push fluids, warm compresses, gentle breast massage, continue frequent feedings  Cephalexin ×7 days  Close follow-up if not improving over the next 3-4 days with treatment, sooner if continued worsening despite treatment    Orders Placed This Encounter   • cephALEXin (KEFLEX) 500 MG capsule       After visit summary was reviewed with the patient.  All questions were answered.  Patient verbalized an understanding of the diagnosis and treatment plan.

## 2022-02-28 NOTE — ED NOTES
TAMY Ravi at bedside to assess.   Dr. Ruben Thornton completed FAST exam.     Mirian Aquino RN  02/28/22 4517

## 2022-02-28 NOTE — ED PROVIDER NOTES
[address] [facility] COMPLAINT   Chief Complaint   Patient presents with   Sunil Whiteheadinski Head Injury        HPI   Adam Preston is a 78 y.o. female who presents after a fall. The onset was prior to arrival. The reason why the patient fell was unknown, or whether she had a syncopal event. The patient complains of headache. The patient has bruising on her face, bleeding from nares, as well as associated one episode of hematemesis per EMS report (which occurred when she was aroused from her state, she vomited). Pt felt chilled upon arrival (due to exposure to cold during transport), but otherwise slowly increased her responsiveness, especially with the arrival of his son Martha (accompanied by his spouse Betsy Guerra). Pt denies any hip pain, extremity weakness, was following all commands appropriately despite her progressive dementia. This is a level 2 trauma alert based on possible TBI in a geriatric patient (68F) on Plavix. Dr. Bharat Pak is present in conjunction with his NP K Raynald Dance    REVIEW OF SYSTEMS   Neurologic: +head injury; unknown LOC   Cardiac: No Chest Pain, No syncope   Respiratory: Nodifficulty breathing   GI:No abdominal pain or vomiting   See history of present illness   All other review of systems were reviewed and are otherwise negative.      PAST MEDICAL & SURGICAL HISTORY   Past Medical History:   Diagnosis Date    CAD (coronary artery disease) 8/8/12    non-obstructive 20-30% stenosis LAD and circomflex    Colon polyps 4/25/12    Tubular adenoma Dr. Last Parker CVA (cerebral vascular accident) [facility]) 2004    Degenerative arthritis of cervical spine 2002    Dementia ([facility] [address] 12/14    Hyperlipidemia     Hypertension     Psoriasis       Past Surgical History:   Procedure Laterality Date    BLADDER REPAIR      CARDIAC CATHETERIZATION  8-8-12    CHOLECYSTECTOMY      COLONOSCOPY  4/24/2012    Dr. Alan Jackson  07/2016    corneal related - Dr Jen Sorensen HYSTERECTOMY      MARIA R AND BSO          CURRENT MEDICATIONS        ALLERGIES   Allergies   Allergen Reactions    Morphine         SOCIAL & FAMILY HISTORY   Social History     Socioeconomic History    Marital status:      Spouse name: Not on file    Number of children: Not on file    Years of education: Not on file    Highest education level: Not on file   Occupational History    Not on file   Tobacco Use    Smoking status: Never Smoker    Smokeless tobacco: Never Used   Vaping Use    Vaping Use: Never used   Substance and Sexual Activity    Alcohol use: No    Drug use: No    Sexual activity: Never   Other Topics Concern    Not on file   Social History Narrative    Not on file     Social Determinants of Health     Financial Resource Strain: Low Risk     Difficulty of Paying Living Expenses: Not hard at all   Food Insecurity: No Food Insecurity    Worried About Running Out of Food in the Last Year: Never true    Elvia of Food in the Last Year: Never true   Transportation Needs:     Lack of Transportation (Medical): Not on file    Lack of Transportation (Non-Medical):  Not on file   Physical Activity:     Days of Exercise per Week: Not on file    Minutes of Exercise per Session: Not on file   Stress:     Feeling of Stress : Not on file   Social Connections:     Frequency of Communication with Friends and Family: Not on file    Frequency of Social Gatherings with Friends and Family: Not on file    Attends Roman [employer] Services: Not on file    Active Member of Clubs or Organizations: Not on file    Attends Club or Organization Meetings: Not on file    Marital Status: Not on file   Intimate Partner Violence:     Fear of Current or Ex-Partner: Not on file    Emotionally Abused: Not on file    Physically Abused: Not on file    Sexually Abused: Not on file   Housing Stability:     Unable to Pay for Housing in the Last Year: Not on file    Number of [place] in the Last Year: Not on file    Unstable Housing in the Last Year: Not on file      Family History   Problem Relation Age of Onset    Heart Disease Brother     Breast Cancer Daughter 54        PHYSICAL EXAM   VITAL SIGNS: BP (!) 134/116   Pulse 66   Temp 96.6 °F (35.9 °C) (Axillary)   Resp 14   Ht 5' 1\" (1.549 m)   Wt 139 lb (63 kg)   SpO2 90%   BMI 26.26 kg/m²    Constitutional: Well developed, well nourished, no acute distress   Eyes: Pupils equally round and react to light, sclera nonicteric   HENT: Moderate sized ecchymosis to right forehead, left periorbital region, bleeding from right nares, no trismus   Eyes: cataract noted to left eye  Neck: supple, no JVD, no posterior neck tenderness   Respiratory: Lungs Clear, no retractions   Cardiovascular: Reg rate, normal rhythm, no murmurs   Vascular: DP pulses 2+ equal bilaterally   GI: Soft, nontender, normal bowel sounds   Musculoskeletal: No edema, stable pelvis, all extremities have equal strength and spontaneous movement   : rectal exam showed normal rectal tone without any dark tarry stool or bright red blood  Integument: Well hydrated, no petechiae   Neurologic: Alert & oriented to self and family, not place or time, no slurred speech, no facial droop   Psych: Pleasant affect     EKG Interpretation   Interpreted by emergency department physician 2/28/22 08:29  Rhythm: Sinus  Rate: 57 BPM   Axis: normal   Ectopy: none   Conduction: normal   ST/T Segments: no acute change   Clinical Impression: Nonspecific - slightly wider QRS (126 msec), noisy EKG, though I do see P-waves    RADIOLOGY   CT HEAD WO CONTRAST   Final Result   1. No mass effect or acute hemorrhage. 2. Chronic periventricular small vessel ischemic changes and cerebral atrophy. **This report has been created using voice recognition software. It may contain minor errors which are inherent in voice recognition technology. **      Final report electronically signed by Dr. Víctor Buckner on 2/28/2022 9:30 AM      CT CERVICAL SPINE WO CONTRAST   Final Result       1. No evidence of acute osseous injury of the cervical spine. 2. Mild reversed lordosis nonspecific and can be positional or may be secondary to muscle spasm. 3. Multilevel degenerative changes of the cervical spine most pronounced at C4-5 where there is mild spinal canal stenosis and up to severe neural foraminal stenosis on the right. **This report has been created using voice recognition software. It may contain minor errors which are inherent in voice recognition technology. **      Final report electronically signed by Dr. Rosanna Rivera MD on 2/28/2022 9:32 AM      CT FACIAL BONES WO CONTRAST   Final Result      No facial bone fracture.       Final report electronically signed by Dr. Rossy Giang on 2/28/2022 9:33 AM           Labs Reviewed   LACTIC ACID - Abnormal; Notable for the following components:       Result Value    Lactic Acid 6.3 (*)     All other components within normal limits   COMPREHENSIVE METABOLIC PANEL - Abnormal; Notable for the following components:    Glucose 244 (*)     CREATININE 2.1 (*)     BUN 70 (*)     CO2 20 (*)     All other components within normal limits   CBC WITH AUTO DIFFERENTIAL - Abnormal; Notable for the following components:    RBC 3.11 (*)     Hemoglobin 9.2 (*)     Hematocrit 30.3 (*)     MCHC 30.4 (*)     RDW-SD 46.4 (*)     Segs Absolute 9.0 (*)     Lymphocytes Absolute 0.9 (*)     All other components within normal limits   PLATELET MAP, TEG CITRATED - Abnormal; Notable for the following components:    Reaction Time TEG 2.6 (*)     Kinetics TEG 0.8 (*)     Angle TEG 78.0 (*)     MA (Max Clot) TEG 72.6 (*)     All other components within normal limits   ANION GAP - Abnormal; Notable for the following components:    Anion Gap 20.0 (*)     All other components within normal limits   OSMOLALITY - Abnormal; Notable for the following components:    Osmolality Calc 313.5 (*)     All other components within normal limits   GLOMERULAR FILTRATION RATE, ESTIMATED - Abnormal; Notable for the following components:    Est, Glom Filt Rate 27 (*)     All other components within normal limits   URINALYSIS WITH REFLEX TO CULTURE - Abnormal; Notable for the following components:    Ketones, Urine TRACE (*)     All other components within normal limits   CK   MYOGLOBIN, URINE   ETHANOL   URINE DRUG SCREEN   BLOOD OCCULT STOOL SCREEN #1        ED COURSE & MEDICAL DECISION MAKING   Pertinent Labs & Imaging studies reviewed and interpreted. (See chart for details)   Vitals:    02/28/22 1338   BP:    Pulse:    Resp:    Temp:    SpO2: 90%        Differential Diagnosis: Cardiac Arrhythmia, Electrolyte imbalance, UTI, Anemia, closed head injury, Dehydration, other. FINAL IMPRESSION   1. Closed head injury, initial encounter    2. Fall, initial encounter    3. Syncope and collapse    4. Hematemesis without nausea    5. Contusion of forehead, initial encounter    6. Acute renal failure, unspecified acute renal failure type ([facility] [address]         PLAN   MDM - as her stay progressed, pt's mental status continues to improve. From the trauma's point of view, pt's imaging and clinical exam proved that she has no TBI. Thus she was cleared from the trauma perspective. Pt nonetheless will benefit from admission to Dr. Huertas File service for acute renal failure as well as serial Hgb for her hematemesis (concern for GI bleed). Via shared decision making, both pt and her family members are agreeable for pt to be admitted.    Electronically signed by: Sasha Cruz MD, 2/28/2022 2:07 PM         Joaquin Wiledr MD  02/28/22 5996

## 2022-02-28 NOTE — ED NOTES
ED nurse-to-nurse bedside report    Chief Complaint   Patient presents with    Fall    Head Injury      LOC: alert and orientated to name and place  Vital signs   Vitals:    02/28/22 0905 02/28/22 0920 02/28/22 0934 02/28/22 1059   BP: 90/73 102/74 110/62 (!) 134/116   Pulse: 59 70 60 66   Resp: 20 18 16 14   Temp:       TempSrc:       SpO2:   100% 96%   Weight:       Height:          Pain:    Pain Interventions: Positioning, heat  Pain Goal: 4  Oxygen: Yes    Current needs required 6L   Telemetry: Yes  LDAs:   Peripheral IV 02/28/22 Left Hand (Active)   Site Assessment Clean;Dry; Intact 02/28/22 0852   Dressing Status Clean;Dry; Intact 02/28/22 0852       Peripheral IV 02/28/22 Right Antecubital (Active)   Site Assessment Clean;Dry; Intact 02/28/22 0853   Dressing Status Clean;Dry; Intact 02/28/22 0853   Dressing Intervention New 02/28/22 0853     Continuous Infusions:    sodium chloride 1,000 mL (02/28/22 1003)     Mobility: Requires assistance * 2  Coe Fall Risk Score:    Fall Risk 10/14/2021 4/29/2021 2/27/2020 8/15/2019 8/7/2018 10/19/2017 10/19/2017   2 or more falls in past year? no no no no no no no   Fall with injury in past year? no no no no no no no     Fall Interventions: Hourly Rounding, Side Rails x 2  Report given to: Maria D Sanchez RN  02/28/22 2885

## 2022-02-28 NOTE — ED NOTES
Patient's initial pressure noted to be low, 87/65, repeating now. Dr. Danilo Orantes at bedside for assessment.             Keyona Carbajal RN  02/28/22 4231

## 2022-03-01 NOTE — CONSULTS
Consult History & Physical      Patient: Dewey Andrew  YOB: 1940  MRN: [id number]     Acct: [de-identified]    Chief Complaint:    Chief Complaint   Patient presents with   Blase Liming Fall    Head Injury       Date of Service: Pt seen/examined in consultation on 3/1/2022    History Of Present Illness:      78 y.o. female, who is in unreliable historian given history of dementia, who we are asked to see/evaluate by Dr. Miguel Earl MD for medical management of acute on chronic anemia in the setting of a positive FOBT. Patient has a history of CVA, CAD, HTN, psoriasis, HLD, who presents after being found on the floor with drops of blood surrounding her initially and initially unresponsive but then arousable by staff at her assisted living facility. On initial examination per admitting physician's note, patient was found to have \"blood on her nose and face. \"  Her initial blood pressure in the emergency department was 87/65 and oxygen saturation 94% on room air. On my visit, patient stated that she had been having pain and bloody bowel movements for 5 days however subsequently denied it emphatically when asked again. On assessing ROS, patient denied answering any further questions I had. Per nursing, last BM was brown and she has not had one yet today. Pt takes iron supplementation, plavix, aspirin, and naproxen at home per chart review. Unclear if she takes these at home. Of note, iron studies were done 02/07/2022ferritin 182 ng/mL, serum iron is 46 mcg/dL, iron saturation 21%, UIBC 126 mcg/dL, soluble transferrin receptor 2.2 mg/L, TIBC 222 mcg/dL. This is consistent with anemia of chronic disease and not iron deficiency anemia at the time of the study.     Past Medical History:    Past Medical History:   Diagnosis Date    CAD (coronary artery disease) 8/8/12    non-obstructive 20-30% stenosis LAD and circomflex    Colon polyps 4/25/12    Tubular adenoma Dr. Dylan Diez CVA (cerebral vascular accident) [facility]) 2004    Degenerative arthritis of cervical spine 2002    Dementia ([facility] [address] 12/14    Hyperlipidemia     Hypertension     Psoriasis        Home Medications:  Prior to Admission medications    Medication Sig Start Date End Date Taking? Authorizing Provider   acetaminophen (TYLENOL) 650 MG extended release tablet Take 650 mg by mouth daily   Yes Historical Provider, MD   ferrous sulfate (IRON 325) 325 (65 Fe) MG tablet Take 1 tablet by mouth daily (with breakfast) 2/10/22  Yes Betty Holguin MD   lisinopril (PRINIVIL;ZESTRIL) 10 MG tablet Take 1 tablet by mouth once daily 12/30/21  Yes Betty Holguin MD   donepezil (ARICEPT) 10 MG tablet Take 1 tablet by mouth nightly 9/14/21  Yes Betty Holguin MD   triamterene-hydroCHLOROthiazide [facility]) 37.5-25 MG per tablet Take 1 tablet by mouth once daily 8/30/21  Yes Betty Holguin MD   vitamin D (ERGOCALCIFEROL) 1.25 MG (66361 UT) CAPS capsule TAKE 1 CAPSULE BY MOUTH TWICE A WEEK  Patient taking differently: Take 50,000 Units by mouth twice a week On Mondays and Thursdays 8/30/21  Yes Betty Holguin MD   atorvastatin (LIPITOR) 20 MG tablet Take 1 tablet by mouth once daily 8/30/21  Yes Betty Holguin MD   clopidogrel (PLAVIX) 75 MG tablet Take 1 tablet by mouth once daily 8/30/21  Yes Betty Holguin MD   memantine [facility]) 10 MG tablet Take 1 tablet by mouth twice daily 7/26/21  Yes Betty Holguin MD   Carboxymethylcell-Hypromellose (GENTEAL OP) Apply 1 drop to eye daily 1 drop each eye daily   Yes Historical Provider, MD   Ascorbic Acid (VITAMIN C) 250 MG tablet Take 500 mg by mouth daily   Yes Historical Provider, MD   aspirin 81 MG tablet Take 81 mg by mouth daily.    Yes Historical Provider, MD   acetaminophen (TYLENOL) 650 MG extended release tablet Take 650 mg by mouth daily as needed for Pain    Historical Provider, MD   loperamide (IMODIUM) 2 MG capsule Take 2 mg by mouth daily as needed for Diarrhea Historical Provider, MD   naproxen (NAPROSYN) 500 MG tablet TAKE 1 TABLET BY MOUTH TWICE DAILY AS NEEDED FOR PAIN (ARTHRITIC  PAIN,  TAKE  MEDICATIONS  WITH  FOOD) 2/22/21   Jose Enrique Beltran MD   calcium carbonate 600 MG TABS tablet Take 1 tablet by mouth daily    Historical Provider, MD   fluticasone [place]) 50 MCG/ACT nasal spray 2 sprays by Nasal route daily 5/28/20   Jose Enrique Beltran MD       Surgical History:  Past Surgical History:   Procedure Laterality Date    BLADDER REPAIR      CARDIAC CATHETERIZATION  8-8-12    CHOLECYSTECTOMY      COLONOSCOPY  4/24/2012    Dr. Shannon Martinez  07/2016    corneal related - Dr Nora Myles         Family History:  Family History   Problem Relation Age of Onset    Heart Disease Brother     Breast Cancer Daughter 54     Past GI History:  -No family history of GI related diseases. Daughter had breast cancer at the age of 54 years.  -Per records, patient had a colonoscopy by Dr. Venkata Flores in 05/20/2015 which demonstrated sigmoid diverticulosis, no polyps or masses throughout colon, normal terminal ileum, and mildmoderate internal hemorrhoids on retroflexion. Allergies:  Morphine    Social History:   TOBACCO:   reports that she has never smoked. She has never used smokeless tobacco.  ETOH:   reports no history of alcohol use. Review Of Systems - unable to assess as pt is an unreliable historian and refused to answer my questions. PHYSICAL EXAM:  BP (!) 99/49   Pulse 60   Temp 98.1 °F (36.7 °C) (Axillary)   Resp 18   Ht 5' 1\" (1.549 m)   Wt 139 lb (63 kg)   SpO2 100%   BMI 26.26 kg/m²     General appearance: No apparent distress, appears stated age, uncooperative with physical exam at times. HEENT: Normal cephalic, atraumatic without obvious deformity. Unable to open eyes as patient resisted this portion of the physical exam.   Neck: Supple, with full range of motion. No jugular venous distention. Trachea midline. Respiratory:  Normal respiratory effort. Clear to auscultation, bilaterally without Rales/Wheezes/Rhonchi. Cardiovascular: Regular rate and rhythm without murmurs, rubs or gallops. Abdomen: Soft, non-tender, non-distended with active bowel sounds. Musculoskeletal: No clubbing, cyanosis or edema bilaterally. Skin: Pink, warm, dry. No rashes or lesions. Psychiatric: Alert and oriented, thought content appropriate, normal insight    Labs:   Recent Labs     03/01/22  0551   WBC 6.1   HGB 7.1*   HCT 23.8*        Recent Labs     03/01/22  0551      K 4.3   *   CO2 22*   BUN 56*   CREATININE 1.6*   CALCIUM 8.9     Recent Labs     02/28/22  0830   AST 14   ALT 15   BILITOT 0.7   ALKPHOS 42     No results for input(s): INR in the last 72 hours. Radiology:   None  Code Status: Full Code    ASSESSMENT:  Acute on Chronic Macrocytic Anemia - baseline hemoglobin 9-11 g/dL    Syncope and Collapse  Brief Hypotension  Dementia  Pre-renal DARIUS  Traumatic Head Injury  H/o CVA  CAD - on ASA/Plavix  HTN    PLAN:     No plans for emergent endoscopy at this time, if hemoglobin acutely drops or there are overt signs of GIB, we will consider endoscopy.  Hold iron supplementation to appropriately monitor stools closely for BRBPR, or melena.  Okay to continue diet as tolerated   IV Protonix   Hold ASA/Plavix   Repeat H&H every 8 hours for 3 days, transfuse if hemoglobin less than 7.0 g/dL   Protonix for 1 more day.  Repeat iron studies, B12+folate levels, and reticulocyte count   Okay to decrease/stop fluid from GI standpoint and when okay with primary     Case reviewed and impression/plan reviewed in collaboration with Dr. Violet Crain MD  Electronically signed by Art Aponte MD on 3/1/2022 at 10:45 AM    [facility]  Thank you for the consultation.

## 2022-03-01 NOTE — PROGRESS NOTES
Reviewed chart for SBIRT per trauma service. Per [facility], pt has a hx \"Dementia without behavioral disturbance (HCC)\". Unable to complete.

## 2022-03-01 NOTE — PLAN OF CARE
Problem: Falls - Risk of:  Goal: Will remain free from falls  Description: Will remain free from falls  Outcome: Ongoing  Note: Free of falls this shift. Call light within reach. Bed alarm on. Falling star program in place. Problem: Falls - Risk of:  Goal: Absence of physical injury  Description: Absence of physical injury  Outcome: Ongoing  Note: Free of falls this shift. Call light within reach. Bed alarm on. Falling star program in place. Problem: Skin Integrity:  Goal: Will show no infection signs and symptoms  Description: Will show no infection signs and symptoms  Outcome: Ongoing  Note: No new signs or symptoms of infection noted. Problem: Skin Integrity:  Goal: Absence of new skin breakdown  Description: Absence of new skin breakdown  Outcome: Ongoing  Note: No new skin breakdown noted. Problem: Bowel Function - Altered:  Goal: Bowel elimination is within specified parameters  Description: Bowel elimination is within specified parameters  Outcome: Ongoing  Note: See I/Os. Problem: Cardiac Output - Decreased:  Goal: Hemodynamic stability will improve  Description: Hemodynamic stability will improve  Outcome: Ongoing  Note: Vitals monitored per protocol. Problem: Pain:  Goal: Pain level will decrease  Description: Pain level will decrease  Outcome: Ongoing  Note: Patient denies pain at this time. Care plan reviewed with patient. Patient  verbalize understanding of the plan of care and contribute to goal setting.

## 2022-03-01 NOTE — CARE COORDINATION
3/1/22, 4:12 PM EST  Discharge Planning Evaluation  Social work consult received, patient from [facility]. Patient/Family preference is to return to [facility], per patient and children. The patient's current payor source at the facility is private pay. Medicare skilled days available: yes  Insurance precert:  no  Spoke with Ana at the facility. Patient bed hold: n/a  Anticipated transport plan: children  Do they require COVID 19 test to return to ECF: Ana was unsure  Is there a required time frame which which COVID test needs done:see above.

## 2022-03-01 NOTE — PROGRESS NOTES
Percy Ces[facility]es 58  INPATIENT OCCUPATIONAL THERAPY  [facility] MED SURG 8B  EVALUATION    Time:   Time In: 5979  Time Out: 1213  Timed Code Treatment Minutes: 11 Minutes  Minutes: 21          Date: 3/1/2022  Patient Name: Dewey Andrew,   Gender: female      MRN: [id number]  : 1940  (78 y.o.)  Referring Practitioner: Penelope Stubbs MD  Diagnosis: syncope and collapse  Additional Pertinent Hx: The patient is an 78-year-old female who isknown to me through the office being her primary care provider. Thepatient lives in the assisted living for one year, has history of CVA,coronary artery disease, rule out MI, and dementia. The patient wasfound on the floor with drops of blood surrounding her, was initiallyunresponsive, but then, she was able to be aroused by the staff members. The patient had blood on the nose and bruises on the face. Thepatient's fall is unwitnessed and no one knows how did it happen or whattime she fell. The patient when came into the hospital, blood pressureswere on the low side. Blood pressure in the emergency room initiallywas 87/65 with 94% saturation. The patient was seen by the traumaservice as a level 2 trauma and worked up and was cleared up from traumaservice and I was then consulted for admission. The patient was seen ont floor together with her son, Andrew Nogueira. The patient has fallenapparently about 07:45 this morning. Restrictions/Precautions:  Restrictions/Precautions: General Precautions,Fall Risk    Subjective  Chart Reviewed: Yes,Orders,Progress Notes  Patient assessed for rehabilitation services?: Yes  Response to previous treatment: Patient with no complaints from previous session  Family / Caregiver Present: No    Subjective: RN in room with pt with pt being on BSC. RN approved of OT session. pt agreeable to therapy.     Pain:  Pain Assessment  Patient Currently in Pain: Denies    Vitals: Vitals not assessed per clinical judgement, see nursing flowsheet    Social/Functional History:  Lives With: Spouse  Type of Home: Assisted living ([facility])  Home Equipment: Rolling walker (no AD PTA)   Bathroom Shower/Tub: Walk-in shower  Bathroom Toilet: Handicap height  Bathroom Equipment: Shower chair       ADL Assistance: Needs assistance  Homemaking Assistance: Needs assistance  Ambulation Assistance: Independent  Transfer Assistance: Independent    Active : No          VISION:WFL    HEARING:  slightly "[employer]     COGNITION: Slow Processing, Decreased Recall, Decreased Insight, Impaired Memory and Decreased Problem Solving    RANGE OF MOTION:  Bilateral Upper Extremity:  WFL    STRENGTH:  Bilateral Upper Extremity:  WFL    SENSATION:   Not tested     ADL:   Grooming: with set-up. wash hands via wash cloth   Toileting: Contact Guard Assistance. for pericare (completed while standing) and clothing management   Toilet Transfer: Contact Guard Assistance. from [facility] . BALANCE:  Sitting Balance:  Supervision. Standing Balance: Contact Guard Assistance. standing x30 seconds for ADL    BED MOBILITY:  Not Tested    TRANSFERS:  Sit to Stand:  [address], min cuign for safety. Stand to Sit: Contact Guard Assistance. FUNCTIONAL MOBILITY:  Assistive Device: handheld assist - would benefit from using RW   Assist Level:  Contact Guard Assistance, Minimal Assistance and X 1. Distance: To and from bathroom  1 LOB noted requiring min A for correction     Activity Tolerance:  Patient tolerance of  treatment: good. Assessment:  Assessment: pt requires assistance with ADLs, transfers, and functional mobility compared to  PLOF. Pt would benefit from skilled OT services to address these tasks, in addition to strength and endurance to return to PLOF.     Performance deficits / Impairments: Decreased functional mobility ,Decreased ADL status,Decreased endurance,Decreased strength,Decreased safe awareness,Decreased cognition,Decreased balance  Prognosis: Fair  REQUIRES OT FOLLOW UP: Yes  Decision Making: Medium Complexity    Treatment Initiated: Treatment and education initiated within context of evaluation. Evaluation time included review of current medical information, gathering information related to past medical, social and functional history, completion of standardized testing, formal and informal observation of tasks, assessment of data and development of plan of care and goals. Treatment time included skilled education and facilitation of tasks to increase safety and independence with ADL's for improved functional independence and quality of life. Discharge Recommendations:  Continue to assess pending progress,24 hour supervision or assist    Patient Education:  OT Education: OT Role,Plan of Care,Transfer Training,ADL Adaptive Strategies    Equipment Recommendations:  Equipment Needed: No    Plan:  Times per week: 3-5x  Times per day: Daily  Current Treatment Recommendations: Gait Training,Strengthening,Patient/Caregiver Education & Training,Balance Training,Functional Mobility Training,Endurance Training,Safety Education & Training,Self-Care / ADL. See long-term goal time frame for expected duration of plan of care. If no long-term goals established, a short length of stay is anticipated. Goals:  Patient goals : did not state  Short term goals  Time Frame for Short term goals: by discharge  Short term goal 1: pt will complete functional mobility to/from the bathroom and [facility] distances with SBA to access ADLs  Short term goal 2: Pt will complete BADL routine with SBA to increase indep with ADLs  Short term goal 3: pt will complete dynamic standing x4 minutes with unilateral release and SBA to increase ease with grooming tasks         Following session, patient left in safe position with all fall risk precautions in place.

## 2022-03-01 NOTE — CARE COORDINATION
3/1/22, 10:24 AM EST  DISCHARGE PLANNING EVALUATION:    Lubna Duggan       Admitted: 2/28/2022/ Marimar Monroe day: 1   Location: -31/031-A Reason for admit: Syncope and collapse [R55]  DARIUS (acute kidney injury) ([facility] [address] [N17.9]  Closed head injury, initial encounter [J74.64SB]  Fall, initial encounter [W19. XXXA]  Contusion of forehead, initial encounter [S00.83XA]  Acute renal failure, unspecified acute renal failure type ([facility] [address] [N17.9]  Hematemesis without nausea [K92.0]   PMH:  has a past medical history of CAD (coronary artery disease), Colon polyps, CVA (cerebral vascular accident) ([facility] [address] Degenerative arthritis of cervical spine, Dementia ([facility] [address] [facility] Hyperlipidemia, Hypertension, and Psoriasis. Procedure:   3/1 Echo: no resulted  Barriers to Discharge:  Syncopal episode, fall at [facility]. PCP following. GI consult for + OBS. Hgb 7.1. IVF. Iron. IV protonix. PT/OT/   PCP: Efe Villareal MD  Readmission Risk Score: 16.8 ( )%    Patient Goals/Plan/Treatment Preferences: From [facility]. SW consulted. Transportation/Food Security/Housekeeping Addressed:  No issues identified.

## 2022-03-01 NOTE — PROGRESS NOTES
Family Medicine Progress Notes/Coverage    Today's Date: 3/1/22  8B-31/031-A  Medical Record # [id number]  CSN/Account # [de-identified]      Ms. Marni Street admitted on 2/28/2022        Subjective / Interval History :     sleepy mostly   Seen with her daughter and son at bedside  Reviewed notes, consults, laboratory and radiology results,    Objective:       Physical Exam:  Patient Vitals for the past 24 hrs:   BP Temp Temp src Pulse Resp SpO2 Height Weight   03/01/22 0730    60  100 %     03/01/22 0351 (!) 92/55 98.4 °F (36.9 °C) Axillary 57 16 97 %     03/01/22 0004 (!) 107/55 98.8 °F (37.1 °C) Axillary 69 16 98 %     02/28/22 2034 (!) 101/54 98.6 °F (37 °C) Axillary 71 14      02/28/22 1528 (!) 119/59   65 15      02/28/22 1338      90 %     02/28/22 1059 (!) 134/116   66 14 96 %     02/28/22 0934 110/62   60 16 100 %     02/28/22 0920 102/74   70 18      02/28/22 0905 90/73   59 20      02/28/22 0836 90/71   57 18      02/28/22 0830 87/65 96.6 °F (35.9 °C) Axillary 60 19 94 % 5' 1\" (1.549 m) 139 lb (63 kg)       General Appearance:  sleepy, no distress, appears stated age   HEENT:  Neck:      Chest/Lungs:  Heart: CTA  RRR   Abdomen:  Back:    Extremities:  Neurological Exam:   sleepy       Assessment:     Admitting Diagnosis:    Syncope and collapse [R55]  DARIUS (acute kidney injury) ([facility] [address] [N17.9]  Closed head injury, initial encounter [S09.90XA]  Fall, initial encounter [W19. XXXA]  Contusion of forehead, initial encounter [S00.83XA]  Acute renal failure, unspecified acute renal failure type ([address] [N17.9]  Hematemesis without nausea [K92.0]    Active Hospital Problems    Diagnosis Date Noted    Fall at home, initial encounter [W19. Bernabe Sheets J38.767] 02/28/2022     Priority: High    [facility] [N17.9] 02/28/2022 Priority: High    Syncope and collapse [R55] 02/28/2022     Priority: High    Occult blood positive stool [R19.5] 02/28/2022     Priority: High    Forehead contusion [S00.83XA] 02/28/2022     Priority: Medium    Traumatic ecchymosis of right knee [S80.01XA] 02/28/2022     Priority: Medium    Anemia [D64.9] 02/28/2022     Priority: Medium    Hypotension due to hypovolemia [I95.89, E86.1] 02/28/2022     Priority: Medium    CAD (coronary artery disease)-mild NONobstructive max 30% ostail, LCX, mid lad and diffuse mild luminal irregularites-per cath 08/08/12 [I25.10] 08/08/2012     Priority: Medium    Hematemesis without nausea [K92.0]     Antiplatelet or antithrombotic long-term use [Z79.02] 03/15/2021    Dementia without behavioral disturbance ([facility] [address] [F03.90] 08/02/2016    Essential hypertension [I10] 04/14/2016    Hyperlipidemia [E78.5] 04/14/2016    S/P cardiac cath 2012- mild nonobstructive CAD [Z98.890] 01/22/2016    CVA (cerebral vascular accident)-2002 [I63.9] 06/21/2012       Plan:     Discussed plans with the nursing staff  H/H every 6 hours, transfuse if < 7  GI consult pending  Lisinopril on parameter    Medications, Laboratories and Imaging results:    Scheduled Meds:   pantoprazole  40 mg IntraVENous BID    aspirin  81 mg Oral Daily    atorvastatin  20 mg Oral Daily    polyvinyl alcohol  1 drop Ophthalmic BID    donepezil  10 mg Oral Nightly    ferrous sulfate  325 mg Oral Daily with breakfast    fluticasone  2 spray Nasal Daily    lisinopril  10 mg Oral Daily    memantine  10 mg Oral BID    sodium chloride flush  5-40 mL IntraVENous 2 times per day     Continuous Infusions:   dextrose 5 % and 0.9 % NaCl      sodium chloride       PRN Meds:sodium chloride flush, sodium chloride, ondansetron **OR** ondansetron, polyethylene glycol, acetaminophen **OR** acetaminophen    Imaging:    Lab Review :    Lab Results   Component Value Date    WBC 6.1 03/01/2022    HGB 7.1 (L) 03/01/2022 HCT 23.8 (L) 03/01/2022    .4 (H) 03/01/2022     03/01/2022     Lab Results   Component Value Date    CREATININE 1.6 (H) 03/01/2022    BUN 56 (H) 03/01/2022     03/01/2022    K 4.3 03/01/2022     (H) 03/01/2022    CO2 22 (L) 03/01/2022     Lab Results   Component Value Date    CKTOTAL 96 02/28/2022     Lab Results   Component Value Date    ALT 15 02/28/2022    AST 14 02/28/2022    ALKPHOS 42 02/28/2022    BILITOT 0.7 02/28/2022     Lab Results   Component Value Date    LIPASE 91 (H) 06/29/2020         Electronically signed by Erik Benites MD on 3/1/22 at 7:19 AM DAWSON Kay M.D.

## 2022-03-01 NOTE — H&P
[address] [place] [address]                              HISTORY AND PHYSICAL    PATIENT NAME: Meg Day                     :        1940  MED REC NO:   [id number]                           ROOM:       0031  ACCOUNT NO:   [de-identified]                           ADMIT DATE: 2022  PROVIDER:     Chas Palma. Harvinder Mccarthy M.D.    Rama Shorten:  The patient is an 20-year-old female brought in here  by the ambulance because of fall at her place in  [facility]  this morning. HISTORY OF PRESENT ILLNESS:  The patient is an 20-year-old female who is  known to me through the office being her primary care provider. The  patient lives in the assisted living for one year, has history of CVA,  coronary artery disease, rule out MI, and dementia. The patient was  found on the floor with drops of blood surrounding her, was initially  unresponsive, but then, she was able to be aroused by the staff members. The patient had blood on the nose and bruises on the face. The  patient's fall is unwitnessed and no one knows how did it happen or what  time she fell. The patient when came into the hospital, blood pressures  were on the low side. Blood pressure in the emergency room initially  was 87/65 with 94% saturation. The patient was seen by the trauma  service as a level 2 trauma and worked up and was cleared up from trauma  service and I was then consulted for admission. The patient was seen on  the floor together with her son, Dewitte Goodell. The patient has fallen  apparently about 07:45 this morning. REVIEW OF SYSTEMS:  Positive syncope. Positive fall. Had bruises on  the face. Denies any headache. No dizziness. No ear pain. No sore  throat, cough, or cold. No shortness of breath or chest pain; however,  the patient had dementia. Denies any weakness or paralysis. The rest  of the review of systems is negative.     PAST MEDICAL HISTORY:  The patient had a history of coronary artery  disease, had a nonobstructive catheterization in 2012; had history of  CVA in 2004; degenerative arthritis in cervical spine; dementia;  hyperlipidemia; hypertension; and psoriasis. PAST SURGICAL HISTORY:  Includes bladder repair, cholecystectomy, eye  surgery, MARIA R-BSO. FAMILY HISTORY:  Significant for heart disease and breast cancer in the  daughter. SOCIAL HISTORY:  The patient lives in assisted living. No smoking or  substance abuse. The patient is . MEDICATIONS:  See nursing notes. ALLERGIES:  TO MORPHINE.    PHYSICAL EXAMINATION:  VITAL SIGNS:  The patient's blood pressure 134/116, pulse 66,  respirations 16, saturations 96%. HEENT:  Head had some bruises and ecchymosis on the right frontal head  and also on the left eyebrow. However, there is no wound, no bleeding. Pupils are equal and reactive to light. Tympanic membrane showed no  otorrhea. No rhinorrhea. NECK:  Supple. There is no neck rigidity. No submandibular  lymphadenopathy. No thyromegaly. LUNGS:  Good air exchange. No rales or wheezes. HEART:  Regular rate and rhythm. No murmur. CHEST WALL:  Symmetrical.  No retractions. ABDOMEN:  Soft, flat, depressible, nontender. No hepatosplenomegaly. _____ bowel sounds. BACK:  No CVA tenderness. EXTREMITIES:  No edema. Full pulses. NEUROLOGIC:  Motor is 5/5 in all extremities. Cranial nerves II through  XII are all intact. Sensory 100%. ASSESSMENT:  1. Syncope and collapse with unknown mechanism. 2.  Acute kidney injury. 3.  Anemia with positive Hemoccult. 4.  Fall injury. 5.  Traumatic hematoma on the forehead, right knee and also on the left  eyebrow. 6.  Nonobstructive coronary artery disease. 7.  Hypertension. 8.  History of stroke in 2002 without any residual.  9.  Dementia without behavioral disturbance. 10. Hypotension due to dehydration. PLAN:  Resume home medications.  Consult GI due to anemia + occult, Protonix, SCD, the patient on physical therapy. Evaluate the patient for mobility and balance. I ordered  echocardiogram, carotid Doppler. Home medications are resumed. Telemetry. The patient will be hydrated slowly because of the acute  kidney injury and will be monitored. The patient will be followed up  accordingly and further orders will be based on clinical course and  laboratory and x-ray findings. AVERY Hodges M.D.    D: 02/28/2022 14:42:26       T: 02/28/2022 18:09:20     [email or web address]  Job#: [id number]     Doc#: [id number]

## 2022-03-02 NOTE — PROGRESS NOTES
[facility]  OCCUPATIONAL THERAPY MISSED TREATMENT NOTE  [facility] MED SURG 8B  8B-31/031-A      Date: 3/2/2022  Patient Name: Namrata Figueroa        CSN: [id number]   : 1940  (78 y.o.)  Gender: female   Referring Practitioner: Ge Jensen MD  Diagnosis: syncope and collapse    REASON FOR MISSED TREATMENT: Hold Treatment per Nursing     Pt had Hgb of 6.5 this AM and had completed blood transfusion, but awaiting redraw upon OT attempt. Will check back as able.

## 2022-03-02 NOTE — CARE COORDINATION
3/2/22, 10:27 AM EST    DISCHARGE ON GOING EVALUATION    [facility] day: 2  Barriers to Discharge: Hgb 6.5. PRBCs ordered. IV protonix. Orthostatics. PT/OT. PCP: Emre Pedersen MD  Readmission Risk Score: 16.9 ( )%  Patient Goals/Plan/Treatment Preferences: From the JOE Corona following.

## 2022-03-02 NOTE — PROGRESS NOTES
Notified Dr. Rowan Wolfe of patient's hgb result. New orders received to decrease IV fluid rate and call with next HGB result. Will continue to monitor.

## 2022-03-02 NOTE — PROGRESS NOTES
[facility] [address]  PHYSICAL THERAPY MISSED TREATMENT NOTE  STRZ MED SURG 8B    Date: 3/2/2022  Patient Name: Mihaela Hood        MRN: [id number]   : 1940  (78 y.o.)  Gender: female                REASON FOR MISSED TREATMENT:  Hold treatment per nursing request.  Pt with low hemaglobin this AM, at 6.5, about to receive blood transfusion, will check back as able.      Cierra Santana PT, DPT

## 2022-03-02 NOTE — PROGRESS NOTES
Gastroenterology Progress Note:     Patient Name:  Tk Barone   MRN: [id number]  [id number]  YOB: 1940  Admit Date: 2/28/2022  8:23 AM  Primary Care Physician: Florence Carrion MD   8B-31/031-A     Patient seen and examined. 24 hours events and chart reviewed. Subjective: Patient sitting up in the chair, daughter present. Patient denies abd pain & nausea. She endorses her \"bottom hurting. \" Her daughter states the patient was endorsing chest pain earlier. Hgb dropped to 6.9 last night then 6.5. She is receiving PRBC. No BM overnight or yet today. Objective:  BP (!) 107/44   Pulse 53   Temp 97.6 °F (36.4 °C) (Axillary)   Resp 20   Ht 5' 1\" (1.549 m)   Wt 139 lb (63 kg)   SpO2 100% Comment: lung sounds clear  BMI 26.26 kg/m²     Physical Exam:    General:  Nourished in no distress  HEENT: Atraumatic, normocephalic. Moist oral mucous membranes. Neck: Supple without adenopathy, JVD, thyromegaly or masses. Trachea midline. CV: Heart RRR, no murmurs, rubs, gallops. Resp: Even, easy without cough or accessory use. Lungs clear to ascultation bilaterally. Abd: Round, soft, nontender. No hepatosplenomegaly or mass present. Active bowel sounds heard. No distention noted. Ext:  Without cyanosis, clubbing, edema.    Skin: Pink, warm, dry  Neuro:  Alert, oriented x 1, confused      Rectal: deferred    Labs:   CBC:   Lab Results   Component Value Date    WBC 6.1 03/01/2022    HGB 6.5 03/02/2022    HCT 22.0 03/02/2022    .4 03/01/2022     03/01/2022     BMP:   Lab Results   Component Value Date     03/01/2022    K 4.3 03/01/2022     03/01/2022    CO2 22 03/01/2022    BUN 56 03/01/2022    CREATININE 1.6 03/01/2022    CALCIUM 8.9 03/01/2022     Lipids:   Lab Results   Component Value Date    ALKPHOS 42 02/28/2022    ALT 15 02/28/2022    AST 14 02/28/2022    BILITOT 0.7 02/28/2022    LABALBU 4.2 02/28/2022    LABALBU 4.8 03/01/2012    AMYLASE 134 06/29/2020    LIPASE 91 06/29/2020      Ref. Range 3/1/2022 14:30   Iron Latest Ref Range: 50 - 170 ug/dL 63   Iron Saturation Latest Ref Range: 20 - 50 % 31   TIBC Latest Ref Range: 171 - 450 ug/dL 206   FOLATE, FOLAT Latest Ref Range: 4.8 - 24.2 ng/mL 11.9   Vitamin B-12 Latest Ref Range: 211 - 911 pg/mL 484      Ref. Range 3/1/2022 13:34   Immature Retic Fract Latest Ref Range: 3.0 - 15.9 % 16.3 (H)   Retic Ct Abs Latest Ref Range: 0.5 - 2.0 % 2.2 (H)   Absolute Retic # Latest Ref Range: 20.0 - 115.0 thou/mm3 51.0   Retic Hemoglobin Latest Ref Range: 28.2 - 35.7 pg 31.5     Current Meds:  Scheduled Meds:   [Held by provider] aspirin  81 mg Oral Daily    atorvastatin  20 mg Oral Daily    polyvinyl alcohol  1 drop Ophthalmic BID    donepezil  10 mg Oral Nightly    [Held by provider] ferrous sulfate  325 mg Oral Daily with breakfast    fluticasone  2 spray Nasal Daily    lisinopril  10 mg Oral Daily    memantine  10 mg Oral BID    sodium chloride flush  5-40 mL IntraVENous 2 times per day     Continuous Infusions:   sodium chloride      pantoprozole (PROTONIX) infusion      dextrose 5 % and 0.9 % NaCl Stopped (03/02/22 0417)    sodium chloride         Assessment:  79 yo F admitted 02/28/22 for a fall. She was noted to have bruising to the face & bleeding from the nares. GI consulted for anemia. H/O CAD & CVA, on aspirin & Plavix. H?O iron deficiency, on iron supplementation at home. 1. Fall at home  2. Dementia  3. Acute on chronic anemia  4. H/O CVA- on ASA & Plavix PTA  5. CAD- on ASA & Plavix PTA  6. DARIUS  7. GITA- on PO iron  8.  H/O HTN    Plan:     Monitor H & H, transfuse prn   Diet as tolerated   NPO at midnight   Hold [facility]   Hold iron   Change PPI to PPI gtt   EGD with risks & benefits discussed with patient's daughter, she will discuss further with family   Updated later in the day that family is agreeable to proceed with EGD   EGD 03/03/22 with Dr. Shantelle Rose RN updated   Supportive care per primary team  Will follow    Case reviewed and impression/plan reviewed in collaboration with Dr. Wesley James  Electronically signed by LUZ Lutz CNP on 3/2/2022 at 12:16 PM    [facility]

## 2022-03-02 NOTE — PROGRESS NOTES
Family Medicine Progress Notes/Coverage    Today's Date: 3/2/22  [id number]  Medical Record # [id number]  CSN/Account # [de-identified]      Ms. Sami Oleary admitted on 2/28/2022        Subjective / Interval History :     Ate well last night , alert yesterday  Hemoglobin still dropping  Sore on the forehead due to contusion  Reviewed notes, consults, laboratory and radiology results,    Objective:       Physical Exam:  Patient Vitals for the past 24 hrs:   BP Temp Temp src Pulse Resp SpO2   03/02/22 0815 (!) 115/58 97.5 °F (36.4 °C) Axillary 70 18 100 %   03/02/22 0346 111/61 97.7 °F (36.5 °C) Axillary 62 16 99 %   03/02/22 0010 118/64 97.5 °F (36.4 °C) Axillary 68 14 100 %   03/01/22 2109 (!) 120/92 97.7 °F (36.5 °C) Axillary 62 14 100 %   03/01/22 1537 108/62 98.7 °F (37.1 °C) Axillary 52 16 98 %   03/01/22 1049 (!) 110/54 98.8 °F (37.1 °C) Axillary 50 18 99 %       General Appearance:  Alert, cooperative, no distress, appears stated age   HEENT:  Neck: Contusion forehead  supple   Chest/Lungs:  Heart: CTA  RRR   Abdomen:  Back: soft   Extremities:  Neurological Exam: No edema  No deficit except for dementia       Assessment:     Admitting Diagnosis:    Syncope and collapse [R55]  DARIUS (acute kidney injury) ([address] [N17.9]  Closed head injury, initial encounter [S09.90XA]  Fall, initial encounter [W19. XXXA]  Contusion of forehead, initial encounter [S00.83XA]  Acute renal failure, unspecified acute renal failure type ([address] [N17.9]  Hematemesis without nausea [K92.0]    Active Hospital Problems    Diagnosis Date Noted    Fall at home, initial encounter [W19. Manohar Paris L22.759] 02/28/2022     Priority: High    DARIUS (acute kidney injury) ([address] [N17.9] 02/28/2022     Priority: High    Syncope and collapse [R55] 02/28/2022     Priority: High    Occult blood positive stool [R19.5] 02/28/2022 Priority: High    Forehead contusion [S00.83XA] 02/28/2022     Priority: Medium    Traumatic ecchymosis of right knee [S80.01XA] 02/28/2022     Priority: Medium    Anemia [D64.9] 02/28/2022     Priority: Medium    Hypotension due to hypovolemia [I95.89, E86.1] 02/28/2022     Priority: Medium    CAD (coronary artery disease)-mild NONobstructive max 30% ostail, LCX, mid lad and diffuse mild luminal irregularites-per cath 08/08/12 [I25.10] 08/08/2012     Priority: Medium    Hematemesis without nausea [K92.0]     Antiplatelet or antithrombotic long-term use [Z79.02] 03/15/2021    Dementia without behavioral disturbance ([facility] [F03.90] 08/02/2016    Essential hypertension [I10] 04/14/2016    Hyperlipidemia [E78.5] 04/14/2016    S/P cardiac cath 2012- mild nonobstructive CAD [Z98.890] 01/22/2016    CVA (cerebral vascular accident)-2002 [I63.9] 06/21/2012       Plan:     Discussed plans with the nursing staff  Transfuse 1 unit  Labs in AM  Continue to hold on Plavix   Needs Endoscopy    Medications, Laboratories and Imaging results:    Scheduled Meds:   pantoprazole  40 mg IntraVENous BID    [Held by provider] aspirin  81 mg Oral Daily    atorvastatin  20 mg Oral Daily    polyvinyl alcohol  1 drop Ophthalmic BID    donepezil  10 mg Oral Nightly    [Held by provider] ferrous sulfate  325 mg Oral Daily with breakfast    fluticasone  2 spray Nasal Daily    lisinopril  10 mg Oral Daily    memantine  10 mg Oral BID    sodium chloride flush  5-40 mL IntraVENous 2 times per day     Continuous Infusions:   sodium chloride      dextrose 5 % and 0.9 % NaCl Stopped (03/02/22 5624)    sodium chloride       PRN Meds:sodium chloride, sodium chloride flush, sodium chloride, ondansetron **OR** ondansetron, polyethylene glycol, acetaminophen **OR** acetaminophen    Imaging:    Lab Review :    Lab Results   Component Value Date    WBC 6.1 03/01/2022    HGB 6.5 (LL) 03/02/2022    HCT 22.0 (L) 03/02/2022    .4 (H) 03/01/2022     03/01/2022     Lab Results   Component Value Date    CREATININE 1.6 (H) 03/01/2022    BUN 56 (H) 03/01/2022     03/01/2022    K 4.3 03/01/2022     (H) 03/01/2022    CO2 22 (L) 03/01/2022     Lab Results   Component Value Date    CKTOTAL 96 02/28/2022     Lab Results   Component Value Date    ALT 15 02/28/2022    AST 14 02/28/2022    ALKPHOS 42 02/28/2022    BILITOT 0.7 02/28/2022     Lab Results   Component Value Date    LIPASE 91 (H) 06/29/2020         Electronically signed by Noe Navarrete MD on 3/2/22 at 9:27 AM DAWSON Charles M.D.

## 2022-03-02 NOTE — PROGRESS NOTES
Dr. Anisha Gay notified of unchanged, low HGB. To discuss with Dr. Devin Keith. No new orders received at this time.

## 2022-03-02 NOTE — PROGRESS NOTES
Patient removed 1 IV, this RN restarted another IV and the patient has just removed it as well. This is a total of 4 IVs since admitted. Patient also has taken off telemetry multiple times. There has been family present. Charge nurse aware. Requesting live sitter at the bedside to help nursing carry our care plan.

## 2022-03-02 NOTE — PLAN OF CARE
Problem: Falls - Risk of:  Goal: Will remain free from falls  Description: Will remain free from falls  Outcome: Ongoing  Note: Free of falls this shift. Call light within reach. Bed alarm on. Falling star program in place. Problem: Falls - Risk of:  Goal: Absence of physical injury  Description: Absence of physical injury  Outcome: Ongoing  Note: Free of falls this shift. Call light within reach. Bed alarm on. Falling star program in place. Problem: Skin Integrity:  Goal: Will show no infection signs and symptoms  Description: Will show no infection signs and symptoms  Outcome: Ongoing  Note: No new signs or symptoms of infection noted. Problem: Skin Integrity:  Goal: Absence of new skin breakdown  Description: Absence of new skin breakdown  Outcome: Ongoing  Note: See skin assessment. Problem: Bowel Function - Altered:  Goal: Bowel elimination is within specified parameters  Description: Bowel elimination is within specified parameters  Outcome: Ongoing  Note: See I/Os. Problem: Cardiac Output - Decreased:  Goal: Hemodynamic stability will improve  Description: Hemodynamic stability will improve  Outcome: Ongoing  Note: Vitals monitored per protocol. Transfusing 1 unit of PRBCs for hgb of 6.5     Problem: Pain:  Goal: Pain level will decrease  Description: Pain level will decrease  Outcome: Ongoing  Note: Patient denies pain at this time. Problem: DISCHARGE BARRIERS  Goal: Patient's continuum of care needs are met  Outcome: Ongoing  Note: Monitoring H&H every 8 hours. Case management on case. Care plan reviewed with patient. Patient verbalize understanding of the plan of care and contribute to goal setting.

## 2022-03-02 NOTE — PROGRESS NOTES
This nurse unable to restart IV. This RN arrived to the room and patient and daughter not there. They were in the lobby taking a walk. Patient was steady on her feet with a front wheel walker and denied dizziness. Daughter requested linens to be changed and accompanied patient to shower along with bedside sitter. Supplies were placed at the bedside. Nurse to nurse report given.

## 2022-03-03 NOTE — PROGRESS NOTES
EKG obtained at this time. Patient converted to sinus rhythm with a rate of 60. Dr Clarence Zavala notified. Ordered to titrate drip off.

## 2022-03-03 NOTE — CARE COORDINATION
3/3/22, 3:27 PM EST    DISCHARGE ON GOING EVALUATION    Helen Virgen day: 3  Barriers to Discharge: EGD cancelled r/t new onset afib. Cardio consult. PCP: Kristine Moore MD  Readmission Risk Score: 15.3 ( )%  Patient Goals/Plan/Treatment Preferences: From [facility] assisted living.

## 2022-03-03 NOTE — PROGRESS NOTES
Pt ambulated to cart to go to endo to get EGD. Pt's HR was reading tachycardia on the heart monitor with a rate of 164. STAT EKG obtained which indicates Atrial Fib. Dr Fabiano Fields notified. EGD cancelled for today. Philippe Vazquez NP for GI aware of cancelled procedure.

## 2022-03-03 NOTE — PROGRESS NOTES
Physician Progress Note      Lupe Leo  [facility] #:                  [id number]  :                       1940  ADMIT DATE:       2022 8:23 AM  DISCH DATE:  RESPONDING  PROVIDER #:        Manan Thomas          QUERY TEXT:    Pt admitted s/p fall. Pt noted to have hematemesis and has \"anemia with   positive Hemoccult\" documented. Pt noted to have Hgb () 9.2, (3/1) 7.1. If possible, please respond below and document in the progress notes and   discharge summary further specificity regarding the acuity and type of anemia:    The medical record reflects the following:  Risk Factors: hematemesis, s/p fall with traumatic hematoma of forehead/right   knee/left eyebrow, advanced age  Clinical Indicators: \"anemia with positive Hemoccult\" documented; Hgb ()   9.2, (3/1) 7.1  Treatment: labs, IVF, GI consult    Thank you! Kalpana López RN, BSN, RHIT, CCDS  Clinical   Options provided:  -- Anemia due to acute blood loss  -- Anemia due to chronic blood loss  -- Anemia due to acute on chronic blood loss  -- Anemia due to iron deficiency  -- Other - I will add my own diagnosis  -- Disagree - Not applicable / Not valid  -- Disagree - Clinically unable to determine / Unknown  -- Refer to Clinical Documentation Reviewer    PROVIDER RESPONSE TEXT:    Provider is clinically unable to determine a response to this query.     Query created by: Randolph Lopez on 3/1/2022 8:55 AM      Electronically signed by:  Manan Thomas 3/3/2022 6:49 AM

## 2022-03-03 NOTE — CONSULTS
[facility]'s  Cardiology Consult      Patient: Megan Taylor  YOB: 1940    MRN: [id number]   Acct: [de-identified]     Primary Care Physician: Ilia Robertson MD    REASON FOR CONSULT:    chest pain abnorman EKG     CHIEF COMPLAINT:    Fall     HISTORY OF PRESENT ILLNESS:    Megan Taylor is a pleasant 78year old female patient with past medical history that includes:   Past Medical History:   Diagnosis Date    CAD (coronary artery disease) 8/8/12    non-obstructive 20-30% stenosis LAD and circomflex    Colon polyps 4/25/12    Tubular adenoma Dr. Liliana Reyes CVA (cerebral vascular accident) [facility]) 2004    Degenerative arthritis of cervical spine 2002    Dementia ([facility] [address] 12/14    Hyperlipidemia     Hypertension     Psoriasis    She is accompanied by her daughter at bedside. Patient is a poor historian, has h/o dementia. The patient was admitted to the hospital on 2/28/2022. She is a nursing home resident. While at [facility], patient was found on the floor minimally responsive. She was thought to have a fall. The events are not clear, patient does not recall the details, syncope is suspected. In the ER, her BP was on the low side 87/65. Patient had evidence for DARIUS, dehydration, she was given IVF. Cr improved from 2.1 to 1 today. Also, patient was anemic with positive FOBT. GI bleeding was suspected, ASA/Plavix were held (apparently on DAPT for h/o CVA). Hgb dropped to 6.5. yesterday during PRBC transfusion, patient had transient atypical chest pain. Hgb up to 9.3 today, she feels better, denies any ongoing chest pains. Troponin <0.01, <0.01. EKG revealed sinus bradycardia, HR 55, RBBB, septal infarct. On telemetry, sinus bradycardia was noted with HR as low as 38 in am/last night, in 50s during the day with evidence for PACs/PVCs. Today, she developed A Fib with RVR. She was started on Cardizem gtt, SR was restored. Echo revealed preserved EF, LAE.        All labs, EKG's, diagnostic testing and images as well as cardiac cath, stress testing   were reviewed during this encounter    CARDIAC TESTING  Echo:   ECHO: Results for orders placed during the hospital encounter of 02/28/22    ECHO Complete 2D W Doppler W Color    Narrative  Transthoracic Echocardiography Report (TTE)    Demographics    Patient Name  Wilbert Griffin  Gender             Female  N    MR #          [id number]     Race               Black    Ethnicity    Account #     [de-identified]     Room Number        9569    Accession     [id number]    Date of Study      03/01/2022  Number    Date of Birth 1940    Referring          John Galvan MD  Physician    Age           78 year(s)    Sonographer        OPAL Dejesus, RDCS,  RDMS, RVT    Interpreting       Echo reader of the week  Physician          Dexter Patterson MD    Procedure    Type of Study    TTE procedure:ECHOCARDIOGRAM COMPLETE 2D W DOPPLER W COLOR. Procedure Date  Date: 03/01/2022 Start: 05:15 AM    Study Location: Bedside  Technical Quality: Limited visualization due to uncooperative patient. Indications:Syncope. Additional Medical History:syncope, coronary artery disease, anemia,  cerebrovascular accident, hypertension, hyperlipidemia    Patient Status: Routine    Height: 61 inches Weight: 139 pounds BSA: 1.62 m^2 BMI: 26.26 kg/m^2    BP: 119/59 mmHg    Conclusions    Summary  Normal left ventricle size and systolic function. Ejection fraction was  estimated at 55 %. There were no regional left ventricular wall motion  abnormalities and wall thickness was within normal limits. Doppler parameters were consistent with abnormal left ventricular  relaxation (grade 1 diastolic dysfunction). The left atrium is Mild to moderate dilated.     Signature    ----------------------------------------------------------------  Electronically signed by Dexter Patterson MD (Interpreting  physician) on 03/01/2022 at 06:05 PM  ----------------------------------------------------------------    Findings    Mitral Valve  The mitral valve structure was normal with normal leaflet separation. DOPPLER: The transmitral velocity was within the normal range with no  evidence for mitral stenosis. Trace mitral regurgitation is present. Aortic Valve  The aortic valve was trileaflet with normal thickness and cuspal  separation. DOPPLER: Transaortic velocity was within the normal range with  no evidence of aortic stenosis. There was no evidence of aortic  regurgitation. Tricuspid Valve  The tricuspid valve structure was normal with normal leaflet separation. DOPPLER: There was no evidence of tricuspid stenosis. Mild tricuspid  regurgitation visualized. Pulmonic Valve  The pulmonic valve leaflets exhibited normal thickness, no calcification,  and normal cuspal separation. DOPPLER: The transpulmonic velocity was  within the normal range with no evidence for regurgitation. Left Atrium  The left atrium is Mild to moderate dilated. Left Ventricle  Normal left ventricle size and systolic function. Ejection fraction was  estimated at 55 %. There were no regional left ventricular wall motion  abnormalities and wall thickness was within normal limits. Doppler parameters were consistent with abnormal left ventricular  relaxation (grade 1 diastolic dysfunction). Right Atrium  Right atrial size was normal.    Right Ventricle  The right ventricular size was normal with normal systolic function and  wall thickness. Pericardial Effusion  The pericardium was normal in appearance with no evidence of a pericardial  effusion. Pleural Effusion  No evidence of pleural effusion. Aorta / Great Vessels  -Aortic root dimension within normal limits.  -The Pulmonary artery is within normal limits. -IVC size is within normal limits with normal respiratory phasic changes.     M-Mode/2D Measurements & Calculations    LV Diastolic   LV Systolic Dimension:    AV Cusp Separation: 1.5 cmLA  Dimension: 4   2.5 cm                    Dimension: 3 cmAO Root Dimension:  cm             LV Volume Diastolic: 70   2.5 cmLA Area: 14.3 cm^2  LV FS:37.5 %   ml  LV PW          LV Volume Systolic: 65.9  Diastolic: 0.7 ml  cm             LV EDV/LV EDV Index: 70   RV Diastolic Dimension: 1.7 cm  Septum         ml/43 m^2LV ESV/LV ESV  Diastolic: 0.6 Index: 07.6 ml/14 m^2     LA/Aorta: 1.2  cm             EF Calculated: 68.1 %  LA volume/Index: 36.8 ml /23m^2    Doppler Measurements & Calculations    MV Peak E-Wave: 59.1     AV Peak Velocity: 149  LVOT Peak Velocity: 91.3  cm/s                     cm/s                   cm/s  MV Peak A-Wave: 63 cm/s  AV Peak Gradient: 8.88 LVOT Peak Gradient: 3 mmHg  MV E/A Ratio: 0.94       mmHg  MV Peak Gradient: 1.4                           TV Peak E-Wave: 55.3 cm/s  mmHg                                            TV Peak A-Wave: 51.8 cm/s    MV Deceleration Time:                           TV Peak Gradient: 1.22  275 msec                 IVRT: 130 msec         mmHg  TR Velocity:243 cm/s  TR Gradient:23.62 mmHg  AV DVI (Vmax):0.61     PV Peak Velocity: 57.8  cm/s  PV Peak Gradient: 1.34  MR Velocity: 319 cm/s                           mmHg    [id number]    Past Medical History:    Past Medical History:   Diagnosis Date    CAD (coronary artery disease) 8/8/12    non-obstructive 20-30% stenosis LAD and circomflex    Colon polyps 4/25/12    Tubular adenoma Dr. Scott Masters [facility]A ([facility]rebral vascular accident) [facility]) 2004    Degenerative arthritis of cervical spine 2002    Dementia ([facility] [address] 12/14    Hyperlipidemia     Hypertension     Psoriasis        Past Surgical History:    Past Surgical History:   Procedure Laterality Date    BLADDER REPAIR      CARDIAC CATHETERIZATION  8-8-12    CHOLECYSTECTOMY      COLONOSCOPY 4/24/2012    Dr. Lacie Torres  07/2016    corneal related - Dr Olivia Flores         Medications Prior to Admission:    Medications Prior to Admission: acetaminophen (TYLENOL) 650 MG extended release tablet, Take 650 mg by mouth daily  ferrous sulfate (IRON 325) 325 (65 Fe) MG tablet, Take 1 tablet by mouth daily (with breakfast)  lisinopril (PRINIVIL;ZESTRIL) 10 MG tablet, Take 1 tablet by mouth once daily  donepezil (ARICEPT) 10 MG tablet, Take 1 tablet by mouth nightly  triamterene-hydroCHLOROthiazide (MAXZIDE-25) 37.5-25 MG per tablet, Take 1 tablet by mouth once daily  vitamin D (ERGOCALCIFEROL) 1.25 MG (48014 UT) CAPS capsule, TAKE 1 CAPSULE BY MOUTH TWICE A WEEK (Patient taking differently: Take 50,000 Units by mouth twice a week On Mondays and Thursdays)  atorvastatin (LIPITOR) 20 MG tablet, Take 1 tablet by mouth once daily  clopidogrel (PLAVIX) 75 MG tablet, Take 1 tablet by mouth once daily  memantine (NAMENDA) 10 MG tablet, Take 1 tablet by mouth twice daily  Carboxymethylcell-Hypromellose (GENTEAL OP), Apply 1 drop to eye daily 1 drop each eye daily  Ascorbic Acid (VITAMIN C) 250 MG tablet, Take 500 mg by mouth daily  aspirin 81 MG tablet, Take 81 mg by mouth daily.   acetaminophen (TYLENOL) 650 MG extended release tablet, Take 650 mg by mouth daily as needed for Pain  loperamide (IMODIUM) 2 MG capsule, Take 2 mg by mouth daily as needed for Diarrhea  [DISCONTINUED] acetaminophen (TYLENOL) 500 MG tablet, Take 500 mg by mouth every 6 hours as needed for Pain  naproxen (NAPROSYN) 500 MG tablet, TAKE 1 TABLET BY MOUTH TWICE DAILY AS NEEDED FOR PAIN (ARTHRITIC  PAIN,  TAKE  MEDICATIONS  WITH  FOOD)  calcium carbonate 600 MG TABS tablet, Take 1 tablet by mouth daily  [DISCONTINUED] Cod Liver Oil 10 MINIM CAPS, Take by mouth  fluticasone (FLONASE) 50 MCG/ACT nasal spray, 2 sprays by Nasal route daily    Allergies:    Morphine    Social History:    reports that she has never smoked. She has never used smokeless tobacco. She reports that she does not drink alcohol and does not use drugs. Family History:   family history includes Breast Cancer (age of onset: 54) in her daughter; Heart Disease in her brother. REVIEW OF SYSTEMS:  Constitutional: negative for anorexia, chills and fevers,weight change  Skin: negative for new skin rash per patient  HEENT: negative for new visual changes  Respiratory: negative for cough, hemoptysis, wheezing  Cardiovascular: negative for  orthopnea, palpitations   Gastrointestinal: negative for abdominal pain,nausea , vomiting, constipation, diarrhea. Hematologic/lymphatic: negative for bruising  Musculoskeletal:negative for muscle weakness, myalgias,wasting  Neurological: negative for coordination problems, and vertigo  Behavioral/Psych:negative for mood/sleep disturbance      PHYSICAL EXAM:   Vitals:  Patient Vitals for the past 24 hrs:   BP Temp Temp src Pulse Resp SpO2   03/03/22 1715 126/63   59     03/03/22 1700 118/65   60     03/03/22 1630 101/61   120     03/03/22 1615 113/68   129     03/03/22 1600 117/60   138     03/03/22 1529 113/70   143     03/03/22 1525 126/86   100     03/03/22 1458 (!) 108/91   141     03/03/22 1451 (!) 129/101    20    03/03/22 1445    (!) 202     03/03/22 1430    133     03/03/22 1415    141     03/03/22 1335 (!) 129/92   138     03/03/22 1312      100 %   03/03/22 0740 127/66 97.6 °F (36.4 °C) Axillary 58 18 99 %   03/02/22 2327 (!) 125/50 98.1 °F (36.7 °C) Axillary 63 18 98 %   03/02/22 2000 (!) 147/59 96.3 °F (35.7 °C) Axillary 71 18 99 %       Last 3 weights:    Wt Readings from Last 3 Encounters:   02/28/22 139 lb (63 kg)   02/10/22 139 lb 2 oz (63.1 kg)   01/20/22 131 lb 4 oz (59.5 kg)     24 hour intake/output:    Intake/Output Summary (Last 24 hours) at 3/3/2022 3575  Last data filed at 3/3/2022 1359  Gross per 24 hour   Intake 0 ml Output    Net 0 ml     BMI:Body mass index is 26.26 kg/m². General Appearance: alert and oriented to person, place and time, well developed and well- nourished, in no acute distress  Skin: warm and dry, no rash or erythema  Eyes: pupils equal, round, and reactive to light, extraocular eye movements intact, conjunctivae normal  Neck: supple and non-tender without mass, no thyromegaly or thyroid nodules, no cervical lymphadenopathy  Pulmonary/Chest: clear to auscultation bilaterally- no wheezes, rales or rhonchi, normal air movement, no respiratory distress  Cardiovascular: normal rate, regular rhythm, normal S1 and S2, no murmur. No rubs, clicks, or gallops, distal pulses intact, no carotid bruits, Negative JVD  Radial Pulses: intact 2+  Abdomen: soft, non-tender, non-distended, normal bowel sounds, no masses or organomegaly  Extremities: no cyanosis, clubbing . no Edema  Musculoskeletal: normal range of motion, no joint swelling, deformity or tenderness      RADIOLOGY   No results found.     LABS:  Recent Labs     02/28/22 2050   TROPONINT < 0.010     CBC:   Lab Results   Component Value Date    WBC 5.9 03/03/2022    RBC 2.56 03/03/2022    RBC 3.59 04/16/2021    HGB 9.3 03/03/2022    HCT 27.4 03/03/2022    MCV 92.6 03/03/2022    MCH 28.5 03/03/2022    MCHC 30.8 03/03/2022    RDW 14.7 04/16/2021     03/03/2022    MPV 11.3 03/03/2022     BMP:    Lab Results   Component Value Date     03/03/2022    K 3.7 03/03/2022     03/03/2022    CO2 20 03/03/2022    BUN 22 03/03/2022    LABALBU 4.2 02/28/2022    LABALBU 4.8 03/01/2012    CREATININE 1.0 03/03/2022    CALCIUM 8.4 03/03/2022    LABGLOM 64 03/03/2022    GLUCOSE 89 03/03/2022    GLUCOSE 104 08/09/2021     Hepatic Function Panel:    Lab Results   Component Value Date    ALKPHOS 42 02/28/2022    ALT 15 02/28/2022    AST 14 02/28/2022    PROT 7.2 02/28/2022    BILITOT 0.7 02/28/2022    LABALBU 4.2 02/28/2022    LABALBU 4.8 03/01/2012     Magnesium: Lab Results   Component Value Date    MG 1.8 06/29/2020     Warfarin PT/INR:  No components found for: Rudy García  HgBA1c:    Lab Results   Component Value Date    LABA1C 5.9 04/29/2021     FLP:    Lab Results   Component Value Date    TRIG 70 01/27/2022    HDL 36 01/27/2022    LDLCALC 85 01/27/2022    LABVLDL 23 08/09/2021     TSH:    Lab Results   Component Value Date    TSH 1.230 02/28/2022     BNP: No results found for: BNP      ASSESSMENT:  1. Atypical chest pain   2. Sinus bradycardia  3. New onset atrial fibrillation with RVR  4. 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 S/p fall  6. S/p syncope  7. Anemia  8. Suspected gi bleeding  9. DARIUS  10. Dehydration   11. H/o nonobstructive CAD, CVA, HTN, Dyslipidemia     RECOMMENDATIONS:   Chest pain resolved   CP was atypical, in setting of severe anemia   Troponin <0.01, <0.01   EKG revealed sinus bradycardia, HR 55, RBBB, septal infarct   No clinical evidence for ACS   Has h/o nonobstructive CAD, prior h/o CVA   DAPT on hold due GIB, anemia    Cont to monitor H/H, transfuse as needed   GI is following    Continue Lipitor    Syncope is probably 2/2 anemia, dehydration     In the ER, her BP was on the low side 87/65. Patient had evidence for DARIUS, dehydration, she was given IVF.  Cr improved from 2.1 to 1 today   Sinus bradycardia was noted initially, seems to be asymptomatic    Echo revealed preserved EF, LAE   On telemetry, sinus bradycardia was noted with HR as low as 38 in am/last night, in 50s during the day with evidence for PACs/PVCs   Today, she developed A Fib with RVR   Not candidate for anticoagulation at this time due to severe anemia, required PRBC transfusion    Tachycardia is probably exacerbated by anemia    She was started on Cardizem gtt, SR was restored   cardizem was titrated off, resume if AF RVR recurs    No indication for pacemaker at this time (unless patient develops symtpomatic bradycardia, pauses or high degree AVB on telemetry)   Cont telemetry as inpatient, and will need cardiac event monitor on discharge     Above findings and plan of care were discussed with patient and her family, questions were answered, agreeable to plan. Thank you for allowing me to participate in the care of this patient. Please let me know if I can be of any further assistance.       Noreen Otoole MD, Sayda Borden   5:49 PM  3/3/2022

## 2022-03-03 NOTE — PLAN OF CARE
Per RN, patient developed tachycardia as she was being transferred to the cart to go to endo. Telemetry monitor was reading afib. Patient has no history of afib. EKG obtained which indicates the patient is in afib. EGD cancelled. Recommend cardiology consult.

## 2022-03-03 NOTE — PROGRESS NOTES
Family Medicine Progress Notes/Coverage    Today's Date: 3/3/22  [id number]-31/031-A  Medical Record # [id number]  CSN/Account # [de-identified]      Ms. Annemarie Lawton admitted on 2/28/2022        Subjective / Interval History :     Seen with her daughter at bedside  C/o chest pain yesterday , gotten blood transfusion x 1 yesterday   Hemoglobin again dropped 8.4 post transfusion  to 7.3 today  Eating well and now still sleeping  Reviewed notes, consults, laboratory and radiology results,    Objective:       Physical Exam:  Patient Vitals for the past 24 hrs:   BP Temp Temp src Pulse Resp SpO2   03/02/22 2327 (!) 125/50 98.1 °F (36.7 °C) Axillary 63 18 98 %   03/02/22 2000 (!) 147/59 96.3 °F (35.7 °C) Axillary 71 18 99 %   03/02/22 1500 131/81 96.3 °F (35.7 °C) Axillary 62 20 100 %   03/02/22 1241 135/60 97.6 °F (36.4 °C) Axillary 57 22 100 %   03/02/22 1215 (!) 147/70   53  100 %   03/02/22 1030 (!) 107/44 97.6 °F (36.4 °C) Axillary 53 20 100 %   03/02/22 1009 (!) 116/53 97.7 °F (36.5 °C) Axillary 58 20 94 %   03/02/22 0815 (!) 115/58 97.5 °F (36.4 °C) Axillary 70 18 100 %       General Appearance:  Alert, cooperative, no distress, appears stated age   HEENT:  Neck:      Chest/Lungs:  Heart: CTA  RRR   Abdomen:  Back:    Extremities:  Neurological Exam:        Assessment:     Admitting Diagnosis:    Syncope and collapse [R55]  DARIUS (acute kidney injury) ([address] [N17.9]  Closed head injury, initial encounter [M40.66OY]  Fall, initial encounter [W19. XXXA]  Contusion of forehead, initial encounter [S00.83XA]  Acute renal failure, unspecified acute renal failure type ([address] [N17.9]  Hematemesis without nausea [K92.0]    Active Hospital Problems    Diagnosis Date Noted    Fall at home, initial encounter [W19. Giovanna Ropes, L60.857] 02/28/2022     Priority: High    [facility] [N17.9] 02/28/2022     Priority: High    Syncope and collapse [R55] 02/28/2022     Priority: High    Occult blood positive stool [R19.5] 02/28/2022     Priority: High    Forehead contusion [S00.83XA] 02/28/2022     Priority: Medium    Traumatic ecchymosis of right knee [S80.01XA] 02/28/2022     Priority: Medium    Anemia [D64.9] 02/28/2022     Priority: Medium    Hypotension due to hypovolemia [I95.89, E86.1] 02/28/2022     Priority: Medium    CAD (coronary artery disease)-mild NONobstructive max 30% ostail, LCX, mid lad and diffuse mild luminal irregularites-per cath 08/08/12 [I25.10] 08/08/2012     Priority: Medium    Hematemesis without nausea [K92.0]     Antiplatelet or antithrombotic long-term use [Z79.02] 03/15/2021    Dementia without behavioral disturbance ([facility] [address] [F03.90] 08/02/2016    Essential hypertension [I10] 04/14/2016    Hyperlipidemia [E78.5] 04/14/2016    S/P cardiac cath 2012- mild nonobstructive CAD [Z98.890] 01/22/2016    CVA (cerebral vascular accident)-2002 [I63.9] 06/21/2012       Plan:     Discussed plans with the nursing staff  Consult Dr Tate Martinez for endoscopy today  DARIUS much better    Medications, Laboratories and Imaging results:    Scheduled Meds:   [Held by provider] aspirin  81 mg Oral Daily    atorvastatin  20 mg Oral Daily    polyvinyl alcohol  1 drop Ophthalmic BID    donepezil  10 mg Oral Nightly    [Held by provider] ferrous sulfate  325 mg Oral Daily with breakfast    fluticasone  2 spray Nasal Daily    lisinopril  10 mg Oral Daily    memantine  10 mg Oral BID    sodium chloride flush  5-40 mL IntraVENous 2 times per day     Continuous Infusions:   sodium chloride      pantoprozole (PROTONIX) infusion 8 mg/hr (03/02/22 1329)    sodium chloride 80 mL/hr at 03/02/22 1325    dextrose 5 % and 0.9 % NaCl Stopped (03/02/22 0907)    sodium chloride       PRN Meds:sodium chloride, sodium chloride flush, sodium chloride, ondansetron **OR** ondansetron, polyethylene glycol, acetaminophen **OR** acetaminophen    Imaging:    Lab Review :    Lab Results   Component Value Date    WBC 5.9 03/03/2022    HGB 7.3 (L) 03/03/2022    HCT 23.7 (L) 03/03/2022    MCV 92.6 03/03/2022     03/03/2022     Lab Results   Component Value Date    CREATININE 1.0 03/03/2022    BUN 22 03/03/2022     03/03/2022    K 3.7 03/03/2022     (H) 03/03/2022    CO2 20 (L) 03/03/2022     Lab Results   Component Value Date    CKTOTAL 96 02/28/2022     Lab Results   Component Value Date    ALT 15 02/28/2022    AST 14 02/28/2022    ALKPHOS 42 02/28/2022    BILITOT 0.7 02/28/2022     Lab Results   Component Value Date    LIPASE 91 (H) 06/29/2020         Electronically signed by Eri Torrez MD on 3/3/22 at 7:32 AM DAWSON Gallegos M.D.

## 2022-03-03 NOTE — PROGRESS NOTES
Physician Progress Note      Lennox Thayer  CSN #:                  [id number]  :                       1940  ADMIT DATE:       2022 8:23 AM  DISCH DATE:  RESPONDING  PROVIDER #:        Roise Goldberg MD          QUERY TEXT:    Pt admitted s/p fall. Pt noted to have lactic acid 6.3. If possible, please   respond below and document in the progress notes and discharge summary if you   are evaluating and/or treating any of the following: The medical record reflects the following:  Risk Factors: s/p fall, hematemesis, hypotension, dehydration, advanced age  Clinical Indicators: lactic acid 6.3  Treatment: IVF, labs    Thank you! Dewaine Favre, RN, BSN, RHIT, CCDS  Clinical   Options provided:  -- Lactic Acidosis  -- Clinically insignificant elevated lactic acid  -- Other - I will add my own diagnosis  -- Disagree - Not applicable / Not valid  -- Disagree - Clinically unable to determine / Unknown  -- Refer to Clinical Documentation Reviewer    PROVIDER RESPONSE TEXT:    This patient has a clinically insignificant elevated lactic acid.     Query created by: Remigio Stockton on 3/1/2022 8:39 AM      Electronically signed by:  Roise Goldberg MD 3/3/2022 9:10 AM

## 2022-03-03 NOTE — PROGRESS NOTES
[facility]  INPATIENT PHYSICAL THERAPY  EVALUATION  [facility] MED SURG 8B - 8B-31/031-A    Time In: 8098  Time Out: 0831  Timed Code Treatment Minutes: 13 Minutes  Minutes: 21          Date: 3/3/2022  Patient Name: Anamaria Dickey,  Gender:  female        MRN: [id number]  : 1940  (78 y.o.)      Referring Practitioner: Noe Navarrete MD  Diagnosis: syncope and collapse  Additional Pertinent Hx: Per EMR \"The patient is an 78-year-old female who isknown to me through the office being her primary care provider. Thepatient lives in the assisted living for one year, has history of CVA,coronary artery disease, rule out MI, and dementia. The patient wasfound on the floor with drops of blood surrounding her, was initiallyunresponsive, but then, she was able to be aroused by the staff members. The patient had blood on the nose and bruises on the face. Thepatient's fall is unwitnessed and no one knows how did it happen or whattime she fell. The patient when came into the hospital, blood pressureswere on the low side. Blood pressure in the emergency room initiallywas 87/65 with 94% saturation. The patient was seen by the traumaservice as a level 2 trauma and worked up and was cleared up from traumaservice and I was then consulted for admission. The patient was seen onthe floor together with her son, Mila Maradiaga. The patient has fallenapparently about 07:45 this morning. \"     Restrictions/Precautions:  Restrictions/Precautions: General Precautions,Fall Risk  Position Activity Restriction  Other position/activity restrictions: dementia    Subjective:  Chart Reviewed: Yes  Patient assessed for rehabilitation services?: Yes  Family / Caregiver Present: No  Subjective: Ok to see pt per nursing. sitter present, pt in bed sleeping, required mod cues to arouse. Pt required increased time for all tasks due to cognition. Pt kept pulling at lines and messing with telemonitor. Pt requesting to use the restroom. Hemaglobin was 7.3 per last reading. Pt repeated self a lot during session. General:  Overall Orientation Status: Impaired  Orientation Level: Disoriented to time,Disoriented to place,Disoriented to situation,Oriented to person  Follows Commands: Impaired    Vision: Within Functional Limits    Hearing: Exceptions to [facility]  Hearing Exceptions: Hard of hearing/hearing concerns         Pain: back pain, not able to quantify    Vitals: Vitals not assessed per clinical judgement, see nursing flowsheet    Social/Functional History:    Lives With: Spouse  Type of Home: Assisted living ([facility])  Home Equipment: Rolling walker (no AD PTA)     Bathroom Shower/Tub: Walk-in shower  Bathroom Toilet: Handicap height  Bathroom Equipment: Shower chair       ADL Assistance: Needs assistance  Homemaking Assistance: Needs assistance  Ambulation Assistance: Independent  Transfer Assistance: Independent    Active : No     Additional Comments: Pt amb with no AD, facility staff helps assist with meals and ADL tasks.     OBJECTIVE:  Range of Motion:  Bilateral Lower Extremity: WNL    Strength:  Bilateral Lower Extremity: Impaired - grossly deconditioned, not able to assess MMT due to cognition    Balance:  Static Sitting Balance:  Independent sitting on EOB and to sit on toilet  Dynamic Sitting Balance: Supervision to complete luis miguel care  Static Standing Balance: Stand By Assistance, Contact Guard Assistance, no AD required, cues for safety  Dynamic Standing Balance: Contact Guard Assistance, to bryce and doff brief and to wash hands at sink and reach for items    Bed Mobility:  Rolling to Left: Stand By Assistance, X 1, with head of bed raised   Supine to Sit: Stand By Assistance, X 1, with head of bed raised, with rail, with verbal cues , with increased time for completion  Due to reduced arousal initially  Transfers:  Sit to Stand: Stand By Assistance, [employer] Assistance, X 1, with verbal cues  Stand to Sit:Stand By Assistance, X 1, with verbal cues  Cues for safety, no LOB noted, completed transfers from various surfaces and heights during session  Ambulation:  Stand By Assistance, [address], X 1, with cues for safety, with verbal cues , with increased time for completion  Distance: 10 feet x2  Surface: Level Tile  Device:No Device  Gait Deviations: Forward Flexed Posture, Slow Becky, Decreased Step Length Bilaterally, Decreased Gait Speed and Unsteady Gait  Cues for safety, once pt amb further, improved steadiness    Functional Outcome Measures: Completed  AM-PAC Inpatient Mobility without Stair Climbing Raw Score : 15  AM-PAC Inpatient without Stair Climbing T-Scale Score : 43.03    ASSESSMENT:  Activity Tolerance:  Patient tolerance of  treatment: fair. Cognition and easily distracted throughout session      Treatment Initiated: Treatment and education initiated within context of evaluation. Evaluation time included review of current medical information, gathering information related to past medical, social and functional history, completion of standardized testing, formal and informal observation of tasks, assessment of data and development of plan of care and goals. Treatment time included skilled education and facilitation of tasks to increase safety and independence with functional mobility for improved independence and quality of life. Assessment: Body structures, Functions, Activity limitations: Decreased functional mobility ,Increased pain,Decreased balance,Decreased strength,Decreased safe awareness,Decreased cognition,Decreased endurance  Assessment: Pt requires 1 person assist for mobility tasks, is not at baseline with mobility. Pt cont to requires skilled PT services to progress towards PLOF, increase safety and decrease risk for falls.   Prognosis: Good    REQUIRES PT FOLLOW UP: Yes    Discharge Recommendations:  Discharge Recommendations: Continue to assess pending progress,24 hour supervision or assist,Patient would benefit from continued therapy after discharge    Patient Education:  PT Education: General Safety,Gait Training,PT Role,Orientation,Functional Mobility Training,Transfer Training  Barriers to Learning: cognition/dementia    Equipment Recommendations:  Equipment Needed: No    Plan:  Times per week: 3-5x GM  Current Treatment Recommendations: Strengthening,Neuromuscular Re-education,Home Exercise Program,Safety Education & Training,Balance Training,Endurance Training,Patient/Caregiver Education & Training,Functional Mobility Training,Equipment Evaluation, Education, & procurement,Gait Training,Transfer Training    Goals:  Patient goals : not able to state due to cognition  Short term goals  Time Frame for Short term goals: by discharge  Short term goal 1: Pt will amb for >150 feet with LRAD with S to progress towards PLOF. Short term goal 2: Pt will demo IND with transfers with LRAD for support to progress towards PLOF. Short term goal 3: Pt will demo IND with bed mobility tasks with good technique to return home safely. Long term goals  Time Frame for Long term goals : NA due to short ELOS    Following session, patient left in safe position with all fall risk precautions in place. Pt in bedside chair with sitter present, all needs and call light in reach.

## 2022-03-03 NOTE — PROGRESS NOTES
[facility]  OCCUPATIONAL THERAPY MISSED TREATMENT NOTE  [facility] MED SURG 8B  8B-31/031-A      Date: 3/3/2022  Patient Name: Margaret Arenas        CSN: [id number]   : 1940  (78 y.o.)  Gender: female   Referring Practitioner: Lewayne Siemens, MD  Diagnosis: syncope and collapse    REASON FOR MISSED TREATMENT: Hold Treatment per Nursing     RN requesting to hold treatment as pt is tachycardic with HR in the 150s at rest. Will hold and check back on next available date.     Jeraldine Siemens, MOT, OTR/L

## 2022-03-03 NOTE — PROGRESS NOTES
Physician Progress Note      CALVINGwstan Sewell  CSN #:                  [id number]  :                       1940  ADMIT DATE:       2022 8:23 AM  DISCH DATE:  RESPONDING  PROVIDER #:        Saulo Guadalupe MD          QUERY TEXT:    Pt admitted s/p fall. Pt noted to have hematemesis and has \"anemia with   positive Hemoccult\" documented. Pt noted to have Hgb () 9.2, (3) 7.1,   (3/2) 6.5. If possible, please respond below and document in the progress   notes and discharge summary further specificity regarding the acuity and type   of anemia:    The medical record reflects the following:  Risk Factors: hematemesis, s/p fall with traumatic hematoma of forehead/right   knee/left eyebrow, advanced age  Clinical Indicators: \"anemia with positive Hemoccult\" documented; Hgb ()   9.2, (3/) 7.1, (3) 6.5  Treatment: labs, IVF, GI consult, PRBC    Thank you! Mary Whiting RN, BSN, RHIT, CCDS  Clinical   Options provided:  -- Anemia due to acute blood loss  -- Anemia due to chronic blood loss  -- Anemia due to acute on chronic blood loss  -- Anemia due to iron deficiency  -- Other - I will add my own diagnosis  -- Disagree - Not applicable / Not valid  -- Disagree - Clinically unable to determine / Unknown  -- Refer to Clinical Documentation Reviewer    PROVIDER RESPONSE TEXT:    This patient has acute on chronic blood loss anemia.     Query created by: Kishan Liu on 3/3/2022 10:06 AM      Electronically signed by:  Saulo Guadalupe MD 3/3/2022 10:28 AM

## 2022-03-04 PROBLEM — I48.91 NEW ONSET ATRIAL FIBRILLATION (HCC): Status: ACTIVE | Noted: 2022-01-01

## 2022-03-04 NOTE — CARE COORDINATION
3/4/22, 11:45 AM EST    Patient goals/plan/ treatment preferences discussed by  and . Patient goals/plan/ treatment preferences reviewed with patient/ family. Patient/ family verbalize understanding of discharge plan and are in agreement with goal/plan/treatment preferences. Understanding was demonstrated using the teach back method. AVS provided by RN at time of discharge, which includes all necessary medical information pertaining to the patients current course of illness, treatment, post-discharge goals of care, and treatment preferences. Services After Discharge  Services At/After Discharge: Nursing Services,[facility]ide [facility]   IMM Letter  IMM Letter given to Patient/Family/Significant other/Guardian/POA/by[de-identified] admitting  IMM Letter date given[de-identified] 02/28/22  IMM Letter time given[de-identified] 1130     Discussed during morning huddle with JEWELS Parada, possible weekend discharge. JOE provided discharge instruction sheet for possible discharge (phone/fax numbers, family will transport). JOE updated staff Zeus Gibson) at [facility] of possible weekend discharge.

## 2022-03-04 NOTE — CARE COORDINATION
3/4/22, 3:23 PM EST    DISCHARGE ON GOING EVALUATION    Familia Jerry day: 4  Barriers to Discharge: Await EGD, on hold r/t bradycardia. Tentative for 3/5 if improved. PCP: Omaira Rawls MD  Readmission Risk Score: 15.8 ( )%  Patient Goals/Plan/Treatment Preferences: [facility] to [facility]

## 2022-03-04 NOTE — PLAN OF CARE
Problem: Falls - Risk of:  Goal: Will remain free from falls  Description: Will remain free from falls  Outcome: Ongoing  Goal: Absence of physical injury  Description: Absence of physical injury  Outcome: Ongoing     Problem: Skin Integrity:  Goal: Will show no infection signs and symptoms  Description: Will show no infection signs and symptoms  Outcome: Ongoing  Goal: Absence of new skin breakdown  Description: Absence of new skin breakdown  Outcome: Ongoing     Problem:  Bowel Function - Altered:  Goal: Bowel elimination is within specified parameters  Description: Bowel elimination is within specified parameters  Outcome: Ongoing     Problem: Cardiac Output - Decreased:  Goal: Hemodynamic stability will improve  Description: Hemodynamic stability will improve  Outcome: Ongoing     Problem: Pain:  Goal: Pain level will decrease  Description: Pain level will decrease  Outcome: Ongoing     Problem: DISCHARGE BARRIERS  Goal: Patient's continuum of care needs are met  Outcome: Ongoing     Problem: Pain:  Goal: Pain level will decrease  Description: Pain level will decrease  Outcome: Ongoing  Goal: Control of acute pain  Description: Control of acute pain  Outcome: Ongoing  Goal: Control of chronic pain  Description: Control of chronic pain  Outcome: Ongoing

## 2022-03-04 NOTE — CARE COORDINATION
3/4/22, 3:26 PM EST    DISCHARGE PLANNING EVALUATION    SW received call from RN stating pts daughter would like to discuss ECF. SW met with pts daughter, provided ECF list.  Stated she needs to review list and discuss with her brother. SW to follow up on Monday. SW noted pt currently has sitter. RN advised sitter would need to be out 24 hrs before going to ECF.

## 2022-03-04 NOTE — PROGRESS NOTES
Gastroenterology Progress Note:     Patient Name:  Anamaria Dickey   MRN: [id number]  [id number]  YOB: 1940  Admit Date: 2/28/2022  8:23 AM  Primary Care Physician: Noe Navarrete MD   8B-31/031-A     Patient seen and examined. 24 hours events and chart reviewed. Subjective: Patient sleeping in bed, sitter present. She has no complaints. She is no longer in afib, she is now bradycardic. No BM overnight or yet today. Hgb 7.7    Objective:  /75   Pulse 55   Temp 98.5 °F (36.9 °C) (Oral)   Resp 18   Ht 5' 1\" (1.549 m)   Wt 139 lb (63 kg)   SpO2 98%   BMI 26.26 kg/m²     Physical Exam:    General:  Nourished in no distress  HEENT: Atraumatic, normocephalic. Moist oral mucous membranes. Neck: Supple without adenopathy, JVD, thyromegaly or masses. Trachea midline. CV: Heart bradycardic, no murmurs, rubs, gallops. Resp: Even, easy without cough or accessory use. Lungs clear to ascultation bilaterally. Abd: Round, soft, nontender. No hepatosplenomegaly or mass present. Active bowel sounds heard. No distention noted. Ext:  Without cyanosis, clubbing, edema.    Skin: Pink, warm, dry  Neuro:  Alert, oriented x 1, confused     Rectal: deferred    Labs:   CBC:   Lab Results   Component Value Date    WBC 5.9 03/03/2022    HGB 7.7 03/04/2022    HCT 25.1 03/04/2022    MCV 92.6 03/03/2022     03/03/2022     BMP:   Lab Results   Component Value Date     03/03/2022    K 3.7 03/03/2022     03/03/2022    CO2 20 03/03/2022    BUN 22 03/03/2022    CREATININE 1.0 03/03/2022    CALCIUM 8.4 03/03/2022     PT/INR: No results found for: PROTIME, INR  Lipids:   Lab Results   Component Value Date    ALKPHOS 42 02/28/2022    ALT 15 02/28/2022    AST 14 02/28/2022    BILITOT 0.7 02/28/2022    LABALBU 4.2 02/28/2022    LABALBU 4.8 03/01/2012    AMYLASE 134 06/29/2020    LIPASE 91 06/29/2020     Current Meds:  Scheduled Meds:   [Held by provider] aspirin  81 mg Oral Daily    atorvastatin  20 mg Oral Daily    polyvinyl alcohol  1 drop Ophthalmic BID    donepezil  10 mg Oral Nightly    [Held by provider] ferrous sulfate  325 mg Oral Daily with breakfast    fluticasone  2 spray Nasal Daily    lisinopril  10 mg Oral Daily    memantine  10 mg Oral BID    sodium chloride flush  5-40 mL IntraVENous 2 times per day     Continuous Infusions:   dilTIAZem (CARDIZEM) 125 mg in dextrose 5% 125 mL infusion Stopped (03/03/22 1813)    sodium chloride      pantoprozole (PROTONIX) infusion 8 mg/hr (03/04/22 0855)    sodium chloride 80 mL/hr at 03/04/22 0902    dextrose 5 % and 0.9 % NaCl Stopped (03/02/22 0417)    sodium chloride         Assessment:  79 yo F admitted 02/28/22 for a fall. She was noted to have bruising to the face & bleeding from the nares. GI consulted for anemia. H/O CAD & CVA, on aspirin & Plavix. H/O iron deficiency, on iron supplementation at home. Developed new onset afib with RVR 03/03/22 that was treated with Cardizem.     1. Fall at home  2. Dementia  3. Acute on chronic anemia- s/p 1 unit PRBC  4. H/O CVA- on ASA & Plavix PTA  5. CAD- on ASA & Plavix PTA  6. DARIUS  7. GITA- on PO iron  8. H/O HTN     Plan:    · Monitor H & H, transfuse prn  · Clear liquid diet, no red dye  · NPO at midnight in case EGD can be done 03/05/22  · Nursing to monitor stool output & document  · Hold [address]  · Hold iron  · Continue PPI gtt  · Will plan for EGD when bradycardia improves  · Cardiology on board  · RN updated  · Supportive care per primary team  Will follow    Case reviewed and impression/plan reviewed in collaboration with Dr. Aiden Francis  Electronically signed by LUZ Dunaway - CNP on 3/4/2022 at 1:02 PM    [facility]     If there are any questions or concerns this weekend, please call Dr. Aiden Francis as he is covering for [facility].     No signs of gi hemorrhage  Change ppi to bid  Resume diet  Unable to do EGD 3/5/22 as not urgent    Swapna Crocker MD, MD  6:03 PM 3/4/2022

## 2022-03-04 NOTE — FLOWSHEET NOTE
03/04/22 1306   Encounter Summary   Services provided to: Patient and family together   Referral/Consult From: Family;Rounding   Continue Visiting Yes  (3/4)   Complexity of Encounter Moderate   Length of Encounter 15 minutes   Routine   Type Initial   Assessment Calm; Approachable   Intervention Nurtured hope;Prayer   Outcome Comfort;Expressed gratitude   During my contact with the 78 yr old patient, the pt's family was actively present. I offered emotional support and prayer. The pt was very polite and welcomed my presence. Plan: Continued spiritual support would be helpful to the pt and her family.

## 2022-03-04 NOTE — PROGRESS NOTES
Family Medicine Progress Notes/Coverage    Today's Date: 3/4/22  8B-31/031-A  Medical Record # [id number]  CSN/Account # [de-identified]      Ms. Bharti Lopez admitted on 2/28/2022        Subjective / Interval History :     Developed AF w/ RVR yesterday converted medically , now bradycardia 40's to 50's w/o any drip  Endoscopy was cancelled  Due to AFib  Sleeping when seen  Reviewed notes, consults, laboratory and radiology results,    Objective:       Physical Exam:  Patient Vitals for the past 24 hrs:   BP Temp Temp src Pulse Resp SpO2   03/04/22 0315 (!) 159/72 98 °F (36.7 °C) Axillary 61 18 98 %   03/03/22 2307 (!) 143/65 97.6 °F (36.4 °C) Axillary 57 20 99 %   03/03/22 2005 (!) 141/66 98.8 °F (37.1 °C) Axillary 84 18 94 %   03/03/22 1715 126/63   59     03/03/22 1700 118/65   60     03/03/22 1630 101/61   120     03/03/22 1615 113/68   129     03/03/22 1600 117/60   138     03/03/22 1529 113/70   143     03/03/22 1525 126/86   100     03/03/22 1458 (!) 108/91   141     03/03/22 1451 (!) 129/101    20    03/03/22 1445    (!) 202     03/03/22 1430    133     03/03/22 1415    141     03/03/22 1335 (!) 129/92   138     03/03/22 1312      100 %       General Appearance:  sleeping, no distress, appears stated age   HEENT:  Neck:      Chest/Lungs:  Heart: CTA  bradycardia   Abdomen:  Back: soft   Extremities:  Neurological Exam: No edema  WNL       Assessment:     Admitting Diagnosis:    Syncope and collapse [R55]  DARIUS (acute kidney injury) ([facility] [N17.9]  Closed head injury, initial encounter [P08.47ZK]  Fall, initial encounter [Z43. XXXA]  Contusion of forehead, initial encounter [S00.83XA]  Acute renal failure, unspecified acute renal failure type ([facility] [address] [N17.9]  Hematemesis without nausea [K92.0]    Active Hospital Problems Diagnosis Date Noted    New onset atrial fibrillation (HCC) w/ RVR now sinus Bradycardia [I48.91] 03/04/2022     Priority: High    Fall at home, initial encounter [F40. Cathye Antione, E05.358] 02/28/2022     Priority: High    [facility] [N17.9] 02/28/2022     Priority: High    Syncope and collapse [R55] 02/28/2022     Priority: High    Anemia [D64.9] 02/28/2022     Priority: High    Occult blood positive stool [R19.5] 02/28/2022     Priority: High    Forehead contusion [S00.83XA] 02/28/2022     Priority: Medium    Traumatic ecchymosis of right knee [S80.01XA] 02/28/2022     Priority: Medium    Hypotension due to hypovolemia [I95.89, E86.1] 02/28/2022     Priority: Medium    Hematemesis without nausea [K92.0]      Priority: Medium    Antiplatelet or antithrombotic long-term use [Z79.02] 03/15/2021     Priority: Medium    Dementia without behavioral disturbance ([facility] [F03.90] 08/02/2016     Priority: Medium    Essential hypertension [I10] 04/14/2016     Priority: Medium    Hyperlipidemia [E78.5] 04/14/2016     Priority: Medium    S/P cardiac cath 2012- mild nonobstructive CAD [Z98.890] 01/22/2016     Priority: Medium    CAD (coronary artery disease)-mild NONobstructive max 30% ostail, LCX, mid lad and diffuse mild luminal irregularites-per cath 08/08/12 [I25.10] 08/08/2012     Priority: Medium    CVA (cerebral vascular accident)-2002 [I63.9] 06/21/2012     Priority: Medium       Plan:     Discussed plans with the nursing staff  Per Cardiology and GI  Monitor H/H      Medications, Laboratories and Imaging results:    Scheduled Meds:   [Held by provider] aspirin  81 mg Oral Daily    atorvastatin  20 mg Oral Daily    polyvinyl alcohol  1 drop Ophthalmic BID    donepezil  10 mg Oral Nightly    [Held by provider] ferrous sulfate  325 mg Oral Daily with breakfast    fluticasone  2 spray Nasal Daily    lisinopril  10 mg Oral Daily    memantine  10 mg Oral BID    sodium chloride flush 5-40 mL IntraVENous 2 times per day     Continuous Infusions:   dilTIAZem (CARDIZEM) 125 mg in dextrose 5% 125 mL infusion Stopped (03/03/22 1813)    sodium chloride      pantoprozole (PROTONIX) infusion 8 mg/hr (03/03/22 2118)    sodium chloride 80 mL/hr at 03/02/22 1325    dextrose 5 % and 0.9 % NaCl Stopped (03/02/22 0417)    sodium chloride       PRN Meds:sodium chloride, sodium chloride flush, sodium chloride, ondansetron **OR** ondansetron, polyethylene glycol, acetaminophen **OR** acetaminophen    Imaging:    Lab Review :    Lab Results   Component Value Date    WBC 5.9 03/03/2022    HGB 7.7 (L) 03/04/2022    HCT 25.1 (L) 03/04/2022    MCV 92.6 03/03/2022     03/03/2022     Lab Results   Component Value Date    CREATININE 1.0 03/03/2022    BUN 22 03/03/2022     03/03/2022    K 3.7 03/03/2022     (H) 03/03/2022    CO2 20 (L) 03/03/2022     Lab Results   Component Value Date    CKTOTAL 96 02/28/2022     Lab Results   Component Value Date    ALT 15 02/28/2022    AST 14 02/28/2022    ALKPHOS 42 02/28/2022    BILITOT 0.7 02/28/2022     Lab Results   Component Value Date    LIPASE 91 (H) 06/29/2020         Electronically signed by Ramesh Murdock MD on 3/4/22 at 7:48 AM DAWSON Cummings M.D.

## 2022-03-04 NOTE — PROGRESS NOTES
Cardiology Progress Note      Patient: Lubna Duggan  YOB: 1940  MRN: [id number]   Acct: [de-identified]  Admit Date:  2/28/2022  Primary Cardiologist: none   Per Dr Diamantina Brunner note:  Vijay Jose:    chest pain abnorman EKG      CHIEF COMPLAINT:    Fall      HISTORY OF PRESENT ILLNESS:    Lubna Duggan is a pleasant 78year old female patient with past medical history that includes:   Past Medical History        Past Medical History:   Diagnosis Date    CAD (coronary artery disease) 8/8/12     non-obstructive 20-30% stenosis LAD and circomflex    Colon polyps 4/25/12     Tubular adenoma Dr. Cierra Brito CVA (cerebral vascular accident) [facility]) 2004    Degenerative arthritis of cervical spine 2002    D[facility] 12/14    Hyperlipidemia      Hypertension      Psoriasis        She is accompanied by her daughter at bedside. Patient is a poor historian, has h/o dementia. The patient was admitted to the hospital on 2/28/2022. She is a nursing home resident. While at [facility], patient was found on the floor minimally responsive. She was thought to have a fall. The events are not clear, patient does not recall the details, syncope is suspected. In the ER, her BP was on the low side 87/65. Patient had evidence for DARIUS, dehydration, she was given IVF. Cr improved from 2.1 to 1 today. Also, patient was anemic with positive FOBT. GI bleeding was suspected, ASA/Plavix were held (apparently on DAPT for h/o CVA). Hgb dropped to 6.5. yesterday during PRBC transfusion, patient had transient atypical chest pain. Hgb up to 9.3 today, she feels better, denies any ongoing chest pains. Troponin <0.01, <0.01. EKG revealed sinus bradycardia, HR 55, RBBB, septal infarct. On telemetry, sinus bradycardia was noted with HR as low as 38 in am/last night, in 50s during the day with evidence for PACs/PVCs. Today, she developed A Fib with RVR. She was started on Cardizem gtt, SR was restored. Echo revealed preserved EF, LAE.       All labs, EKG's, diagnostic testing and images as well as cardiac cath, stress testing   were reviewed during this encounter  \"      Subjective (Events in last 24 hours):   Pt awake, sleepy. Not the best historian. Many comments do not make sense/confused, [place]. Tried to explain to patient about her HR and our plans for this. She expressed appreciation for seeing her.      Objective:   /75   Pulse 55   Temp 98.5 °F (36.9 °C) (Oral)   Resp 18   Ht 5' 1\" (1.549 m)   Wt 139 lb (63 kg)   SpO2 98%   BMI 26.26 kg/m²      vss  TELEMETRY: appears mostly nsr with frequent/occ pac/pvc     Physical Exam:  General Appearance: alert and oriented to person  Cardiovascular: normal rate, irregular rhythm, normal S1 and S2, no murmurs, rubs, clicks, or gallops, distal pulses intact, no carotid bruits, no JVD  Pulmonary/Chest: clear to auscultation bilaterally- no wheezes, rales or rhonchi, normal air movement, no respiratory distress  Abdomen: soft, non-tender, non-distended, normal bowel sounds, no masses   Extremities: no cyanosis, clubbing or edema, pulse   Skin: warm and dry, no rash or erythema      Medications:    [Held by provider] aspirin  81 mg Oral Daily    atorvastatin  20 mg Oral Daily    polyvinyl alcohol  1 drop Ophthalmic BID    donepezil  10 mg Oral Nightly    [Held by provider] ferrous sulfate  325 mg Oral Daily with breakfast    fluticasone  2 spray Nasal Daily    lisinopril  10 mg Oral Daily    memantine  10 mg Oral BID    sodium chloride flush  5-40 mL IntraVENous 2 times per day      dilTIAZem (CARDIZEM) 125 mg in dextrose 5% 125 mL infusion Stopped (03/03/22 1813)    sodium chloride      pantoprozole (PROTONIX) infusion 8 mg/hr (03/04/22 0855)    sodium chloride 80 mL/hr at 03/04/22 0902    dextrose 5 % and 0.9 % NaCl Stopped (03/02/22 0417)    sodium chloride       sodium chloride, , PRN  sodium chloride flush, 5-40 mL, PRN  sodium chloride, 25 mL, PRN  ondansetron, 4 mg, Q8H PRN   Or  ondansetron, 4 mg, Q6H PRN  polyethylene glycol, 17 g, Daily PRN  acetaminophen, 650 mg, Q6H PRN   Or  acetaminophen, 650 mg, Q6H PRN        Diagnostics:  EKG:     Echo:   Conclusions      Summary   Normal left ventricle size and systolic function. Ejection fraction was   estimated at 55 %. There were no regional left ventricular wall motion   abnormalities and wall thickness was within normal limits. Doppler parameters were consistent with abnormal left ventricular   relaxation (grade 1 diastolic dysfunction). The left atrium is Mild to moderate dilated. Signature      ----------------------------------------------------------------   Electronically signed by Greyson Blake MD (Interpreting   physician) on 03/01/2022   Stress:     Left Heart Cath:     Lab Data:    Cardiac Enzymes:  No results for input(s): CKTOTAL, CKMB, CKMBINDEX, TROPONINI in the last 72 hours.     CBC:   Lab Results   Component Value Date    WBC 5.9 03/03/2022    RBC 2.56 03/03/2022    RBC 3.59 04/16/2021    HGB 7.7 03/04/2022    HCT 25.1 03/04/2022     03/03/2022       CMP:    Lab Results   Component Value Date     03/03/2022    K 3.7 03/03/2022     03/03/2022    CO2 20 03/03/2022    BUN 22 03/03/2022    CREATININE 1.0 03/03/2022    LABGLOM 64 03/03/2022    GLUCOSE 89 03/03/2022    GLUCOSE 104 08/09/2021    CALCIUM 8.4 03/03/2022       Hepatic Function Panel:    Lab Results   Component Value Date    ALKPHOS 42 02/28/2022    ALT 15 02/28/2022    AST 14 02/28/2022    PROT 7.2 02/28/2022    BILITOT 0.7 02/28/2022    LABALBU 4.2 02/28/2022    LABALBU 4.8 03/01/2012       Magnesium:    Lab Results   Component Value Date    MG 1.8 06/29/2020       PT/INR:  No results found for: PROTIME, INR    HgBA1c:    Lab Results   Component Value Date    LABA1C 5.9 04/29/2021       FLP:    Lab Results   Component Value Date    TRIG 70 01/27/2022    HDL 36 01/27/2022    LDLCALC 85 01/27/2022    LABVLDL 23 08/09/2021       TSH: Lab Results   Component Value Date    TSH 1.230 02/28/2022         Assessment:  Atypical chest pain-none today, resolved   Sinus bradycardia- ongoing, not symptomatic, not severe  New onset afib with rvr  ? Tachy-acndace---no further episodes of tachy/afib rvr noted  S/p   S/p syncope  Anemia  DARIUS  Dehydration  Hx of non obs CAD, CVA, HTN, HLD. Plan: Will check event monitor at discharge. Patient's rhythm at present appears to be NSR with frequent pac/pvc, possible intermittent afib controlled rate. Rate when NSR looks to be 55-60, slower occurs d/t PVC and compensatory pauses. Will not give any AVN blockers or AA at this time. Given bleeding concerns, no OAC. Would benefit from [address] if bleeding is deemed stable and okay with other consultants, however given fall and syncope, not a good candidate regardless. Watchman could also be considered but given dementia and other medical conditions, this can be discussed in the future amid goals of care discussion. F/u with Dr Blank Sellers in 4-6 weeks. If HR stays controlled and maintains in the 40s/50s primarily, patient is stable from cardiology standpoint and is okay for discharge.    Electronically signed by Camilla Vazquez PA-C on 3/4/2022 at 1:21 PM

## 2022-03-05 NOTE — PROGRESS NOTES
Patient: Martha Koch  Unit/Bed: 1U-72/956-D  YOB: 1940  MRN: [id number] Acct: [de-identified]   Admitting Diagnosis: Syncope and collapse [R55]  DARIUS (acute kidney injury) ([facility] [N17.9]  Closed head injury, initial encounter [S09.90XA]  Fall, initial encounter [W19. XXXA]  Contusion of forehead, initial encounter [S00.83XA]  Acute renal failure, unspecified acute renal failure type ([facility] [N17.9]  Hematemesis without nausea [K92.0]  Admit Date:  2/28/2022  Hospital Day: 5    Assessment:     Principal Problem:    Syncope and collapse  Active Problems:    Occult blood positive stool    CAD (coronary artery disease)-mild NONobstructive max 30% ostail, LCX, mid lad and diffuse mild luminal irregularites-per cath 08/08/12    Anemia    Hypotension due to hypovolemia    CVA (cerebral vascular accident)-2002    S/P cardiac cath 2012- mild nonobstructive CAD    Essential hypertension    Hyperlipidemia    Dementia without behavioral disturbance (HCC)    Antiplatelet or antithrombotic long-term use    Fall at home, initial encounter    Forehead contusion    Traumatic ecchymosis of right knee    DARIUS (acute kidney injury) ([facility]    Hematemesis without nausea    New onset atrial fibrillation (HCC) w/ RVR now sinus Bradycardia  Resolved Problems:    * No resolved hospital problems. *      Plan:     Continue to follow with upcoming EGD        Subjective:     Patient has no complaint of CP or SOB but is somewhat agitated presently with her dementia. .   Medication side effects: none    Scheduled Meds:   pantoprazole  40 mg Oral BID AC    [Held by provider] aspirin  81 mg Oral Daily    atorvastatin  20 mg Oral Daily    polyvinyl alcohol  1 drop Ophthalmic BID    donepezil  10 mg Oral Nightly    [Held by provider] ferrous sulfate  325 mg Oral Daily with breakfast    fluticasone  2 spray Nasal Daily    lisinopril  10 mg Oral Daily    memantine  10 mg Oral BID    sodium chloride flush  5-40 mL IntraVENous 2 times per day     Continuous Infusions:   dilTIAZem (CARDIZEM) 125 mg in dextrose 5% 125 mL infusion Stopped (03/03/22 1813)    sodium chloride      sodium chloride 80 mL/hr at 03/04/22 0902    dextrose 5 % and 0.9 % NaCl Stopped (03/02/22 0417)    sodium chloride       PRN Meds:sodium chloride, sodium chloride flush, sodium chloride, ondansetron **OR** ondansetron, polyethylene glycol, acetaminophen **OR** acetaminophen    Review of Systems  Pertinent items are noted in HPI. Objective:     Patient Vitals for the past 8 hrs:   BP Temp Temp src Pulse Resp SpO2   03/05/22 1144 (!) 150/71 97.3 °F (36.3 °C) Oral 58 18 100 %   03/05/22 0802 138/89 97.9 °F (36.6 °C) Axillary 72 18 99 %     I/O last 3 completed shifts:  In: -   Out: 500 [Urine:500]  No intake/output data recorded. BP (!) 150/71   Pulse 58   Temp 97.3 °F (36.3 °C) (Oral)   Resp 18   Ht 5' 1\" (1.549 m)   Wt 139 lb (63 kg)   SpO2 100%   BMI 26.26 kg/m²     General appearance: alert, appears stated age and no distress  Head: Normocephalic, without obvious abnormality, atraumatic  Lungs: clear to auscultation bilaterally  Heart: regular rate and rhythm, S1, S2 normal, no murmur, click, rub or gallop  Abdomen: soft, non-tender; bowel sounds normal; no masses,  no organomegaly  Extremities: extremities normal, atraumatic, no cyanosis or edema  Skin: Skin color, texture, turgor normal. No rashes or lesions  Neurologic: Grossly normal    ECG:   tele appears to show afib with acceptable rate    Data Review:  Results for Wes White (MRN [id number]) as of 3/5/2022 15:35   Ref.  Range 3/3/2022 12:49 3/3/2022 15:09 3/4/2022 02:24 3/4/2022 14:46 3/5/2022 06:06   Hemoglobin Quant Latest Ref Range: 12.0 - 16.0 gm/dl 8.7 (L) 9.3 (L) 7.7 (L) 7.7 (L) 7.6 (L)   Hematocrit Latest Ref Range: 37.0 - 47.0 % 27.4 (L)  25.1 (L) 25.2 (L) 25.1 (L)       Electronically signed by Ky Welch MD on 3/5/2022 at 3:33 PM

## 2022-03-05 NOTE — PROGRESS NOTES
Pt Name: Umair Tanner  MRN: [id number]  [id number]  YOB: 1940  Admit Date: 2/28/2022  8:23 AM  Date of evaluation: 3/5/2022  Primary Care Physician: Billy Smith MD   [id number] CARBONE No complaints. [place] diet    O.     Vitals:    03/05/22 0356   BP: (!) 166/67   Pulse: 70   Resp: 18   Temp:    SpO2: 96%       Body mass index is 26.26 kg/m². Alert but confused   clear to auscultation bilaterally   regular rate and rhythm   Soft and nondistended with normal BS, nontender   no cyanosis, clubbing or edema present      Current Meds    pantoprazole  40 mg Oral BID AC    [Held by provider] aspirin  81 mg Oral Daily    atorvastatin  20 mg Oral Daily    polyvinyl alcohol  1 drop Ophthalmic BID    donepezil  10 mg Oral Nightly    [Held by provider] ferrous sulfate  325 mg Oral Daily with breakfast    fluticasone  2 spray Nasal Daily    lisinopril  10 mg Oral Daily    memantine  10 mg Oral BID    sodium chloride flush  5-40 mL IntraVENous 2 times per day      dilTIAZem (CARDIZEM) 125 mg in dextrose 5% 125 mL infusion Stopped (03/03/22 1813)    sodium chloride      sodium chloride 80 mL/hr at 03/04/22 0902    dextrose 5 % and 0.9 % NaCl Stopped (03/02/22 0417)    sodium chloride       Diet: ADULT DIET; Regular; Low Fat/Low Chol/High Fiber/MARY    A.  77 yo F admitted 02/28/22 for a fall. She was noted to have bruising to the face & bleeding from the nares. GI consulted for anemia.  H/O CAD & CVA, on aspirin & Plavix. H/O iron deficiency, on iron supplementation at home. Developed new onset afib with RVR 03/03/22 that was treated with Cardizem.     1. Fall at home  2. Dementia  3. Acute on chronic anemia, profound - s/p 1 unit PRBC  4. H/O CVA- on ASA & Plavix PTA - Held  5. CAD- on ASA & Plavix PTA - held  6. DARIUS  7. GITA - on PO iron  8. H/O HTN   9. Afib with RVR - resolved  10.   Bradycardia - resolved    P.      · Monitor H & H, transfuse prn  · Cardiac diet  · Nursing to monitor stool output & document  · Continue to hold plavix  · Hold iron  · Continue Bid PPI   · Will plan for EGD, nonurgent, likely Monday 3/7/22 - high risk given Plavix use but benefits outweigh risks  · Cardiology on board      Lopez Alford MD, MD  10:44 AM 3/5/2022

## 2022-03-05 NOTE — PLAN OF CARE
Problem: Falls - Risk of:  Goal: Will remain free from falls  Description: Will remain free from falls  Outcome: Ongoing  Note: Sitter at bedside. Overbed table within reach. Call light within reach. Hourly rounds complete and needs are made known during rounds. Goal: Absence of physical injury  Description: Absence of physical injury  Outcome: Ongoing  Note: Sitter at bedside. Overbed table within reach. Call light within reach. Hourly rounds complete and needs are made known during rounds. Problem: Skin Integrity:  Goal: Will show no infection signs and symptoms  Description: Will show no infection signs and symptoms  Outcome: Ongoing  Note: No s/s of infection noted. Goal: Absence of new skin breakdown  Description: Absence of new skin breakdown  Outcome: Ongoing  Note: No skin breakdown noted this shift. Problem: Bowel Function - Altered:  Goal: Bowel elimination is within specified parameters  Description: Bowel elimination is within specified parameters  Outcome: Ongoing  Note: WDL. Problem: Cardiac Output - Decreased:  Goal: Hemodynamic stability will improve  Description: Hemodynamic stability will improve  Outcome: Ongoing  Note: Improved. Problem: Pain:  Goal: Pain level will decrease  Description: Pain level will decrease  Outcome: Ongoing  Note: No complaints of pain this shift. Problem: DISCHARGE BARRIERS  Goal: Patient's continuum of care needs are met  Outcome: Ongoing  Note: Care needs being met. Problem: Pain:  Goal: Pain level will decrease  Description: Pain level will decrease  Outcome: Ongoing  Note: No complaints of pain this shift. Goal: Control of acute pain  Description: Control of acute pain  Outcome: Ongoing  Note: No complaints of pain this shift. Goal: Control of chronic pain  Description: Control of chronic pain  Outcome: Ongoing  Note: No complaints of pain this shift. Care plan reviewed with patient.   Patient verbalize understanding of the plan of care and contribute to goal setting.

## 2022-03-06 NOTE — OP NOTE
[facility] Endoscopy    Patient: Anamaria March  : 1940  Acct#: [de-identified]  Date of evaluation: 3/6/2022  Primary Care Physician: Noe Navarrete MD     Procedure:    [x]EGD    []Enteroscopy    [x]Biopsy   []Dilation      []PEG    []PEG change    []PEG removal  []Variceal banding    []Control of bleeding  []Destruction of lesion by [facility]    []Stent Placement  []Foreign Body Removal    []Snare Polypectomy  []Other:     []Aborted:        []Colonoscopy  []Flex Sigmoid []EUS, rectal     []Biopsy   []Snare Polypectomy    []Control of bleeding  []Destruction of lesion by [facility]    []Stent Placement  []Foreign Body Removal    []Dilation    []Other:    []Aborted:   []Tattoo    Indication:   Profound GITA    History:  79 yo F with dementia admitted 20 for a fall with bruising to the face & bleeding from the nares. GI consulted for anemia.  H/O CAD & CVA, on aspirin & Plavix. H/O iron deficiency, on iron supplementation at home. Developed new onset afib with RVR 22 that was treated with Cardizem. Physical Exam:  VITALS: /65   Pulse 74   Temp 98.1 °F (36.7 °C) (Axillary)   Resp 16   Ht 5' 1\" (1.549 m)   Wt 139 lb (63 kg)   SpO2 99%   BMI 26.26 kg/m²   The patient is a 78 y.o. female in no acute distress. HEAD: Normal cephalic/atraumatic. NECK: No lymphadenopathy or bruits. CHEST: Rises equally on inspiration. Clear to auscultation bilaterally. CARDIOVASCULAR: Regular rate and rhythm without murmurs, rubs or gallops. ABDOMEN: Soft, nontender, and nondistended with normal bowel sounds. No Hepatosplemomegaly. UPPER EXTREMITIES: no cyanosis, clubbing, or edema. DERM: no rash or jaundice. LOWER EXTREMITIES: no cyanosis, clubbing, or edema. NEURO: Alert but confused. Patient moves all extremities and has gross sensation in all extremities.     ASA: ASA 3 - Patient with moderate systemic disease with functional limitations    MEDICATION: MAC/Propofol/Anesthesia:  Yes     Photo:  Yes  Biopsy:  Yes      Description of Procedure: The risks and benefits of the procedure were described to the patient or their representative if unable to give consent including but not limited to bleeding, infection, poking a hole someplace requiring surgery to fix it, having a reaction to medication, and death. The patient or their representative if unable to give consent understood these risks and provided informed consent. Airway was assessed and is adequate for the planned sedation. Anesthesia: MAC  Estimated Blood Loss: less than 50   Complications: None  Specimens: Was Obtained:  see below     Sedation was administered by anesthesia who monitored the patient during the procedure. The patient was placed in the left lateral decubitus position. A forward-viewing Olympus endoscope was lubricated and inserted through the mouth into the oropharynx. Under direct visualization, the upper esophagus was intubated. The scope was advanced through the esophagus and stomach to second portion of duodenum. Scope was slowly withdrawn with good views of mucosal surfaces. The scope was retroflexed in the fundus. Findings and maneuvers are listed in impression below. The patient tolerated the procedure well. The scope was removed. There were no immediate complications. IMPRESSION:  1. Esophagus - small hiatal hernia with ulcer with worrisome-appearing nodularity distal to Schatzki ring unusual for reflux esophagitis, multiple biopsies taken  2. Stomach - diffuse gastritis, biopsied for Hpylori using Edgard criteria  3. Duodenum - normal   4. NSAID use    RECOMMENDATIONS:    1. Await pathology   2. Continue recently started bid ppi  3.   Recommend against antiplatelets or anticoagulation      Annabelle Colbert MD  11:35 AM 3/7/2022

## 2022-03-06 NOTE — PLAN OF CARE
Problem: Falls - Risk of:  Goal: Will remain free from falls  Description: Will remain free from falls  3/6/2022 1444 by Meir Beckham RN  Outcome: Ongoing  3/6/2022 0115 by Roman Mckeon RN  Outcome: Ongoing  Note: Patient free of falls this shift. Patient on falling star program. Fall band intact. RN visually checks on patient with hourly rounds. Patient tolerates ambulation each shift. Goal: Absence of physical injury  Description: Absence of physical injury  3/6/2022 1444 by Meir Beckham RN  Outcome: Ongoing  3/6/2022 0115 by Roman Mckeon RN  Outcome: Ongoing  Note: Patient free from injury this shift     Problem: Skin Integrity:  Goal: Will show no infection signs and symptoms  Description: Will show no infection signs and symptoms  3/6/2022 1444 by Meir Beckham RN  Outcome: Ongoing  3/6/2022 0115 by Roman Mckeon RN  Outcome: Ongoing  Note: Labs and VS monitored for s/s of infection  Goal: Absence of new skin breakdown  Description: Absence of new skin breakdown  3/6/2022 1444 by Meir Beckham RN  Outcome: Ongoing  3/6/2022 0115 by Roman Mckeon RN  Outcome: Ongoing  Note: No new skin breakdown noted this shift     Problem: Bowel Function - Altered:  Goal: Bowel elimination is within specified parameters  Description: Bowel elimination is within specified parameters  3/6/2022 1444 by Meir Beckham RN  Outcome: Ongoing  3/6/2022 0115 by Roman Mckeon RN  Outcome: Ongoing  Note: No BM noted this shift     Problem: Cardiac Output - Decreased:  Goal: Hemodynamic stability will improve  Description: Hemodynamic stability will improve  3/6/2022 1444 by Meir Beckham RN  Outcome: Ongoing  3/6/2022 0115 by Roman Mckeon RN  Outcome: Ongoing  Note: Blood pressure WNL. Vitals monitored and recorded. Patient hemodynamically stable. Cardiac monitor in place with strips being read every four hours.        Problem: Pain:  Goal: Pain level will decrease  Description: Pain level will decrease  3/6/2022 1444 by Niles Spence RN  Outcome: Ongoing  3/6/2022 0115 by Thuy Hummel RN  Outcome: Ongoing  Note: Patient denies pain this shift     Problem: DISCHARGE BARRIERS  Goal: Patient's continuum of care needs are met  3/6/2022 1444 by Niles Spence RN  Outcome: Ongoing  3/6/2022 0115 by Thuy Hummel RN  Outcome: Ongoing  Note: Discharge needs are assessed daily     Problem: Pain:  Goal: Pain level will decrease  Description: Pain level will decrease  3/6/2022 1444 by Niles Spence RN  Outcome: Ongoing  3/6/2022 0115 by Thuy Hummel RN  Outcome: Ongoing  Note: Patient denies pain this shift  Goal: Control of acute pain  Description: Control of acute pain  Outcome: Ongoing  Goal: Control of chronic pain  Description: Control of chronic pain  Outcome: Ongoing

## 2022-03-06 NOTE — PLAN OF CARE
Problem: Falls - Risk of:  Goal: Will remain free from falls  Description: Will remain free from falls  Outcome: Ongoing  Note: Patient free of falls this shift. Patient on falling star program. Fall band intact. RN visually checks on patient with hourly rounds. Patient tolerates ambulation each shift. Problem: Falls - Risk of:  Goal: Absence of physical injury  Description: Absence of physical injury  Outcome: Ongoing  Note: Patient free from injury this shift     Problem: Skin Integrity:  Goal: Will show no infection signs and symptoms  Description: Will show no infection signs and symptoms  Outcome: Ongoing  Note: Labs and VS monitored for s/s of infection     Problem: Skin Integrity:  Goal: Absence of new skin breakdown  Description: Absence of new skin breakdown  Outcome: Ongoing  Note: No new skin breakdown noted this shift     Problem: Bowel Function - Altered:  Goal: Bowel elimination is within specified parameters  Description: Bowel elimination is within specified parameters  Outcome: Ongoing  Note: No BM noted this shift     Problem: Cardiac Output - Decreased:  Goal: Hemodynamic stability will improve  Description: Hemodynamic stability will improve  Outcome: Ongoing  Note: Blood pressure WNL. Vitals monitored and recorded. Patient hemodynamically stable. Cardiac monitor in place with strips being read every four hours. Problem: Pain:  Goal: Pain level will decrease  Description: Pain level will decrease  Outcome: Ongoing  Note: Patient denies pain this shift     Problem: DISCHARGE BARRIERS  Goal: Patient's continuum of care needs are met  Outcome: Ongoing  Note: Discharge needs are assessed daily     Problem: Pain:  Goal: Pain level will decrease  Description: Pain level will decrease  Outcome: Ongoing  Note: Patient denies pain this shift   Care plan reviewed with patient. Patient verbalize understanding of the plan of care and contribute to goal setting.

## 2022-03-06 NOTE — PROGRESS NOTES
Pt Name: Namrata Figueroa  MRN: [id number]  [id number]  YOB: 1940  Admit Date: 2/28/2022  8:23 AM  Date of evaluation: 3/6/2022  Primary Care Physician: Ge Jensen MD   8B-31/031-IRENE CARBONE No complaints. augusta diet    O.     Vitals:    03/06/22 0747   BP:    Pulse: 74   Resp:    Temp:    SpO2:        Body mass index is 26.26 kg/m². Alert but confused   clear to auscultation bilaterally   regular rate and rhythm   Soft and nondistended with normal BS, nontender   no cyanosis, clubbing or edema present      Current Meds    pantoprazole  40 mg Oral BID AC    [Held by provider] aspirin  81 mg Oral Daily    atorvastatin  20 mg Oral Daily    polyvinyl alcohol  1 drop Ophthalmic BID    donepezil  10 mg Oral Nightly    [Held by provider] ferrous sulfate  325 mg Oral Daily with breakfast    fluticasone  2 spray Nasal Daily    lisinopril  10 mg Oral Daily    memantine  10 mg Oral BID    sodium chloride flush  5-40 mL IntraVENous 2 times per day      dilTIAZem (CARDIZEM) 125 mg in dextrose 5% 125 mL infusion Stopped (03/03/22 1813)    sodium chloride      sodium chloride 80 mL/hr at 03/05/22 2142    dextrose 5 % and 0.9 % NaCl Stopped (03/02/22 0417)    sodium chloride       Diet: ADULT DIET; Regular; Low Fat/Low Chol/High Fiber/MARY    A.  79 yo F admitted 02/28/22 for a fall. She was noted to have bruising to the face & bleeding from the nares. GI consulted for anemia.  H/O CAD & CVA, on aspirin & Plavix. H/O iron deficiency, on iron supplementation at home. Developed new onset afib with RVR 03/03/22 that was treated with Cardizem.     1. Fall at home  2. Dementia  3. Acute on chronic anemia, profound - s/p 1 unit PRBC 3/2/22  4. H/O CVA- on ASA & Plavix PTA - Held  5. CAD- on ASA & Plavix PTA - held  6. ARF  7. GITA - on PO iron  8. H/O HTN   9. Afib with RVR - resolved  10.   Bradycardia - resolved    P.      · Monitor H & H, transfuse prn  · Cardiac diet  · Nursing to monitor stool output & document  · Continue to hold plavix  · Hold iron  · Continue Bid PPI   · Will plan for EGD, nonurgent, plan Monday 3/7/22 - high risk given recent Plavix use but benefits outweigh risks  · Cardiology on board      Javier Ortega MD, MD  9:45 AM 3/6/2022

## 2022-03-06 NOTE — H&P
[address] [address] Endoscopy    Pre-Endoscopy H&PE      Patient: Maverick Barraza    : 1940    Acct#: [de-identified]  Primary Care Physician: Gennaro Comer MD   Date of evaluation: 3/6/2022    Planned Procedure:    [x]EGD    []Enteroscopy    []PEG    []PEG change    []PEG removal  []Variceal banding    []Biopsy   []Dilation      []Control of bleeding  []Destruction of lesion by [facility]    []Stent Placement  []Foreign Body Removal    []Snare Polypectomy  []Other:       []Colonoscopy  []Flex Sigmoid []EUS, rectal      []Biopsy   []Dilation      []Control of bleeding  []Destruction of lesion by [facility]    []Stent Placement  []Foreign Body Removal    []Snare Polypectomy  []Other:      Planned Sedation  []Conscious Sedation [x]MAC/Propofol []Anesthesia    []None    Airway:  Adequate for planned sedation    Indication:   Profound GITA    History:  79 yo F admitted 22 for a fall. She was noted to have bruising to the face & bleeding from the nares. GI consulted for anemia.  H/O CAD & CVA, on aspirin & Plavix. H/O iron deficiency, on iron supplementation at home. Developed new onset afib with RVR 22 that was treated with Cardizem. Physical Exam:  VITALS: /65   Pulse 74   Temp 98.1 °F (36.7 °C) (Axillary)   Resp 16   Ht 5' 1\" (1.549 m)   Wt 139 lb (63 kg)   SpO2 99%   BMI 26.26 kg/m²   The patient is a 78 y.o. female in no acute distress. HEAD: Normal cephalic/atraumatic. NECK: No lymphadenopathy or bruits. CHEST: Rises equally on inspiration. Clear to auscultation bilaterally. CARDIOVASCULAR: Regular rate and rhythm without murmurs, rubs or gallops. ABDOMEN: Soft, nontender, and nondistended with normal bowel sounds. No Hepatosplemomegaly. UPPER EXTREMITIES: no cyanosis, clubbing, or edema. DERM: no rash or jaundice. LOWER EXTREMITIES: no cyanosis, clubbing, or edema. NEURO: Alert but confused. Patient moves all extremities and has gross sensation in all extremities.     ASA: ASA 3 - Patient with moderate systemic disease with functional limitations    See GI consult dated 3/1/22    Courtney Palencia MD  9:54 AM 3/6/2022

## 2022-03-06 NOTE — PROGRESS NOTES
Patient: Mihaela   Unit/Bed: 4H-40/251-V  YOB: 1940  MRN: [id number] Acct: [de-identified]   Admitting Diagnosis: Syncope and collapse [R55]  DARIUS (acute kidney injury) ([facility] [N17.9]  Closed head injury, initial encounter [S09.90XA]  Fall, initial encounter [W19. XXXA]  Contusion of forehead, initial encounter [S00.83XA]  Acute renal failure, unspecified acute renal failure type ([facility] [N17.9]  Hematemesis without nausea [K92.0]  Admit Date:  2/28/2022  Hospital Day: 6    Assessment:     Principal Problem:    Syncope and collapse  Active Problems:    Occult blood positive stool    CAD (coronary artery disease)-mild NONobstructive max 30% ostail, LCX, mid lad and diffuse mild luminal irregularites-per cath 08/08/12    Anemia    Hypotension due to hypovolemia    CVA (cerebral vascular accident)-2002    S/P cardiac cath 2012- mild nonobstructive CAD    Essential hypertension    Hyperlipidemia    Dementia without behavioral disturbance (HCC)    Antiplatelet or antithrombotic long-term use    Fall at home, initial encounter    Forehead contusion    Traumatic ecchymosis of right knee    DARIUS (acute kidney injury) ([facility]    Hematemesis without nausea    New onset atrial fibrillation (HCC) w/ RVR now sinus Bradycardia  Resolved Problems:    * No resolved hospital problems. *      Plan:     She had RVR this AM and was given a single dose of metoprolol with improved control. Will check labs tomorrow AM  The diltiazem drip is still on the orders as active but is not being used or in the room so I discontinued it.   I spoke with her daughter Sterling Correa in the room  The potassium is being replaced        Subjective:     Patient has no complaints  Medication side effects: none    Scheduled Meds:   pantoprazole  40 mg Oral BID AC    [Held by provider] aspirin  81 mg Oral Daily    atorvastatin  20 mg Oral Daily    polyvinyl alcohol  1 drop Ophthalmic BID    donepezil  10 mg Oral Nightly    [Held by provider] ferrous sulfate  325 mg Oral Daily with breakfast    fluticasone  2 spray Nasal Daily    lisinopril  10 mg Oral Daily    memantine  10 mg Oral BID    sodium chloride flush  5-40 mL IntraVENous 2 times per day     Continuous Infusions:   sodium chloride      dextrose 5 % and 0.9 % NaCl Stopped (03/02/22 1627)    sodium chloride       PRN Meds:potassium chloride **OR** potassium alternative oral replacement **OR** potassium chloride, sodium chloride, sodium chloride flush, sodium chloride, ondansetron **OR** ondansetron, polyethylene glycol, acetaminophen **OR** acetaminophen    Review of Systems  Pertinent items are noted in HPI. Objective:     Patient Vitals for the past 8 hrs:   BP Temp Temp src Pulse Resp SpO2   03/06/22 0747    74     03/06/22 0739 131/65 98.1 °F (36.7 °C) Axillary 161 16 99 %   03/06/22 0254 (!) 163/59 98.3 °F (36.8 °C) Axillary 64 18 99 %     I/O last 3 completed shifts: In: 0   Out: 300 [Urine:300]  No intake/output data recorded. /65   Pulse 74   Temp 98.1 °F (36.7 °C) (Axillary)   Resp 16   Ht 5' 1\" (1.549 m)   Wt 139 lb (63 kg)   SpO2 99%   BMI 26.26 kg/m²     General appearance: alert, appears stated age and cooperative  Head: Normocephalic, without obvious abnormality, atraumatic  Lungs: clear to auscultation bilaterally  Heart: regular rate and rhythm, S1, S2 normal, no murmur, click, rub or gallop  Abdomen: soft, non-tender; bowel sounds normal; no masses,  no organomegaly  Extremities: extremities normal, atraumatic, no cyanosis or edema  Skin: Skin color, texture, turgor normal. No rashes or lesions  Neurologic: Grossly normal    ECG:   Tele now with a fib    Data Review:     Results for Soto Zendejas (MRN [id number]) as of 3/6/2022 10:13   Ref.  Range 3/3/2022 03:28 3/6/2022 05:37   Sodium Latest Ref Range: 135 - 145 meq/L 141 142   Potassium Latest Ref Range: 3.5 - 5.2 meq/L 3.7 3.1 (L)   Chloride Latest Ref Range: 98 - 111 meq/L 112 (H) 111   CO2 Latest Ref Range: 23 - 33 meq/L 20 (L) 20 (L)   BUN,BUNPL Latest Ref Range: 7 - 22 mg/dL 22 10   Creatinine Latest Ref Range: 0.4 - 1.2 mg/dL 1.0 1.0   Anion Gap Latest Ref Range: 8.0 - 16.0 meq/L 9.0 11.0   Est, Glom Filt Rate Latest Units: ml/min/1.73m2 64 (A) 64 (A)   GLUCOSE, FASTING,GF Latest Ref Range: 70 - 108 mg/dL 89 86   CALCIUM, SERUM, [id number] Latest Ref Range: 8.5 - 10.5 mg/dL 8.4 (L) 8.2 (L)     Electronically signed by Kelsey Srivsatava MD on 3/6/2022 at 10:10 AM

## 2022-03-07 NOTE — PLAN OF CARE
Problem: Falls - Risk of:  Goal: Will remain free from falls  Description: Will remain free from falls  Outcome: Met This Shift  Note: Fall precautions in place: bed locked and in lowest position, bed alarm/chair alarm turned on, fall band applied, call light and table within reach. Sitter at bedside. Goal: Absence of physical injury  Description: Absence of physical injury  Outcome: Met This Shift     Problem: Cardiac Output - Decreased:  Goal: Hemodynamic stability will improve  Description: Hemodynamic stability will improve  Outcome: Met This Shift     Problem: Pain:  Goal: Pain level will decrease  Description: Pain level will decrease  Outcome: Met This Shift     Problem: Pain:  Goal: Pain level will decrease  Description: Pain level will decrease  Outcome: Met This Shift     Problem: Skin Integrity:  Goal: Will show no infection signs and symptoms  Description: Will show no infection signs and symptoms  Outcome: Ongoing  Goal: Absence of new skin breakdown  Description: Absence of new skin breakdown  Outcome: Ongoing     Problem:  Bowel Function - Altered:  Goal: Bowel elimination is within specified parameters  Description: Bowel elimination is within specified parameters  Outcome: Ongoing     Problem: DISCHARGE BARRIERS  Goal: Patient's continuum of care needs are met  Outcome: Ongoing     Problem: Pain:  Goal: Control of acute pain  Description: Control of acute pain  Outcome: Ongoing  Goal: Control of chronic pain  Description: Control of chronic pain  Outcome: Ongoing

## 2022-03-07 NOTE — PROGRESS NOTES
[facility]  INPATIENT PHYSICAL THERAPY  DAILY NOTE  [facility] MED SURG 8B - 8B-31/031-A    Time In: 7371  Time Out: 2626  Timed Code Treatment Minutes: 29 Minutes  Minutes: 29          Date: 3/7/2022  Patient Name: Ivelisse Marin,  Gender:  female        MRN: [id number]  : 1940  (78 y.o.)     Referring Practitioner: Cindy Glass MD  Diagnosis: syncope and collapse  Additional Pertinent Hx: Per EMR \"The patient is an 78-year-old female who isknown to me through the office being her primary care provider. Thepatient lives in the assisted living for one year, has history of CVA,coronary artery disease, rule out MI, and dementia. The patient wasfound on the floor with drops of blood surrounding her, was initiallyunresponsive, but then, she was able to be aroused by the staff members. The patient had blood on the nose and bruises on the face. Thepatient's fall is unwitnessed and no one knows how did it happen or whattime she fell. The patient when came into the hospital, blood pressureswere on the low side. Blood pressure in the emergency room initiallywas 87/65 with 94% saturation. The patient was seen by the traumaservice as a level 2 trauma and worked up and was cleared up from traumaservice and I was then consulted for admission. The patient was seen onthe floor together with her son, Ramo Camarillo. The patient has fallenapparently about 07:45 this morning. \"     Prior Level of Function:  Lives With: Spouse  Type of Home: Assisted living [facility]  Home Equipment: Rolling walker (no AD PTA)   Bathroom Shower/Tub: Walk-in shower  Bathroom Toilet: Handicap height  Bathroom Equipment: Shower chair    ADL Assistance: Needs assistance  Homemaking Assistance: Needs assistance  Ambulation Assistance: Independent  Transfer Assistance: Independent  Active : No  Additional Comments: Pt amb with no AD, facility staff helps assist with meals and ADL tasks. Restrictions/Precautions:  Restrictions/Precautions: General Precautions,Fall Risk  Position Activity Restriction  Other position/activity restrictions: dementia     SUBJECTIVE: pt in recliner upon arrival, very pleasant and confused. Pt holding a baby doll and believes she is taking care of it. Pt needing much extra time and cues to initiate session due to confusion. PAIN: no c/o pain    Vitals: Vitals not assessed per clinical judgement, see nursing flowsheet    OBJECTIVE:  Bed Mobility:  Not Tested    Transfers:  Sit to Stand: Moderate Assistance, with increased time for completion, cues for hand placement, with verbal cues  Stand to [place] [address], with increased time for completion, cues for hand placement, with verbal cues    Ambulation:  Minimal Assistance, with cues for safety, with verbal cues , with increased time for completion  Distance: amb around nurses station, pt very easily distracted and needing max cues and assist for path finding  Surface: Level Tile  Device:Hand-Held Assist and pt furniture walking  Gait Deviations: Forward Flexed Posture, Slow Becky, Decreased Step Length Bilaterally, Decreased Gait Speed, Decreased Heel Strike Bilaterally, Moderate Path Deviations, Unsteady Gait and Decreased Terminal Knee Extension. Pt impulsive and unsteady, demos mult mild LOB needing assist to correct    Balance:  pt stood and searched through bathroom to locate objects, pt unsteady needing UE support    Exercise:  Patient was guided in 1 set(s) 10 reps of exercise to both lower extremities. Ankle pumps, Glut sets, Quad sets, Heelslides and Hip abduction/adduction. Exercises were completed for increased independence with functional mobility. Functional Outcome Measures: Completed  AM-PAC Inpatient Mobility Raw Score : 18  AM-PAC Inpatient T-Scale Score : 43.63    ASSESSMENT:  Assessment: Patient progressing toward established goals.   Activity Tolerance:  Patient tolerance of  treatment: good. Pt unsteady on feet, distractible and demos high fall risk. Pt needing hands on assist for mobility. Pt will benefit from cont PT at this time     Equipment Recommendations:Equipment Needed: No  Discharge Recommendations: Subacte/Skilled Nursing Facility  Plan: Times per week: 3-5x GM  Current Treatment Recommendations: Strengthening,Neuromuscular Re-education,Home Exercise Program,Safety Education & Jerlyn Ode Training,Patient/Caregiver Education & Training,Functional Mobility Training,Equipment Evaluation, Education, & procurement,Gait Training,Transfer Training    Patient Education  Patient Education: Plan of Care, [employer] Mobility, Transfers, Gait, Verbal Exercise Instruction    Goals:  Patient goals : not able to state due to cognition  Short term goals  Time Frame for Short term goals: by discharge  Short term goal 1: Pt will amb for >150 feet with LRAD with S to progress towards PLOF. Short term goal 2: Pt will demo IND with transfers with LRAD for support to progress towards PLOF. Short term goal 3: Pt will demo IND with bed mobility tasks with good technique to return home safely. Long term goals  Time Frame for Long term goals : NA due to short ELOS    Following session, patient left in safe position with all fall risk precautions in place.

## 2022-03-07 NOTE — PROGRESS NOTES
[address] [place] [address] 8B  Occupational Therapy  Daily Note  Time:   Time In: 1250  Time Out: 1320  Timed Code Treatment Minutes: 30 Minutes  Minutes: 30          Date: 3/7/2022  Patient Name: Anamaria Dickey,   Gender: female      Room: 8B-/031-A  MRN: [id number]  : 1940  (78 y.o.)  Referring Practitioner: Noe Navarrete MD  Diagnosis: syncope and collapse  Additional Pertinent Hx: The patient is an 55-year-old female who isknown to me through the office being her primary care provider. Thepatient lives in the assisted living for one year, has history of CVA,coronary artery disease, rule out MI, and dementia. The patient wasfound on the floor with drops of blood surrounding her, was initiallyunresponsive, but then, she was able to be aroused by the staff members. The patient had blood on the nose and bruises on the face. Thepatient's fall is unwitnessed and no one knows how did it happen or whattime she fell. The patient when came into the hospital, blood pressureswere on the low side. Blood pressure in the emergency room initiallywas 87/65 with 94% saturation. The patient was seen by the traumaservice as a level 2 trauma and worked up and was cleared up from traumaservice and I was then consulted for admission. The patient was seen ont floor together with her son, Mila Maradiaga. The patient has fallenapparently about 07:45 this morning. Restrictions/Precautions:  Restrictions/Precautions: General Precautions,Fall Risk  Position Activity Restriction  Other position/activity restrictions: dementia; sitter present    SUBJECTIVE: Pleasant. Talkative. RN approved session. Pt had EGD this morning. PAIN: No pain indicated. Vitals: Vitals not assessed per clinical judgement, see nursing flowsheet    COGNITION: Decreased Insight, Impaired Memory, Decreased Safety Awareness and Impulsive    ADL:   Grooming: Supervision.   washing her hands and doing her denture care at the sink with verbal cues for sequencing as pt had placed her  upper plate in and needed cues to put in her lower plate also  Toileting: Supervision. pt could wipe herself and do her own clothing management- pulling up/down the Depends  Toilet Transfer: Modified Independent. to/from the elevated toilet seat with armrests. Benito Santiago BALANCE:  Sitting Balance:  Supervision. scooting at the edge of bed  Standing Balance: Supervision. doing her grooming at the sink    BED MOBILITY:  Supine to Sit: Contact Guard Assistance, with verbal cues  help needed to initiate  Scooting:  Using a bedrail with SBA    TRANSFERS:  Sit to Stand:  Stand By Assistance. from the edge of bed  Stand to Sit: Stand By Assistance. to the recliner chair    FUNCTIONAL MOBILITY:  Assistive Device: None  Assist Level:  Contact Guard Assistance. Distance: To and from bathroom and in the hallway around obstacles  Pt was showing even steps and some forward flexion. Occasionally pt reached out for the railing in the hallway for stability. No LOB. ADDITIONAL ACTIVITIES:  Pt wiped up the countertop after she had finished her grooming with supervision. She needed redirection at times as she was holding onto the part of her IV where it is attached to her. Cues needed for place. Pt could read the communication board also for information about place and the calendar for the day/date info. ASSESSMENT:     Activity Tolerance:  Patient tolerance of  treatment: good. Pt was able to go for a long walk. She also stood at the sink for 3 minutes while finishing her self care.       Discharge Recommendations: Subacute/skilled nursing facility and 24 hour assistance or supervision  Equipment Recommendations: Equipment Needed: No  Plan: Times per week: 3-5x  Times per day: Daily  Current Treatment Recommendations: Gait Training,Strengthening,Patient/Caregiver Education & Training,Balance Training,Functional Mobility [facility] Education & Training,Self-Care / ADL    Patient Education  Patient Education: Orientation    Goals  Short term goals  Time Frame for Short term goals: by discharge  Short term goal 1: pt will complete functional mobility to/from the bathroom and [facility] distances with SBA to access ADLs  Short term goal 2: Pt will complete BADL routine with SBA to increase indep with ADLs  Short term goal 3: pt will complete dynamic standing x4 minutes with unilateral release and SBA to increase ease with grooming tasks    Following session, patient left in safe position with all fall risk precautions in place.

## 2022-03-07 NOTE — PROGRESS NOTES
EGD completed. Tolerated well. Photos taken. Biopsies taken. Two specimen jars labeled and sent to lab. Scope [id number].

## 2022-03-07 NOTE — PROGRESS NOTES
Recovery mode. Drowsy, responds to verbal stimuli. Dr. Kyler Recio discussed findings and plan of care with patients daughter. Report called to DANISHA-RN.

## 2022-03-07 NOTE — ANESTHESIA POSTPROCEDURE EVALUATION
Department of Anesthesiology  Postprocedure Note    Patient: Tristin Mckeon  MRN: [id number]  YOB: 1940  Date of evaluation: 3/7/2022  Time:  11:33 AM     Procedure Summary     Date: 03/07/22 Room / Location: [address] [place] [address] / [address] [place] [address]    Anesthesia Start: 1114 Anesthesia Stop: 9186    Procedure: EGD BIOPSY (N/A ) Diagnosis: (R/O GI BLEED)    Surgeons: Erin Florez MD Responsible Provider: Alvino Rodriguez DO    Anesthesia Type: MAC ASA Status: 3          Anesthesia Type: MAC    Jalen Phase I: Jalen Score: 10    Jalen Phase II:      Last vitals: Reviewed and per EMR flowsheets.        Anesthesia Post Evaluation    Patient location during evaluation: PACU  Patient participation: complete - patient participated  Level of consciousness: awake  Pain score: 0  Airway patency: patent  Nausea & Vomiting: no vomiting and no nausea  Complications: no  Cardiovascular status: hemodynamically stable  Respiratory status: spontaneous ventilation and room air  Hydration status: stable

## 2022-03-07 NOTE — CARE COORDINATION
3/7/22, 2:33 PM EST    DISCHARGE ON GOING EVALUATION    Caio Booth day: 7  Location: -31/031-A Reason for admit: Syncope and collapse [R55]  [facility] [N17.9]  Closed head injury, initial encounter [R78.75QJ]  Fall, initial encounter [W19. XXXA]  Contusion of forehead, initial encounter [S00.83XA]  Acute renal failure, unspecified acute renal failure type ([facility] [N17.9]  Hematemesis without nausea [K92.0]   Procedure: 3-7-22 EGD  Barriers to Discharge: Pt to have EGD today due to hematemesis and profound anemia. Nodularity in esophagus noted. Await biopsy. Rec against antiplatelets and anticoagulation. Pt with dementia. SW following. PT/OT working with pt. PCP: Florence Carrion MD  Readmission Risk Score: 16.8 ( )%  Patient Goals/Plan/Treatment Preferences: SW following for return to [address] [place] [address] or skilled facility.

## 2022-03-07 NOTE — ANESTHESIA PRE PROCEDURE
Department of Anesthesiology  Preprocedure Note       Name:  Rosendo Gonzalez   Age:  78 y.o.  :  1940                                          MRN:  [id number]         Date:  3/7/2022      Surgeon: Tatianna Frias):  Joss Aleman MD    Procedure: Procedure(s):  EGD DIAGNOSTIC ONLY    Medications prior to admission:   Prior to Admission medications    Medication Sig Start Date End Date Taking? Authorizing Provider   acetaminophen (TYLENOL) 650 MG extended release tablet Take 650 mg by mouth daily   Yes Historical Provider, MD   ferrous sulfate (IRON 325) 325 (65 Fe) MG tablet Take 1 tablet by mouth daily (with breakfast) 2/10/22  Yes Hany Bowser MD   lisinopril (PRINIVIL;ZESTRIL) 10 MG tablet Take 1 tablet by mouth once daily 21  Yes Hany Bowser MD   donepezil (ARICEPT) 10 MG tablet Take 1 tablet by mouth nightly 21  Yes Hany Bowser MD   triamterene-hydroCHLOROthiazide [facility]) 37.5-25 MG per tablet Take 1 tablet by mouth once daily 21  Yes Hany Bowser MD   vitamin D (ERGOCALCIFEROL) 1.25 MG (54853 UT) CAPS capsule TAKE 1 CAPSULE BY MOUTH TWICE A WEEK  Patient taking differently: Take 50,000 Units by mouth twice a week On  and 21  Yes Hany Bowser MD   atorvastatin (LIPITOR) 20 MG tablet Take 1 tablet by mouth once daily 21  Yes Hany Bowser MD   clopidogrel (PLAVIX) 75 MG tablet Take 1 tablet by mouth once daily 21  Yes Hany Bowser MD   memantine [facility]) 10 MG tablet Take 1 tablet by mouth twice daily 21  Yes Hany Bowser MD   Carboxymethylcell-Hypromellose (GENTEAL OP) Apply 1 drop to eye daily 1 drop each eye daily   Yes Historical Provider, MD   Ascorbic Acid (VITAMIN C) 250 MG tablet Take 500 mg by mouth daily   Yes Historical Provider, MD   aspirin 81 MG tablet Take 81 mg by mouth daily.    Yes Historical Provider, MD   acetaminophen (TYLENOL) 650 MG extended release tablet Take 650 mg by mouth daily as needed for Pain    Historical Provider, MD   loperamide (IMODIUM) 2 MG capsule Take 2 mg by mouth daily as needed for Diarrhea    Historical Provider, MD   naproxen (NAPROSYN) 500 MG tablet TAKE 1 TABLET BY MOUTH TWICE DAILY AS NEEDED FOR PAIN (ARTHRITIC  PAIN,  TAKE  MEDICATIONS  WITH  FOOD) 2/22/21   Selina Martínez MD   calcium carbonate 600 MG TABS tablet Take 1 tablet by mouth daily    Historical Provider, MD   fluticasone Cari Calkin) 50 MCG/ACT nasal spray 2 sprays by Nasal route daily 5/28/20   Selina Martínez MD       Current medications:    Current Facility-Administered Medications   Medication Dose Route Frequency Provider Last Rate Last Admin    potassium chloride (KLOR-CON M) extended release tablet 40 mEq  40 mEq Oral PRN Selina Martínez MD   40 mEq at 03/07/22 0956    Or    potassium bicarb-citric acid (EFFER-K) effervescent tablet 40 mEq  40 mEq Oral PRN Selina Martínez MD   40 mEq at 03/06/22 0736    Or    potassium chloride 10 mEq/100 mL IVPB (Peripheral Line)  10 mEq IntraVENous PRN Selina Martínez MD        pantoprazole (PROTONIX) tablet 40 mg  40 mg Oral BID AC Jun Sullivan MD   40 mg at 03/07/22 0557    0.9 % sodium chloride infusion   IntraVENous PRN Selina Martínez MD        dextrose 5 % and 0.9 % sodium chloride infusion   IntraVENous Continuous Yazmin Peterson MD   Stopped at 03/02/22 0417    [Held by provider] aspirin EC tablet 81 mg  81 mg Oral Daily Selina Martínez MD   81 mg at 03/01/22 0755    atorvastatin (LIPITOR) tablet 20 mg  20 mg Oral Daily Selina Marítnez MD   20 mg at 03/07/22 8121    polyvinyl alcohol (LIQUIFILM TEARS) 1.4 % ophthalmic solution 1 drop  1 drop Ophthalmic BID Selina Martínez MD   1 drop at 03/07/22 0904    donepezil (ARICEPT) tablet 10 mg  10 mg Oral Nightly Selina Martínez MD   10 mg at 03/06/22 2206    [Held by provider] ferrous sulfate (IRON 325) tablet 325 mg  325 mg Oral Daily with breakfast Milka Mitesh MD Jose   325 mg at 03/01/22 0755    fluticasone (FLONASE) 50 MCG/ACT nasal spray 2 spray  2 spray Nasal Daily Yuan MD Yaron   2 spray at 03/07/22 0904    lisinopril (PRINIVIL;ZESTRIL) tablet 10 mg  10 mg Oral Daily Yuan MD Yaron   10 mg at 03/07/22 0902    memantine (NAMENDA) tablet 10 mg  10 mg Oral BID Yuan MD Yaron   10 mg at 03/07/22 1815    sodium chloride flush 0.9 % injection 5-40 mL  5-40 mL IntraVENous 2 times per day Yuan RidMD corey   10 mL at 03/07/22 0903    sodium chloride flush 0.9 % injection 5-40 mL  5-40 mL IntraVENous PRN Yuan Riding, MD        0.9 % sodium chloride infusion  25 mL IntraVENous PRN Yuan RidMD corey        ondansetron (ZOFRAN-ODT) disintegrating tablet 4 mg  4 mg Oral Q8H PRN Yuan MD Yaron        Or    ondansetron [facility]) injection 4 mg  4 mg IntraVENous Q6H PRN Yuan RidMD corey        polyethylene glycol [facility]) packet 17 g  17 g Oral Daily PRN Yuan MD Yaron        acetaminophen (TYLENOL) tablet 650 mg  650 mg Oral Q6H PRN Yuan MD Yaron   650 mg at 03/07/22 3123    Or    acetaminophen (TYLENOL) suppository 650 mg  650 mg Rectal Q6H PRN Kwabena Marrufo MD           Allergies: Allergies   Allergen Reactions    Morphine        Problem List:    Patient Active Problem List   Diagnosis Code    CVA (cerebral vascular accident)-2002 I63.9    CAD (coronary artery disease)-mild NONobstructive max 30% ostail, LCX, mid lad and diffuse mild luminal irregularites-per cath 08/08/12 I25.10    S/P cardiac cath 2012- mild nonobstructive CAD Z98.890    Essential hypertension I10    Hyperlipidemia E78.5    Dementia without behavioral disturbance ([facility] [address] F03.90    Antiplatelet or antithrombotic long-term use Z79.02    Fall at home, initial encounter [address] Debra Faye, Y92.009    Forehead contusion S00.83XA    Traumatic ecchymosis of right knee S80. 01XA    DARIUS (acute kidney injury) ([facility] [address] N17.9    Syncope and collapse R55    Anemia D64.9    Occult blood positive stool R19.5    Hypotension due to hypovolemia I95.89, E86.1    Hematemesis without nausea K92.0    New onset atrial fibrillation (HCC) w/ RVR now sinus Bradycardia I48.91       Past Medical History:        Diagnosis Date    CAD (coronary artery disease) 8/8/12    non-obstructive 20-30% stenosis LAD and circomflex    Colon polyps 4/25/12    Tubular adenoma Dr. Erika Glover CVA (cerebral vascular accident) [facility]) 2004    Degenerative arthritis of cervical spine 2002    Dementia ([facility] [address] 12/14    Hyperlipidemia     Hypertension     Psoriasis        Past Surgical History:        Procedure Laterality Date    BLADDER REPAIR      CARDIAC CATHETERIZATION  8-8-12    CHOLECYSTECTOMY      COLONOSCOPY  4/24/2012    Dr. Ash Coker  07/2016    corneal related - Dr Buster Hansen AND [facility]         Social History:    Social History     Tobacco Use    Smoking status: Never Smoker    Smokeless tobacco: Never Used   Substance Use Topics    Alcohol use:  No                                Counseling given: Not Answered      Vital Signs (Current):   Vitals:    03/06/22 2255 03/07/22 0631 03/07/22 0859 03/07/22 1023   BP: (!) 153/63 (!) 137/51 (!) 142/66 (!) 178/76   Pulse: 65 54 63 63   Resp: 18 16 16 16   Temp: 36.8 °C (98.3 °F) 36.8 °C (98.2 °F) 37.2 °C (99 °F)    TempSrc: Oral Oral Oral    SpO2: 100% 99% 99% 98%   Weight:       Height:                                                  BP Readings from Last 3 Encounters:   03/07/22 (!) 178/76   02/10/22 130/72   01/20/22 118/70       NPO Status: Time of last liquid consumption: 0900                        Time of last solid consumption: 2200                        Date of last liquid consumption: 03/07/22                        Date of last solid food consumption: 03/06/22    BMI:   Wt Readings from Last 3 Encounters:   02/28/22 139 lb (63 kg)   02/10/22 139 lb 2 oz (63.1 kg)   01/20/22 131 lb 4 oz (59.5 kg)     Body mass index is 26.26 kg/m². CBC:   Lab Results   Component Value Date    WBC 4.6 03/07/2022    RBC 2.60 03/07/2022    RBC 3.59 04/16/2021    HGB 7.5 03/07/2022    HCT 25.2 03/07/2022    MCV 96.9 03/07/2022    RDW 14.7 04/16/2021     03/07/2022       CMP:   Lab Results   Component Value Date     03/07/2022    K 3.4 03/07/2022     03/07/2022    CO2 22 03/07/2022    BUN 12 03/07/2022    CREATININE 1.0 03/07/2022    LABGLOM 64 03/07/2022    GLUCOSE 76 03/07/2022    GLUCOSE 104 08/09/2021    PROT 7.2 02/28/2022    CALCIUM 8.1 03/07/2022    BILITOT 0.7 02/28/2022    ALKPHOS 42 02/28/2022    AST 14 02/28/2022    ALT 15 02/28/2022       POC Tests: No results for input(s): POCGLU, POCNA, POCK, POCCL, POCBUN, POCHEMO, POCHCT in the last 72 hours.     Coags: No results found for: PROTIME, INR, APTT    HCG (If Applicable): No results found for: PREGTESTUR, PREGSERUM, HCG, HCGQUANT     ABGs: No results found for: PHART, PO2ART, WNW5KLR, WRU5LCC, BEART, R7IISYPG     Type & Screen (If Applicable):  Lab Results   Component Value Date    LABRH POS 02/28/2022       Drug/Infectious Status (If Applicable):  No results found for: HIV, HEPCAB    COVID-19 Screening (If Applicable): No results found for: COVID19        Anesthesia Evaluation  Patient summary reviewed and Nursing notes reviewed  Airway: Mallampati: II  TM distance: <3 FB   Neck ROM: full  Mouth opening: > = 3 FB Dental:    (+) edentulous      Pulmonary:normal exam                               Cardiovascular:    (+) hypertension:, CAD:, dysrhythmias: atrial fibrillation,       ECG reviewed  Rhythm: irregular    Echocardiogram reviewed               ROS comment: New onset afib: started this visit: pt has halter monitor on      Neuro/Psych:   (+) CVA:, psychiatric history:             ROS comment: Dementia: daughter POA at bedside GI/Hepatic/Renal: Neg GI/Hepatic/Renal ROS            Endo/Other: Negative Endo/Other ROS                    Abdominal:             Vascular:           ROS comment: History of a stroke in 2002. Other Findings:           Anesthesia Plan      MAC     ASA 3       Induction: intravenous. Anesthetic plan and risks discussed with legal guardian. Plan discussed with attending.                   LUZ Sharma - TINO   3/7/2022

## 2022-03-07 NOTE — FLOWSHEET NOTE
03/07/22 1643   Encounter Summary   Services provided to: Patient   Referral/Consult From: Rounding   Continue Visiting Yes  (3/7)   Complexity of Encounter Moderate   Length of Encounter 30 minutes   Routine   Type Initial   Assessment Calm; Approachable   Intervention Active listening;Nurtured hope   Outcome Comfort   Spiritual/Gnosticist   Type Spiritual support   Assessment: In my encounter with the 78 yr old patient, while rounding  the unit I provided spiritual care to patient through conversation, I also came to assess the patients spiritual needs present. The pt was confused at times but was very polite during the conversation. Interventions:  I provided prayer, emotional support and words of comfort.  provided a listening presence and encouraged pt to share their beliefs and how they support him during their hospitalization. Outcomes: The patient was encouraged and didnt share any further spiritual needs at this time. Plan:  Chaplains will follow-up at a later time for assessment of any spiritual care needs present.

## 2022-03-07 NOTE — PROGRESS NOTES
Family Medicine Progress Notes/Coverage    Today's Date: 3/7/22  8B-31/031-A  Medical Record # [id number]  CSN/Account # [de-identified]      Ms. Miranda Ospina admitted on 2/28/2022        Subjective / Interval History :     doing well, No SOB, No chest pain , No fatigue. No nausea nor abdominal pain  Sleep all night \" I am alright\"  EGD  This afternoo  Reviewed notes, consults, laboratory and radiology results,    Objective:       Physical Exam:  Patient Vitals for the past 24 hrs:   BP Temp Temp src Pulse Resp SpO2   03/07/22 0859 (!) 142/66 99 °F (37.2 °C) Oral 63 16 99 %   03/07/22 0631 (!) 137/51 98.2 °F (36.8 °C) Oral 54 16 99 %   03/06/22 2255 (!) 153/63 98.3 °F (36.8 °C) Oral 65 18 100 %   03/06/22 1905 (!) 157/70 98 °F (36.7 °C) Oral 60 16 100 %   03/06/22 1636 (!) 147/68 97.3 °F (36.3 °C) Axillary 59 18 100 %   03/06/22 1114 123/60 97.9 °F (36.6 °C) Oral 83 18 94 %       General Appearance:  Alert, cooperative, no distress, appears stated age   HEENT:  Neck:      Chest/Lungs:  Heart: CTA  Bradycardia   Abdomen:  Back: soft   Extremities:  Neurological Exam: No edema  WNL       Assessment:     Admitting Diagnosis:    Syncope and collapse [R55]  DARIUS (acute kidney injury) ([facility] [address] [N17.9]  Closed head injury, initial encounter [X26.33IZ]  Fall, initial encounter [W19. XXXA]  Contusion of forehead, initial encounter [S00.83XA]  Acute renal failure, unspecified acute renal failure type ([address] [N17.9]  Hematemesis without nausea [K92.0]    Active Hospital Problems    Diagnosis Date Noted    New onset atrial fibrillation (HCC) w/ RVR now sinus Bradycardia [I48.91] 03/04/2022     Priority: High    Fall at home, initial encounter [W19. Cathryn Chatman, A65.850] 02/28/2022     Priority: High    DARIUS (acute kidney injury) ([facility] [address] [N17.9] 02/28/2022     Priority: High    Syncope and collapse [R55] 02/28/2022     Priority: High    Anemia [D64.9] 02/28/2022     Priority: High    Occult blood positive stool [R19.5] 02/28/2022     Priority: High    Forehead contusion [S00.83XA] 02/28/2022     Priority: Medium    Traumatic ecchymosis of right knee [S80.01XA] 02/28/2022     Priority: Medium    Hypotension due to hypovolemia [I95.89, E86.1] 02/28/2022     Priority: Medium    Hematemesis without nausea [K92.0]      Priority: Medium    Antiplatelet or antithrombotic long-term use [Z79.02] 03/15/2021     Priority: Medium    Dementia without behavioral disturbance ([facility] [address] [F03.90] 08/02/2016     Priority: Medium    Essential hypertension [I10] 04/14/2016     Priority: Medium    Hyperlipidemia [E78.5] 04/14/2016     Priority: Medium    S/P cardiac cath 2012- mild nonobstructive CAD [Z98.890] 01/22/2016     Priority: Medium    CAD (coronary artery disease)-mild NONobstructive max 30% ostail, LCX, mid lad and diffuse mild luminal irregularites-per cath 08/08/12 [I25.10] 08/08/2012     Priority: Medium    CVA (cerebral vascular accident)-2002 [I63.9] 06/21/2012     Priority: Medium       Plan:     Discussed plans with the nursing staff  For EGD this afternoon  Called her daughter Shruti Davies, concern of patient going home to her assisted living with a 30 day Holter as she removed the lead of the monitor. Discussed transitioning to ECF with Dementia unit.     Medications, Laboratories and Imaging results:    Scheduled Meds:   potassium replacement protocol   Other RX Placeholder    pantoprazole  40 mg Oral BID AC    [Held by provider] aspirin  81 mg Oral Daily    atorvastatin  20 mg Oral Daily    polyvinyl alcohol  1 drop Ophthalmic BID    donepezil  10 mg Oral Nightly    [Held by provider] ferrous sulfate  325 mg Oral Daily with breakfast    fluticasone  2 spray Nasal Daily    lisinopril  10 mg Oral Daily    memantine  10 mg Oral BID    sodium chloride flush  5-40 mL IntraVENous 2 times per day     Continuous Infusions:   sodium chloride      dextrose 5 % and 0.9 % NaCl Stopped (03/02/22 8332)    sodium chloride       PRN Meds:potassium chloride **OR** potassium alternative oral replacement **OR** potassium chloride, sodium chloride, sodium chloride flush, sodium chloride, ondansetron **OR** ondansetron, polyethylene glycol, acetaminophen **OR** acetaminophen    Imaging:    Lab Review :    Lab Results   Component Value Date    WBC 4.6 (L) 03/07/2022    HGB 7.5 (L) 03/07/2022    HCT 25.2 (L) 03/07/2022    MCV 96.9 03/07/2022     03/07/2022     Lab Results   Component Value Date    CREATININE 1.0 03/07/2022    BUN 12 03/07/2022     03/07/2022    K 3.4 (L) 03/07/2022     03/07/2022    CO2 22 (L) 03/07/2022     Lab Results   Component Value Date    CKTOTAL 96 02/28/2022     Lab Results   Component Value Date    ALT 15 02/28/2022    AST 14 02/28/2022    ALKPHOS 42 02/28/2022    BILITOT 0.7 02/28/2022     Lab Results   Component Value Date    LIPASE 91 (H) 06/29/2020         Electronically signed by Noe Navarrete MD on 3/7/22 at 9:43 AM DAWSON Charles M.D.

## 2022-03-07 NOTE — PROGRESS NOTES
EGD complete, photos taken, *** specimens taken by ***, pt tolerated procedure well    Scope Number *** used.

## 2022-03-08 PROBLEM — K21.00 HIATAL HERNIA WITH GASTROESOPHAGEAL REFLUX DISEASE AND ESOPHAGITIS: Status: ACTIVE | Noted: 2022-01-01

## 2022-03-08 PROBLEM — K44.9 HIATAL HERNIA WITH GASTROESOPHAGEAL REFLUX DISEASE AND ESOPHAGITIS: Status: ACTIVE | Noted: 2022-01-01

## 2022-03-08 NOTE — PROGRESS NOTES
Family Medicine Progress Notes/Coverage    Today's Date: 3/8/22  8B-31/031-A  Medical Record # [id number]  CSN/Account # [de-identified]      Ms. Hattie Hodgson admitted on 2/28/2022        Subjective / Interval History :     EGD done yesterday and tolerated well. 1. Esophagus - small hiatal hernia with ulcer with worrisome-appearing nodularity distal to Schatzki ring unusual for reflux esophagitis, multiple biopsies taken  2. Stomach - diffuse gastritis, biopsied for Hpylori using Edgard criteria  3. Duodenum - normal     Did not sleep well, confused    Reviewed notes, consults, laboratory and radiology results,    Objective:       Physical Exam:  Patient Vitals for the past 24 hrs:   BP Temp Temp src Pulse Resp SpO2   03/07/22 2015 (!) 156/71 97.8 °F (36.6 °C) Oral 52 20 100 %   03/07/22 1459 (!) 142/50 98.4 °F (36.9 °C) Oral 55 20 96 %   03/07/22 1211 (!) 139/58 98.6 °F (37 °C) Oral 53 18 96 %   03/07/22 1139 (!) 157/70   55 16 99 %   03/07/22 1135 121/63   56 16 99 %   03/07/22 1023 (!) 178/76   63 16 98 %   03/07/22 0859 (!) 142/66 99 °F (37.2 °C) Oral 63 16 99 %       General Appearance:  Sleeping now, no distress, appears stated age   HEENT:  Neck:      Chest/Lungs:  Heart: CTA  Sinus bradycardia   Abdomen:  Back: soft   Extremities:  Neurological Exam: No edema       Assessment:     Admitting Diagnosis:    Syncope and collapse [R55]  DARIUS (acute kidney injury) ([facility] [address] [N17.9]  Closed head injury, initial encounter [T06.85PI]  Fall, initial encounter [W19. XXXA]  Contusion of forehead, initial encounter [S00.83XA]  Acute renal failure, unspecified acute renal failure type ([address] [N17.9]  Hematemesis without nausea [K92.0]    Active Hospital Problems    Diagnosis Date Noted    Hiatal hernia with gastroesophageal reflux disease and esophagitis and ulcer [K44.9, K21.00] 03/07/2022 Priority: High    New onset atrial fibrillation (HCC) w/ RVR now sinus Bradycardia [I48.91] 03/04/2022     Priority: High    Fall at home, initial encounter [F89. Osmani Queen, M25.862] 02/28/2022     Priority: High    [facility] [N17.9] 02/28/2022     Priority: High    Syncope and collapse [R55] 02/28/2022     Priority: High    Anemia [D64.9] 02/28/2022     Priority: High    Occult blood positive stool [R19.5] 02/28/2022     Priority: High    Forehead contusion [S00.83XA] 02/28/2022     Priority: Medium    Traumatic ecchymosis of right knee [S80.01XA] 02/28/2022     Priority: Medium    Hypotension due to hypovolemia [I95.89, E86.1] 02/28/2022     Priority: Medium    Hematemesis without nausea [K92.0]      Priority: Medium    Antiplatelet or antithrombotic long-term use [Z79.02] 03/15/2021     Priority: Medium    Dementia without behavioral disturbance ([facility] [F03.90] 08/02/2016     Priority: Medium    Essential hypertension [I10] 04/14/2016     Priority: Medium    Hyperlipidemia [E78.5] 04/14/2016     Priority: Medium    S/P cardiac cath 2012- mild nonobstructive CAD [Z98.890] 01/22/2016     Priority: Medium    CAD (coronary artery disease)-mild NONobstructive max 30% ostail, LCX, mid lad and diffuse mild luminal irregularites-per cath 08/08/12 [I25.10] 08/08/2012     Priority: Medium    CVA (cerebral vascular accident)-2002 [I63.9] 06/21/2012     Priority: Medium       Plan:     Discussed plans with the nursing staff  H/H if stable  D/C to ECF with 30 days monitor    Medications, Laboratories and Imaging results:    Scheduled Meds:   LORazepam  0.5 mg IntraVENous Once    pantoprazole  40 mg Oral BID AC    [Held by provider] aspirin  81 mg Oral Daily    atorvastatin  20 mg Oral Daily    polyvinyl alcohol  1 drop Ophthalmic BID    donepezil  10 mg Oral Nightly    [Held by provider] ferrous sulfate  325 mg Oral Daily with breakfast    fluticasone  2 spray Nasal Daily    lisinopril  10 mg Oral Daily    memantine  10 mg Oral BID    sodium chloride flush  5-40 mL IntraVENous 2 times per day     Continuous Infusions:   sodium chloride      dextrose 5 % and 0.9 % NaCl Stopped (03/02/22 0417)    sodium chloride       PRN Meds:potassium chloride **OR** potassium alternative oral replacement **OR** potassium chloride, sodium chloride, sodium chloride flush, sodium chloride, ondansetron **OR** ondansetron, polyethylene glycol, acetaminophen **OR** acetaminophen    Imaging:    Lab Review :    Lab Results   Component Value Date    WBC 4.6 (L) 03/07/2022    HGB 7.5 (L) 03/07/2022    HCT 25.2 (L) 03/07/2022    MCV 96.9 03/07/2022     03/07/2022     Lab Results   Component Value Date    CREATININE 1.0 03/07/2022    BUN 12 03/07/2022     03/07/2022    K 3.4 (L) 03/07/2022     03/07/2022    CO2 22 (L) 03/07/2022     Lab Results   Component Value Date    CKTOTAL 96 02/28/2022     Lab Results   Component Value Date    ALT 15 02/28/2022    AST 14 02/28/2022    ALKPHOS 42 02/28/2022    BILITOT 0.7 02/28/2022     Lab Results   Component Value Date    LIPASE 91 (H) 06/29/2020         Electronically signed by Makeda Almaguer MD on 3/8/22 at 7:11 AM DAWSON Evangelista M.D.

## 2022-03-08 NOTE — PROGRESS NOTES
Pt Name: Umair Tanner  MRN: [id number]  [id number]  YOB: 1940  Admit Date: 2/28/2022  8:23 AM  Date of evaluation: 3/8/2022  Primary Care Physician: Macarena Nguyen MD   8B-31/031-IRENE CARBONE No complaints. augusta diet    O.     Vitals:    03/08/22 1058   BP: (!) 140/63   Pulse: 57   Resp: 18   Temp: 97.8 °F (36.6 °C)   SpO2: 100%       Body mass index is 26.26 kg/m². Alert but confused   clear to auscultation bilaterally   regular rate and rhythm   Soft and nondistended with normal BS, nontender   no cyanosis, clubbing or edema present      Current Meds    LORazepam  0.5 mg IntraVENous Once    pantoprazole  40 mg Oral BID AC    [Held by provider] aspirin  81 mg Oral Daily    atorvastatin  20 mg Oral Daily    polyvinyl alcohol  1 drop Ophthalmic BID    donepezil  10 mg Oral Nightly    [Held by provider] ferrous sulfate  325 mg Oral Daily with breakfast    fluticasone  2 spray Nasal Daily    lisinopril  10 mg Oral Daily    memantine  10 mg Oral BID    sodium chloride flush  5-40 mL IntraVENous 2 times per day      sodium chloride      dextrose 5 % and 0.9 % NaCl Stopped (03/02/22 0417)    sodium chloride       Diet: ADULT DIET; Easy to Chew; Low Fat/Low Chol/High Fiber/MARY    A.  77 yo F admitted 02/28/22 for a fall. She was noted to have bruising to the face & bleeding from the nares. GI consulted for anemia.  H/O CAD & CVA, on aspirin & Plavix. H/O iron deficiency, on iron supplementation at home. Developed new onset afib with RVR 03/03/22 that was treated with Cardizem.     1. Fall at home  2. Dementia  3. Acute on chronic anemia, profound - s/p 1 unit PRBC 3/2/22  4. H/O CVA- on ASA & Plavix PTA - Held  5. CAD- on ASA & Plavix PTA - held  6. ARF  7. GITA - on PO iron  8. H/O HTN   9. Afib with RVR - resolved  10. Bradycardia - resolved  11.   Worrisome ulcer in proximal stomach near GEJ, biopsies showing only Garrison's    P.      Bid ppi  Repeat EGD in 2 months to confirm healing.   OK to resume Plavix in one week  OK per GI to discharge, signing off    [place] MD, MD  4:32 PM 3/8/2022

## 2022-03-08 NOTE — CARE COORDINATION
3/8/22, 9:07 AM EST    DISCHARGE PLANNING EVALUATION    Left a message for daughter Karen Mccall to discuss discharge plan.

## 2022-03-08 NOTE — CARE COORDINATION
3/8/22, 3:43 PM EST    DISCHARGE PLANNING EVALUATION    Called [facility] to make a referral, no answer and no voicemail.     Called Lovely at [facility] and made a referral.     Left a message at [facility] to make a referral.

## 2022-03-08 NOTE — CARE COORDINATION
Discharge Planning Update:     Hospital day: 8  Location: 8B-31/031-A     Following for fall, DARIUS, syncope, anemia. Dementia. Hgb today 8.0. Creat 1.0. PT/OT following. SW following for SNF placement.

## 2022-03-08 NOTE — PLAN OF CARE
Problem: Falls - Risk of:  Goal: Will remain free from falls  Description: Will remain free from falls  3/7/2022 2007 by Dominick Leger RN  Outcome: Ongoing  3/7/2022 0803 by Nadira Asif RN  Outcome: Met This Shift  Note: Fall precautions in place: bed locked and in lowest position, bed alarm/chair alarm turned on, fall band applied, call light and table within reach. Sitter at bedside. Goal: Absence of physical injury  Description: Absence of physical injury  3/7/2022 2007 by Dominick Leger RN  Outcome: Ongoing  3/7/2022 0803 by Nadira Asif RN  Outcome: Met This Shift     Problem: Skin Integrity:  Goal: Will show no infection signs and symptoms  Description: Will show no infection signs and symptoms  3/7/2022 2007 by Dominick Leger RN  Outcome: Ongoing  3/7/2022 0803 by Nadira Asif RN  Outcome: Ongoing  Goal: Absence of new skin breakdown  Description: Absence of new skin breakdown  3/7/2022 2007 by Dominick Leger RN  Outcome: Ongoing  3/7/2022 0803 by Nadira Asif RN  Outcome: Ongoing     Problem:  Bowel Function - Altered:  Goal: Bowel elimination is within specified parameters  Description: Bowel elimination is within specified parameters  3/7/2022 2007 by Dominick Leger RN  Outcome: Ongoing  3/7/2022 0803 by Nadira Asif RN  Outcome: Ongoing     Problem: Cardiac Output - Decreased:  Goal: Hemodynamic stability will improve  Description: Hemodynamic stability will improve  3/7/2022 2007 by Dominick Leger RN  Outcome: Ongoing  3/7/2022 0803 by Nadira Asif RN  Outcome: Met This Shift     Problem: Pain:  Goal: Pain level will decrease  Description: Pain level will decrease  3/7/2022 2007 by Dominick Leger RN  Outcome: Ongoing  3/7/2022 0803 by Nadira Asif RN  Outcome: Met This Shift     Problem: DISCHARGE BARRIERS  Goal: Patient's continuum of care needs are met  3/7/2022 2007 by Dominick Leger RN  Outcome: Ongoing  3/7/2022 0803 by Nadira Asif RN  Outcome: Ongoing Problem: Pain:  Goal: Pain level will decrease  Description: Pain level will decrease  3/7/2022 2007 by Dominick Leger RN  Outcome: Ongoing  3/7/2022 0803 by Nadira Asif RN  Outcome: Met This Shift  Goal: Control of acute pain  Description: Control of acute pain  3/7/2022 2007 by Dominick Leger RN  Outcome: Ongoing  3/7/2022 0803 by Nadira Asif RN  Outcome: Ongoing  Goal: Control of chronic pain  Description: Control of chronic pain  3/7/2022 2007 by Dominick Leger RN  Outcome: Ongoing  3/7/2022 0803 by Nadira Asif RN  Outcome: Ongoing   Dominick Leger RN

## 2022-03-08 NOTE — CARE COORDINATION
Discharge Planning Update:     Hospital day: 8  Location: 8B-31/031-A     This CM RN phoned pt daughter Rayne Hutchinson ([phone]) to obtain preferred ECF. Rayne Hutchinson states she had conversation with Dr. Charlie Braden and they would appreciate her being admitted to a Dementia unit. Tia's preference for facilities are as follows in order: The [address] [place] [address] Avenue of [address] [place] [address] in [place] [address] notified of this. Rayne Hutchinson would like to be kept updated with the progression towards discharge.

## 2022-03-08 NOTE — PROGRESS NOTES
[address] [place] [address] 8B  Occupational Therapy  Daily Note  Time:   Time In: 8025  Time Out: 1503  Timed Code Treatment Minutes: 25 Minutes  Minutes: 25          Date: 3/8/2022  Patient Name: Fausto Lo,   Gender: female      Room: -031-A  MRN: [id number]  : 1940  (78 y.o.)  Referring Practitioner: Cony Simeon MD  Diagnosis: syncope and collapse  Additional Pertinent Hx: The patient is an 47-year-old female who isknown to me through the office being her primary care provider. Thepatient lives in the assisted living for one year, has history of CVA,coronary artery disease, rule out MI, and dementia. The patient wasfound on the floor with drops of blood surrounding her, was initiallyunresponsive, but then, she was able to be aroused by the staff members. The patient had blood on the nose and bruises on the face. Thepatient's fall is unwitnessed and no one knows how did it happen or whattime she fell. The patient when came into the hospital, blood pressureswere on the low side. Blood pressure in the emergency room initiallywas 87/65 with 94% saturation. The patient was seen by the traumaservice as a level 2 trauma and worked up and was cleared up from traumaservice and I was then consulted for admission. The patient was seen ont floor together with her son, Trisha Lee. The patient has fallenapparently about 07:45 this morning. Restrictions/Precautions:  Restrictions/Precautions: General Precautions,Fall Risk  Position Activity Restriction  Other position/activity restrictions: dementia     SUBJECTIVE: Pt seated in recliner holding [facility] upon arrival, agreeable to activity with encouragement- pt required frequent redirection. PAIN: Denies    Vitals: Vitals not assessed per clinical judgement, see nursing flowsheet    COGNITION: Exceptions    ADL:   Grooming: Stand By Assistance. Standing sink side washing hands.  Pt would not complete until therapist washed hands first.  Toileting: Minimal Assistance. For clothig management. SBA to complete hygiene while seated on RTS. Toilet Transfer: Stand By Assistance. RTS. BALANCE:  Sitting Balance:  Stand By Assistance. Standing Balance: Contact Guard Assistance- SBA. x5 minutes within grooming task- increased time to complete d/t needs for redirection    BED MOBILITY:  Not Tested    TRANSFERS:  Sit to Stand:  Contact Guard Assistance. Stand to Sit: Contact Guard Assistance. Comment: Increased time for initiation    FUNCTIONAL MOBILITY:  Assistive Device: None, Hand Held Assist  Assist Level:  Contact Guard Assistance. Distance:   Completed functional mobility within unit hallway and to/from BR at slow pace, no LOB noted. Pt provided seated rest break after trial of mobility, min fatigue noted. ASSESSMENT:     Activity Tolerance:  Patient tolerance of  treatment: fair. Discharge Recommendations: Subacute/skilled nursing facility  Equipment Recommendations: Equipment Needed: No  Plan: Times per week: 3-5x  Times per day: Daily  Current Treatment Recommendations: Gait Training,Strengthening,Patient/Caregiver Education & Training,Balance Training,Functional Mobility Training,Endurance Training,Safety Education & Training,Self-Care / ADL    Patient Education  Patient Education: ADL's, Orientation, Importance of Increasing Activity and Safety with transfers and mobility.     Goals  Short term goals  Time Frame for Short term goals: by discharge  Short term goal 1: pt will complete functional mobility to/from the bathroom and [facility] distances with SBA to access ADLs  Short term goal 2: Pt will complete BADL routine with SBA to increase indep with ADLs  Short term goal 3: pt will complete dynamic standing x4 minutes with unilateral release and SBA to increase ease with grooming tasks    Following session, patient left in safe position with all fall risk precautions in place

## 2022-03-08 NOTE — PROGRESS NOTES
[facility]  OCCUPATIONAL THERAPY MISSED TREATMENT NOTE  [facility] MED SURG 8B  8B-31/031-A      Date: 3/8/2022  Patient Name: Collette Blanco        CSN: [id number]   : 1940  (78 y.o.)  Gender: female   Referring Practitioner: Emre Pedersen MD  Diagnosis: syncope and collapse         REASON FOR MISSED TREATMENT: Attempted for OT session this AM. Live sitter present stating that pt was awake all night, \"\". States that pt has not been able to arouse for staff this AM, will check back this PM as able.

## 2022-03-09 PROBLEM — K22.70 BARRETT'S ESOPHAGUS WITH ESOPHAGITIS: Status: ACTIVE | Noted: 2022-01-01

## 2022-03-09 PROBLEM — K20.90 BARRETT'S ESOPHAGUS WITH ESOPHAGITIS: Status: ACTIVE | Noted: 2022-01-01

## 2022-03-09 NOTE — PROGRESS NOTES
Family Medicine Progress Notes/Coverage    Today's Date: 3/9/22  8B-31/031-A  Medical Record # [id number]  CSN/Account # [de-identified]      Ms. Miranda Ospina admitted on 2/28/2022        Subjective / Interval History :     doing well, No SOB, No chest pain , No fatigue. No nausea nor abdominal pain  Sitting and pleasant when seen. slept better last night  Reviewed notes, consults, laboratory and radiology results,    Objective:       Physical Exam:  Patient Vitals for the past 24 hrs:   BP Temp Temp src Pulse Resp SpO2   03/09/22 0829 133/81 98 °F (36.7 °C) Axillary 76 18    03/09/22 0304 (!) 158/72 97.5 °F (36.4 °C) Axillary 53 16 100 %   03/08/22 2021 (!) 140/113 98.3 °F (36.8 °C) Oral 71 18    03/08/22 1700 (!) 141/119 96.9 °F (36.1 °C) Axillary 60 18 100 %   03/08/22 1058 (!) 140/63 97.8 °F (36.6 °C) Oral 57 18 100 %       General Appearance:  Alert, cooperative, no distress, appears stated age   HEENT:  Neck:      Chest/Lungs:  Heart: CTA  RRR   Abdomen:  Back: soft   Extremities:  Neurological Exam: No edema  WNL       Assessment:     Admitting Diagnosis:    Syncope and collapse [R55]  DARIUS (acute kidney injury) ([facility] [address] [N17.9]  Closed head injury, initial encounter [S09.90XA]  Fall, initial encounter [W19. XXXA]  Contusion of forehead, initial encounter [S00.83XA]  Acute renal failure, unspecified acute renal failure type ([address] [N17.9]  Hematemesis without nausea [K92.0]    Active Hospital Problems    Diagnosis Date Noted    Garrison's esophagus with esophagitis [K22.70, K20.90] 03/09/2022     Priority: High    Hiatal hernia with gastroesophageal reflux disease and esophagitis and ulcer [K44.9, K21.00] 03/07/2022     Priority: High    New onset atrial fibrillation (HCC) w/ RVR now sinus Bradycardia [I48.91] 03/04/2022     Priority: High    Fall at home, initial encounter [K53.NOOB, P65.624] 02/28/2022     Priority: High    DARIUS (acute kidney injury) ([facility] [address] [N17.9] 02/28/2022     Priority: High    Syncope and collapse [R55] 02/28/2022     Priority: High    Anemia [D64.9] 02/28/2022     Priority: High    Occult blood positive stool [R19.5] 02/28/2022     Priority: High    Forehead contusion [S00.83XA] 02/28/2022     Priority: Medium    Traumatic ecchymosis of right knee [S80.01XA] 02/28/2022     Priority: Medium    Hypotension due to hypovolemia [I95.89, E86.1] 02/28/2022     Priority: Medium    Hematemesis without nausea [K92.0]      Priority: Medium    Antiplatelet or antithrombotic long-term use [Z79.02] 03/15/2021     Priority: Medium    [facility] [F03.90] 08/02/2016     Priority: Medium    Essential hypertension [I10] 04/14/2016     Priority: Medium    Hyperlipidemia [E78.5] 04/14/2016     Priority: Medium    S/P cardiac cath 2012- mild nonobstructive CAD [Z98.890] 01/22/2016     Priority: Medium    CAD (coronary artery disease)-mild NONobstructive max 30% ostail, LCX, mid lad and diffuse mild luminal irregularites-per cath 08/08/12 [I25.10] 08/08/2012     Priority: Medium    CVA (cerebral vascular accident)-2002 [I63.9] 06/21/2012     Priority: Medium       Plan:     Discussed plans with the nursing staff  ECF placement with Holter monitor    Medications, Laboratories and Imaging results:    Scheduled Meds:   LORazepam  0.5 mg IntraVENous Once    pantoprazole  40 mg Oral BID AC    [Held by provider] aspirin  81 mg Oral Daily    atorvastatin  20 mg Oral Daily    polyvinyl alcohol  1 drop Ophthalmic BID    donepezil  10 mg Oral Nightly    [Held by provider] ferrous sulfate  325 mg Oral Daily with breakfast    fluticasone  2 spray Nasal Daily    lisinopril  10 mg Oral Daily    memantine  10 mg Oral BID    sodium chloride flush  5-40 mL IntraVENous 2 times per day     Continuous Infusions:   sodium chloride      dextrose 5 % and 0.9 % NaCl Stopped (03/02/22 0417)    sodium chloride       PRN Meds:potassium chloride **OR** potassium alternative oral replacement **OR** potassium chloride, sodium chloride, sodium chloride flush, sodium chloride, ondansetron **OR** ondansetron, polyethylene glycol, acetaminophen **OR** acetaminophen    Imaging:    Lab Review :    Lab Results   Component Value Date    WBC 4.6 (L) 03/07/2022    HGB 8.0 (L) 03/08/2022    HCT 26.6 (L) 03/08/2022    MCV 96.9 03/07/2022     03/07/2022     Lab Results   Component Value Date    CREATININE 1.0 03/07/2022    BUN 12 03/07/2022     03/07/2022    K 3.4 (L) 03/07/2022     03/07/2022    CO2 22 (L) 03/07/2022     Lab Results   Component Value Date    CKTOTAL 96 02/28/2022     Lab Results   Component Value Date    ALT 15 02/28/2022    AST 14 02/28/2022    ALKPHOS 42 02/28/2022    BILITOT 0.7 02/28/2022     Lab Results   Component Value Date    LIPASE 91 (H) 06/29/2020         Electronically signed by Efe Villareal MD on 3/9/22 at 9:33 AM DAWSON Webber M.D.

## 2022-03-09 NOTE — PROGRESS NOTES
Physician Progress Note      Casper Arce  CSN #:                  [id number]  :                       1940  ADMIT DATE:       2022 8:23 AM  DISCH DATE:  RESPONDING  PROVIDER #:        Ubaldo Pop MD          QUERY TEXT:    Attending,    Patient admitted with DARIUS and GI bleeding (hematemesis and hemoccult positive   stool). Pt underwent EGD which showed, \"Esophagus - small hiatal hernia with   ulcer with worrisome-appearing nodularity distal to Schatzki ring unusual for   reflux esophagitis. Clenton Reas Clenton Reas Stomach - diffuse gastritis. \"  If possible, please   respond below and document in the progress notes and discharge summary the   cause of the GI bleeding: The medical record reflects the following:  Risk Factors: esophageal ulcer, gastritis, advanced age  Clinical Indicators: hematemesis and hemoccult positive stool; anemia  Treatment: GI consult, EGD, labs, IVF, ferrous sulfate, Protonix    Thank you! Micky Aviles, RN, BSN, RHIT, CCDS  Clinical   Options provided:  -- GI bleeding due to esophageal ulcer  -- GI bleeding due to gastritis  -- GI bleeding due to, please specify. -- Other - I will add my own diagnosis  -- Disagree - Not applicable / Not valid  -- Disagree - Clinically unable to determine / Unknown  -- Refer to Clinical Documentation Reviewer    PROVIDER RESPONSE TEXT:    This patient has GI bleeding due to esophageal ulcer.     Query created by: Devin Vu on 3/8/2022 9:56 AM      Electronically signed by:  Ubaldo Pop MD 3/9/2022 8:08 AM

## 2022-03-09 NOTE — CARE COORDINATION
3/9/22, 4:41 PM EST    DISCHARGE PLANNING EVALUATION    The [address] patient's medically needs to speak with the daughter for financials and payment. Facility and this writer have left message for daughter No Ann and are awaiting call back.

## 2022-03-09 NOTE — CARE COORDINATION
3/9/22, 1:34 PM EST    DISCHARGE PLANNING EVALUATION    Spoke with Corrina Stephen at [facility]. She stated that clinical that they can accept the patient. Corrina Stephen has reach out to the daughter to discuss the payment before the can officially accept. Left a message for daughter Hitesh Kaiser to update and request she call the [facility].

## 2022-03-09 NOTE — DISCHARGE INSTR - COC
Continuity of Care Form    Patient Name: Shakira Lou   :  1940  MRN:  [id number]    Admit date:  2022  Discharge date:  ***    Code Status Order: Full Code   Advance Directives:      Admitting Physician:  Rob Lew MD  PCP: Rob Lew MD    Discharging Nurse: [facility] Unit/Room#: 8B-31/031-A  Discharging Unit Phone Number: ***    Emergency Contact:   Extended Emergency Contact Information  Primary Emergency Contact: Nia Kwan  Address: [address] [facility], [address] [place] of [address] Phone: [phone]  Relation: Child  Secondary Emergency Contact: Valentina Crowevickie [facility] Phone: [phone]  Relation: Child   needed?  No    Past Surgical History:  Past Surgical History:   Procedure Laterality Date    BLADDER REPAIR      CARDIAC CATHETERIZATION  12    CHOLECYSTECTOMY      COLONOSCOPY  2012    Dr. Danyelle Srivastava  2016    corneal related - Dr Chriss Odom ENDOSCOPY N/A 3/7/2022    EGD BIOPSY performed by Alexia Velez MD at [facility]       Immunization History:   Immunization History   Administered Date(s) Administered    COVID-19, J&J, PF, 0.5 mL 03/15/2021    COVID-19, Pfizer Purple top, DILUTE for use, 12+ yrs, 30mcg/0.3mL dose 2021    Influenza 2012    Influenza Virus Vaccine 2011, 10/05/2013, 2014, 2015    Influenza, MDCK Quadv, IM, PF (Flucelvax 2 yrs and older) 10/19/2017    Influenza, MDCK Quadv, with preserv IM (Flucelvax 2 yrs and older) 2019    Influenza, Quadv, IM, (6 mo and older Fluzone, Flulaval, Fluarix and 3 yrs and older Afluria) 2018, 10/01/2020, 10/14/2021    Influenza, Quadv, IM, PF (6 mo and older Fluzone, Flulaval, Fluarix, and 3 yrs and older Afluria) 2016    Pneumococcal Conjugate 13-valent ([address]) 2015    Pneumococcal Polysaccharide ([facility]f[address]73) 08/23/2011       Active Problems:  Patient Active Problem List   Diagnosis Code    CVA (cerebral vascular accident)-2002 I63.9    CAD (coronary artery disease)-mild NONobstructive max 30% ostail, LCX, mid lad and diffuse mild luminal irregularites-per cath 08/08/12 I25.10    S/P cardiac cath 2012- mild nonobstructive CAD Z98.890    Essential hypertension I10    Hyperlipidemia E78.5    Dementia without behavioral disturbance (HCC) F03.90    Antiplatelet or antithrombotic long-term use Z79.02    Fall at home, initial encounter [address] XXXA, Y92.009    Forehead contusion S00.83XA    Traumatic ecchymosis of right knee S80. 01XA    DARIUS (acute kidney injury) ([facility] [address] N17.9    Syncope and collapse R55    Anemia D64.9    Occult blood positive stool R19.5    Hypotension due to hypovolemia I95.89, E86.1    Hematemesis without nausea K92.0    New onset atrial fibrillation (HCC) w/ RVR now sinus Bradycardia I48.91    Hiatal hernia with gastroesophageal reflux disease and esophagitis and ulcer K44.9, K21.00    Garrison's esophagus with esophagitis K22.70, K20.90       Isolation/Infection:   Isolation            No Isolation          Patient Infection Status       None to display            Nurse Assessment:  Last Vital Signs: /81   Pulse 76   Temp 98 °F (36.7 °C) (Axillary)   Resp 18   Ht 5' 1\" (1.549 m)   Wt 139 lb (63 kg)   SpO2 100%   BMI 26.26 kg/m²     Last documented pain score (0-10 scale): Pain Level: 0  Last Weight:   Wt Readings from Last 1 Encounters:   02/28/22 139 lb (63 kg)     Mental Status:  {IP PT MENTAL STATUS:70816}    IV Access:  { CLAUDIA IV ACCESS:491771430}    Nursing Mobility/ADLs:  Walking   {CHP DME KFYT:322659612}  Transfer  {CHP DME XXED:598099662}  Bathing  {CHP DME DFUC:996771219}  Dressing  {CHP DME TLYR:034550473}  Toileting  {CHP DME UUCL:015219818}  Feeding  {CHP DME RJFT:200522319}  Med Admin  {CHP DME PQNA:879788947}  Med Delivery   { CLAUDIA MED Delivery:903466821}    Wound Care Documentation and Therapy:        Elimination:  Continence:    Bowel: {YES / SZ:16769}  Bladder: {YES / LN:53486}  Urinary Catheter: {Urinary Catheter:339566350}   Colostomy/Ileostomy/Ileal Conduit: {YES / :03006}       Date of Last BM: ***    Intake/Output Summary (Last 24 hours) at 3/9/2022 0941  Last data filed at 3/9/2022 0224  Gross per 24 hour   Intake --   Output 700 ml   Net -700 ml     I/O last 3 completed shifts:  In: -   Out: 900 [Urine:900]    Safety Concerns:     [address] Safety Concerns:775312835}    Impairments/Disabilities:      [address] Impairments/Disabilities:729531719}    Nutrition Therapy:  Current Nutrition Therapy:   [address] Diet List:179117568}    Routes of Feeding: {CHP DME Other Feedings:974332270}  Liquids: {Slp liquid thickness:46144}  Daily Fluid Restriction: {CHP DME Yes amt example:960132715}  Last Modified Barium Swallow with Video (Video Swallowing Test): {Done Not Done JX:266867118}    Treatments at the Time of Hospital Discharge:   Respiratory Treatments: ***  Oxygen Therapy:  {Therapy; copd oxygen:16378}  Ventilator:    {MH CC Vent HSHX:398603400}    Rehab Therapies: {THERAPEUTIC INTERVENTION:1221987950}  Weight Bearing Status/Restrictions: [employer] Weight Bearin}  Other Medical Equipment (for information only, NOT a DME order):  {EQUIPMENT:892626858}  Other Treatments: ***    Patient's personal belongings (please select all that are sent with patient):  {CHP DME Belongings:097214963}    RN SIGNATURE:  {Esignature:191132218}    CASE MANAGEMENT/SOCIAL WORK SECTION    Inpatient Status Date: 22    Readmission Risk Assessment Score:  Readmission Risk              Risk of Unplanned Readmission:  14           Discharging to Facility/ Agency   Name: [facility] Guido [address] [place] [address], [address]  Fax:[phone]    Dialysis Facility (if applicable)   Name:  Address:  Dialysis Schedule:  Phone:  Fax:    /Social Worker signature: Electronically signed by JORDANA Cervantes on 3/10/22 at 9:09 AM EST    PHYSICIAN SECTION    Prognosis: Fair    Condition at Discharge: Stable    Rehab Potential (if transferring to Rehab): Good    Recommended Labs or Other Treatments After Discharge: CBC BMP 2 weeks    Physician Certification: I certify the above information and transfer of Mihaela Hood  is necessary for the continuing treatment of the diagnosis listed and that she requires [place] for greater 30 days.      Update Admission H&P: No change in H&P    PHYSICIAN SIGNATURE:  Electronically signed by Kailey Juarez MD on 3/9/22 at 9:42 AM EST

## 2022-03-09 NOTE — CARE COORDINATION
3/9/22, 1:54 PM EST    DISCHARGE ON GOING EVALUATION    Jailyn Sr day: 9  Location: -31/031-A Reason for admit: Syncope and collapse [R55]  DARIUS (acute kidney injury) ([facility] [address] [N17.9]  Closed head injury, initial encounter [X20.86CP]  Fall, initial encounter [W19. XXXA]  Contusion of forehead, initial encounter [S00.83XA]  Acute renal failure, unspecified acute renal failure type ([facility] [N17.9]  Hematemesis without nausea [K92.0]   Procedure: No  Barriers to Discharge: Awaiting placement. Labs ordered for today but not resulted. Holter needed at discharge. Pt up in chair this afternoon. PCP: Augusta Franklin MD  Readmission Risk Score: 17 ( )%  Patient Goals/Plan/Treatment Preferences: Accepted at [facility] pending financials.

## 2022-03-09 NOTE — PROGRESS NOTES
[address] [place] [address] 8B  Occupational Therapy  Daily Note  Time:   Time In: 9679  Time Out: 1425  Timed Code Treatment Minutes: 23 Minutes  Minutes: 23          Date: 3/9/2022  Patient Name: Arlene Magaña,   Gender: female      Room: [facility]031-A  MRN: [id number]  : 1940  (78 y.o.)  Referring Practitioner: Zeynep Neff MD  Diagnosis: syncope and collapse  Additional Pertinent Hx: The patient is an 29-year-old female who isknown to me through the office being her primary care provider. Thepatient lives in the assisted living for one year, has history of CVA,coronary artery disease, rule out MI, and dementia. The patient wasfound on the floor with drops of blood surrounding her, was initiallyunresponsive, but then, she was able to be aroused by the staff members. The patient had blood on the nose and bruises on the face. Thepatient's fall is unwitnessed and no one knows how did it happen or whattime she fell. The patient when came into the hospital, blood pressureswere on the low side. Blood pressure in the emergency room initiallywas 87/65 with 94% saturation. The patient was seen by the traumaservice as a level 2 trauma and worked up and was cleared up from traumaservice and I was then consulted for admission. The patient was seen onthe floor together with her son, Pedro Luis Ingram. The patient has fallenapparently about 07:45 this morning. Restrictions/Precautions:  Restrictions/Precautions: General Precautions,Fall Risk  Position Activity Restriction  Other position/activity restrictions: dementia     SUBJECTIVE: patient in process of being assisted to get up from recliner to use toilet with tech. This writer arrived to patient's room and assisted patient. A & O x 1. Patient focused on baby doll \"sleeping\" in her bed and concerned that he has been sleeping all day. Patient difficult to keep on task.      PAIN: 0/10:     Vitals: Vitals not assessed per clinical judgement, see nursing flowsheet    COGNITION: Decreased Recall, Decreased Insight, Impaired Memory, Decreased Problem Solving, Decreased Safety Awareness, Impaired Attention, Difficulty Following Commands and Impulsive    ADL:   Grooming: Stand By Assistance. to stand at sink to wash hands with increased time for patient to focus and sequence task with soap and water  Lower Extremity Dressing: Supervision. to doff/don slipper sock seated in recliner with multiple cues to initiate in task after instructed to patient   Toileting: Stand By Assistance. with patient distracted by \"hat\" to collect urine and was confused why the urine in the hat wasn't flushing. (I) with toilet hygiene with patient excessively wiping with cues to terminate task. Toilet Transfer: Stand By Assistance. with no AD . Patient wiped down toilet seat with paper towel at end of toileting and demo no LOB during task. BALANCE:  Sitting Balance:  Supervision. due to cognition   Standing Balance: Stand By Assistance, [address] with no AD and moved impulsively    BED MOBILITY:  Not Tested    TRANSFERS:  Sit to Stand:  Stand By Assistance. cues for hand placement    FUNCTIONAL MOBILITY:  Assistive Device: None  Assist Level:  Stand By Assistance and [address] Distance: To and from bathroom  Cues for safety, appeared slightly unsteady and reaching out to items in room to  Walk to / from [facility] from recliner. ADDITIONAL ACTIVITIES:  Completed 4 exer x 10 reps of UB exer with 1# free wt with rest breaks and constant verbal/tactile cues for sequencing, tech and to stay on task to increase UB strength fir self care routine. Patient requesting baby doll at end of treatment and was provided to patient to hold with patient left in room in recliner with patient attending to her baby doll. ASSESSMENT:  Activity Tolerance:  Patient tolerance of  treatment: fair.  Decreased cognition, safety awareness and attention, no c/o pain, fatigue

## 2022-03-09 NOTE — PROGRESS NOTES
[facility]  INPATIENT PHYSICAL THERAPY  DAILY NOTE  [facility] MED SURG 8B - 8B-31/031-A     Time In: 0088  Time Out: 8540  Timed Code Treatment Minutes: 23 Minutes  Minutes: 23          Date: 3/9/2022  Patient Name: Jessica Emerson,  Gender:  female        MRN: [id number]  : 1940  (78 y.o.)     Referring Practitioner: Javier Darby MD  Diagnosis: syncope and collapse  Additional Pertinent Hx: Per EMR \"The patient is an 64-year-old female who isknown to me through the office being her primary care provider. Thepatient lives in the assisted living for one year, has history of CVA,coronary artery disease, rule out MI, and dementia. The patient wasfound on the floor with drops of blood surrounding her, was initiallyunresponsive, but then, she was able to be aroused by the staff members. The patient had blood on the nose and bruises on the face. Thepatient's fall is unwitnessed and no one knows how did it happen or whattime she fell. The patient when came into the hospital, blood pressureswere on the low side. Blood pressure in the emergency room initiallywas 87/65 with 94% saturation. The patient was seen by the traumaservice as a level 2 trauma and worked up and was cleared up from traumaservice and I was then consulted for admission. The patient was seen ont floor together with her son, Kaela Green. The patient has fallenapparently about 07:45 this morning. \"     Prior Level of Function:  Lives With: Spouse  Type of Home: Assisted living [facility]  Home Equipment: Rolling walker (no AD PTA)   Bathroom Shower/Tub: Walk-in shower  Bathroom Toilet: Handicap height  Bathroom Equipment: Shower chair    ADL Assistance: Needs assistance  Homemaking Assistance: Needs assistance  Ambulation Assistance: Independent  Transfer Assistance: Independent  Active : No  Additional Comments: Pt amb with no AD, facility staff helps assist with meals and ADL tasks. Restrictions/Precautions:  Restrictions/Precautions: General Precautions,Fall Risk  Position Activity Restriction  Other position/activity restrictions: dementia      SUBJECTIVE: Pt sitting up in recliner with her \"grandson\" (baby doll) on her lap. Pt stating that he was resting now, but was playing earlier. Pt was agreeable to therapy with a little encouragement. PAIN: denies      Vitals: Vitals not assessed per clinical judgement, see nursing flowsheet    OBJECTIVE:  Bed Mobility:  Not Tested    Transfers:  Sit to Stand: Contact Guard Assistance  Stand to Sit:Contact Guard Assistance    Ambulation:  Contact Guard Assistance  Distance: 80 ft   Surface: Level Tile  Device:no device to occasional use of recio rail or counter  Gait Deviations: Forward Flexed Posture, Decreased Step Length Bilaterally, Decreased Gait Speed and Mild Path Deviations    Balance:  Static Sitting Balance:  Supervision  Dynamic Sitting Balance: Stand By Assistance  Static Standing Balance: Contact Guard Assistance  Dynamic Standing Balance: Contact Guard Assistance    Exercise:  Patient was guided in 1 set(s) 10 reps of exercise to both lower extremities. Ankle pumps, Glut sets, Quad sets, Hip abduction/adduction, Seated marches and Long arc quads. Exercises were completed for increased independence with functional mobility. Functional Outcome Measures: Completed  AM-PAC Inpatient Mobility without Stair Climbing Raw Score : 15  AM-PAC Inpatient without Stair Climbing T-Scale Score : 43.03    ASSESSMENT:  Assessment: Patient progressing toward established goals. Activity Tolerance:  Patient tolerance of  treatment: good.        Equipment Recommendations:Equipment Needed: No  Discharge Recommendations: Continue to assess pending progress and Subacte/Skilled Nursing Facility  Plan: Times per week: 3-5x GM  Current Treatment Recommendations: Strengthening,Neuromuscular Re-education,Home Exercise Program,Safety Education & Rashard Rodriguez Training,Patient/Caregiver Education & Training,Functional Mobility Training,Equipment Evaluation, Education, & procurement,Gait Training,Transfer Training    Patient Education  Patient Education: Plan of Care, Home Exercise Program    Goals:  Patient goals : not able to state due to cognition  Short term goals  Time Frame for Short term goals: by discharge  Short term goal 1: Pt will amb for >150 feet with LRAD with S to progress towards PLOF. Short term goal 2: Pt will demo IND with transfers with LRAD for support to progress towards PLOF. Short term goal 3: Pt will demo IND with bed mobility tasks with good technique to return home safely. Long term goals  Time Frame for Long term goals : NA due to short ELOS    Following session, patient left in safe position with all fall risk precautions in place. Kashmir Don.  Malini Randolph Oran 8

## 2022-03-09 NOTE — CARE COORDINATION
3/9/22, 8:41 AM EST    DISCHARGE PLANNING EVALUATION    Spoke with Alexander at [facility] and made a referral as that is first choice.

## 2022-03-09 NOTE — PLAN OF CARE
Problem: Falls - Risk of:  Goal: Will remain free from falls  Description: Will remain free from falls  3/8/2022 2339 by Kody Napier RN  Outcome: Ongoing  Note: Pt is free from falls this shift, pt's bed is at lowest position with bed alarm on, pt's belonging and call light are in reach, pt has gripper socks on, bed siderails up x2. Rn to monitor closely     Problem: Falls - Risk of:  Goal: Absence of physical injury  Description: Absence of physical injury  3/8/2022 2339 by Kody Napier RN  Outcome: Ongoing  Note: No new injury obtained or seen this shift. RN to monitor closely     Problem: Skin Integrity:  Goal: Will show no infection signs and symptoms  Description: Will show no infection signs and symptoms  3/8/2022 2339 by Kody Napier RN  Outcome: Ongoing  Note: Pt shows no s/s of infection, no fever. RN to continue to monitor     Problem: Skin Integrity:  Goal: Absence of new skin breakdown  Description: Absence of new skin breakdown  3/8/2022 2339 by Kody Napier RN  Outcome: Ongoing  Note: No new skin breakdown seen this shift. RN to monitor closely. Pt is able to turn self and reposition. Problem: Bowel Function - Altered:  Goal: Bowel elimination is within specified parameters  Description: Bowel elimination is within specified parameters  3/8/2022 2339 by Kody Napire RN  Outcome: Ongoing  Note: No abnormality in bowel elimination this shift.  RN to monitor     Problem: Cardiac Output - Decreased:  Goal: Hemodynamic stability will improve  Description: Hemodynamic stability will improve  3/8/2022 2339 by Kody Napier RN  Outcome: Ongoing     Problem: Pain:  Goal: Pain level will decrease  Description: Pain level will decrease  3/8/2022 2339 by Kody Napier RN  Outcome: Ongoing  Note: Pt states in no pain, Rn will continue to monitor closely     Problem: DISCHARGE BARRIERS  Goal: Patient's continuum of care needs are met  3/8/2022 2339 by Kody Napier RN  Outcome: Ongoing  Note: Pt and family member are involved in [facility] and Tx team, SW are following.

## 2022-03-10 NOTE — PROCEDURES
The skin was prepped and a 14 day cardiac event monitor was applied. The patient was instructed on the documentation of symptoms and the purpose of the monitor as well as the things to avoid while wearing the monitor. The patient was instructed to remove and return the monitor on 03/24/2022.   The serial number of the monitor that was applied is [id number]

## 2022-03-10 NOTE — PLAN OF CARE
Problem: Falls - Risk of:  Goal: Will remain free from falls  Description: Will remain free from falls  Outcome: Ongoing  Note: Absence of falls this shift. Fall prevention in place. Fall band applied. Bed alarm activated as needed. Problem: Falls - Risk of:  Goal: Absence of physical injury  Description: Absence of physical injury  Outcome: Ongoing  Note: Absence of physical injury. Problem: Skin Integrity:  Goal: Will show no infection signs and symptoms  Description: Will show no infection signs and symptoms  Outcome: Ongoing  Note: No s/s of infection noted. Pt afebrile. Problem: Skin Integrity:  Goal: Absence of new skin breakdown  Description: Absence of new skin breakdown  Outcome: Ongoing  Note: No new skin breakdown noted. Problem: Bowel Function - Altered:  Goal: Bowel elimination is within specified parameters  Description: Bowel elimination is within specified parameters  Outcome: Ongoing  Note: No bowel complaints. Problem: Cardiac Output - Decreased:  Goal: Hemodynamic stability will improve  Description: Hemodynamic stability will improve  Outcome: Ongoing  Note: Vitals stable at this time. Problem: Pain:  Goal: Pain level will decrease  Description: Pain level will decrease  Outcome: Ongoing  Note: Pt complains of pain at a 0/10. Non pharmacological interventions completed which include rest, and repositioning. Pt states pain goal at a 0/10. Pain assessed every 4 hours, and as needed. PRN pain medications given as ordered. Problem: DISCHARGE BARRIERS  Goal: Patient's continuum of care needs are met  Outcome: Ongoing  Note:  on case helping with discharge needs. Problem: Pain:  Goal: Pain level will decrease  Description: Pain level will decrease  Outcome: Ongoing  Note: Pt complains of pain at a 0/10. Non pharmacological interventions completed which include rest, and repositioning. Pt states pain goal at a 0/10.  Pain assessed every 4 hours, and as needed. PRN pain medications given as ordered. Problem: Pain:  Goal: Control of acute pain  Description: Control of acute pain  Outcome: Ongoing  Note: Pt complains of pain at a 0/10. Non pharmacological interventions completed which include rest, and repositioning. Pt states pain goal at a 0/10. Pain assessed every 4 hours, and as needed. PRN pain medications given as ordered. Problem: Pain:  Goal: Control of chronic pain  Description: Control of chronic pain  Outcome: Ongoing  Note: Pt complains of pain at a 0/10. Non pharmacological interventions completed which include rest, and repositioning. Pt states pain goal at a 0/10. Pain assessed every 4 hours, and as needed. PRN pain medications given as ordered. Care plan reviewed with patient. Patient verbalize understanding of the plan of care and contribute to goal setting.

## 2022-03-10 NOTE — PROGRESS NOTES
Family Medicine Progress Notes/Coverage    Today's Date: 3/10/22  8B-31/031-A  Medical Record # [id number]  CSN/Account # [de-identified]      Ms. Izabella Dixon admitted on 2/28/2022        Subjective / Interval History :     Slept well last night, otherwise doing well  Reviewed notes, consults, laboratory and radiology results,    Objective:       Physical Exam:  Patient Vitals for the past 24 hrs:   BP Temp Temp src Pulse Resp SpO2   03/10/22 0424 (!) 142/53 98.5 °F (36.9 °C) Oral 60 17 97 %   03/09/22 2032 (!) 165/73 98.4 °F (36.9 °C) Oral 61 18 100 %   03/09/22 1515 (!) 161/68 98.6 °F (37 °C) Oral 57  96 %   03/09/22 1127 (!) 153/69 97.7 °F (36.5 °C) Oral 62  100 %   03/09/22 0829 133/81 98 °F (36.7 °C) Axillary 76 18        General Appearance:  Alert, cooperative, no distress, appears stated age   HEENT:  Neck:      Chest/Lungs:  Heart: CTA  RRR   Abdomen:  Back:    Extremities:  Neurological Exam:        Assessment:     Admitting Diagnosis:    Syncope and collapse [R55]  DARIUS (acute kidney injury) ([facility] [address] [N17.9]  Closed head injury, initial encounter [S09.90XA]  Fall, initial encounter [W19. XXXA]  Contusion of forehead, initial encounter [S00.83XA]  Acute renal failure, unspecified acute renal failure type ([facility] [address] [N17.9]  Hematemesis without nausea [K92.0]    Active Hospital Problems    Diagnosis Date Noted    Garrison's esophagus with esophagitis [K22.70, K20.90] 03/09/2022     Priority: High    Hiatal hernia with gastroesophageal reflux disease and esophagitis and ulcer [K44.9, K21.00] 03/07/2022     Priority: High    New onset atrial fibrillation (HCC) w/ RVR now sinus Bradycardia [I48.91] 03/04/2022     Priority: High    Fall at home, initial encounter [W19. Rodney Jessica, N95.170] 02/28/2022     Priority: High    DARIUS (acute kidney injury) ([facility] [N17.9] 02/28/2022     Priority: High  Syncope and collapse [R55] 02/28/2022     Priority: High    Anemia [D64.9] 02/28/2022     Priority: High    Occult blood positive stool [R19.5] 02/28/2022     Priority: High    Forehead contusion [S00.83XA] 02/28/2022     Priority: Medium    Traumatic ecchymosis of right knee [S80.01XA] 02/28/2022     Priority: Medium    Hypotension due to hypovolemia [I95.89, E86.1] 02/28/2022     Priority: Medium    Hematemesis without nausea [K92.0]      Priority: Medium    Antiplatelet or antithrombotic long-term use [Z79.02] 03/15/2021     Priority: Medium    Dementia without behavioral disturbance ([facility] [F03.90] 08/02/2016     Priority: Medium    Essential hypertension [I10] 04/14/2016     Priority: Medium    Hyperlipidemia [E78.5] 04/14/2016     Priority: Medium    S/P cardiac cath 2012- mild nonobstructive CAD [Z98.890] 01/22/2016     Priority: Medium    CAD (coronary artery disease)-mild NONobstructive max 30% ostail, LCX, mid lad and diffuse mild luminal irregularites-per cath 08/08/12 [I25.10] 08/08/2012     Priority: Medium    CVA (cerebral vascular accident)-2002 [I63.9] 06/21/2012     Priority: Medium       Plan:     Discussed plans with the nursing staff  [place] today    Medications, Laboratories and Imaging results:    Scheduled Meds:   LORazepam  0.5 mg IntraVENous Once    pantoprazole  40 mg Oral BID AC    [Held by provider] aspirin  81 mg Oral Daily    atorvastatin  20 mg Oral Daily    polyvinyl alcohol  1 drop Ophthalmic BID    donepezil  10 mg Oral Nightly    [Held by provider] ferrous sulfate  325 mg Oral Daily with breakfast    fluticasone  2 spray Nasal Daily    lisinopril  10 mg Oral Daily    memantine  10 mg Oral BID    sodium chloride flush  5-40 mL IntraVENous 2 times per day     Continuous Infusions:   sodium chloride      dextrose 5 % and 0.9 % NaCl Stopped (03/02/22 1567)    sodium chloride       PRN Meds:potassium chloride **OR** potassium alternative oral replacement **OR** potassium chloride, sodium chloride, sodium chloride flush, sodium chloride, ondansetron **OR** ondansetron, polyethylene glycol, acetaminophen **OR** acetaminophen    Imaging:    Lab Review :    Lab Results   Component Value Date    WBC 4.7 (L) 03/10/2022    HGB 7.4 (L) 03/10/2022    HCT 24.8 (L) 03/10/2022    MCV 94.7 03/10/2022     03/10/2022     Lab Results   Component Value Date    CREATININE 1.0 03/10/2022    BUN 15 03/10/2022     03/10/2022    K 3.8 03/10/2022     03/10/2022    CO2 26 03/10/2022     Lab Results   Component Value Date    CKTOTAL 96 02/28/2022     Lab Results   Component Value Date    ALT 15 02/28/2022    AST 14 02/28/2022    ALKPHOS 42 02/28/2022    BILITOT 0.7 02/28/2022     Lab Results   Component Value Date    LIPASE 91 (H) 06/29/2020         Electronically signed by Edwin Hewitt MD on 3/10/22 at 7:28 AM DAWSON Neri M.D.

## 2022-03-10 NOTE — FLOWSHEET NOTE
Costa Waller has a sitter who stated that Juan Gutierrez is very confused, this  decided to give the AD information and our brochure to the sitter to give to [facility] daughter who is coming this afternoon. 03/10/22 1351   Encounter Summary   Continue Visiting Yes  (3/10 AD info given)   Complexity of Encounter Low   Care Plan:  Continue spiritual and emotional care for patient and family. Including prayers. She may be discharged soon.

## 2022-03-10 NOTE — CARE COORDINATION
3/10/22, 7:43 AM EST    DISCHARGE PLANNING EVALUATION      Call from Dimple with [address], reports she did talk to patient's daughter last night, reports they cannot afford their dementia unit [facility] [address]. JOE did ask about nursing home side, reports their nursing home side is more expensive than the dementia unit. SW did call daughter, left a voicemail for a call back. 8:34 AM  SW met with daughter Hitesh Kaiser outside of pt's room this morning. Hitesh Kaiser reports she spoke with doctor this morning, plans for discharge. Daughter reports she did talk with [facility] last night and reports private pay cost is not what they are wanting to do. She reports she would like patient to return [facility] today, reports she or other family/caregiver can transport. SW did let her know she would contact [facility] this morning about getting her back to their facility. JOE did call [facility], director Paulo Alex, left a message for a call back. 9:18 AM  Call from Catalino with [facility], they can take patient back, reports patient will be on 15 minute checks due to her fall that brought her into the hospital. She asked that nurse call report prior to discharge. JOE spoke with nurse Erich Angelucci, provided update and number to call report to [facility]. 3/10/22, 10:55 AM EST    Patient goals/plan/ treatment preferences discussed by  and . Patient goals/plan/ treatment preferences reviewed with patient/ family. Patient/ family verbalize understanding of discharge plan and are in agreement with goal/plan/treatment preferences. Understanding was demonstrated using the teach back method. AVS provided by RN at time of discharge, which includes all necessary medical information pertaining to the patients current course of illness, treatment, post-discharge goals of care, and treatment preferences.     Services After Discharge  Services At/After Discharge: Nursing Services,Aide Services [facility])   IMM Letter  IMM Letter given to Patient/Family/Significant other/Guardian/POA/by[de-identified] Stephanie DONIS Case Manager. IMM Letter date given[de-identified] 03/08/22  IMM Letter time given[de-identified] 2173       Call from nurse, reports cannot get in contact with daughter Marisue Denver. Sw did call Marisue Denver, reports she will come up to the hospital around 3:30pm and take her to [facility] from there. Daughter asked about alternative holter monitor, reports doctor talked about there may be an alternative one different than what she has on. SW did let nursing know this, she was going to check on this. JOE did call Maty Branch at [facility] and updated on estimated time pt to arrive there. JOE faxed AVS and last dose MAR to [facility].

## 2022-03-17 NOTE — PROGRESS NOTES
Post-Discharge Transitional Care Follow Up      Wai Reynolds   YOB: 1940    Date of Office Visit:  3/17/2022  Date of Hospital Admission: 2/28/22  Date of Hospital Discharge: 3/10/22  Readmission Risk Score (high >=14%. Medium >=10%):Readmission Risk Score: 16.2 ( )      Care management risk score Rising risk (score 2-5) and Complex Care (Scores >=6): 1     Non face to face  following discharge, date last encounter closed (first attempt may have been earlier): *No documented post hospital discharge outreach found in the last 14 days     Call initiated 2 business days of discharge: *No response recorded in the last 14 days     Anemia, unspecified type  -     Basic Metabolic Panel; Future  -     CA DISCHARGE MEDS RECONCILED W/ CURRENT OUTPATIENT MED LIST  Dementia due to Parkinson's disease without behavioral disturbance (HCC)  -     CA DISCHARGE MEDS RECONCILED W/ CURRENT OUTPATIENT MED LIST  Essential hypertension  -     CBC with Auto Differential; Future  Hematemesis without nausea  Edema of both legs      Medical Decision Making: moderate complexity  Return in about 2 months (around 5/17/2022). Subjective:   HPI    Inpatient course: Discharge summary reviewed- see chart. Interval history/Current status: Patient brought in here by the daughter complaining of edema on both lower extremities since patient been taken off of Maxide. Patient's currently living at the assisted living, she is wearing a Holter monitor and has not taken it off at the present. No fall no arthritis pain.       Patient Active Problem List   Diagnosis    CVA (cerebral vascular accident)-2002    CAD (coronary artery disease)-mild NONobstructive max 30% ostail, LCX, mid lad and diffuse mild luminal irregularites-per cath 08/08/12    S/P cardiac cath 2012- mild nonobstructive CAD    Essential hypertension    Hyperlipidemia    Dementia without behavioral disturbance ([facility] [address]    Antiplatelet or antithrombotic long-term use    Fall at home, initial encounter    Forehead contusion    Traumatic ecchymosis of right knee    DARIUS (acute kidney injury) ([facility] [address]    Syncope and collapse    Anemia    Occult blood positive stool    Hypotension due to hypovolemia    Hematemesis without nausea    New onset atrial fibrillation (HCC) w/ RVR now sinus Bradycardia    Hiatal hernia with gastroesophageal reflux disease and esophagitis and ulcer    Garrison's esophagus with esophagitis       Medications listed as ordered at the time of discharge from hospital  [unfilled]     Medications marked \"taking\" at this time  Outpatient Medications Marked as Taking for the 3/17/22 encounter (Office Visit) with Javier Soliz MD   Medication Sig Dispense Refill    Elastic Bandages & Supports ([facility]) [address] 1 each by Does not apply route daily as needed (Bite to the lower leg daily for swelling) 2 each 1    acetaminophen (TYLENOL) 500 MG tablet Take 1 tablet by mouth every 6 hours as needed for Pain 120 tablet 3    polyethylene glycol (GLYCOLAX) 17 g packet Take 17 g by mouth daily as needed for Constipation 527 g 0    pantoprazole (PROTONIX) 40 MG tablet Take 1 tablet by mouth 2 times daily (before meals) 60 tablet 3    loperamide (IMODIUM) 2 MG capsule Take 2 mg by mouth daily as needed for Diarrhea      ferrous sulfate (IRON 325) 325 (65 Fe) MG tablet Take 1 tablet by mouth daily (with breakfast) 90 tablet 1    lisinopril (PRINIVIL;ZESTRIL) 10 MG tablet Take 1 tablet by mouth once daily 90 tablet 1    donepezil (ARICEPT) 10 MG tablet Take 1 tablet by mouth nightly 90 tablet 1    vitamin D (ERGOCALCIFEROL) 1.25 MG (57397 UT) CAPS capsule TAKE 1 CAPSULE BY MOUTH TWICE A WEEK (Patient taking differently: Take 50,000 Units by mouth twice a week On Mondays and Thursdays) 24 capsule 1    atorvastatin (LIPITOR) 20 MG tablet Take 1 tablet by mouth once daily 90 tablet 1    memantine (NAMENDA) 10 MG tablet Take 1 tablet by mouth twice daily 180 tablet 1    calcium carbonate 600 MG TABS tablet Take 1 tablet by mouth daily      fluticasone (FLONASE) 50 MCG/ACT nasal spray 2 sprays by Nasal route daily 1 Bottle 3    Carboxymethylcell-Hypromellose (GENTEAL OP) Apply 1 drop to eye daily 1 drop each eye daily          Medications patient taking as of now reconciled against medications ordered at time of hospital discharge: Yes    Review of Systems   Constitutional: Negative for appetite change, chills, diaphoresis, fatigue and fever. HENT: Negative for congestion, ear pain, postnasal drip, rhinorrhea, sinus pressure, sneezing, sore throat, trouble swallowing and voice change. Respiratory: Negative for cough, chest tightness, shortness of breath and wheezing. Cardiovascular: Negative for chest pain, palpitations and leg swelling. Gastrointestinal: Negative for abdominal pain, constipation, diarrhea, nausea and vomiting. Musculoskeletal: Negative for arthralgias, back pain, joint swelling, myalgias, neck pain and neck stiffness. Neurological: Negative for dizziness, syncope, weakness, light-headedness, numbness and headaches. Objective:    /66 (Site: Right Upper Arm, Position: Sitting, Cuff Size: Medium Adult)   Pulse 68   Temp 97 °F (36.1 °C)   Resp 12   Ht 5' 1\" (1.549 m)   Wt 139 lb 12.8 oz (63.4 kg)   BMI 26.41 kg/m²   Physical Exam  Vitals reviewed. Constitutional:       Appearance: She is well-developed. HENT:      Head: Normocephalic and atraumatic. Right Ear: External ear normal.      Left Ear: External ear normal.      Nose: Nose normal.   Eyes:      General: No scleral icterus. Conjunctiva/sclera: Conjunctivae normal.      Pupils: Pupils are equal, round, and reactive to light. Neck:      Thyroid: No thyromegaly. Vascular: No JVD. Cardiovascular:      Rate and Rhythm: Normal rate and regular rhythm. Heart sounds: No murmur heard. No friction rub. Pulmonary:      Effort: Pulmonary effort is normal.      Breath sounds: Normal breath sounds. No wheezing or rales. Chest:      Chest wall: No tenderness. Abdominal:      General: Bowel sounds are normal.      Palpations: Abdomen is soft. There is no mass. Tenderness: There is no abdominal tenderness. Musculoskeletal:      Cervical back: Normal range of motion and neck supple. Lymphadenopathy:      Cervical: No cervical adenopathy. Skin:     Findings: No rash. Neurological:      Mental Status: She is alert and oriented to person, place, and time. Psychiatric:         Behavior: Behavior is cooperative. New Prescriptions    ELASTIC BANDAGES & SUPPORTS (MEDICAL COMPRESSION STOCKINGS) [address] [facility]    1 each by Does not apply route daily as needed (Bite to the lower leg daily for swelling)       Updated home medications.  (New prescriptions plus current medications)   Current Outpatient Medications   Medication Sig Dispense Refill    Elastic Bandages & Supports (MEDICAL COMPRESSION STOCKINGS) MISC 1 each by Does not apply route daily as needed (Bite to the lower leg daily for swelling) 2 each 1    acetaminophen (TYLENOL) 500 MG tablet Take 1 tablet by mouth every 6 hours as needed for Pain 120 tablet 3    polyethylene glycol (GLYCOLAX) 17 g packet Take 17 g by mouth daily as needed for Constipation 527 g 0    pantoprazole (PROTONIX) 40 MG tablet Take 1 tablet by mouth 2 times daily (before meals) 60 tablet 3    loperamide (IMODIUM) 2 MG capsule Take 2 mg by mouth daily as needed for Diarrhea      ferrous sulfate (IRON 325) 325 (65 Fe) MG tablet Take 1 tablet by mouth daily (with breakfast) 90 tablet 1    lisinopril (PRINIVIL;ZESTRIL) 10 MG tablet Take 1 tablet by mouth once daily 90 tablet 1    donepezil (ARICEPT) 10 MG tablet Take 1 tablet by mouth nightly 90 tablet 1    vitamin D (ERGOCALCIFEROL) 1.25 MG (31849 UT) CAPS capsule TAKE 1 CAPSULE BY MOUTH TWICE A WEEK (Patient taking differently: Take 50,000 Units by mouth twice a week On Mondays and Thursdays) 24 capsule 1    atorvastatin (LIPITOR) 20 MG tablet Take 1 tablet by mouth once daily 90 tablet 1    memantine (NAMENDA) 10 MG tablet Take 1 tablet by mouth twice daily 180 tablet 1    calcium carbonate 600 MG TABS tablet Take 1 tablet by mouth daily      fluticasone (FLONASE) 50 MCG/ACT nasal spray 2 sprays by Nasal route daily 1 Bottle 3    Carboxymethylcell-Hypromellose (GENTEAL OP) Apply 1 drop to eye daily 1 drop each eye daily       No current facility-administered medications for this visit. Discuss about putting her on Maxide however she cannot be having problem with blood pressure including hypotension and falling down. We concurred together this time of just placing her on a compression stockings    Return in about 2 months (around 5/17/2022). Patient to return immediately for follow-up if having more problems and concerns. Yasmin Diallo MD        An electronic signature was used to authenticate this note.   --Yasmin Diallo MD

## 2022-03-22 NOTE — TELEPHONE ENCOUNTER
Lv Alvarado from [facility] called. Pt has been picking at her heart monitor. Ordered for 14 days, she is currently on day 12. Lv Alvarado had called the monitor company to get more strips to reapply monitor, company told her they have 12 good days of data and to inquire about ending the monitor a couple days early. Please advise.

## 2022-03-22 NOTE — DISCHARGE SUMMARY
[address] [place] [address]                               DISCHARGE SUMMARY    PATIENT NAME: Junior Wilder                     :        1940  MED REC NO:   [id number]                           ROOM:       0031  ACCOUNT NO:   [de-identified]                           ADMIT DATE: 2022  PROVIDER:     Meagan Corrigan. Austin Ruby M.D.            Darlin Vega: 03/10/2022    DISCHARGE FINAL DIAGNOSES:  1. Syncope. 2.  Acute kidney injury. 3.  Close head injury secondary to fall. 4.  Contusion on the forehead, acute. 5.  Hematemesis. 6.  Garrison's esophagus with esophagitis. 7.  Hiatal hernia with GERD, esophagitis, and bleeding ulcer. 8.  New-onset atrial fibrillation. 9.  Anemia. 10.  Positive occult blood. 11.  Traumatic ecchymosis on the right knee. 12.  Hypertension due to hypovolemia. 13.  Dementia without behavioral issue. 14.  Hypertension. 15.  Hyperlipidemia. 16.  Nonobstructive coronary artery disease. 17.  History of CVA. DISCHARGE HOME MEDICATIONS:  Include,  1. Tylenol for pain. 2.  GlycoLax 17 gm daily as needed for constipation. 3.  Protonix 40 mg one tablet twice a day. 4.  Iron tablet 325 mg one tablet daily. 5.  Lisinopril 10 mg one tablet daily. 6.  Aricept 10 mg one tablet daily. 7.  Vitamin D 1.25 mg twice a week. 8.  Lipitor 20 mg one tablet daily. 9.  Namenda 10 mg one tablet daily. 10.  GenTeal ophthalmic one drop to both eyes daily. 11.  Imodium 2 mg daily for diarrhea as needed. 12.  Calcium carbonate 600 mg one tablet daily. 13.  Flonase nasal spray daily. The patient _____ on the Dementia Unit/Independent Living with Dementia  Unit. CONSULTANTS ON THIS HOSPITALIZATION:  Include Dr. Gurmeet Noe from  Gastro[facility].    PROCEDURE DURING THIS HOSPITALIZATION:  Includes,  1. EGD noticing hiatal hernia with reflux esophagitis and an ulcer. 2.  Diffuse gastritis.     HISTORY AND PHYSICAL:  The patient is an 25-year-old female, known to me  through the office being her primary care provider. The patient lived  in assisted living for a year and a half, history of CVA, coronary  artery disease, and dementia. The patient was found on the floor and  was unresponsive, no one able to know when she dropped, but she was up,  helped by the assisted living personnel. The patient had noted blood in  the nose and bruise in the face, fall is unwitnessed. The patient came  to the hospital with low blood pressure, initially in the emergency  room, it is 87/65 with 94% saturation. The patient was seen by the  Trauma Service who worked her up and was cleared from the [employer]. The patient was then referred to me for admission. PHYSICAL EXAMINATION:  VITAL SIGNS:  Blood pressure _____, pulse 65, respirations 16, and  saturation 96%. HEENT:  Head normocephalic. There is bruises on the right frontal head  and left eyebrow. There is no wound. There is no bleeding. Pupils are  equal and reactive to light. NECK:  Supple. LUNGS:  Clear to auscultation. No rales. HEART:  Regular rate and rhythm. ABDOMEN:  Soft, depressible, and nontender. EXTREMITIES:  No edema. NEUROLOGIC:  Motor is 5/5 in all extremities. Cranial nerves II through  XII are all intact. Sensory 100%. HOSPITAL COURSE:  The patient was admitted by me due to syncope, kidney  injury, and anemia with positive Hemoccult. The patient's appropriate  home medications were resumed, was started on Protonix, SCDs, and  consulted Physical Therapy including Gastroenterology. The patient was  admitted on 02/28/2022. The patient was seen by Gastroenterology  Services and the resident. The patient was monitored and hemoglobin  continued to drop for which the patient had a subsequent EGD by Dr. Kyung Lowry, which was done on 03/07/2022. The EGD was done after the  Plavix was washed out.   The patient during her stay was attended by  Occupational and Physical Therapy. The patient had an attempt to have  EGD earlier on 03/22; however, the patient developed atrial fibrillation  with RVR, which was conducted medically and the endoscopy was canceled  and was re-scheduled. The patient finally had an EGD on 03/07/2022, and  the patient tolerated well. The patient stayed in the hospital little  longer for 10 days because of the rescheduling of the EGD due to the  patient developed atrial fibrillation, RVR, and likewise, the ECF  placement. The patient did well and was subsequently discharged to the  assisted living with Dementia Unit, discharged on 03/10/2022. AVERY Guthrie M.D.    D: 03/21/2022 16:24:30       T: 03/21/2022 23:02:55     [email or web address]  Job#: [id number]     Doc#: [id number]    CC:

## 2022-03-28 NOTE — PROCEDURES
[address] [place] [address]                                 EVENT MONITOR    PATIENT NAME: Dustin Daily                     :        1940  MED REC NO:   [id number]                           ROOM:       0031  ACCOUNT NO:   [de-identified]                           ADMIT DATE: 2022  PROVIDER:     Kacey Jackson MD    TEST TYPE:  Cardiac event monitor. DATE OF ENROLLMENT:  03/10/2022 until 2022. INDICATIONS: Syncope and collapse. FINDINGS:  The patient's monitoring period was between 03/10/2022 and  2022. The baseline underlying rhythm was consistent with sinus  rhythm with paroxysmal atrial fibrillation noticed. The patient was in  sinus rhythm 98% of the study duration. During this time in sinus  rhythm, tachycardia was noticed in 1%. The patient was bradycardic in  30%. The average heart rate during the study duration was 112 beats per  minute. Paroxysmal atrial fibrillation was noticed. The patient was in  atrial fibrillation in 2% of the study duration during which tachycardia  was noticed in 70% of episodes where the patient was in atrial  fibrillation, indicative of rapid ventricular response for that  duration. There was evidence for frequent premature atrial complexes. Also, there was evidence for frequent premature ventricular complexes. PAC burden was 5%. The PVC burden was 2%. Nonsustained ventricular  tachycardia for four beats was noticed. No prolonged pauses. No  profound bradycardia. IMPRESSION:  1. Normal sinus rhythm to sinus tachycardia with paroxysmal atrial  fibrillation. 2.  Atrial fibrillation burden of 2% during which rapid ventricular  response was noticed in 70% of atrial fibrillation duration. 3.  Frequent premature ventricular complexes. 4.  Four-beat run of nonsustained ventricular tachycardia.         Ketan Devries MD    D: 2022 8:54:01       T: 03/28/2022 8:56:28     AM/SARAH_Bernardo  Job#: [id number]     Doc#: [id number]    CC:

## 2022-04-05 PROBLEM — I48.91 ATRIAL FIBRILLATION WITH RAPID VENTRICULAR RESPONSE (HCC): Status: ACTIVE | Noted: 2022-01-01

## 2022-04-05 PROBLEM — I48.0 PAROXYSMAL ATRIAL FIBRILLATION WITH RVR (HCC): Status: ACTIVE | Noted: 2022-01-01

## 2022-04-05 NOTE — ED TRIAGE NOTES
Presents to ED with c/o bilateral lower extremity edema. Patient was seen at PCP today and sent here for further evaluation. Patient has had intermittent leg swelling the past couple weeks.

## 2022-04-05 NOTE — PROGRESS NOTES
Visit Date: 4/7/2022    Subjective: Steffanie Ceron is a 78 y. o.female who presents today for:  Chief Complaint   Patient presents with    Leg Swelling         HPI:     HPI     Bilateral leg swelling on and off > 1 week hurting left  Most hurting to walk    Patient had dementia, she was admitted previously 2/28/22 to 3/10/22 for upper GI bleeding secondary to ulcer ,esophagitis, reflux and Garrison's esophagus. During hospitalization the patient developed atrial fibrillation with converted medically. Patient Plavix and aspirin has been discontinued because of GI bleeding, was sent home with Protonix which she is still currently taking up to present. Patient is living at the assisted living. CurrentHome Medications:  No current facility-administered medications for this visit.      Current Outpatient Medications   Medication Sig Dispense Refill    metoprolol tartrate (LOPRESSOR) 50 MG tablet Take 1 tablet by mouth 2 times daily 60 tablet 3     Facility-Administered Medications Ordered in Other Visits   Medication Dose Route Frequency Provider Last Rate Last Admin    metoprolol tartrate (LOPRESSOR) tablet 50 mg  50 mg Oral BID Ted Mcfarlane MD        atorvastatin (LIPITOR) tablet 20 mg  20 mg Oral Daily Ben Doshi MD   20 mg at 04/07/22 0855    calcium elemental (OSCAL) tablet 500 mg  1 tablet Oral Daily Ben Doshi MD   500 mg at 04/07/22 0851    polyvinyl alcohol (LIQUIFILM TEARS) 1.4 % ophthalmic solution 1 drop  1 drop Ophthalmic Daily Ben Doshi MD   1 drop at 04/07/22 0851    donepezil (ARICEPT) tablet 10 mg  10 mg Oral Nightly Ben Doshi MD   10 mg at 04/06/22 1944    ferrous sulfate (IRON 325) tablet 325 mg  325 mg Oral Daily with breakfast Ben Doshi MD   325 mg at 04/07/22 0851    fluticasone (FLONASE) 50 MCG/ACT nasal spray 2 spray  2 spray Nasal Daily Ben Doshi MD   2 spray at 04/06/22 1427    lisinopril (PRINIVIL;ZESTRIL) tablet 5 mg  5 mg Oral Daily Zeynep Neff MD   5 mg at 04/07/22 0851    memantine (NAMENDA) tablet 10 mg  10 mg Oral BID Zeynep Neff MD   10 mg at 04/07/22 0851    pantoprazole (PROTONIX) tablet 40 mg  40 mg Oral BID AC Zeynep Neff MD   40 mg at 04/07/22 0854    sodium chloride flush 0.9 % injection 5-40 mL  5-40 mL IntraVENous 2 times per day Zeynep Neff MD   10 mL at 04/07/22 0854    sodium chloride flush 0.9 % injection 5-40 mL  5-40 mL IntraVENous PRN Zeynep Neff MD        0.9 % sodium chloride infusion   IntraVENous PRN Zeynep Neff MD        ondansetron (ZOFRAN-ODT) disintegrating tablet 4 mg  4 mg Oral Q8H PRN Zeynep Neff MD        Or    ondansetron [facility]) injection 4 mg  4 mg IntraVENous Q6H PRN Zeynep Neff MD        polyethylene glycol [facility]) packet 17 g  17 g Oral Daily PRN Zeynep Neff MD        acetaminophen (TYLENOL) tablet 650 mg  650 mg Oral Q6H PRN Zeynep Neff MD        Or   [facility] acetaminophen (TYLENOL) suppository 650 mg  650 mg Rectal Q6H PRN Zeynep Neff MD           Subjective:      Review of Systems   Constitutional: Negative for appetite change, chills, diaphoresis, fatigue and fever. HENT: Negative for congestion, ear pain, postnasal drip, rhinorrhea, sinus pressure, sneezing, sore throat, trouble swallowing and voice change. Respiratory: Negative for cough, chest tightness, shortness of breath and wheezing. Cardiovascular: Positive for leg swelling. Negative for chest pain and palpitations. Gastrointestinal: Negative for abdominal pain, constipation, diarrhea, nausea and vomiting. Musculoskeletal: Negative for arthralgias, back pain, joint swelling, myalgias, neck pain and neck stiffness. Neurological: Negative for dizziness, syncope, weakness, light-headedness, numbness and headaches.        Objective:     /70 (Site: Right Upper Arm, Position: Sitting, Cuff Size: Medium Adult)   Pulse 72   Temp 97.3 °F (36.3 °C) (Rectal)   Resp 12   Ht 5' 1\" (1.549 m)   Wt 141 lb 9.6 oz (64.2 kg)   BMI 26.76 kg/m²   BP Readings from Last 3 Encounters:   04/07/22 139/83   04/05/22 130/70   03/17/22 114/66     Wt Readings from Last 3 Encounters:   04/07/22 136 lb (61.7 kg)   04/05/22 141 lb 9.6 oz (64.2 kg)   03/17/22 139 lb 12.8 oz (63.4 kg)       Physical Exam  Vitals reviewed. Constitutional:       Appearance: She is well-developed. HENT:      Head: Normocephalic and atraumatic. Right Ear: External ear normal.      Left Ear: External ear normal.      Nose: Nose normal.   Eyes:      General: No scleral icterus. Conjunctiva/sclera: Conjunctivae normal.      Pupils: Pupils are equal, round, and reactive to light. Neck:      Thyroid: No thyromegaly. Vascular: No JVD. Cardiovascular:      Rate and Rhythm: Tachycardia present. Rhythm irregularly irregular. Heart sounds: No murmur heard. No friction rub. Pulmonary:      Effort: Pulmonary effort is normal.      Breath sounds: Normal breath sounds. No wheezing or rales. Chest:      Chest wall: No tenderness. Abdominal:      General: Bowel sounds are normal.      Palpations: Abdomen is soft. There is no mass. Tenderness: There is no abdominal tenderness. Musculoskeletal:      Cervical back: Normal range of motion and neck supple. Right lower leg: 3+ Edema present. Left lower leg: 3+ Edema present. Lymphadenopathy:      Cervical: No cervical adenopathy. Skin:     Findings: No rash. Neurological:      Mental Status: She is alert and oriented to person, place, and time. Psychiatric:         Behavior: Behavior is cooperative. Right upper thigh assessment:         Diagnosis Orders   1. Paroxysmal atrial fibrillation (HCC)  EKG 12 Lead   2. Bilateral leg edema     3. HX UGIB/ Hiatal hernia with gastroesophageal reflux disease and esophagitis and ulcer /    4.  Cardiac arrhythmia, unspecified cardiac arrhythmia type  EKG 12 Lead   5. Dementia due to Parkinson's disease without behavioral disturbance ([facility] [address]         Plan:      Medications Prescribed:  No orders of the defined types were placed in this encounter. Orders Placed:  Orders Placed This Encounter   Procedures    EKG 12 Lead     Order Specific Question:   Reason for Exam?     Answer:   Irregular heart rate       The son Martha who accompanied  the patient was advised to take the patient to the emergency room because of cardiac dysrhythmia/likely atrial fibrillation with RVR     Return for To ER. Discussed use, benefit, and side effects of prescribedmedications. All patient questions answered. Pt voiced understanding. Instructedto continue current medications, diet and exercise. Patient agreed with treatmentplan.

## 2022-04-05 NOTE — ED PROVIDER NOTES
[address] ENCOUNTER      PATIENT NAME: Pedro Medeiros  MRN: [id number]  : 1940  MENDIOLA: 2022  PROVIDER: Aminata Alford MD      CHIEF COMPLAINT       Chief Complaint   Patient presents with    Leg Swelling       Patient is seen and evaluated in a timely fashion. Nurses Notes are reviewed and I agree except as noted in the HPI. HISTORY OF PRESENT ILLNESS    Pedro Medeiros is a 78 y.o. female who presents to Emergency Department with Leg Swelling     This patient is 78-year-old female with past medical history of nonobstructive CAD, paroxysmal atrial fibrillation with RVR, not on [address] due to bleeding PUD, TIA in , dementia, hypertension, and hyperlipidemia is brought into ED by son from primary care [de-identified] office because of increasing bilateral lower extremity pitting edema and recurrent atrial fibrillation with ventricular response. Patient's son states patient has been having increasing bilateral lower extremity edema over last 3 weeks. No obvious weight gain. Patient seems having left lower extremity discomfort. Patient was seen by PCP today, and patient was found to have atrial fibrillation with RVR per office EKG. No CVA symptoms. No chest pain, no shortness of breath. No nausea or vomiting. No abdominal pain. Patient's activity has been at her baseline. No urinary symptoms. This HPI was provided by son and review of EMR.      REVIEW OF SYSTEMS   Ten-point review of systems is negative except those documented in above HPI including constitutional, HEENT, respiratory, cardiovascular, gastrointestinal, genitourinary, musculoskeletal, skin, neurological, hematological and behavioral.      PAST MEDICAL HISTORY     Past Medical History:   Diagnosis Date    CAD (coronary artery disease) 12    non-obstructive 20-30% stenosis LAD and circomflex    Colon polyps 12    Tubular adenoma Dr. Herbert Cevallos CVA (cerebral vascular accident) [facility]) 2004    Degenerative arthritis of cervical spine 2002    Dementia ([facility] [address] 12/14    Hiatal hernia with gastroesophageal reflux disease and esophagitis and ulcer 3/7/2022    Hyperlipidemia     Hypertension     Psoriasis        SURGICAL HISTORY       Past Surgical History:   Procedure Laterality Date    BLADDER REPAIR      CARDIAC CATHETERIZATION  8-8-12    CHOLECYSTECTOMY      COLONOSCOPY  4/24/2012    Dr. Deena Bradley  07/2016    corneal related - Dr Chapa Reasons GASTROINTESTINAL ENDOSCOPY N/A 3/7/2022    EGD BIOPSY performed by Jaycee Cabral MD at [address] [facility]       Previous Medications    ACETAMINOPHEN (TYLENOL) 500 MG TABLET    Take 1 tablet by mouth every 6 hours as needed for Pain    ATORVASTATIN (LIPITOR) 20 MG TABLET    Take 1 tablet by mouth once daily    CALCIUM CARBONATE 600 MG TABS TABLET    Take 1 tablet by mouth daily    CARBOXYMETHYLCELL-HYPROMELLOSE (GENTEAL OP)    Apply 1 drop to eye daily 1 drop each eye daily    DONEPEZIL (ARICEPT) 10 MG TABLET    Take 1 tablet by mouth nightly    ELASTIC BANDAGES & SUPPORTS (MEDICAL COMPRESSION STOCKINGS) MISC    1 each by Does not apply route daily as needed (Bite to the lower leg daily for swelling)    FERROUS SULFATE (IRON 325) 325 (65 FE) MG TABLET    Take 1 tablet by mouth daily (with breakfast)    FLUTICASONE (FLONASE) 50 MCG/ACT NASAL SPRAY    2 sprays by Nasal route daily    LISINOPRIL (PRINIVIL;ZESTRIL) 10 MG TABLET    Take 1 tablet by mouth once daily    LOPERAMIDE (IMODIUM) 2 MG CAPSULE    Take 2 mg by mouth daily as needed for Diarrhea    MEMANTINE (NAMENDA) 10 MG TABLET    Take 1 tablet by mouth twice daily    PANTOPRAZOLE (PROTONIX) 40 MG TABLET    Take 1 tablet by mouth 2 times daily (before meals)    POLYETHYLENE GLYCOL (GLYCOLAX) 17 G PACKET    Take 17 g by mouth daily as needed for Constipation    VITAMIN D (ERGOCALCIFEROL) 1.25 MG (94703 UT) CAPS CAPSULE    TAKE 1 CAPSULE BY MOUTH TWICE A WEEK       ALLERGIES     Morphine    FAMILY HISTORY     She indicated that her mother is alive. She indicated that the status of her father is unknown. She indicated that the status of her brother is unknown. She indicated that the status of her daughter is unknown.   family history includes Breast Cancer (age of onset: 54) in her daughter; Heart Disease in her brother. SOCIAL HISTORY      reports that she has never smoked. She has never used smokeless tobacco. She reports that she does not drink alcohol and does not use drugs. PHYSICAL EXAM      height is 5' 3\" (1.6 m) and weight is 140 lb (63.5 kg). Her oral temperature is 98 °F (36.7 °C). Her blood pressure is 131/92 (abnormal) and her pulse is 100. Her respiration is 21 and oxygen saturation is 96%. Physical Exam  Vitals and nursing note reviewed. Constitutional:       Appearance: She is well-developed. She is not diaphoretic. HENT:      Head: Normocephalic and atraumatic. Nose: Nose normal.   Eyes:      General: No scleral icterus. Right eye: No discharge. Left eye: No discharge. Conjunctiva/sclera: Conjunctivae normal.      Pupils: Pupils are equal, round, and reactive to light. Neck:      Vascular: No JVD. Trachea: No tracheal deviation. Cardiovascular:      Rate and Rhythm: Tachycardia present. Rhythm irregular. Heart sounds: Normal heart sounds. No murmur heard. No friction rub. No gallop. Pulmonary:      Effort: Pulmonary effort is normal. No respiratory distress. Breath sounds: Normal breath sounds. No stridor. No wheezing or rales. Chest:      Chest wall: No tenderness. Abdominal:      General: Bowel sounds are normal. There is no distension. Palpations: Abdomen is soft. There is no mass. Tenderness: There is no abdominal tenderness. There is no guarding or rebound. Hernia: No hernia is present.    Musculoskeletal: General: No tenderness or deformity. Cervical back: Normal range of motion and neck supple. Right lower leg: Edema present. Left lower leg: Edema present. Comments: Bilateral lower extremity mild 2+ pitting edema   Lymphadenopathy:      Cervical: No cervical adenopathy. Skin:     General: Skin is warm and dry. Capillary Refill: Capillary refill takes less than 2 seconds. Coloration: Skin is not pale. Findings: No erythema or rash. Neurological:      Mental Status: She is alert and oriented to person, place, and time. Cranial Nerves: No cranial nerve deficit. Sensory: No sensory deficit. Motor: No abnormal muscle tone. Coordination: Coordination normal.      Deep Tendon Reflexes: Reflexes normal.   Psychiatric:         Behavior: Behavior normal.         Thought Content: Thought content normal.         Judgment: Judgment normal.       ANCILLARY TEST RESULTS   EKG:    Interpreted by me  Atrial fibrillation with rapid ventricular response, ventricular rate of 130 bpm, QRS duration 124 ms,  ms, no ST elevation or acute T wave, right bundle branch block, compared to old EKG from 3/3/2022    LAB RESULTS:  Results for orders placed or performed during the hospital encounter of 04/05/22   CBC with Auto Differential   Result Value Ref Range    WBC 5.3 4.8 - 10.8 thou/mm3    RBC 3.23 (L) 4.20 - 5.40 mill/mm3    Hemoglobin 9.2 (L) 12.0 - 16.0 gm/dl    Hematocrit 31.4 (L) 37.0 - 47.0 %    MCV 97.2 81.0 - 99.0 fL    MCH 28.5 26.0 - 33.0 pg    MCHC 29.3 (L) 32.2 - 35.5 gm/dl    RDW-CV 14.8 (H) 11.5 - 14.5 %    RDW-SD 53.0 (H) 35.0 - 45.0 fL    Platelets 810 248 - 414 thou/mm3    MPV 12.0 9.4 - 12.4 fL    Seg Neutrophils 58.8 %    Lymphocytes 29.1 %    Monocytes 10.0 %    Eosinophils 1.3 %    Basophils 0.6 %    Immature Granulocytes 0.2 %    Segs Absolute 3.1 1.8 - 7.7 thou/mm3    Lymphocytes Absolute 1.5 1.0 - 4.8 thou/mm3    Monocytes Absolute 0.5 0.4 - 1.3 thou/mm3 Eosinophils Absolute 0.1 0.0 - 0.4 thou/mm3    Basophils Absolute 0.0 0.0 - 0.1 thou/mm3    Immature Grans (Abs) 0.01 0.00 - 0.07 thou/mm3    nRBC 0 /100 wbc   Comprehensive Metabolic Panel   Result Value Ref Range    Glucose 101 70 - 108 mg/dL    CREATININE 1.4 (H) 0.4 - 1.2 mg/dL    BUN 18 7 - 22 mg/dL    Sodium 140 135 - 145 meq/L    Potassium 3.7 3.5 - 5.2 meq/L    Chloride 104 98 - 111 meq/L    CO2 22 (L) 23 - 33 meq/L    Calcium 9.5 8.5 - 10.5 mg/dL    AST 20 5 - 40 U/L    Alkaline Phosphatase 73 38 - 126 U/L    Total Protein 7.0 6.1 - 8.0 g/dL    Albumin 3.7 3.5 - 5.1 g/dL    Total Bilirubin 0.8 0.3 - 1.2 mg/dL    ALT 23 11 - 66 U/L   Brain Natriuretic Peptide   Result Value Ref Range    Pro-BNP 3391.0 (H) 0.0 - 1800.0 pg/mL   Troponin   Result Value Ref Range    Troponin T < 0.010 ng/ml   Magnesium   Result Value Ref Range    Magnesium 1.8 1.6 - 2.4 mg/dL   TSH with Reflex   Result Value Ref Range    TSH 2.280 0.400 - 4.200 uIU/mL   Anion Gap   Result Value Ref Range    Anion Gap 14.0 8.0 - 16.0 meq/L   Osmolality   Result Value Ref Range    Osmolality Calc 281.4 275.0 - 300.0 mOsmol/kg   Glomerular Filtration Rate, Estimated   Result Value Ref Range    Est, Glom Filt Rate 43 (A) ml/min/1.73m2   EKG Emergency   Result Value Ref Range    Ventricular Rate 130 BPM    QRS Duration 124 ms    Q-T Interval 286 ms    QTc Calculation (Bazett) 420 ms    R Axis -20 degrees    T Axis -77 degrees       RADIOLOGY REPORTS  XR CHEST PORTABLE   Final Result   Cardiomegaly with possible tiny bilateral pleural effusions.       This document has been electronically signed by: Nya Bull MD on    04/05/2022 06:56 PM          TWAN [id number] (MDM)     Differential Diagnosis: CHF, atrial fibrillation with RVR, ACS, hyperthyroidism, electrolyte imbalance    Initial Plans:   Large-bore IV  Telemetry monitor  ECG  Chest x-ray  Basic labs  IV Lopressor  Will discuss anticoagulation with primary service, cardiology, and GI, pending H/H    Summary:    Patient has recurrent atrial fibrillation with RVR, 5 mg Lopressor IV was given, ventricular rate was under control in ED. Hemoglobin 9.2, due to her history of GI bleeding, I will defer anticoagulation to primary care service, cardiology, and GI. Other pertinent findings included creatinine 1.4 (Baseline creatinine 1.0),  BNP 3391 (no baseline to compare), troponin less than 0.01, chest x-ray shows cardiomegaly with possible tiny bilateral pleural effusion. Patient's DARIUS and CHF exacerbation both could contribute to her lower extremity edema. Admission was warranted, case wass discussed with primary care service Dr. Danita Malik who graciously admitted this patient. Dr. Danita Malik would call cardiology and GI consult      ED Medications  Medications   bumetanide (BUMEX) injection 1 mg (has no administration in time range)   metoprolol (LOPRESSOR) injection 5 mg (5 mg IntraVENous Given 4/5/22 1824)     Vital signs in ED  Vitals:    04/05/22 1755 04/05/22 1826 04/05/22 1852   BP: 125/89 (!) 131/92    Pulse: 140 144 100   Resp: 20 20 21   Temp: 98 °F (36.7 °C)     TempSrc: Oral     SpO2: 96%     Weight: 140 lb (63.5 kg)     Height: 5' 3\" (1.6 m)       CRITICAL CARE   CRITICAL CARE: There was a high probability of clinically significant/life threatening deterioration in this patient's condition which required my urgent intervention. Total critical care time was 30 minutes. This excludes any time for separately reportable procedures. CONSULTS   Dr. Arianna Mckeon      1. Peripheral edema    2. Atrial fibrillation, rapid ([address]    3. Acute on chronic congestive heart failure, unspecified heart failure type ([address]    4. DARIUS (acute kidney injury) ([address]    5. Chronic anemia        DISPOSITION Decision To Admit 04/05/2022 07:28:05 PM      PATIENT REFERRED TO:  No follow-up provider specified.     DISCHARGE MEDICATIONS:  New Prescriptions No medications on file       (Please note that portions of this note were completed with a voice recognition program.  Efforts were made to edit the dictations but occasionally words aremis-transcribed.)    MD Brando Sotelo MD  04/05/22 1932

## 2022-04-05 NOTE — ED NOTES
Pt resting on cot at this time. No needs voiced. Call light within reach and family at bedside.       Ángela Parish RN  04/05/22 1003
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
## 2022-04-06 PROBLEM — R60.0 BILATERAL LEG EDEMA: Status: ACTIVE | Noted: 2022-01-01

## 2022-04-06 PROBLEM — R55 SYNCOPE AND COLLAPSE: Status: RESOLVED | Noted: 2022-01-01 | Resolved: 2022-01-01

## 2022-04-06 PROBLEM — I95.89 HYPOTENSION DUE TO HYPOVOLEMIA: Status: RESOLVED | Noted: 2022-01-01 | Resolved: 2022-01-01

## 2022-04-06 PROBLEM — R19.5 OCCULT BLOOD POSITIVE STOOL: Status: RESOLVED | Noted: 2022-01-01 | Resolved: 2022-01-01

## 2022-04-06 PROBLEM — I50.31 ACUTE DIASTOLIC CONGESTIVE HEART FAILURE (HCC): Status: ACTIVE | Noted: 2022-01-01

## 2022-04-06 PROBLEM — E86.1 HYPOTENSION DUE TO HYPOVOLEMIA: Status: RESOLVED | Noted: 2022-01-01 | Resolved: 2022-01-01

## 2022-04-06 PROBLEM — S00.83XA FOREHEAD CONTUSION: Status: RESOLVED | Noted: 2022-01-01 | Resolved: 2022-01-01

## 2022-04-06 PROBLEM — S80.01XA TRAUMATIC ECCHYMOSIS OF RIGHT KNEE: Status: RESOLVED | Noted: 2022-01-01 | Resolved: 2022-01-01

## 2022-04-06 NOTE — ED NOTES
ED to inpatient nurses report    Chief Complaint   Patient presents with    Leg Swelling      Present to ED from home  LOC: alert to only name  Vital signs   Vitals:    04/05/22 1755 04/05/22 1826 04/05/22 1852 04/05/22 1941   BP: 125/89 (!) 131/92  (!) 106/92   Pulse: 140 144 100 95   Resp: 20 20 21 16   Temp: 98 °F (36.7 °C)      TempSrc: Oral      SpO2: 96%   98%   Weight: 140 lb (63.5 kg)      Height: 5' 3\" (1.6 m)         Oxygen Baseline none    Current needs required none Bipap/Cpap No  LDAs:   Peripheral IV 04/05/22 Right Antecubital (Active)   Site Assessment Clean;Dry; Intact 04/05/22 2010   Line Status Blood return noted; Flushed 04/05/22 2010   Dressing Status Clean;Dry; Intact 04/05/22 2010   Dressing Intervention New 04/05/22 2010     Mobility: Requires assistance * 2  Pending ED orders: urinalysis  Present condition: stable      Electronically signed by Ely Melo RN on 4/5/2022 at [address] Leung RN  04/05/22 2023

## 2022-04-06 NOTE — PLAN OF CARE
Re-oriented and reviewed POC with the patient patient does not respond but fallows commands. Call light left within reach will continue to monitor.      Problem: Falls - Risk of:  Goal: Will remain free from falls  Description: Will remain free from falls  Outcome: Ongoing  Goal: Absence of physical injury  Description: Absence of physical injury  Outcome: Ongoing     Problem: Discharge Planning:  Goal: Discharged to appropriate level of care  Description: Discharged to appropriate level of care  4/6/2022 1120 by Jean Lesch, RN  Outcome: Ongoing  4/6/2022 0905 by JORDANA Resendiz  Outcome: Ongoing     Problem: Cardiac:  Goal: Ability to maintain vital signs within normal range will improve  Description: Ability to maintain vital signs within normal range will improve  Outcome: Ongoing  Goal: Cardiovascular alteration will improve  Description: Cardiovascular alteration will improve  Outcome: Ongoing  Goal: Ability to maintain an adequate cardiac output will improve  Description: Ability to maintain an adequate cardiac output will improve  Outcome: Ongoing  Goal: Hemodynamic stability will improve  Outcome: Ongoing     Problem: Health Behavior:  Goal: Will modify at least one risk factor affecting health status  Description: Will modify at least one risk factor affecting health status  Outcome: Ongoing  Goal: Identification of resources available to assist in meeting health care needs will improve  Description: Identification of resources available to assist in meeting health care needs will improve  Outcome: Ongoing     Problem: Physical Regulation:  Goal: Complications related to the disease process, condition or treatment will be avoided or minimized  Description: Complications related to the disease process, condition or treatment will be avoided or minimized  Outcome: Ongoing     Problem: Fluid Volume:  Goal: Ability to achieve and maintain adequate urine output will improve  Description: Ability to achieve and maintain adequate urine output will improve  Outcome: Ongoing     Problem: Respiratory:  Goal: Respiratory status will improve  Description: Respiratory status will improve  Outcome: Ongoing

## 2022-04-06 NOTE — PROGRESS NOTES
Resting in bed with eyes closed. Hard to arouse but will respond to name. Lung sounds are diminished throughout. Heart sounds irreglalr with S1 and S2. Bilaterally lower extremities have 3+ pitting edema. Denies any pain at this time. Call light and bedside table within reach.  Noel Perez SN/RSC

## 2022-04-06 NOTE — CARE COORDINATION
4/6/22, 9:00 AM EDT    DISCHARGE PLANNING EVALUATION      JOE ladd attempted a phone call to patient's daughter, Arsalan Tracy about Kaylen Verdugo returning to [facility] assisted [facility]. Asked for a call back as soon as possible. DISCHARGE/PLANNING EVALUATION  4/6/22, 9:07 AM EDT    Reason for Referral: Patient is from [address] [place] [address]  Mental Status: Patient is confused, spoke with daughter Laurel Fearing Making: Family members   Family/Social/Home Environment: Lives at [facility], has a very supportive family with lots of help. Current Services including food security, transportation and housekeeping:  Lives at [address]: Lives at [facility], unsure of equipment used  Payment Source: Medicare  Concerns or Barriers to Discharge:  None  Post acute provider list with quality measures, geographic area and applicable managed care information provided. Questions regarding selection process answered: Lives at [facility]    Teach Back Method used with Arsalan Tracy regarding care plan and discharge planning. Patient's daughter Arsalan Tracy verbalize understanding of the plan of care and contribute to goal setting. Patient goals, treatment preferences and discharge plan: Arsalan Tracy would like Kaylen Verdugo to return to [facility]. She reports that someone will be able to transport, as she has lots of family and support system. JOE student spoke with administration at [facility], they are aware that the patient is here and is agreeable to accept her back. Aware that there is a possibility of discharge today.     -Social work student Allie Benitez    Electronically signed by Cedrick Salazar.  JORDANA Elder on 4/6/2022 at 9:07 AM

## 2022-04-06 NOTE — H&P
Family Medicine Admit Notes/Coverage    Today's Date: 4/6/22  [id number]  Medical Record # [id number]  CSN/Account # [de-identified]      Ms. Mauricio Murillo admitted on 4/5/2022    Patient is admitted due to   Chief Complaint   Patient presents with    Leg Swelling     Seen in the office yesterday due to bilateral leg edema x > 1 week  Noted she was in AF w/ RVR at the office  admitted last 2/28/22 - 3/10/22 due to UGIB , + bleeding ulcer, GERD esophagitis, Hiatal hernia    Physical Exam:  Patient Vitals for the past 24 hrs:   BP Temp Temp src Pulse Resp SpO2 Height Weight   04/06/22 0815 116/68 97.7 °F (36.5 °C) Temporal 78 18 98 %     04/06/22 0315 (!) 138/92  Oral 84 19 100 %     04/05/22 2050 (!) 138/94 97.6 °F (36.4 °C) Axillary 105 16 96 %     04/05/22 1941 (!) 106/92   95 16 98 %     04/05/22 1852    100 21      04/05/22 1826 (!) 131/92   144 20      04/05/22 1755 125/89 98 °F (36.7 °C) Oral 140 20 96 % 5' 3\" (1.6 m) 140 lb (63.5 kg)       General Appearance:  Sleeping when seen, no distress, appears stated age   HEENT:  Neck:      Chest/Lungs:  Heart: CTA  IRRR   Abdomen:  Back: soft   Extremities:  Neurological Exam: Edema is lesser  WNL       Assessment:     Active Hospital Problems    Diagnosis Date Noted    Bilateral leg edema [R60.0] 04/06/2022     Priority: High    Atrial fibrillation with rapid ventricular response ([address] [I48.91] 04/05/2022     Priority: High    Paroxysmal atrial fibrillation with RVR ([address] [I48.0] 04/05/2022     Priority: High    Garrison's esophagus with esophagitis [K22.70, K20.90] 03/09/2022     Priority: High    Hiatal hernia with gastroesophageal reflux disease and esophagitis and ulcer [K44.9, K21.00] 03/07/2022     Priority: High    DARIUS (acute kidney injury) ([address] [N17.9] 02/28/2022     Priority: High    Anemia [D64.9] 02/28/2022     Priority: High    Acute diastolic congestive heart failure ([facility] [I50.31] 04/06/2022     Priority: Medium    Dementia without behavioral disturbance (HCC) [F03.90] 08/02/2016     Priority: Medium    Essential hypertension [I10] 04/14/2016     Priority: Medium    Hyperlipidemia [E78.5] 04/14/2016     Priority: Medium    CAD (coronary artery disease)-mild NONobstructive max 30% ostail, LCX, mid lad and diffuse mild luminal irregularites-per cath 08/08/12 [I25.10] 08/08/2012     Priority: Medium    CVA (cerebral vascular accident)-2002 [I63.9] 06/21/2012     Priority: Medium       Plan:     History and Physical been dictated    Discussed plans with the nursing staff  Added Metoprolol 25 BID  Resume home medications  Consult Cardiology  No blood thinner due to recent UGIB needing tranfusion /EGD 3- weeks ago                                                                                       Shubham Evangelista M.D.

## 2022-04-06 NOTE — PROGRESS NOTES
Resting in bed with eyes closed. Is hard to arouse but responds to name. Family is at bedside. Call light and bedside table within reach.  Marilee Nageotte SN/RSC

## 2022-04-06 NOTE — CONSULTS
[facility]  Cardiology Consult        Patient: Anamaria March  YOB: 1940  MRN: [id number]     Acct: [de-identified]    PCP: Noe Navarrete MD    Date of Admission: 4/5/2022      Reason for Consultation: Atrial fibrillation with RVR      History Of Present Illness:    78 y.o. female with history of dementia and upper GI bleeding secondary to gastric ulcer who was apparently admitted to the hospital for leg swelling. Patient is a poor historian and does not communicate. Also the history was obtained from the chart. Patient underwent EGD on 3/7/2022 and had diffuse gastritis biopsies were taken and small hiatal hernia with ulcer worrisome for nodularity distal to Schatzki's ring unusual for reflux esophagitis. Multiple biopsies were taken. At that time GI recommended against all antiplatelet and anticoagulation therapy also of note patient had a fall and had trauma to the face. Currently her telemetry shows atrial fibrillation with heart rate in the 100 to 110 bpm.  Cardiology is consulted for possible restarting of anticoagulation. All labs, EKG's, diagnostic testing and images as well as cardiac cath, stress testing were reviewed during this encounter.     Past Medical History:          Diagnosis Date    CAD (coronary artery disease) 8/8/12    non-obstructive 20-30% stenosis LAD and circomflex    Colon polyps 4/25/12    Tubular adenoma Dr. Lulú Mccann CVA (cerebral vascular accident) [facility]) 2004    Degenerative arthritis of cervical spine 2002    Dementia ([facility] [address] 12/14    Hiatal hernia with gastroesophageal reflux disease and esophagitis and ulcer 3/7/2022    Hyperlipidemia     Hypertension     Psoriasis        Past Surgical History:          Procedure Laterality Date    BLADDER REPAIR      CARDIAC CATHETERIZATION  8-8-12    CHOLECYSTECTOMY      COLONOSCOPY  4/24/2012    Dr. Katerine Valera  07/2016    corneal related - Dr Kike Washburn MD   Carboxymethylcell-Hypromellose (GENTEAL OP) Apply 1 drop to eye daily 1 drop each eye daily    Historical Provider, MD       Current Facility-Administered Medications   Medication Dose Route Frequency Provider Last Rate Last Admin    atorvastatin (LIPITOR) tablet 20 mg  20 mg Oral Daily Bronson Amanda MD        calcium elemental (OSCAL) tablet 500 mg  1 tablet Oral Daily Bronson Amanda MD        polyvinyl alcohol (LIQUIFILM TEARS) 1.4 % ophthalmic solution 1 drop  1 drop Ophthalmic Daily Bronson Amanda MD        donepezil (ARICEPT) tablet 10 mg  10 mg Oral Nightly Bronson Amanda MD        ferrous sulfate (IRON 325) tablet 325 mg  325 mg Oral Daily with breakfast Bronson Amanda MD        fluticasone [facility]) 50 MCG/ACT nasal spray 2 spray  2 spray Nasal Daily Bronson Amanda MD        lisinopril (PRINIVIL;ZESTRIL) tablet 5 mg  5 mg Oral Daily Bronson Amanda MD        memantine [facility]) tablet 10 mg  10 mg Oral BID Bronson Amanda MD        pantoprazole (PROTONIX) tablet 40 mg  40 mg Oral BID AC Bronson Amanda MD        sodium chloride flush 0.9 % injection 5-40 mL  5-40 mL IntraVENous 2 times per day Bronson Amanda MD   10 mL at 04/05/22 2100    sodium chloride flush 0.9 % injection 5-40 mL  5-40 mL IntraVENous PRN Bronson Amanda MD        0.9 % sodium chloride infusion   IntraVENous PRN Bronson Amanda MD        ondansetron (ZOFRAN-ODT) disintegrating tablet 4 mg  4 mg Oral Q8H PRN Bronson Amanda MD        Or    ondansetron [facility]) injection 4 mg  4 mg IntraVENous Q6H PRN Bronson Amanda MD        polyethylene glycol [facility]) packet 17 g  17 g Oral Daily PRN Bronson Amanda MD        acetaminophen (TYLENOL) tablet 650 mg  650 mg Oral Q6H PRN Bronson Amanda MD        Or    acetaminophen (TYLENOL) suppository 650 mg  650 mg Rectal Q6H PRN Bronson Amanda MD        metoprolol tartrate (LOPRESSOR) tablet 25 mg  25 mg Oral BID Roderick Horton MD   25 mg at 04/05/22 9667       Allergies:  Morphine    Social History:    TOBACCO:   reports that she has never smoked. She has never used smokeless tobacco.  ETOH:   reports no history of alcohol use. Family History:        Problem Relation Age of Onset    Heart Disease Brother     Breast Cancer Daughter 54         Review of Systems -   Unable to obtain    All others reviewed and are negative.        /64   Pulse 88   Temp 98.4 °F (36.9 °C) (Temporal)   Resp 16   Ht 5' 3\" (1.6 m)   Wt 140 lb (63.5 kg)   SpO2 97%   BMI 24.80 kg/m²       Physical Examination:   General appearance -not communicative  Mental status -dementia, not communicative  Neck - supple, no significant adenopathy, no JVD, or carotid bruits  Chest - clear to auscultation, no wheezes, rales or rhonchi, symmetric air entry  Heart - normal rate, regular rhythm, normal S1, S2, no murmurs, rubs, clicks or gallops  Abdomen - soft, nontender, nondistended, no masses or organomegaly  Neurological -not communicative  Musculoskeletal - no joint tenderness, deformity or swelling  Extremities - peripheral pulses normal, 1+ pedal edema, no clubbing or cyanosis  Skin - normal coloration and turgor, no rashes, no suspicious skin lesions noted      LABS:    Recent Labs     04/05/22  1800 04/06/22  0750   TROPONINT < 0.010 < 0.010     CBC:   Lab Results   Component Value Date    WBC 5.3 04/05/2022    RBC 3.23 04/05/2022    RBC 2.96 03/17/2022    HGB 9.2 04/05/2022    HCT 31.4 04/05/2022    MCV 97.2 04/05/2022    MCH 28.5 04/05/2022    MCHC 29.3 04/05/2022    RDW 16.0 03/17/2022     04/05/2022    MPV 12.0 04/05/2022     BMP:    Lab Results   Component Value Date     04/06/2022    K 3.8 04/06/2022     04/06/2022    CO2 24 04/06/2022    BUN 18 04/06/2022    LABALBU 3.7 04/05/2022    LABALBU 4.8 03/01/2012    CREATININE 1.4 04/06/2022    CALCIUM 9.8 04/06/2022    LABGLOM 43 04/06/2022    GLUCOSE 93 04/06/2022 GLUCOSE 78 03/17/2022     Hepatic Function Panel:    Lab Results   Component Value Date    ALKPHOS 73 04/05/2022    ALT 23 04/05/2022    AST 20 04/05/2022    PROT 7.0 04/05/2022    BILITOT 0.8 04/05/2022    LABALBU 3.7 04/05/2022    LABALBU 4.8 03/01/2012     Magnesium:    Lab Results   Component Value Date    MG 1.8 04/05/2022     Warfarin PT/INR:  No components found for: PTPATWAR, PTINRWAR  HgBA1c:    Lab Results   Component Value Date    LABA1C 5.9 04/29/2021     FLP:    Lab Results   Component Value Date    TRIG 70 01/27/2022    HDL 36 01/27/2022    LDLCALC 85 01/27/2022    LABVLDL 23 08/09/2021     TSH:    Lab Results   Component Value Date    TSH 2.280 04/05/2022     BNP: No components found for: PRO-BNP      Assessment/Plan:    Patient Active Problem List   Diagnosis    CVA (cerebral vascular accident)-2002    CAD (coronary artery disease)-mild NONobstructive max 30% ostail, LCX, mid lad and diffuse mild luminal irregularites-per cath 08/08/12    S/P cardiac cath 2012- mild nonobstructive CAD    Essential hypertension    Hyperlipidemia    Dementia without behavioral disturbance (HCC)    Antiplatelet or antithrombotic long-term use    DARIUS (acute kidney injury) ([facility] [address]    Anemia    New onset atrial fibrillation (HCC) w/ RVR now sinus Bradycardia    Hiatal hernia with gastroesophageal reflux disease and esophagitis and ulcer    Garrison's esophagus with esophagitis    Atrial fibrillation with rapid ventricular response (HCC)    Paroxysmal atrial fibrillation with RVR (HCC)    Bilateral leg edema    Acute diastolic congestive heart failure ([address]     Briefly this is a 78-year-old female with history of A. fib, recent upper GI bleed due to ulcer and fall is here for leg swelling. Patient is a poor historian and does not communicate at all. Cardiology is being consulted to see if anticoagulation can be restarted due to A.  Fib    Telemetry  Continue beta-blockers  Has normal EF with ejection fraction of 55%  Based on prior GI notes they recommended against any antiplatelet and anticoagulation therapy due to gastric ulcers on 3/7/2022  Also due to her falls it is probably not a good idea to resume anticoagulation  Based on her frail medical condition and significant dementia continue medical therapy only for now  In the future if her clinical condition improves would consider reevaluating risks and benefits of anticoagulation  Based on her chads vas score alone she would qualify for anticoagulation but due to our recent upper GI bleed we will hold off on anticoagulation  She needs to follow-up with GI team    Please do note hesitate to contact me for any further questions. Thank you for the opportunity to be involved in this patient's care.     Code Status: Full Code    Electronically signed by Orlando Bassett MD on 4/6/2022 at 1:04 PM

## 2022-04-06 NOTE — PROGRESS NOTES
Alert and oriented to self. Disoriented to time, place, and situation. Speech is clear and appropriate. PERRL 4mm-3mm. Mucous membranes are pink and moist. Hand grasp is weak and equal bilaterally. Sensation is felt bilaterally in upper extremities. Upper extremities are warm and dry and skin color appropriate for ethnicity. Heart sounds are irregular, S1 and S2 heard. Lung sounds are diminished throughout. Bowel sounds are active x 4. No tenderness noted. Lower extremities are warm and dry and skin color is appropriate for ethnicity. Sensation is felt bilaterally. Bilaterally 3+ pitting edema in lower extremities. Pedal pulses are 2+ bilaterally. Skin is intact. Bedside table and call light within reach.  Mirian Phan SN/RSC

## 2022-04-06 NOTE — CARE COORDINATION
4/6/22, 11:45 AM EDT    DISCHARGE PLANNING EVALUATION      SW student left a message for daughter, Tracy Escobar to update her on possible discharge for Juliette Pickett. Asked for a call back. 11:55 AM update: SW student spoke with Tracy Escobar, she is completely willing to transport Juliette Pickett today if she is discharged.  She stated that the nurses have her number and as soon as Juliette Pickett is ready, she will come up with some warmer clothes and take her to [facility].     -Social work student Se Carpio

## 2022-04-06 NOTE — ED NOTES
Pt removed IV and cardiac monitoring. IV restarted and pt placed back on monitor. Son at bedside.       Shubham Paulson RN  04/05/22 2010

## 2022-04-06 NOTE — PROGRESS NOTES
Patient admitted to 3B Room 35 in a wheelchair and from ED  No complaints upon arrival to the room. IV site free of s/s of infection or infiltration. Vital signs obtained. Assessment and data collection initiated. Oriented to room. Policies and procedures for 4a explained All questions answered with no further questions at this time. Fall prevention and safety brochure discussed with patient. 2 person skin check completed with TRUDY DONIS    Patient declines PCP notification. Patient declines family notification.

## 2022-04-06 NOTE — PROGRESS NOTES
Physician Progress Note      Casper Arce  CSN #:                  [id number]  :                       1940  ADMIT DATE:       2022 5:43 PM  [address] [place] DATE:  RESPONDING  PROVIDER #:        Ubaldo Pop MD          QUERY TEXT:    Attending,    Pt admitted with PAF with RVR. Pt noted to have EF 55% with grade 1 diastolic   dysfunction, pro-BNP 4091, CXR showed, \"Cardiomegaly with possible tiny   bilateral pleural effusions,\" and pt received IV Bumex x 1. If possible,   please document in progress notes and discharge summary if you are evaluating   and/or treating any of the following: The medical record reflects the following:  Risk Factors: PAF with RVR, CAD, HTN, advanced age  Clinical Indicators: EF 55% with grade 1 diastolic dysfunction, pro-BNP 4091,   CXR showed, \"Cardiomegaly with possible tiny bilateral pleural effusions\"  Treatment: IV Bumex, Lisinopril, Lopressor, CXR, pro-BNP, I&Os, tele, pending   Cardiology consult, 2gm sodium diet    Thank you! Micky Aviles, RN, BSN, RHIT, CCDS  Clinical   Options provided:  -- Acute on Chronic Diastolic CHF/HFpEF  -- Acute Diastolic CHF/HFpEF  -- Chronic Diastolic CHF/HFpEF  -- Other - I will add my own diagnosis  -- Disagree - Not applicable / Not valid  -- Disagree - Clinically unable to determine / Unknown  -- Refer to Clinical Documentation Reviewer    PROVIDER RESPONSE TEXT:    This patient is in acute diastolic CHF/HFpEF.     Query created by: Devin Vu on 2022 10:06 AM      Electronically signed by:  Ubaldo Pop MD 2022 10:16 AM

## 2022-04-06 NOTE — PROGRESS NOTES
Was able to arouse pt long enough to take medications. It was difficult to keep pt on task when administering medications. Call light and bedside table within reach. SBAR given to primary nurse Hoa.  Aditya CORTEZ/RSC

## 2022-04-06 NOTE — DISCHARGE INSTR - COC
Continuity of Care Form    Patient Name: Dewey Andrew   :  1940  MRN:  [id number]    Admit date:  2022  Discharge date:  ***    Code Status Order: Full Code   Advance Directives:      Admitting Physician:  Penelope Stubbs MD  PCP: Penelope Stubbs MD    Discharging Nurse: [facility] Unit/Room#: 3B-35/035-A  Discharging Unit Phone Number: ***    Emergency Contact:   Extended Emergency Contact Information  Primary Emergency Contact: Jennifer Wu  Address: [address] [facility], [address] [place] of [address] Phone: [phone]  Relation: Child  Secondary Emergency Contact: [facility]), [address] Phone: [phone]  Mobile Phone: [phone]  Relation: Child   needed?  No    Past Surgical History:  Past Surgical History:   Procedure Laterality Date    BLADDER REPAIR      CARDIAC CATHETERIZATION  10    CHOLECYSTECTOMY      COLONOSCOPY  2012    Dr. Erika Pugh  2016    corneal related - Dr Shan Mcneil ENDOSCOPY N/A 3/7/2022    EGD BIOPSY performed by Swapna Crocker MD at [facility]       Immunization History:   Immunization History   Administered Date(s) Administered    COVID-19, J&J, PF, 0.5 mL 03/15/2021    COVID-19, Pfizer Purple top, DILUTE for use, 12+ yrs, 30mcg/0.3mL dose 2021    Influenza 2012    Influenza Virus Vaccine 2011, 10/05/2013, 2014, 2015    Influenza, MDCK Quadv, IM, PF (Flucelvax 2 yrs and older) 10/19/2017    Influenza, MDCK Quadv, with preserv IM (Flucelvax 2 yrs and older) 2019    Influenza, Quadv, IM, (6 mo and older Fluzone, Flulaval, Fluarix and 3 yrs and older Afluria) 2018, 10/01/2020, 10/14/2021    Influenza, Quadv, IM, PF (6 mo and older Fluzone, Flulaval, Fluarix, and 3 yrs and older Afluria) 2016    Pneumococcal Conjugate 13-valent ([facility]) 2015 Pneumococcal Polysaccharide ([id number]) 08/23/2011       Active Problems:  Patient Active Problem List   Diagnosis Code    CVA (cerebral vascular accident)-2002 I63.9    CAD (coronary artery disease)-mild NONobstructive max 30% ostail, LCX, mid lad and diffuse mild luminal irregularites-per cath 08/08/12 I25.10    S/P cardiac cath 2012- mild nonobstructive CAD Z98.890    Essential hypertension I10    Hyperlipidemia E78.5    Dementia without behavioral disturbance (HCC) F03.90    Antiplatelet or antithrombotic long-term use Z79.02    DARIUS (acute kidney injury) ([facility] [address] N17.9    Anemia D64.9    New onset atrial fibrillation (HCC) w/ RVR now sinus Bradycardia I48.91    Hiatal hernia with gastroesophageal reflux disease and esophagitis and ulcer K44.9, K21.00    Garrison's esophagus with esophagitis K22.70, K20.90    Atrial fibrillation with rapid ventricular response (HCC) I48.91    Paroxysmal atrial fibrillation with RVR (HCC) I48.0    Bilateral leg edema V81.2    Acute diastolic congestive heart failure (HCC) I50.31       Isolation/Infection:   Isolation            No Isolation          Patient Infection Status       None to display            Nurse Assessment:  Last Vital Signs: /68   Pulse 78   Temp 97.7 °F (36.5 °C) (Temporal)   Resp 18   Ht 5' 3\" (1.6 m)   Wt 140 lb (63.5 kg)   SpO2 98%   BMI 24.80 kg/m²     Last documented pain score (0-10 scale): Pain Level: 0  Last Weight:   Wt Readings from Last 1 Encounters:   04/05/22 140 lb (63.5 kg)     Mental Status:  {IP PT MENTAL STATUS:20030}    IV Access:  { CLAUDIA IV ACCESS:975753560}    Nursing Mobility/ADLs:  Walking   {[facility] DME FKHF:694367475}  Transfer  {[facility] DME GLGY:687511135}  Bathing  {[facility] DME SQOH:259677916}  Dressing  {[facility] DME FCRH:845829981}  Toileting  {[facility] DME ZSDW:919626724}  Feeding  {[facility] JCMH:672163662}  Med Admin  {[facility] QRLJ:532559411}  Med Delivery   { CLAUDIA MED Delivery:683607267}    Wound Care Documentation and Therapy: Elimination:  Continence: Bowel: {YES / OH:00821}  Bladder: {YES / TA:40235}  Urinary Catheter: {Urinary Catheter:422134265}   Colostomy/Ileostomy/Ileal Conduit: {YES / WE:01205}       Date of Last BM: ***  No intake or output data in the 24 hours ending 22 0923  No intake/output data recorded. Safety Concerns:     [facility] Safety Concerns:375921450}    Impairments/Disabilities:      [facility] Impairments/Disabilities:845495800}    Nutrition Therapy:  Current Nutrition Therapy:   [facility] Diet List:603671389}    Routes of Feeding: {CHP DME Other Feedings:944764282}  Liquids: {Slp liquid thickness:81053}  Daily Fluid Restriction: {CHP DME Yes amt example:432469604}  Last Modified Barium Swallow with Video (Video Swallowing Test): {Done Not Done DYRL:377029389}    Treatments at the Time of Hospital Discharge:   Respiratory Treatments: ***  Oxygen Therapy:  {Therapy; copd oxygen:07129}  Ventilator:    {[facility] Vent PPLA:137365821}    Rehab Therapies: {THERAPEUTIC INTERVENTION:5034964689}  Weight Bearing Status/Restrictions: [address] [place] [address] Weight Bearin}  Other Medical Equipment (for information only, NOT a DME order):  {EQUIPMENT:430579252}  Other Treatments: ***    Patient's personal belongings (please select all that are sent with patient):  {CHP DME Belongings:792868418}    RN SIGNATURE:  {Esignature:013688436}    CASE MANAGEMENT/SOCIAL WORK SECTION    Inpatient Status Date: ***    Readmission Risk Assessment Score:  Readmission Risk              Risk of Unplanned Readmission:  19           Discharging to Facility/ Agency   Name: [facility]  Address: [address] [place] [address]. [address] [place] [address]. [address]  Phone: [phone]  Fax: [phone]    Dialysis Facility (if applicable)   Name:  Address:  Dialysis Schedule:  Phone:  Fax:    / signature: Electronically signed by JORDANA Holcomb on 22 at 9:24 AM EDT    PHYSICIAN SECTION    Prognosis: Good    Condition at Discharge: Stable    Rehab Potential (if transferring to Rehab): Good    Recommended Labs or Other Treatments After Discharge: CBC , BMP , 7-10 days    Physician Certification: I certify the above information and transfer of Megan Taylor  is necessary for the continuing treatment of the diagnosis listed and that she requires Desmond Varela for greater 30 days.      Update Admission H&P: No change in H&P    PHYSICIAN SIGNATURE:  Electronically signed by Ilia Robertson MD on 4/7/22 at 2:43 PM EDT

## 2022-04-06 NOTE — H&P
[address] [place] [address]                              HISTORY AND PHYSICAL    PATIENT NAME: Cathy Melendrez                     :        1940  MED REC NO:   [id number]                           ROOM:       9486  ACCOUNT NO:   [de-identified]                           ADMIT DATE: 2022  PROVIDER:     Karoline Vargas. Paulette Orozco M.D.    [address]:  The patient is an 78-year-old female, presented to the  emergency room because of atrial fibrillation with RVR/leg swelling for  more than one week. HISTORY OF PRESENT ILLNESS:  The patient is an 78-year-old black female  who is known to me in the office being her primary care provider. The  patient came for a followup yesterday because of swelling of both lower  extremities with ankle pain for more than one week. The patient lives  in assisted living because of dementia. The patient's history is  limited because of her dementia and most of the history is coming from  the caretaker and also from the son. The patient has been having leg  swelling for more than one week, however, did not have any shortness of  breath or chest pain, no fall injury. The patient was admitted on  2022, for a fall injury, was found on the floor by the nursing  staff at the assisted living. The patient was found out, has an upper  GI bleeding ulcer, esophagitis, GERD, and hiatal hernia. The patient  had an EGD done by Dr. Corinne Freeze. The patient had anemia at that time  needing blood transfusion. The patient has discontinued of her Plavix  and aspirin. For the past more than one week, the patient's leg has  been more swollen and having ankle pain, left more than the right,  however, did not have any shortness of breath. No chest pain. No  nausea or vomiting.   The patient was seen in the office yesterday and  was noted to be on atrial fibrillation with RVR for which the patient  was sent to the emergency room for the above reason and was since  admitted. PAST MEDICAL HISTORY:  The patient had history of recent upper GI  bleeding noted on the hospitalization on 02/28/2022, had history of  dementia since 2014, hiatal hernia, GERD, esophagitis and ulcer,  hypertension, hyperlipidemia, psoriasis, history of CVA without any  residual paralysis, coronary artery disease, and colon polyps. PAST SURGICAL HISTORY:  Includes bladder repair, cholecystectomy,  hysterectomy, and upper endoscopy. FAMILY HISTORY:  Significant for hypertension and hyperlipidemia. SOCIAL HISTORY:  The patient is  and dementia, lives in the  assisted living assisted living. No smoking or substance abuse. MEDICATIONS:  The patient on Lipitor, Os-Anirudh, Aricept, Namenda,  lisinopril, Flonase, Protonix, GlycoLax, and eye drops. ALLERGIES:  TO MORPHINE.    PHYSICAL EXAMINATION:  GENERAL:  When I am seeing the patient, the patient was smiling away,  but is arousable. The patient is alert, active, and cooperative. VITAL SIGNS:  T-Max is 98, blood pressure is running at 116/68, pulse is  78, respirations 18 at the room air, and saturation is 98%. HEENT:  Tympanic membranes normal.  Oral mucosa is moist.  NECK:  Supple. LUNGS:  Clear to auscultation. HEART:  Irregular rate and rhythm. BACK:  Showed no CVA tenderness. ABDOMEN:  Soft, nontender, no hepatosplenomegaly. Normoactive bowel  sounds. EXTREMITIES:  No edema. Full pulses. NEUROLOGIC:  Motor, sensory, and cranial nerves all intact. ASSESSMENT:  1. Atrial fib with RVR, now controlled with medications, atrial fib  with a rate running 78.  2.  Bilateral leg edema. 3.  Acute diastolic heart failure. 4.  History of upper GI bleeding secondary to a bleeding ulcer. 5.  Esophagitis, Garrison's esophagus, and hiatal hernia. 6.  Acute kidney injury secondary to the atrial fib. 7.  Anemia. 8.  Dementia without behavioral disturbance. 9.  Hypertension.   10. Hyperlipidemia. 11.  History of stroke. PLAN:  The patient was admitted, started metoprolol 25 mg b.i.d. and we  are going to adjust accordingly. Resume home medications . At this time,   the patient was not placed on blood thinner because of the recent upper   GI bleeding, needing transfusion. The patient was referred to Cardiology   Services. The patient will follow up accordingly. Further orders will   be based on the patient's clinical course and laboratory and x-ray findings. AVERY Acevedo M.D.    D: 04/06/2022 9:23:39       T: 04/06/2022 11:29:04     [email or web address]  Job#: [id number]     Doc#: [id number]    CC:

## 2022-04-06 NOTE — CARE COORDINATION
4/6/22, 8:27 AM EDT  DISCHARGE PLANNING EVALUATION:    Jatinder Scruggs       Admitted: 4/5/2022/ [address] day: 1   Location: [address] [place] [address]A Reason for admit: Peripheral edema [R60.9]  Chronic anemia [D64.9]  DARIUS (acute kidney injury) ([address] [N17.9]  Atrial fibrillation with rapid ventricular response ([address] [I48.91]  Atrial fibrillation, rapid ([address] [I48.91]  Acute on chronic congestive heart failure, unspecified heart failure type ([address] [I50.9]  Paroxysmal atrial fibrillation with RVR ([address] [I48.0]   PMH:  has a past medical history of CAD (coronary artery disease), Colon polyps, CVA (cerebral vascular accident) ([address] Degenerative arthritis of cervical spine, Dementia ([address] Hiatal hernia with gastroesophageal reflux disease and esophagitis and ulcer, Hyperlipidemia, Hypertension, and Psoriasis. Procedure:   4/5 CXR: Cardiomegaly with possible tiny bilateral pleural effusions. Barriers to Discharge:  Sent to ED from PCP office with increased swelling of legs. Pt was found to be in afib upon arrival, highest ventricular rate was in the 100's. Current HR is 78. Room air. [address] Cardiology. Given Bumex iv x1 and Lopressor iv x1. PCP: Erik Benites MD  Readmission Risk Score: 20.2 ( )%    Patient Goals/Plan/Treatment Preferences: Spoke with pt's family member. Pt is from assisted living at [facility]. Family member take pt to all appts. Pt has all her equipment and her needs are met at the AL. SW following. Verified PCP and insurance. Transportation/Food Security/Housekeeping Addressed:  No issues identified.

## 2022-04-06 NOTE — CARE COORDINATION
04/06/22 1422   Readmission Assessment   Number of Days since last admission? 8-30 days   Previous Disposition Other (comment)  (Assisted Living)   Who is being Interviewed Caregiver   What was the patient's/caregiver's perception as to why they think they needed to return back to the hospital? Other (Comment)  (\"Feel she is not as active and there were med changes\")   Did you visit your Primary Care Physician after you left the hospital, before you returned this time? Yes   Did you see a specialist, such as Cardiac, Pulmonary, Orthopedic Physician, etc. after you left the hospital? No   Who advised the patient to return to the hospital? Physician   Does the patient report anything that got in the way of taking their medications? No   In our efforts to provide the best possible care to you and others like you, can you think of anything that we could have done to help you after you left the hospital the first time, so that you might not have needed to return so soon?  Other (Comment)  (\"nothing\")

## 2022-04-07 NOTE — PROGRESS NOTES
Family Medicine Progress Notes/Coverage    Today's Date: 4/7/22  [id number]  Medical Record # [id number]  CSN/Account # [de-identified]      Ms. Marni Street admitted on 4/5/2022        Subjective / Interval History :      Thank you Dr Keo Dennis of seeing  Mrs Stephanie Pisano in Atrial Fib  Reviewed notes, consults, laboratory and radiology results,    Objective:       Physical Exam:  Patient Vitals for the past 24 hrs:   BP Temp Temp src Pulse Resp SpO2 Weight   04/07/22 0400 125/74 97.4 °F (36.3 °C) Axillary 70 16 96 % 136 lb (61.7 kg)   04/06/22 1930 123/74  Oral 102 18 96 %    04/06/22 1534 105/82 98.4 °F (36.9 °C) Oral 102 18 100 %    04/06/22 1400 128/80   68      04/06/22 1145 104/64 98.4 °F (36.9 °C) Temporal 88 16 97 %    04/06/22 0945 118/72   80      04/06/22 0842    80      04/06/22 0815 116/68 97.7 °F (36.5 °C) Temporal 78 18 98 %        General Appearance:  Alert, cooperative, no distress, appears stated age   HEENT:  Neck:      Chest/Lungs:  Heart: CTA  IRRR   Abdomen:  Back:    Extremities:  Neurological Exam: Edema is much better       Assessment:     Admitting Diagnosis:    Peripheral edema [R60.9]  Chronic anemia [D64.9]  DARIUS (acute kidney injury) ([address] [N17.9]  Atrial fibrillation with rapid ventricular response (HCC) [I48.91]  Atrial fibrillation, rapid ([address] [I48.91]  Acute on chronic congestive heart failure, unspecified heart failure type ([address] [I50.9]  Paroxysmal atrial fibrillation with RVR ([address] [I48.0]    Active Hospital Problems    Diagnosis Date Noted    Bilateral leg edema [R60.0] 04/06/2022     Priority: High    Atrial fibrillation with rapid ventricular response ([address] [I48.91] 04/05/2022     Priority: High    Paroxysmal atrial fibrillation with RVR ([address] [I48.0] 04/05/2022     Priority: High    Garrison's esophagus with esophagitis  04/06/2022    K 3.8 04/06/2022     04/06/2022    CO2 24 04/06/2022     Lab Results   Component Value Date    CKTOTAL 96 02/28/2022     Lab Results   Component Value Date    ALT 23 04/05/2022    AST 20 04/05/2022    ALKPHOS 73 04/05/2022    BILITOT 0.8 04/05/2022     Lab Results   Component Value Date    LIPASE 91 (H) 06/29/2020         Electronically signed by Ramesh Murdock MD on 4/7/22 at 7:16 AM EDT                                                                                           Kevin Cummings M.D.

## 2022-04-07 NOTE — CARE COORDINATION
4/7/22, 1:55 PM EDT    DISCHARGE ON GOING EVALUATION    [facility] day: 2  Location: [address] [place] [address]A Reason for admit: Peripheral edema [R60.9]  Chronic anemia [D64.9]  DARIUS (acute kidney injury) ([address] [N17.9]  Atrial fibrillation with rapid ventricular response ([address] [I48.91]  Atrial fibrillation, rapid ([address] [I48.91]  Acute on chronic congestive heart failure, unspecified heart failure type ([address] [I50.9]  Paroxysmal atrial fibrillation with RVR ([address] [I48.0]   Procedure:   4/5 CXR: Cardiomegaly with possible tiny bilateral pleural effusions. Barriers to Discharge: FM following. Consulted GI due to history of bleeding ulcer. Nursing reports pt's heart rate went into 150's with activity. Medications adjusted. PCP: Roderick Horton MD  Readmission Risk Score: 20.8 ( )%  Patient Goals/Plan/Treatment Preferences: From [facility] following.

## 2022-04-07 NOTE — FLOWSHEET NOTE
04/07/22 0835   Vital Signs   Pulse 172   Heart Rate Source Monitor     Pt set off bed alarm and headed to bathroom. Asia Hahn assisted pt. Pts HR was 133-174 while up and ambulating. Pt is very short term memory loss and forgets what is going on and what to do with simple things, water pitcher (drink from straw). Pt assisted back to bed and medications given including lopressor 25 mg PO. Informed primary RN of event. Pts bed alarm on zone 2 for her safety.

## 2022-04-07 NOTE — CARE COORDINATION
4/7/22, 1:37 PM EDT    Patient goals/plan/ treatment preferences discussed by  and . Patient goals/plan/ treatment preferences reviewed with patient/ family. Patient/ family verbalize understanding of discharge plan and are in agreement with goal/plan/treatment preferences. Understanding was demonstrated using the teach back method. AVS provided by RN at time of discharge, which includes all necessary medical information pertaining to the patients current course of illness, treatment, post-discharge goals of care, and treatment preferences. IMM Letter  IMM Letter given to Patient/Family/Significant other/Guardian/POA/by[de-identified] Pt Access  IMM Letter date given[de-identified] 04/05/22  IMM Letter time given[de-identified] 2037     Juan Jones is a potential discharge back to [facility] today. Called Chito RN at the facility. They are able to accept back today. They would like report called and AVS faxed.

## 2022-04-07 NOTE — PROGRESS NOTES
CLINICAL PHARMACY: DISCHARGE MED RECONCILIATION/REVIEW    [facility]) Select Patient?: No  Total # of Interventions Recommended: 0   -   Total # Interventions Accepted: 0  Intervention Severity:   - Level 1 Intervention Present?: No   - Level 2 #: 0   - Level 3 #: 0   Time Spent (min): 15    Additional Documentation:

## 2022-04-07 NOTE — FLOWSHEET NOTE
Advance Directive Consult: Advance Directive materials were provided to patient and explained. Patient is not ready to complete at this time, gave information for Spiritual Care to be contacted if further assistance is needed. 04/07/22 1310   Encounter Summary   Services provided to: Patient and family together   Referral/Consult From: [facility] Family members   Continue Visiting Yes  (4/7 AD left with her)   Complexity of Encounter Low   Length of Encounter 15 minutes   Spiritual/Amish   Type Spiritual support   waiting until family arrives.

## 2022-04-07 NOTE — PLAN OF CARE
Problem: Falls - Risk of:  Goal: Will remain free from falls  Description: Will remain free from falls  Outcome: Met This Shift  Goal: Absence of physical injury  Description: Absence of physical injury  Outcome: Met This Shift     Problem: Discharge Planning:  Goal: Discharged to appropriate level of care  Description: Discharged to appropriate level of care  Outcome: Met This Shift     Problem: Cardiac:  Goal: Ability to maintain vital signs within normal range will improve  Description: Ability to maintain vital signs within normal range will improve  Outcome: Met This Shift  Goal: Cardiovascular alteration will improve  Description: Cardiovascular alteration will improve  Outcome: Met This Shift  Goal: Ability to maintain an adequate cardiac output will improve  Description: Ability to maintain an adequate cardiac output will improve  Outcome: Met This Shift  Goal: Hemodynamic stability will improve  Outcome: Met This Shift     Problem: Health Behavior:  Goal: Will modify at least one risk factor affecting health status  Description: Will modify at least one risk factor affecting health status  Outcome: Met This Shift  Goal: Identification of resources available to assist in meeting health care needs will improve  Description: Identification of resources available to assist in meeting health care needs will improve  Outcome: Met This Shift     Problem: Physical Regulation:  Goal: Complications related to the disease process, condition or treatment will be avoided or minimized  Description: Complications related to the disease process, condition or treatment will be avoided or minimized  Outcome: Met This Shift     Problem: Fluid Volume:  Goal: Ability to achieve and maintain adequate urine output will improve  Description: Ability to achieve and maintain adequate urine output will improve  Outcome: Met This Shift     Problem: Respiratory:  Goal: Respiratory status will improve  Description: Respiratory status will improve  Outcome: Met This Shift

## 2022-04-12 NOTE — PROGRESS NOTES
[address] [facility] [address]  [place] [address]  Dept: [phone]  Dept Fax: [phone]  Loc: [phone]    Visit Date: 4/12/2022    Ms. Sami Oleary is a 78 y.o. female  who presented for:  Chief Complaint   Patient presents with    Follow-Up from Hospital   syncope   Atrial fibrillation   HPI:   HPI   Joesph Mack is a pleasant 78year old female patient who  has a past medical history of CAD (coronary artery disease), Colon polyps, CVA (cerebral vascular accident) ([facility] [address] Degenerative arthritis of cervical spine, Dementia ([facility] [address] Hiatal hernia with gastroesophageal reflux disease and esophagitis and ulcer, Hyperlipidemia, Hypertension, and Psoriasis. Cath in 2012 revealed mild nonobstructive CAD. Echo 3/2022 revealed preserved EF. The patient was recently admitted to [facility]. She was seen by cardiology for atrial fibrillation with RVR. She has h/o GIB, gastritis (EGD 3/2022), HH, Ulcer, GI recommended against anticoagulation. Due to GIB and falls history, cardiology recommended against [address]. She stays at the [facility]. Not taking Metoprolol per family. She has dementia. Patient denies chest pain, shortness of breath, dyspnea on exertion.        Current Outpatient Medications:     metoprolol tartrate (LOPRESSOR) 50 MG tablet, Take 1 tablet by mouth 2 times daily, Disp: 60 tablet, Rfl: 3    Elastic Bandages & Supports (MEDICAL COMPRESSION STOCKINGS) MISC, 1 each by Does not apply route daily as needed (Bite to the lower leg daily for swelling), Disp: 2 each, Rfl: 1    acetaminophen (TYLENOL) 500 MG tablet, Take 1 tablet by mouth every 6 hours as needed for Pain, Disp: 120 tablet, Rfl: 3    pantoprazole (PROTONIX) 40 MG tablet, Take 1 tablet by mouth 2 times daily (before meals), Disp: 60 tablet, Rfl: 3    loperamide (IMODIUM) 2 MG capsule, Take 2 mg by mouth daily as needed for Diarrhea, Disp: , Rfl:     ferrous sulfate (IRON 325) 325 (65 Fe) MG tablet, Take 1 tablet by mouth daily (with breakfast), Disp: 90 tablet, Rfl: 1    lisinopril (PRINIVIL;ZESTRIL) 10 MG tablet, Take 1 tablet by mouth once daily, Disp: 90 tablet, Rfl: 1    donepezil (ARICEPT) 10 MG tablet, Take 1 tablet by mouth nightly, Disp: 90 tablet, Rfl: 1    vitamin D (ERGOCALCIFEROL) 1.25 MG (05165 UT) CAPS capsule, TAKE 1 CAPSULE BY MOUTH TWICE A WEEK (Patient taking differently: Take 50,000 Units by mouth twice a week On Mondays and Thursdays), Disp: 24 capsule, Rfl: 1    atorvastatin (LIPITOR) 20 MG tablet, Take 1 tablet by mouth once daily, Disp: 90 tablet, Rfl: 1    memantine (NAMENDA) 10 MG tablet, Take 1 tablet by mouth twice daily, Disp: 180 tablet, Rfl: 1    calcium carbonate 600 MG TABS tablet, Take 1 tablet by mouth daily, Disp: , Rfl:     fluticasone (FLONASE) 50 MCG/ACT nasal spray, 2 sprays by Nasal route daily, Disp: 1 Bottle, Rfl: 3    Carboxymethylcell-Hypromellose (GENTEAL OP), Apply 1 drop to eye daily 1 drop each eye daily, Disp: , Rfl:     Past Medical History  Fanny Chaves  has a past medical history of CAD (coronary artery disease), Colon polyps, CVA (cerebral vascular accident) ([facility] [address] Degenerative arthritis of cervical spine, Dementia ([facility] [address] [address] Hiatal hernia with gastroesophageal reflux disease and esophagitis and ulcer, Hyperlipidemia, Hypertension, and Psoriasis. Social History  Fanny Chaves  reports that she has never smoked. She has never used smokeless tobacco. She reports that she does not drink alcohol and does not use drugs. Family History  Nicol family history includes Breast Cancer (age of onset: 54) in her daughter; Heart Disease in her brother.     Past Surgical History   Past Surgical History:   Procedure Laterality Date    BLADDER REPAIR      CARDIAC CATHETERIZATION  8-8-12    CHOLECYSTECTOMY      COLONOSCOPY  4/24/2012    Dr. Dominga Dao  07/2016    corneal related - Dr Jill Tejada GASTROINTESTINAL ENDOSCOPY N/A 3/7/2022    EGD BIOPSY performed by Nuala Sacks, MD at [facility]       Review of Systems   Constitutional: Negative for chills and fever  HENT: Negative for congestion, sinus pressure, sneezing and sore throat. Eyes: Negative for pain, discharge, redness and itching. Respiratory: Negative for apnea, cough  Gastrointestinal: Negative for blood in stool, constipation, diarrhea   Endocrine: Negative for cold intolerance, heat intolerance, polydipsia. Genitourinary: Negative for dysuria, enuresis, flank pain and hematuria. Musculoskeletal: Negative for arthralgias, joint swelling and neck pain. Neurological: Negative for numbness and headaches. Psychiatric/Behavioral: Negative for agitation, confusion, decreased concentration and dysphoric mood. Objective:     Ht 5' 3\" (1.6 m)   Wt 129 lb 9.6 oz (58.8 kg)   BMI 22.96 kg/m²     Wt Readings from Last 3 Encounters:   04/12/22 129 lb 9.6 oz (58.8 kg)   04/07/22 136 lb (61.7 kg)   04/05/22 141 lb 9.6 oz (64.2 kg)     BP Readings from Last 3 Encounters:   04/07/22 139/83   04/05/22 130/70   03/17/22 114/66       Nursing note and vitals reviewed. Physical Exam   Constitutional: Oriented to person, place, and time. Appears well-developed and well-nourished. ENT: Moist mucous membranes. No bleeding. Tongue is midline. Head: Normocephalic and atraumatic. Eyes: EOM are normal. Pupils are equal, round, and reactive to light. Neck: Normal range of motion. Neck supple. No JVD present. Cardiovascular: irregularly irregular rhythm, systolic murmur, no rubs, and intact distal pulses. Pulmonary/Chest: Effort normal and breath sounds normal. No respiratory distress. No wheezes. No rales. Abdominal: Soft. Bowel sounds are normal. No distension. There is no tenderness. Musculoskeletal: Normal range of motion. no edema. Neurological: Alert and oriented to person, place, and time. No cranial nerve deficit. Checo Christianson MD  Physician    Age           78 year(s)    Sonographer        [facility], RDCS,  RDMS, RVT    Interpreting       Echo reader of the week  Physician          Kristy Raman MD    Procedure    Type of Study    TTE procedure:ECHOCARDIOGRAM COMPLETE 2D W DOPPLER W COLOR. Procedure Date  Date: 03/01/2022 Start: 05:15 AM    Study Location: Bedside  Technical Quality: Limited visualization due to uncooperative patient. Indications:Syncope. Additional Medical History:syncope, coronary artery disease, anemia,  cerebrovascular accident, hypertension, hyperlipidemia    Patient Status: Routine    Height: 61 inches Weight: 139 pounds BSA: 1.62 m^2 BMI: 26.26 kg/m^2    BP: 119/59 mmHg    Conclusions    Summary  Normal left ventricle size and systolic function. Ejection fraction was  estimated at 55 %. There were no regional left ventricular wall motion  abnormalities and wall thickness was within normal limits. Doppler parameters were consistent with abnormal left ventricular  relaxation (grade 1 diastolic dysfunction). The left atrium is Mild to moderate dilated. Signature    ----------------------------------------------------------------  Electronically signed by Kristy Raman MD (Interpreting  physician) on 03/01/2022 at 06:05 PM  ----------------------------------------------------------------    Findings    Mitral Valve  The mitral valve structure was normal with normal leaflet separation. DOPPLER: The transmitral velocity was within the normal range with no  evidence for mitral stenosis. Trace mitral regurgitation is present. Aortic Valve  The aortic valve was trileaflet with normal thickness and cuspal  separation. DOPPLER: Transaortic velocity was within the normal range with  no evidence of aortic stenosis. There was no evidence of aortic  regurgitation. Tricuspid Valve  The tricuspid valve structure was normal with normal leaflet separation.   DOPPLER: There was no evidence of tricuspid stenosis. Mild tricuspid  regurgitation visualized. Pulmonic Valve  The pulmonic valve leaflets exhibited normal thickness, no calcification,  and normal cuspal separation. DOPPLER: The transpulmonic velocity was  within the normal range with no evidence for regurgitation. Left Atrium  The left atrium is Mild to moderate dilated. Left Ventricle  Normal left ventricle size and systolic function. Ejection fraction was  estimated at 55 %. There were no regional left ventricular wall motion  abnormalities and wall thickness was within normal limits. Doppler parameters were consistent with abnormal left ventricular  relaxation (grade 1 diastolic dysfunction). Right Atrium  Right atrial size was normal.    Right Ventricle  The right ventricular size was normal with normal systolic function and  wall thickness. Pericardial Effusion  The pericardium was normal in appearance with no evidence of a pericardial  effusion. Pleural Effusion  No evidence of pleural effusion. Aorta / Great Vessels  -Aortic root dimension within normal limits.  -The Pulmonary artery is within normal limits. -IVC size is within normal limits with normal respiratory phasic changes.     M-Mode/2D Measurements & Calculations    LV Diastolic   LV Systolic Dimension:    AV Cusp Separation: 1.5 cmLA  Dimension: 4   2.5 cm                    Dimension: 3 cmAO Root Dimension:  cm             LV Volume Diastolic: 70   2.5 cmLA Area: 14.3 cm^2  LV FS:37.5 %   ml  LV PW          LV Volume Systolic: 01.9  Diastolic: 0.7 ml  cm             LV EDV/LV EDV Index: 70   RV Diastolic Dimension: 1.7 cm  Septum         ml/43 m^2LV ESV/LV ESV  Diastolic: 0.6 Index: 31.5 ml/14 m^2     LA/Aorta: 1.2  cm             EF Calculated: 68.1 %  LA volume/Index: 36.8 ml /23m^2    Doppler Measurements & Calculations    MV Peak E-Wave: 59.1     AV Peak Velocity: 149  LVOT Peak Velocity: 91.3  cm/s                     cm/s cm/s  MV Peak A-Wave: 63 cm/s  AV Peak Gradient: 8.88 LVOT Peak Gradient: 3 mmHg  MV E/A Ratio: 0.94       mmHg  MV Peak Gradient: 1.4                           TV Peak E-Wave: 55.3 cm/s  mmHg                                            TV Peak A-Wave: 51.8 cm/s    MV Deceleration Time:                           TV Peak Gradient: 1.22  275 msec                 IVRT: 130 msec         mmHg  TR Velocity:243 cm/s  TR Gradient:23.62 mmHg  AV DVI (Vmax):0.61     PV Peak Velocity: 57.8  cm/s  PV Peak Gradient: 1.34  MR Velocity: 319 cm/s                           mmHg    [id number]  [id number]      AssessmentPlan: Fausto Lo is a pleasant 78year old female patient who  has a past medical history of CAD (coronary artery disease), Colon polyps, CVA (cerebral vascular accident) ([facility] [address] Degenerative arthritis of cervical spine, Dementia ([facility] [address] Hiatal hernia with gastroesophageal reflux disease and esophagitis and ulcer, Hyperlipidemia, Hypertension, and Psoriasis. Cath in 2012 revealed mild nonobstructive CAD. Echo 3/2022 revealed preserved EF. The patient was recently admitted to [address] [place] [address]. She was seen by cardiology for atrial fibrillation with RVR. She has h/o GIB, gastritis (EGD 3/2022), HH, Ulcer, GI recommended against anticoagulation. Due to GIB and falls history, cardiology recommended against [facility]. She stays at the [facility]. Not taking Metoprolol per family. She has dementia. Patient denies chest pain, shortness of breath, dyspnea on exertion. 1. Paroxysmal atrial fibrillation  2. Nonobstructive CAD   3. Dyslipidemia  4. H/o CVA  5. H/o GI bleeding, gastritis  6. H/o falls   7. HTN     In atrial fibrillation   Rate control approach    Resume metoprolol at 25 mg po BID   The patient was instructed to check the blood pressure at home, and record the readings.  Patient will call office with blood pressure readings, will adjust patient's antihypertensive medications as needed accordingly    Last Hgb was 9.2    Not candidate for [address]   Has dementia, recurrent falls, GI bleeding, anemia    D/w patient and family/caregiver, they wish to avoid procedures (not interested in WATCHMAN work up)     Above findings and plan of care were discussed with patient in details, patient's questions were answered.      Disposition:  RTC in 6 months           Electronically signed by Barbara Krause MD, [facility]    4/12/2022 at 11:02 AM EDT

## 2022-04-21 PROBLEM — R60.0 BILATERAL LEG EDEMA: Status: RESOLVED | Noted: 2022-01-01 | Resolved: 2022-01-01

## 2022-04-21 PROBLEM — Z79.02 ANTIPLATELET OR ANTITHROMBOTIC LONG-TERM USE: Status: RESOLVED | Noted: 2021-03-15 | Resolved: 2022-01-01

## 2022-04-21 NOTE — PROGRESS NOTES
Post-Discharge Transitional Care Follow Up      López Corey   YOB: 1940    Date of Office Visit:  4/21/2022  Date of Hospital Admission: 4/5/22  Date of Hospital Discharge: 4/7/22  Readmission Risk Score (high >=14%. Medium >=10%):Readmission Risk Score: 21.2 ( )      Care management risk score Rising risk (score 2-5) and Complex Care (Scores >=6): 2     Non face to face  following discharge, date last encounter closed (first attempt may have been earlier): *No documented post hospital discharge outreach found in the last 14 days     Call initiated 2 business days of discharge: *No response recorded in the last 14 days     Bilateral low back pain without sciatica, unspecified chronicity  -     POC URINE with Microscopic  Paroxysmal atrial fibrillation with RVR (HCC)  Acute diastolic congestive heart failure (HCC)  Essential hypertension  Dementia without behavioral disturbance, unspecified dementia type ([facility] [address]  Hiatal hernia with gastroesophageal reflux disease and esophagitis and ulcer  DARIUS (acute kidney injury) [facility])  Hospital discharge follow-up  -     IN DISCHARGE MEDS RECONCILED W/ CURRENT OUTPATIENT MED LIST  Low back pain with sciatica, sciatica laterality unspecified, unspecified back pain laterality, unspecified chronicity      Medical Decision Making: moderate complexity  Return in about 8 weeks (around 6/14/2022) for AF HTN Dementia. On this date 4/21/2022 I have spent 30  minutes reviewing previous notes, test results and face to face with the patient discussing the diagnosis and importance of compliance with the treatment plan as well as documenting on the day of the visit. Subjective:   HPI    Inpatient course: Discharge summary reviewed- see chart.     Interval history/Current status: don no like to sit in a recliner, leg again swollen    Poor appetite      Patient Active Problem List   Diagnosis    CVA (cerebral vascular accident)-2002    CAD (coronary artery disease)-mild NONobstructive max 30% ostail, LCX, mid lad and diffuse mild luminal irregularites-per cath 08/08/12    S/P cardiac cath 2012- mild nonobstructive CAD    Essential hypertension    Hyperlipidemia    Dementia without behavioral disturbance ([facility] [address]    DARIUS (acute kidney injury) ([facility] [address]    Anemia    New onset atrial fibrillation (HCC) w/ RVR now sinus Bradycardia    Hiatal hernia with gastroesophageal reflux disease and esophagitis and ulcer    Garrison's esophagus with esophagitis    Atrial fibrillation with rapid ventricular response (HCC)    Paroxysmal atrial fibrillation with RVR (HCC)    Acute diastolic congestive heart failure (HCC)       Medications listed as ordered at the time of discharge from hospital     Medication List          Accurate as of April 21, 2022 11:03 AM. If you have any questions, ask your nurse or doctor. CHANGE how you take these medications    metoprolol tartrate 50 MG tablet  Commonly known as: LOPRESSOR  Take 1 tablet by mouth 2 times daily  What changed:   · medication strength  · how much to take  Changed by:  Rafita Balderas MD     vitamin D 1.25 MG (93740 UT) Caps capsule  Commonly known as: ERGOCALCIFEROL  TAKE 1 CAPSULE BY MOUTH TWICE A WEEK  What changed: additional instructions        CONTINUE taking these medications    acetaminophen 500 MG tablet  Commonly known as: TYLENOL  Take 1 tablet by mouth every 6 hours as needed for Pain     atorvastatin 20 MG tablet  Commonly known as: LIPITOR  Take 1 tablet by mouth once daily     calcium carbonate 600 MG Tabs tablet     donepezil 10 MG tablet  Commonly known as: ARICEPT  Take 1 tablet by mouth nightly     ferrous sulfate 325 (65 Fe) MG tablet  Commonly known as: IRON 325  Take 1 tablet by mouth daily (with breakfast)     fluticasone 50 MCG/ACT nasal spray  Commonly known as: Flonase  2 sprays by Nasal route daily     GENTEAL OP     lisinopril 10 MG tablet  Commonly known as: PRINIVIL;ZESTRIL  Take 1 tablet by mouth once daily     loperamide 2 MG capsule  Commonly known as: IMODIUM     Medical Compression Stockings Misc  1 each by Does not apply route daily as needed (Bite to the lower leg daily for swelling)     memantine 10 MG tablet  Commonly known as: NAMENDA  Take 1 tablet by mouth twice daily     pantoprazole 40 MG tablet  Commonly known as: PROTONIX  Take 1 tablet by mouth 2 times daily (before meals)           Where to Get Your Medications      You can get these medications from any pharmacy    Bring a paper prescription for each of these medications  · metoprolol tartrate 50 MG tablet          Medications marked \"taking\" at this time  Outpatient Medications Marked as Taking for the 4/21/22 encounter (Office Visit) with Makeda Almaguer MD   Medication Sig Dispense Refill    metoprolol tartrate (LOPRESSOR) 50 MG tablet Take 1 tablet by mouth 2 times daily 60 tablet 5    Elastic Bandages & Supports (MEDICAL COMPRESSION STOCKINGS) MISC 1 each by Does not apply route daily as needed (Bite to the lower leg daily for swelling) 2 each 1    acetaminophen (TYLENOL) 500 MG tablet Take 1 tablet by mouth every 6 hours as needed for Pain 120 tablet 3    pantoprazole (PROTONIX) 40 MG tablet Take 1 tablet by mouth 2 times daily (before meals) 60 tablet 3    loperamide (IMODIUM) 2 MG capsule Take 2 mg by mouth daily as needed for Diarrhea      ferrous sulfate (IRON 325) 325 (65 Fe) MG tablet Take 1 tablet by mouth daily (with breakfast) 90 tablet 1    lisinopril (PRINIVIL;ZESTRIL) 10 MG tablet Take 1 tablet by mouth once daily 90 tablet 1    donepezil (ARICEPT) 10 MG tablet Take 1 tablet by mouth nightly 90 tablet 1    vitamin D (ERGOCALCIFEROL) 1.25 MG (57419 UT) CAPS capsule TAKE 1 CAPSULE BY MOUTH TWICE A WEEK (Patient taking differently: Take 50,000 Units by mouth twice a week On Mondays and Thursdays) 24 capsule 1    atorvastatin (LIPITOR) 20 MG tablet Take 1 tablet by mouth once daily 90 tablet 1    memantine (NAMENDA) 10 MG tablet Take 1 tablet by mouth twice daily 180 tablet 1    calcium carbonate 600 MG TABS tablet Take 1 tablet by mouth daily      fluticasone (FLONASE) 50 MCG/ACT nasal spray 2 sprays by Nasal route daily 1 Bottle 3    Carboxymethylcell-Hypromellose (GENTEAL OP) Apply 1 drop to eye daily 1 drop each eye daily          Medications patient taking as of now reconciled against medications ordered at time of hospital discharge: Yes    Review of Systems   Constitutional: Negative for appetite change, chills, diaphoresis, fatigue and fever. HENT: Negative for congestion, ear pain, postnasal drip, rhinorrhea, sinus pressure, sneezing, sore throat, trouble swallowing and voice change. Respiratory: Negative for cough, chest tightness, shortness of breath and wheezing. Cardiovascular: Positive for leg swelling. Negative for chest pain and palpitations. Gastrointestinal: Negative for abdominal pain, constipation, diarrhea, nausea and vomiting. Musculoskeletal: Negative for arthralgias, back pain, joint swelling, myalgias, neck pain and neck stiffness. Neurological: Negative for dizziness, syncope, weakness, light-headedness, numbness and headaches. Objective:    /82 (Site: Right Upper Arm, Position: Sitting, Cuff Size: Medium Adult)   Pulse 64   Resp 12   Ht 5' 3\" (1.6 m)   Wt 136 lb 2 oz (61.7 kg)   BMI 24.11 kg/m²   Physical Exam  Vitals reviewed. Constitutional:       Appearance: She is well-developed. HENT:      Head: Normocephalic and atraumatic. Right Ear: External ear normal.      Left Ear: External ear normal.      Nose: Nose normal.   Eyes:      General: No scleral icterus. Conjunctiva/sclera: Conjunctivae normal.      Pupils: Pupils are equal, round, and reactive to light. Neck:      Thyroid: No thyromegaly. Vascular: No JVD. Cardiovascular:      Rate and Rhythm: Normal rate. Rhythm irregularly irregular.       Heart sounds: No murmur heard.  No friction rub. Pulmonary:      Effort: Pulmonary effort is normal.      Breath sounds: Normal breath sounds. No wheezing or rales. Chest:      Chest wall: No tenderness. Abdominal:      General: Bowel sounds are normal.      Palpations: Abdomen is soft. There is no mass. Tenderness: There is no abdominal tenderness. Musculoskeletal:      Cervical back: Normal range of motion and neck supple. Lymphadenopathy:      Cervical: No cervical adenopathy. Skin:     Findings: No rash. Neurological:      Mental Status: She is alert and oriented to person, place, and time. Psychiatric:         Behavior: Behavior is cooperative. New Prescriptions    No medications on file       Updated home medications.  (New prescriptions plus current medications)   Current Outpatient Medications   Medication Sig Dispense Refill    metoprolol tartrate (LOPRESSOR) 50 MG tablet Take 1 tablet by mouth 2 times daily 60 tablet 5    Elastic Bandages & Supports (MEDICAL COMPRESSION STOCKINGS) MISC 1 each by Does not apply route daily as needed (Bite to the lower leg daily for swelling) 2 each 1    acetaminophen (TYLENOL) 500 MG tablet Take 1 tablet by mouth every 6 hours as needed for Pain 120 tablet 3    pantoprazole (PROTONIX) 40 MG tablet Take 1 tablet by mouth 2 times daily (before meals) 60 tablet 3    loperamide (IMODIUM) 2 MG capsule Take 2 mg by mouth daily as needed for Diarrhea      ferrous sulfate (IRON 325) 325 (65 Fe) MG tablet Take 1 tablet by mouth daily (with breakfast) 90 tablet 1    lisinopril (PRINIVIL;ZESTRIL) 10 MG tablet Take 1 tablet by mouth once daily 90 tablet 1    donepezil (ARICEPT) 10 MG tablet Take 1 tablet by mouth nightly 90 tablet 1    vitamin D (ERGOCALCIFEROL) 1.25 MG (74622 UT) CAPS capsule TAKE 1 CAPSULE BY MOUTH TWICE A WEEK (Patient taking differently: Take 50,000 Units by mouth twice a week On Mondays and Thursdays) 24 capsule 1    atorvastatin (LIPITOR) 20 MG tablet Take 1 tablet by mouth once daily 90 tablet 1    memantine (NAMENDA) 10 MG tablet Take 1 tablet by mouth twice daily 180 tablet 1    calcium carbonate 600 MG TABS tablet Take 1 tablet by mouth daily      fluticasone (FLONASE) 50 MCG/ACT nasal spray 2 sprays by Nasal route daily 1 Bottle 3    Carboxymethylcell-Hypromellose (GENTEAL OP) Apply 1 drop to eye daily 1 drop each eye daily       No current facility-administered medications for this visit. Patient still atrial fibrillation, with irregular ventricular response, will can increase the metoprolol to 50 mg twice daily    Return in about 8 weeks (around 6/14/2022) for AF HTN Dementia. Patient to return immediately for follow-up if having more problems and concerns. Florence Carrion MD        An electronic signature was used to authenticate this note.   --Florence Carrion MD

## 2022-04-21 NOTE — DISCHARGE SUMMARY
[address] [id number]                               DISCHARGE SUMMARY    PATIENT NAME: Ari Paiz                     :        1940  MED REC NO:   [id number]                           ROOM:       2871  ACCOUNT NO:   [de-identified]                           ADMIT DATE: 2022  PROVIDER:     Marina Macdonald. Melissa Chung M.D.            Estelita [place]: 2022    DISCHARGE FINAL DIAGNOSES:  1. Atrial fibrillation with rapid ventricular response, improved to  normal ventricular response. 2.  Acute-on-chronic congestive heart failure with diastolic heart  failure, preserved EF. 3.  Bilateral leg edema. 4.  Paroxysmal atrial fibrillation, RVR. 5.  Garrison's esophagus with esophagitis. 6.  Hiatal hernia with GERD. 7.  Anemia. 8.  Acute diastolic heart failure with preserved EF. 9.  Dementia without behavioral disturbance. 11.  Essential hypertension. 12.  Hyperlipidemia. 13.  Coronary artery disease. 14.  History of CVA. DISCHARGE HOME MEDICATIONS TO THE EXTENDED CARE FACILITY/NURSING HOME:  1. Lopressor 50 mg one tablet twice a day. 2.  Compression stockings daily. 3.  Tylenol 500 mg one tablet every six hours for pain. 4.  GlycoLax 17 gm for constipation. 5.  Protonix 40 mg twice a day. 6.  Imodium 2 mg daily as needed for diarrhea. 7.  Lisinopril 10 mg one tablet daily. 8.  Iron 325 mg one tablet daily. 9.  Aricept 10 mg one tablet daily. 10.  Vitamin D 50,000 units twice a week. 11.  Lipitor 20 mg daily. 12.  Namenda 10 mg one tablet daily. 13.  Calcium carbonate 600 mg daily. 14.  Flonase nasal spray daily. 15.  GenTeal eye drop one drop each eye daily. The patient is being discharged to the dementia unit in the [facility]. CONSULTANT ON THIS HOSPITALIZATION:  Alvarez Sanchez, from Cardiology. ACTIVITY:  As tolerated. DIET:  As tolerated.     HISTORY AND PHYSICAL:  The patient is an 55-year-old female presented to  the emergency room because of atrial fibrillation with RVR, leg swelling  for more than one week. The patient is known to me through the office,  being her primary care provider. She came in for followup because of  swelling on both lower extremities noted in the ankle and foot for more  than one week. The patient is living in assisted living because of  dementia in the dementia [place]. History is limited because of her  dementia. The caretaker and the son brought the patient to the office  and having leg swelling for a week; however, denies of any shortness of  breath or chest pain. She was admitted last 02/28/2022 for a fall,  found on the floor in nursing home and had a cut on the forehead and  diagnosed to have upper GI bleeding, esophagitis, GERD and hiatal  hernia. The patient had an EGD at that time and was noted to be anemic  and needing blood transfusion. The patient's Plavix has been  discontinued now. However, for the past one week, the legs had been  more swollen. The patient was seen in the office and noted to have  atrial fibrillation and RVR, for which the patient was sent to the  emergency room and was evaluated and was since admitted. PHYSICAL EXAMINATION:  VITAL SIGNS:  T-max of 98, blood pressure is 116/68, pulse 78,  respirations _____ room air saturation is 98%. GENERAL:  When I am seeing the patient, alert, active, cooperative,  smiling. HEENT:  Head normocephalic and atraumatic. LUNGS:  Clear to auscultation. HEART:  Irregular rate and rhythm. ABDOMEN:  Showed no CVA tenderness. Abdomen is soft, nontender. EXTREMITIES:  No edema at this time. NEUROLOGICAL EXAMINATION:  Within normal limits. HOSPITAL COURSE:  The patient was admitted and started on metoprolol 25  mg B.I.D. and titrated up according to the patient's heart rate. The  patient's rapid ventricular response was slowed down with the metoprolol  and the patient has tolerated well and responded well. The patient's  swelling was incredibly improved. The patient was now placed on DVT  prophylaxis including blood thinner because of the recent GI bleeding,  needing transfusion. The consult was done with Cardiology Services, Dr. Rosy Flores, who co-managed the patient. The case was also referred to  Gastroenterology for advice regarding blood thinner. The patient stayed  in the hospital for two days and during her course, the patient remained  to be in atrial fibrillation, however, is tolerating treatment plans and  followup. The patient was then discharged in improved and stable  condition on 04/07/2022. AVERY Srivastava M.D.    D: 04/20/2022 17:06:49       T: 04/21/2022 1:08:39     [email or web address]  Job#: [id number]     Doc#: [id number]    CC:

## 2022-04-21 NOTE — PROGRESS NOTES
No components found for: CHLPL  Lab Results   Component Value Date    TRIG 70 01/27/2022     Lab Results   Component Value Date    HDL 36 01/27/2022     Lab Results   Component Value Date    LDLCALC 85 01/27/2022     Lab Results   Component Value Date    LABVLDL 23 08/09/2021       Lab Results   Component Value Date    ALT 23 04/05/2022    AST 20 04/05/2022    ALKPHOS 73 04/05/2022    BILITOT 0.8 04/05/2022           Is patient currently taking any cholesterol medications? Yes   If yes, see med list as above    Is the patient reporting any side effects of cholesterol medications? No    Is the patient taking any over the counter medications? Yes   If yes, see med list as above    Is the patient taking a daily aspirin? No      Patient Self-Management Goal for Chronic Condition  Goal: I will take all medications as prescribed by my doctor, and I will call the office if I am having any medication problems. Barriers to success: none  Plan for overcoming my barriers: N/A     Confidence: 10/10  Date goal set: 4/21/22  Date goal attained:     Have you seen any other physician or provider since your last visit no    Have you had any other diagnostic tests since your last visit? no    Have you changed or stopped any medications since your last visit including any over-the-counter medicines, vitamins, or herbal medicines? no     Are you taking all your prescribed medications?  Yes    If NO, why?

## 2022-04-22 NOTE — TELEPHONE ENCOUNTER
Pt's daughter, Arsalan Tracy called said that in the future any prescriptions need to be sent to [address]. We must have printed the metoprolol Rx and sent the paper copy with the pt yesterday.

## 2022-04-25 NOTE — TELEPHONE ENCOUNTER
Dion Alvarado called asking since pt has had some med changes over the last month, she ask if still ok to dose pt with her OTC Tylenol 500 mg?     Please call Dion Alvarado at  [phone]

## 2022-04-28 NOTE — TELEPHONE ENCOUNTER
Shruti Davies called back stating [facility] will need a written order sent for pt to be given Tylenol OTC PRN by facility staff. Shruti Davies ask if this order could be sent ASAP so pt can receive her Tylenol.     Please fax to [facility]

## 2022-05-03 NOTE — TELEPHONE ENCOUNTER
Mireya De Jesus called stating since pt's med Metoprolol has been increased back to   50 mg on 4-21-22,  pt has been having extreme fatigue, drowsiness,sleeping way more than normal.    Mireya De Jesus ask if you feel this is from the RX and ask if the med needs decreased to help?

## 2022-05-04 NOTE — TELEPHONE ENCOUNTER
Spoke with Shane Angulo at [facility] and he said that he would get the UA w/ culture. He also wanted Dr Lucila Pena to know that pt is also having increased shortness of Breath and decreased appetite. He would like to know if Dr Lucila Pena would like anything else.

## 2022-05-05 PROBLEM — J18.9 PNEUMONIA: Status: ACTIVE | Noted: 2022-01-01

## 2022-05-05 PROBLEM — N18.9 ACUTE-ON-CHRONIC KIDNEY INJURY (HCC): Status: ACTIVE | Noted: 2022-01-01

## 2022-05-05 PROBLEM — E87.20 LACTIC ACIDOSIS: Status: ACTIVE | Noted: 2022-01-01

## 2022-05-05 PROBLEM — N17.9 ACUTE RENAL FAILURE (ARF) (HCC): Status: ACTIVE | Noted: 2022-01-01

## 2022-05-05 PROBLEM — N17.9 ACUTE-ON-CHRONIC KIDNEY INJURY (HCC): Status: ACTIVE | Noted: 2022-01-01

## 2022-05-05 PROBLEM — E87.29 HIGH ANION GAP METABOLIC ACIDOSIS: Status: ACTIVE | Noted: 2022-01-01

## 2022-05-05 PROBLEM — K76.9 HEPATIC DYSFUNCTION: Status: ACTIVE | Noted: 2022-01-01

## 2022-05-05 PROBLEM — E87.0 HYPERNATREMIA: Status: ACTIVE | Noted: 2022-01-01

## 2022-05-05 NOTE — CONSULTS
[facility]'s  Cardiology Consult        Patient: Arlene Magaña  YOB: 1940  MRN: [id number]     Acct: [de-identified]    PCP: Zeynep Neff MD    Date of Admission: 5/4/2022      Reason for Consultation:  Elevated troponins      History Of Present Illness:    78 y. o.female with history of dementia, nonobstructive CAD, CVA, hypertension, GI bleed, atrial fibrillation not on [address] who was brought in to the hospital for increased weakness, hypoxic respiratory failure and decreased Appetite. Patient Cannot Provide Any History, Most of the History Was Obtained from the Chart. In the ER She Was Noted to Be in Acute on Chronic Renal Failure. Has Mildly Elevated Downtrending Troponins and Abnormal liver function tests. Electrocardiogram shows atrial fibrillation with right bundle branch block. Echocardiogram on 3/1/2022 showed ejection fraction of 55% with grade 1 diastolic dysfunction and mild to moderately dilated LA. Cardiology was consulted for elevated troponins. Her proBNP is elevated to 9935. On recent admission in April it was 46. All labs, EKG's, diagnostic testing and images as well as cardiac cath, stress testing were reviewed during this encounter.     Past Medical History:          Diagnosis Date    Atrial fibrillation ([address]     CAD (coronary artery disease) 8/8/12    non-obstructive 20-30% stenosis LAD and circomflex    Colon polyps 4/25/12    Tubular adenoma Dr. Cisco Cummins CVA (cerebral vascular accident) [facility]) 2004    Degenerative arthritis of cervical spine 2002    Dementia ([facility] 12/14    Hiatal hernia with gastroesophageal reflux disease and esophagitis and ulcer 3/7/2022    Hyperlipidemia     Hypertension     Psoriasis        Past Surgical History:          Procedure Laterality Date    BLADDER REPAIR      CARDIAC CATHETERIZATION  8-8-12    CHOLECYSTECTOMY      COLONOSCOPY  4/24/2012    Dr. Jaimee Cleaning  07/2016    corneal related - Dr Mary Alfonso ENDOSCOPY N/A 3/7/2022    EGD BIOPSY performed by Kacie Hardy MD at [facility]       Medications Prior to Admission:      Prior to Admission medications    Medication Sig Start Date End Date Taking?  Authorizing Provider   acetaminophen (AMINOFEN) 325 MG tablet Take 2 tablets by mouth every 6 hours as needed for Pain or Fever (may cut or crush medications)  Patient not taking: Reported on 5/5/2022 4/29/22   Jennifer Rider MD   metoprolol tartrate (LOPRESSOR) 50 MG tablet Take 1 tablet by mouth 2 times daily 4/21/22   Jennifer Rider MD   Elastic Bandages & Supports ([address]) [address] 1 each by Does not apply route daily as needed (Bite to the lower leg daily for swelling) 3/17/22   Jennifer Rider MD   pantoprazole (PROTONIX) 40 MG tablet Take 1 tablet by mouth 2 times daily (before meals) 3/9/22   Jennifer Rider MD   loperamide (IMODIUM) 2 MG capsule Take 2 mg by mouth daily as needed for Diarrhea    Historical Provider, MD   ferrous sulfate (IRON 325) 325 (65 Fe) MG tablet Take 1 tablet by mouth daily (with breakfast) 2/10/22   Jennifer Rider MD   lisinopril (PRINIVIL;ZESTRIL) 10 MG tablet Take 1 tablet by mouth once daily 12/30/21   Jennifer Rider MD   donepezil (ARICEPT) 10 MG tablet Take 1 tablet by mouth nightly 9/14/21   Jennifer Rider MD   vitamin D (ERGOCALCIFEROL) 1.25 MG (10333 UT) CAPS capsule TAKE 1 CAPSULE BY MOUTH TWICE A WEEK  Patient taking differently: Take 50,000 Units by mouth twice a week On Mondays and Thursdays 8/30/21   Jennifer Rider MD   atorvastatin (LIPITOR) 20 MG tablet Take 1 tablet by mouth once daily 8/30/21   Jennifer Rider MD   [facility]) 10 MG tablet Take 1 tablet by mouth twice daily 7/26/21   Jennifer Rider MD   calcium carbonate 600 MG TABS tablet Take 1 tablet by mouth daily    Historical Provider, MD fluticasone (FLONASE) 50 MCG/ACT nasal spray 2 sprays by Nasal route daily 5/28/20   Rob Lew MD   Carboxymethylcell-Hypromellose (GENTEAL OP) Apply 1 drop to eye daily 1 drop each eye daily    Historical Provider, MD       Current Facility-Administered Medications   Medication Dose Route Frequency Provider Last Rate Last Admin    acetaminophen (TYLENOL) tablet 650 mg  650 mg Oral Q6H PRN Mansi Resendiz MD        [Held by provider] atorvastatin (LIPITOR) tablet 20 mg  20 mg Oral Nightly Mansi Resendiz MD        calcium elemental (OSCAL) tablet 500 mg  1 tablet Oral Daily Mansi Resendiz MD        polyvinyl alcohol (LIQUIFILM TEARS) 1.4 % ophthalmic solution 1 drop  1 drop Ophthalmic Daily Mansi Resendiz MD        donepezil (ARICEPT) tablet 10 mg  10 mg Oral Nightly Mansi Resendiz MD        ferrous sulfate (IRON 325) tablet 325 mg  325 mg Oral Daily with breakfast Mansi Resendiz MD        fluticasone (FLONASE) 50 MCG/ACT nasal spray 2 spray  2 spray Nasal Daily Mansi Resendiz MD        memantine (NAMENDA) tablet 5 mg  5 mg Oral BID Mansi Resendiz MD        metoprolol tartrate (LOPRESSOR) tablet 50 mg  50 mg Oral BID Mansi Resendiz MD        pantoprazole (PROTONIX) tablet 40 mg  40 mg Oral BID AC Mansi Resendiz MD        vitamin D (ERGOCALCIFEROL) capsule 50,000 Units  50,000 Units Oral Once per day on Mon Thu Mansi Resendiz MD        sodium chloride flush 0.9 % injection 5-40 mL  5-40 mL IntraVENous 2 times per day Mansi Resendiz MD        sodium chloride flush 0.9 % injection 5-40 mL  5-40 mL IntraVENous PRN Mansi Resendiz MD        0.9 % sodium chloride infusion   IntraVENous PRN Mansi Resendiz MD        ondansetron (ZOFRAN-ODT) disintegrating tablet 4 mg  4 mg Oral Q8H PRN Mansi Resendiz MD        Or    ondansetron (ZOFRAN) injection 4 mg  4 mg IntraVENous Q6H PRN Isabel Grimm Chas Luke MD        ipratropium-albuterol (DUONEB) nebulizer solution 1 ampule  1 ampule Inhalation Q4H WA Mima Scruggs MD   1 ampule at 05/05/22 0838    cefTRIAXone (ROCEPHIN) 1,000 mg in sterile water 10 mL IV syringe  1,000 mg IntraVENous Q24H Mima Scruggs MD   1,000 mg at 05/05/22 0415    And    azithromycin (ZITHROMAX) tablet 500 mg  500 mg Oral Q24H Mima Scruggs MD        dextrose 5 % solution   IntraVENous Continuous Jackalyn Gottron, MD           Allergies:  Morphine    Social History:    TOBACCO:   reports that she has never smoked. She has never used smokeless tobacco.  ETOH:   reports no history of alcohol use. Family History:        Problem Relation Age of Onset    Heart Disease Brother     Breast Cancer Daughter 54         Review of Systems -   Unable to obtain  All others reviewed and are negative.        BP (!) 136/92   Pulse 126   Temp 97 °F (36.1 °C) (Axillary)   Resp 20   Ht 5' 3\" (1.6 m)   Wt 132 lb 4.4 oz (60 kg)   SpO2 92%   BMI 23.43 kg/m²       Physical Examination:   General appearance -alert but not oriented  Mental status - alert but not oriented  Neck - supple, no significant adenopathy, no JVD, or carotid bruits  Chest - clear to auscultation, no wheezes, rales or rhonchi, symmetric air entry  Heart - irregular regular rhythm, normal S1, S2, no murmurs, rubs, clicks or gallops  Abdomen - soft, nontender, nondistended, no masses or organomegaly  Neurological - alert but not oriented  Musculoskeletal - no joint tenderness, deformity or swelling  Extremities - peripheral pulses normal, no pedal edema, no clubbing or cyanosis  Skin - normal coloration and turgor, no rashes, no suspicious skin lesions noted      LABS:    Recent Labs     05/04/22  2300 05/05/22  0428 05/05/22  0700   CKTOTAL  --  151*  --    TROPONINT 0.023* 0.019* 0.015*     CBC:   Lab Results   Component Value Date    WBC 5.9 05/05/2022    RBC 3.75 05/05/2022    RBC 2.96 03/17/2022 HGB 10.0 05/05/2022    HCT 34.8 05/05/2022    MCV 92.8 05/05/2022    MCH 26.7 05/05/2022    MCHC 28.7 05/05/2022    RDW 16.0 03/17/2022     05/05/2022    MPV 12.6 05/05/2022     BMP:    Lab Results   Component Value Date     05/05/2022    K 4.2 05/05/2022    K 4.5 05/04/2022     05/05/2022    CO2 17 05/05/2022    BUN 62 05/05/2022    LABALBU 3.8 05/05/2022    LABALBU 4.8 03/01/2012    CREATININE 2.2 05/05/2022    CALCIUM 9.5 05/05/2022    LABGLOM 26 05/05/2022    GLUCOSE 127 05/05/2022    GLUCOSE 78 03/17/2022     Hepatic Function Panel:    Lab Results   Component Value Date    ALKPHOS 269 05/05/2022     05/05/2022     05/05/2022    PROT 6.8 05/05/2022    BILITOT 2.9 05/05/2022    BILIDIR 1.2 05/05/2022    LABALBU 3.8 05/05/2022    LABALBU 4.8 03/01/2012     Magnesium:    Lab Results   Component Value Date    MG 1.8 04/05/2022     Warfarin PT/INR:  No components found for: PTPATWAR, PTINRWAR  HgBA1c:    Lab Results   Component Value Date    LABA1C 5.9 04/29/2021     FLP:    Lab Results   Component Value Date    TRIG 70 01/27/2022    HDL 36 01/27/2022    LDLCALC 85 01/27/2022    LABVLDL 23 08/09/2021     TSH:    Lab Results   Component Value Date    TSH 2.280 04/05/2022     BNP: No components found for: PRO-BNP      Assessment/Plan:    Patient Active Problem List   Diagnosis    CVA (cerebral vascular accident)-2002    CAD (coronary artery disease)-mild NONobstructive max 30% ostail, LCX, mid lad and diffuse mild luminal irregularites-per cath 08/08/12    S/P cardiac cath 2012- mild nonobstructive CAD    Essential hypertension    Hyperlipidemia    Dementia without behavioral disturbance [address]    DARIUS (acute kidney injury) ([address]    Anemia    New onset atrial fibrillation (HCC) w/ RVR now sinus Bradycardia    Hiatal hernia with gastroesophageal reflux disease and esophagitis and ulcer    Garrison's esophagus with esophagitis    [facility] fibrillation with [facility] ventricular response [facility]    Paroxysmal atrial fibrillation with RVR (HCC)    Acute diastolic congestive heart failure (HCC)    Acute renal failure (ARF) (HCC)    Hypernatremia    Pneumonia    High anion gap metabolic acidosis    Acute-on-chronic kidney injury ([facility]    Lactic acidosis    Hepatic dysfunction     Briefly this is an unfortunate 58-year-old female with history of atrial fibrillation not on anticoagulation due to prior GI bleeding, dementia, prior CVA, hypertension who was sent to the for decreased appetite, acute hypoxic respiratory failure. Was found to be in DARIUS on CKD. Cardiology was consulted for elevated troponins    Telemetry  Monitor renal function  Appears euvolemic and not in distress  Sitter at bedside states patient does not IV lines  Not on oxygen supplementation  Continue conservative management  Nephrology on board  Continue rest of management    Please do note hesitate to contact me for any further questions. Thank you for the opportunity to be involved in this patient's care.     Code Status: Full Code    Electronically signed by Diamond Phoenix, MD on 5/5/2022 at 9:00 AM

## 2022-05-05 NOTE — PROGRESS NOTES
[facility] 58  OCCUPATIONAL THERAPY MISSED TREATMENT NOTE  [facility]-STEPDOWN 3B  3B-27/027-A      Date: 2022  Patient Name: Tyler Serrano        CSN: [id number]   : 1940  (78 y.o.)  Gender: female                REASON FOR MISSED TREATMENT: Hold Treatment per Nursing; state patient has been up all night, agitated and ripped out 2 IVs. Patient is finally resting and wanted patient to rest for a while. Ask OT to check back later. OT to check back as able and time allows.

## 2022-05-05 NOTE — ED TRIAGE NOTES
Pt presents to the ED via EMS from [facility] assisted living. Per nursing home, pt has had increased weakness and decreased appetite over the past couple days. Per nursing home, pt had shortness of breath with exertion and reports 66% on room air.

## 2022-05-05 NOTE — H&P
Internal Medicine Specialties  H&P  5/5/2022  10:21 AM    Patient: Jose Enrique Parada  YOB: 1940    MRN: [id number]   Acct:  [de-identified]   3B-27/027-A  Primary Care Physician: Merna Daily MD    Chief Complaint:  Chief Complaint   Patient presents with    Shortness of Breath     with movement       History of Present Illness: The patient is a 78 y.o. female with pmhx of A fib not on anticoagulation due to recent hx GIB, dementia, anemia, HTN, HLD, CKD, CAD, CVA, CHF preserved EF who presents with weakness and decreased appetite over the past couple of days--sent in by her AL. Apparantly she was having SOB with minimal exertion and a supposed 66% SPO2 on room air at AL; now she is 100% on room air. Per chart review she was in the hospital less than a month ago with A fib RVR and fluid overload; based on these notes pt has baseline dementia so most likely her mental status today is not too far from her baseline. In the emergency department Cr up to 2.4 (baseline of 1-1.5), Na 149, CO2 16, lactic acid 7.1 (trended down to 4.1 s/p IVF), BNP 9935 (above her baseline but no obvious signs of fluid overload currently), trop 0.023 trended down to 0.015. Patient admitted for lactic acidosis and DARIUS with AMS. On exam today she is not oriented, unable to follow basic commands but is alert and moves around the bed spontaneously. She has pulled out multiple IVs in the night. Unable to assess a full ROS due to her AMS. She does not seem to be in any obvious distress although her HR is elevated at 140.        Past Medical History:        Diagnosis Date    [facility] [address]     CAD (coronary artery disease) 8/8/12    non-obstructive 20-30% stenosis LAD and circomflex    Colon polyps 4/25/12    Tubular adenoma Dr. Genoveva Jeter CVA (cerebral vascular accident) [facility]) 2004    Degenerative arthritis of cervical spine 2002    Dementia ([facility] [address] 12/14    Hiatal hernia with gastroesophageal reflux disease and esophagitis and ulcer 3/7/2022    Hyperlipidemia     Hypertension     Psoriasis        Past Surgical History:        Procedure Laterality Date    BLADDER REPAIR      CARDIAC CATHETERIZATION  8-8-12    CHOLECYSTECTOMY      COLONOSCOPY  4/24/2012    Dr. Zaida Calhoun  07/2016    corneal related - Dr Derrell Elliott GASTROINTESTINAL ENDOSCOPY N/A 3/7/2022    EGD BIOPSY performed by Garfield Polanco MD at [facility]       Home Medications: Medications Prior to Admission: acetaminophen (AMINOFEN) 325 MG tablet, Take 2 tablets by mouth every 6 hours as needed for Pain or Fever (may cut or crush medications) (Patient not taking: Reported on 5/5/2022)  metoprolol tartrate (LOPRESSOR) 50 MG tablet, Take 1 tablet by mouth 2 times daily  Elastic Bandages & Supports ([address]) MISC, 1 each by Does not apply route daily as needed (Bite to the lower leg daily for swelling)  pantoprazole (PROTONIX) 40 MG tablet, Take 1 tablet by mouth 2 times daily (before meals)  loperamide (IMODIUM) 2 MG capsule, Take 2 mg by mouth daily as needed for Diarrhea  ferrous sulfate (IRON 325) 325 (65 Fe) MG tablet, Take 1 tablet by mouth daily (with breakfast)  lisinopril (PRINIVIL;ZESTRIL) 10 MG tablet, Take 1 tablet by mouth once daily  donepezil (ARICEPT) 10 MG tablet, Take 1 tablet by mouth nightly  vitamin D (ERGOCALCIFEROL) 1.25 MG (88290 UT) CAPS capsule, TAKE 1 CAPSULE BY MOUTH TWICE A WEEK (Patient taking differently: Take 50,000 Units by mouth twice a week On Mondays and Thursdays)  atorvastatin (LIPITOR) 20 MG tablet, Take 1 tablet by mouth once daily  memantine (NAMENDA) 10 MG tablet, Take 1 tablet by mouth twice daily  calcium carbonate 600 MG TABS tablet, Take 1 tablet by mouth daily  fluticasone (FLONASE) 50 MCG/ACT nasal spray, 2 sprays by Nasal route daily  Carboxymethylcell-Hypromellose (GENTEAL OP), Apply 1 drop to eye daily 1 drop each eye daily      Allergies:  Morphine    Social History:    reports that she has never smoked. She has never used smokeless tobacco. She reports that she does not drink alcohol and does not use drugs.         Family History:       Problem Relation Age of Onset    Heart Disease Brother     Breast Cancer Daughter 54       Review of Systems:    Unable to assess due to AMS     Physical Exam:    Vitals:  Patient Vitals for the past 24 hrs:   BP Temp Temp src Pulse Resp SpO2 Height Weight   05/05/22 0945 133/83 97.7 °F (36.5 °C) Axillary 130 18 100 % -- --   05/05/22 0545 (!) 136/92 97 °F (36.1 °C) Axillary -- -- 92 % -- --   05/05/22 0300 110/72 96.7 °F (35.9 °C) Rectal 126 20 91 % -- 132 lb 4.4 oz (60 kg)   05/05/22 0115 (!) 110/91 -- -- 130 21 94 % -- --   05/05/22 0056 (!) 116/91 97.9 °F (36.6 °C) Axillary 110 24 98 % -- --   05/04/22 2257 114/86 -- -- 94 27 100 % 5' 3\" (1.6 m) 136 lb (61.7 kg)     Weight: Weight: 132 lb 4.4 oz (60 kg)     24 hour intake/output:    Intake/Output Summary (Last 24 hours) at 5/5/2022 1021  Last data filed at 5/5/2022 0602  Gross per 24 hour   Intake 102.37 ml   Output 100 ml   Net 2.37 ml       General appearance - alert, ill appearing, and in no distress  Eyes - pupils equal and reactive, extraocular eye movements intact  Ears - bilateral TM's and external ear canals normal  Nose - normal and patent, no erythema, discharge or polyps  Mouth - mucous membranes moist, pharynx normal without lesions  Neck - supple, no significant adenopathy  Lymphatics - no palpable lymphadenopathy, no hepatosplenomegaly  Chest - clear to auscultation, no wheezes, rales or rhonchi, symmetric air entry  Distant Breath Sounds: No  Heart - A fib rate of 140, normal S1, S2, no murmurs, rubs, clicks or gallops  Abdomen - soft, nontender, nondistended, no masses or organomegaly  Obese: No; Protuberant: No  Neurological - alert, not oriented, does not follow basic commands, agitated   Musculoskeletal - no joint tenderness, deformity or swelling  Extremities - peripheral pulses normal, no pedal edema, no clubbing or cyanosis  Skin - normal coloration and turgor, no rashes, no suspicious skin lesions noted    Review of Labs and Diagnostic Testing:    CBC:   Recent Labs     05/05/22 0428   WBC 5.9   HGB 10.0*   HCT 34.8*   MCV 92.8        BMP:   Recent Labs     05/05/22 0428 05/05/22 0428 05/05/22  0700   *   < > 146*   K 4.2  --   --      --   --    CO2 17*  --   --    PHOS 4.4  --   --    BUN 62*  --   --    CREATININE 2.2*  --   --    CALCIUM 9.5  --   --    GLUCOSE 127*  --   --     < > = values in this interval not displayed. PT/INR: No results for input(s): PROTIME, INR in the last 72 hours. APTT: No results for input(s): APTT in the last 72 hours. Lipids:   Recent Labs     05/05/22 0428   ALKPHOS 269*   *   *   BILITOT 2.9*   BILIDIR 1.2*   LABALBU 3.8     Troponin:   Recent Labs     05/05/22  0700   TROPONINT 0.015*        Imaging:  US RENAL COMPLETE    Result Date: 5/5/2022  RENAL ULTRASOUND COMPARISON: None. FINDINGS: Renal cortical echogenicity is normal bilaterally. Right kidney measures 9.7 cm. Left kidney measures 9.1 cm. There is no renal stone or hydronephrosis bilaterally. Multiple renal cysts are noted, for example in the right kidney is a 2.4 x 1.7 x 1.5 cm cyst on image 21. Urinary bladder is decompressed with a Arguelles catheter. 1. No acute disease in the bilateral kidneys. No renal stone or hydronephrosis. This document has been electronically signed by: Milena Yeung M.D. on 05/05/2022 05:08 AM    US LIVER    Result Date: 5/5/2022  ULTRASOUND ABDOMEN LIMITED COMPARISON: None. FINDINGS: The gallbladder is absent. Mild prominence of the pancreatic duct is noted without focal lesion. There are no focal liver lesions. Liver is enlarged and measures 18.7 cm on image 28. Common bile duct measures 6-7 mm in caliber by my measurements.  There are no fluid collections. 1. Mild hepatomegaly. No focal liver lesion. 2. Status post cholecystectomy. This document has been electronically signed by: Cher Cooper M.D. on 05/05/2022 05:04 AM    XR CHEST PORTABLE    Result Date: 5/4/2022  Single view of the chest Comparison:  CR,SR - XR CHEST PORTABLE - 04/05/2022 06:28 PM EDT Findings: Mild to moderate patchy opacities bilateral lung bases, increased. This could represent aspiration, pneumonia or edema versus atelectasis. Cardiomegaly present. Impression: Cardiomegaly present. Mild to moderate patchy opacities bilateral lung bases, increased This document has been electronically signed by: Gio Hines MD on 05/04/2022 11:42 PM    XR CHEST PORTABLE    Result Date: 4/5/2022  1 view chest x-ray Comparison: None Findings: Heart is enlarged. No pulmonary vascular congestion. Calcification in the aortic knob. Possible tiny bilateral pleural effusions. No consolidation or pneumothorax. No acute fracture. Cardiomegaly with possible tiny bilateral pleural effusions. This document has been electronically signed by: Dori Gibbs MD on 04/05/2022 06:56 PM        Assessment/Plan:    1. Anion gap metabolic acidosis from Lactic acidosis with elevated LFTs   a. Trend lactic  b. Obtain CT abdomen WO contrast to evaluate cause of elevated LFT's; abdomen is NT  c. Trend LFTs  d. Check lipase   e. GI consulted   f. Cont IVF   2. ? Pneumonia  a. Started on abx; pt has pulled out all of her IVs and can not take pills by mouth due to AMS   b. Not on supplemental O2, afebrile, not in any resp distress   c. Strep pneumo and legionella antigens pending   3. A fib RVR, not on anticoag due to recent hx GIB  a. Cardiology on board  b. Pt's HR fluctuating between 100 and 140; unable to get IV medications due to pulling out all of her IVs and unable to take by mouth due to AMS; cardio aware  c. No anticoag due to hx GIB  d. Cont tele monitoring   4. CAD with elevated trops   a.  Trops stable  b. No further workup per cardio  5. DARIUS on CKD with metabolic acidosis   a. Nephrology following  b. Cont IVF  c. Urine eosinophils are positive; UA negative for infection   6. Hx Dementia  a. Noted   7. HTN  a. Lisinopril on hold due to DARIUS   b. BP stable now   8. HLD  a. Holding Statin due to elevated LFTs  9. Hx CVA  a. Not on ASA/Plavix due to GIB hx   10. PT/OT  11. DVT prophylaxis  a. SCDs     Palliative consulted for goals of care/code status discussion. Assessment and plan of care discussed with supervising physician, Dr Shanique Pérez. Electronically signed by LUZ Barrientos CNP on 5/5/2022 at 10:21 AM     Addendum/attestation by Saint Jakob, MD:  I have seen and examined the patient independently. Face to face evaluation and examination was performed. The above evaluation and note has been reviewed. Laboratory and radiological data were reviewed. I Have discussed with Francine Cox CNP about this patient in detail. The above assessment and plan has been reviewed. Please see my modifications mentioned below. My modifications:, pt with hypernatremia and volume contraction, on hypotonic fluids.  Line difficult to obtain will need midline or picc if ok with nephrology     Time spent  1 hr    Electronically signed by Jie Boo MD on 5/5/2022 at 1:20 PM           Copy: Primary Care Physician: Keny Powell MD

## 2022-05-05 NOTE — PROGRESS NOTES
Message left for  [facility] regarding PICC order. Patient has a GFR of 26 need clarification before PICC team proceed.

## 2022-05-05 NOTE — PROGRESS NOTES
This RN arrived to patient room per primary nurse request due to patient needing an IV due to pull her IV out. Patient has had several IVs and has pulled them all out due to confusion. This RN attempted to restart patient IV x2 unsuccessfully. Primary nurse aware. This RN made recommendation of possible Midline Catheter. Patient's GFR is currently 26, nephrology would need to be consulted and agreeable to Midline or PICC placement. Primary nurse Verbalized understanding and will contact IV Therapy/ PICC with update. No further questions or concerns voiced at this time.

## 2022-05-05 NOTE — PROGRESS NOTES
Patient arrived per cart to 3B. Heart monitor applied and vitals taken. Admission paperwork completed. Oriented to room and use of call light and bed controls. Patient denies pain or needs. No signs of distress noted. Bed locked & in low position, side-rails up x2. Call light in reach. Reminded patient to call nurse if any needs arise. Family at bedside    2 person skin assessment performed by this nurse and Reginold Render.

## 2022-05-05 NOTE — PLAN OF CARE
Alerted that pt continues remove IV preventing further management. Per nephrology, okay to place PICC line. Given h/o dementia, pt would not be a good candidate for initiation of dialysis should the need arise and therefore, the need to conserve access is not required.     Electronically signed by Avel Underwood MD on 5/5/2022 at 11:58 AM

## 2022-05-05 NOTE — ED PROVIDER NOTES
[address] COMPLAINT       Chief Complaint   Patient presents with    Shortness of Breath     with movement       Nurses Notes reviewed and I agree except as noted in the HPI. HISTORY OF PRESENT ILLNESS    Namrata Figueroa is a 78 y.o. female. Patient lives in assisted living. She is having worsening shortness of breath with minimal exertion. Seems to be worse than baseline. Family does report history of dementia. Patient is not able to provide much information. REVIEW OF SYSTEMS         Review of systems not obtainable due to dementia         PAST MEDICAL HISTORY    has a past medical history of Atrial fibrillation ([address] CAD (coronary artery disease), Colon polyps, CVA (cerebral vascular accident) ([address] Degenerative arthritis of cervical spine, Dementia ([address] Hiatal hernia with gastroesophageal reflux disease and esophagitis and ulcer, Hyperlipidemia, Hypertension, and Psoriasis. SURGICAL HISTORY      has a past surgical history that includes travon and bso (cervix removed); Hysterectomy; Cholecystectomy; bladder repair; Colonoscopy (4/24/2012); Cardiac catheterization (8-8-12); Eye surgery (07/2016); eye surgery; and Upper gastrointestinal endoscopy (N/A, 3/7/2022).     CURRENT MEDICATIONS       Previous Medications    ACETAMINOPHEN (AMINOFEN) 325 MG TABLET    Take 2 tablets by mouth every 6 hours as needed for Pain or Fever (may cut or crush medications)    ATORVASTATIN (LIPITOR) 20 MG TABLET    Take 1 tablet by mouth once daily    CALCIUM CARBONATE 600 MG TABS TABLET    Take 1 tablet by mouth daily    CARBOXYMETHYLCELL-HYPROMELLOSE (GENTEAL OP)    Apply 1 drop to eye daily 1 drop each eye daily    DONEPEZIL (ARICEPT) 10 MG TABLET    Take 1 tablet by mouth nightly    ELASTIC BANDAGES & SUPPORTS (MEDICAL COMPRESSION STOCKINGS) MISC    1 each by Does not apply route daily as needed (Bite to the lower leg daily for swelling)    FERROUS SULFATE (IRON 325) 325 (65 FE) MG TABLET    Take 1 tablet by mouth daily (with breakfast)    FLUTICASONE (FLONASE) 50 MCG/ACT NASAL SPRAY    2 sprays by Nasal route daily    LISINOPRIL (PRINIVIL;ZESTRIL) 10 MG TABLET    Take 1 tablet by mouth once daily    LOPERAMIDE (IMODIUM) 2 MG CAPSULE    Take 2 mg by mouth daily as needed for Diarrhea    MEMANTINE (NAMENDA) 10 MG TABLET    Take 1 tablet by mouth twice daily    METOPROLOL TARTRATE (LOPRESSOR) 50 MG TABLET    Take 1 tablet by mouth 2 times daily    PANTOPRAZOLE (PROTONIX) 40 MG TABLET    Take 1 tablet by mouth 2 times daily (before meals)    VITAMIN D (ERGOCALCIFEROL) 1.25 MG (95800 UT) CAPS CAPSULE    TAKE 1 CAPSULE BY MOUTH TWICE A WEEK       ALLERGIES     is allergic to morphine. FAMILY HISTORY     She indicated that her mother is . She indicated that the status of her father is unknown. She indicated that the status of her brother is unknown. She indicated that the status of her daughter is unknown.   family history includes Breast Cancer (age of onset: 54) in her daughter; Heart Disease in her brother. SOCIAL HISTORY      reports that she has never smoked. She has never used smokeless tobacco. She reports that she does not drink alcohol and does not use drugs. PHYSICAL EXAM     INITIAL VITALS:  height is 5' 3\" (1.6 m) and weight is 136 lb (61.7 kg). Her axillary temperature is 97.9 °F (36.6 °C). Her blood pressure is 110/91 (abnormal) and her pulse is 130. Her respiration is 21 and oxygen saturation is 94%. Constitutional: Chronically ill-appearing  Eyes:  Pupils are equal and reactive, extraocular muscles intact   HENT:  Atraumatic appearing  oropharynx moist, no pharyngeal exudates.   Neck- normal range of motion, supple   Respiratory:  No wheezing, rhonchi or rales  Cardiovascular: regular,  GI:  Non tender, no rigidity, rebound or guarding   Musculoskeletal:  2/4 distal pulses, no pitting edema  Integument: warm and dry DIAGNOSTIC RESULTS     EKG: All EKG's are interpreted by the Emergency Department Physician who either signs or Co-signs this chart in the absence of a cardiologist.  EKG interpreted by me showing atrial fibrillation at a rate of 91, QRS of 128, QTC of 504, axis of 63 right bundle pattern.     RADIOLOGY: non-plain film images(s) such as CT, Ultrasound and MRI are read by the radiologist.  Chest x-ray interpreted by the radiologist showing cardiomegaly and patchy opacities bilaterally could be edema    LABS:   Labs Reviewed   CBC WITH AUTO DIFFERENTIAL - Abnormal; Notable for the following components:       Result Value    RBC 3.74 (*)     Hemoglobin 10.1 (*)     Hematocrit 35.2 (*)     MCHC 28.7 (*)     RDW-CV 16.6 (*)     RDW-SD 55.4 (*)     All other components within normal limits   COMPREHENSIVE METABOLIC PANEL W/ REFLEX TO MG FOR LOW K - Abnormal; Notable for the following components:    Glucose 165 (*)     CREATININE 2.4 (*)     BUN 60 (*)     Sodium 149 (*)     CO2 16 (*)      (*)     Alkaline Phosphatase 292 (*)     Total Bilirubin 3.1 (*)      (*)     All other components within normal limits   TROPONIN - Abnormal; Notable for the following components:    Troponin T 0.023 (*)     All other components within normal limits   BRAIN NATRIURETIC PEPTIDE - Abnormal; Notable for the following components:    Pro-BNP 9935.0 (*)     All other components within normal limits   LACTIC ACID - Abnormal; Notable for the following components:    Lactic Acid 7.1 (*)     All other components within normal limits   ANION GAP - Abnormal; Notable for the following components:    Anion Gap 24.0 (*)     All other components within normal limits   OSMOLALITY - Abnormal; Notable for the following components:    Osmolality Calc 316.7 (*)     All other components within normal limits   GLOMERULAR FILTRATION RATE, ESTIMATED - Abnormal; Notable for the following components:    Est, Glom Filt Rate 23 (*)     All other components within normal limits   BLOOD GAS, VENOUS - Abnormal; Notable for the following components:    PH MIXED 7.48 (*)     PCO2, MIXED VENOUS 22 (*)     HCO3, Mixed 17 (*)     Base Exc, Mixed -5.4 (*)     All other components within normal limits   URINE WITH REFLEXED MICRO - Abnormal; Notable for the following components:    Bilirubin Urine SMALL (*)     Ketones, Urine TRACE (*)     Protein,  (*)     Color, UA DK YELLOW (*)     All other components within normal limits   COVID-19, RAPID   CULTURE, BLOOD 1   CULTURE, BLOOD 2   BILE ACIDS, TOTAL   SCAN OF BLOOD SMEAR   PROCALCITONIN   LACTIC ACID       EMERGENCY DEPARTMENT COURSE:   Vitals:    Vitals:    05/04/22 2257 05/05/22 0056 05/05/22 0115   BP: 114/86 (!) 116/91 (!) 110/91   Pulse: 94 110 130   Resp: 27 24 21   Temp:  97.9 °F (36.6 °C)    TempSrc:  Axillary    SpO2: 100% 98% 94%   Weight: 136 lb (61.7 kg)     Height: 5' 3\" (1.6 m)         There are findings to suggest CHF include markedly elevated BNP and chest x-ray findings,  however there is some contradictory findings in the blood work suggesting that she actually has prerenal azotemia. She is also noted to have a marked metabolic acidosis and elevated lactate. Sodium is also quite elevated. Case discussed with  to admit and with Dr Selwyn Daniel nephrology to guide treatment. She is requesting a D5 W drip with 1 amp of bicarb at 50 to 75 cc/h    CRITICAL CARE:   15 min    CONSULTS   Dr Silvia Peralta, nephrology    PROCEDURES:  None    FINAL IMPRESSION      1. Acute renal failure, unspecified acute renal failure type ([address]    2. Metabolic acidosis    3.  Congestive heart failure, unspecified HF chronicity, unspecified heart failure type [facility])          DISPOSITION/PLAN   Admitted    DISCHARGE MEDICATIONS:  New Prescriptions    No medications on file       (Please note that portions of this note were completed with a voice recognition program.  Efforts were made to edit the dictations but occasionally words are mis-transcribed.)    Mayelin Joshi, DO Mayelin Joshi,   05/05/22 0215

## 2022-05-05 NOTE — CONSULTS
[facility]    [address]  [facility]  5/5/2022 6:14 AM    Pt Name:    Saran Kimble  MRN:     [id number]   [id number]  YOB: 1940  Admit Date:    5/4/2022 10:46 PM  Primary Care Physician:  Anat Tiwari MD        Reason for Consult:  Keke Bourne, high anion gap metabolic acidosis. \"    History:   The patient is a 78 y.o. female w/ h/o HLD on atorvastatin, Psoriasis not on medications, DDD on tylenol for pain, HTN on lisinopril, Colon polyps, CVA in 2002 not on ASA/Plavix, Non-obstructive CAD, Chronic G1DD HF EF 55% 02/28/22, Dementia on memantine / donepezil, Hiatal hernia w/ GERD / Esophagitis / Ulcer / GIB on pantoprazole, chronic iron deficiency anemia on supplementation, Long-term Persistent Afib on metoprolol tartrate not on [address] [place] [address] Road, and bladder repair who presents from [address] [place] [address] due to worsening shortness of breath with hypoxic respiratory failure (O2 saturation 66% with exertion on room air), increased weakness, and decreased appetite consulting nephrology for the aforementioned. On my visit, pt was voluntarily non-communicative. Prior to my arrival, she was conversive, alert, and awake but un-oriented which is her baseline. When lab was drawing blood, she verbalized for them to stop. When they did not, she stopped speaking and closed her eyes. When attempting to converse with her, she closes her eyes and looks the other way. Does respond to noxious stimuli. Pt later woke up and refused to talk to me. No intervention documented in the ED. D5W w/ 1 amp of bicarb 50 mEq/L at 75 cc/h was started on admission. Of note, pt was recently discharged after hospitalization between 04/05/22-04/07/22 for afib w/ RVR and heart failure exacerbation at which time pt was started on metoprolol tartrate for rate control and reduction in rate-pressure product. She was subsequently discharged.      Past Medical Transportation (Medical): Not on file    Lack of Transportation (Non-Medical): Not on file   Physical Activity:     Days of Exercise per Week: Not on file    Minutes of Exercise per Session: Not on file   Stress:     Feeling of Stress : Not on file   Social Connections:     Frequency of Communication with Friends and Family: Not on file    Frequency of Social Gatherings with Friends and Family: Not on file    Attends Muslim Services: Not on file    Active Member of [address] [place] [address] or Organizations: Not on file    Attends Club or Organization Meetings: Not on file    Marital Status: Not on file   Intimate Partner Violence:     Fear of Current or Ex-Partner: Not on file    Emotionally Abused: Not on file    Physically Abused: Not on file    Sexually Abused: Not on file   Housing Stability:     Unable to Pay for Housing in the Last Year: Not on file    Number of [place] in the Last Year: Not on file    Unstable Housing in the Last Year: Not on file       Home Meds:  Prior to Admission medications    Medication Sig Start Date End Date Taking?  Authorizing Provider   acetaminophen (AMINOFEN) 325 MG tablet Take 2 tablets by mouth every 6 hours as needed for Pain or Fever (may cut or crush medications)  Patient not taking: Reported on 5/5/2022 4/29/22   Gennaro Comer MD   metoprolol tartrate (LOPRESSOR) 50 MG tablet Take 1 tablet by mouth 2 times daily 4/21/22   Gennaro Comer MD   Elastic Bandages & Supports ([address]) [address] 1 each by Does not apply route daily as needed (Bite to the lower leg daily for swelling) 3/17/22   Gennaro Comer MD   pantoprazole (PROTONIX) 40 MG tablet Take 1 tablet by mouth 2 times daily (before meals) 3/9/22   Gennaro Comer MD   loperamide (IMODIUM) 2 MG capsule Take 2 mg by mouth daily as needed for Diarrhea    Historical Provider, MD   ferrous sulfate (IRON 325) 325 (65 Fe) MG tablet Take 1 tablet by mouth daily (with breakfast) 2/10/22 Ge Jensen MD   lisinopril (PRINIVIL;ZESTRIL) 10 MG tablet Take 1 tablet by mouth once daily 12/30/21   Ge Jensen MD   donepezil (ARICEPT) 10 MG tablet Take 1 tablet by mouth nightly 9/14/21   Ge Jensen MD   vitamin D (ERGOCALCIFEROL) 1.25 MG (26689 UT) CAPS capsule TAKE 1 CAPSULE BY MOUTH TWICE A WEEK  Patient taking differently: Take 50,000 Units by mouth twice a week On Mondays and Thursdays 8/30/21   Ge Jensen MD   atorvastatin (LIPITOR) 20 MG tablet Take 1 tablet by mouth once daily 8/30/21   Ge Jensen MD   memantine [facility]) 10 MG tablet Take 1 tablet by mouth twice daily 7/26/21   Ge Jensen MD   calcium carbonate 600 MG TABS tablet Take 1 tablet by mouth daily    Historical Provider, MD   fluticasone [facility]) 50 MCG/ACT nasal spray 2 sprays by Nasal route daily 5/28/20   eG Jensen MD   Carboxymethylcell-Hypromellose (GENTEAL OP) Apply 1 drop to eye daily 1 drop each eye daily    Historical Provider, MD       Review of Systems:  Constitutional: no fever or chills  Head: No headaches  Eyes: no blurry vision, no discharge  Ears: no ear pain or hearing changes  Nose: no runny nose or epistaxis  Respiratory: no shortness of breath or cough or sputum production  Cardiovascular: no chest pain, no edema  GI: no nausea, no vomiting or diarrhea  : denies any hematuria, no flank pain  Skin: no rash  Musculoskeletal: no joint pain, moves all ext  Neuro: no tremor, no slurred speech  Psychiatric: stable mood, no depression or insomnia    Current Meds:  Infusion:    sodium bicarbonate infusion 75 mL/hr at 05/05/22 0602    sodium chloride       Meds:    [Held by provider] atorvastatin  20 mg Oral Nightly    calcium elemental  1 tablet Oral Daily    polyvinyl alcohol  1 drop Ophthalmic Daily    donepezil  10 mg Oral Nightly    ferrous sulfate  325 mg Oral Daily with breakfast    fluticasone  2 spray Nasal Daily    memantine  10 mg Oral BID    metoprolol tartrate  50 mg Oral BID    pantoprazole  40 mg Oral BID AC    vitamin D  50,000 Units Oral Once per day on Mon Thu    sodium chloride flush  5-40 mL IntraVENous 2 times per day    ipratropium-albuterol  1 ampule Inhalation Q4H WA    cefTRIAXone (ROCEPHIN) IV  1,000 mg IntraVENous Q24H    And    azithromycin  500 mg Oral Q24H     Meds prn: acetaminophen, sodium chloride flush, sodium chloride, ondansetron **OR** ondansetron     Allergies/Intolerances: ALLERGIES: Morphine    24HR INTAKE/OUTPUT:      Intake/Output Summary (Last 24 hours) at 5/5/2022 0614  Last data filed at 5/5/2022 0602  Gross per 24 hour   Intake 102.37 ml   Output 100 ml   Net 2.37 ml     No intake/output data recorded. I/O this shift:  In: 102.4 [I.V.:102.4]  Out: 100 [Urine:100]  Admission weight: 136 lb (61.7 kg)  Wt Readings from Last 3 Encounters:   05/05/22 132 lb 4.4 oz (60 kg)   04/21/22 136 lb 2 oz (61.7 kg)   04/12/22 129 lb 9.6 oz (58.8 kg)     Body mass index is 23.43 kg/m². Physical Examination:  VITALS:  BP (!) 136/92   Pulse 126   Temp 97 °F (36.1 °C) (Axillary)   Resp 20   Ht 5' 3\" (1.6 m)   Wt 132 lb 4.4 oz (60 kg)   SpO2 92%   BMI 23.43 kg/m²   Weight:   Wt Readings from Last 3 Encounters:   05/05/22 132 lb 4.4 oz (60 kg)   04/21/22 136 lb 2 oz (61.7 kg)   04/12/22 129 lb 9.6 oz (58.8 kg)     Constitutional and General Appearance: alert and cooperative with exam, appears comfortable, no distress  Eyes: no icteric sclera, no pallor conjunctiva, no discharge seen from either eye  Ears and Nose: normal external appearance of left and right ear and nose. No active drainage from nose. Oral: moist oral mucus membranes  Neck: No jugular venous distention, appears symmetric, good ROM  Lungs: Air entry B/L, no crackles or rales, no use of accessory muscles  Heart: irregular rate, tachycardic.    Extremities: 1+ LE edema  GI: soft, non-tender, no guarding  Skin: no rash seen on exposed extremities  Musculo: moves all extremities  Neuro: no slurred speech, no facial drooping, no asterixis  Psychiatric: Awake, alert, un-oriented at baseline    Lab Data  CBC:   Recent Labs     05/04/22 2300 05/05/22 0428   WBC 5.9 5.9   HGB 10.1* 10.0*   HCT 35.2* 34.8*    144     BMP:  Recent Labs     05/04/22 2300 05/05/22 0428   * 148*   K 4.5 4.2    111   CO2 16* 17*   BUN 60* 62*   CREATININE 2.4* 2.2*   GLUCOSE 165* 127*   CALCIUM 9.7 9.5     PTH: @PTH@  TSH: No results for input(s): TSH in the last 72 hours. HgBa1c: No results for input(s): LABA1C in the last 72 hours. Hepatic:   Recent Labs     05/04/22 2300 05/05/22 0428   LABALBU 4.1 3.8   * 144*   * 138*   BILITOT 3.1* 2.9*   ALKPHOS 292* 269*     ABGs: No results found for: PHART, PO2ART, IQH9NUW  Troponin: No results for input(s): TROPONINI in the last 72 hours. BNP: No results for input(s): BNP in the last 72 hours. Old labs reviewed. Impression and Plan:  1. Non-oliguric Pre-renal DARIUS Stage I on CKD Stage IIIA eGFR 34 mL/min/1.73m2- P/w sCr of 2.4 mg/dL on baseline 1.3 mg/dL. BUN:sCr >20 and FENa 0.5% suggesting pre-renal etiology. Hypovolemic: pt's lungs are CTAB, there is no JVD, and her lower extremity edema is 1+ pitting at baseline despite Pro-BNP which is elevated at 9935.0 ng/dL. Less likely CRS T1. Cannot exclude reduced CO from RVR and dehydration given h/o poor PO intake and evidence of hemoconcentration on serologies. · Discontinue bicarb gtt  · Start D5W @ 75 cc/h  · Daily BMP  · Strict I&Os  · Avoid nephrotoxic substances  2. Hypovolemic Hypernatremia 2/2 Hemoconcentration from Poor PO Fluid Intake - continue to monitor. UOP low which makes diabetes insipidus less likely. 3. Primary anion gap metabolic acidosis 2/2 lactic acidosis w/ Primary respiratory alkalosis - likely from significant dehydration causing tissue hypoperfusion resulting in lactic acidosis. Plan as above.    4. Long-term Persistent Afib on metoprolol tartrate not on OAC (d/t h/o GIB) currently in RVR  5. Acute on Chronic G1DD HF EF 55% 02/28/22 - cardiology consulted  6. Acute Hypoxic Respiratory Failure, resolved - 2/2 aspiration  7. Elevated LFTs w/ Hyperbilirubinemia - H/o cholecystectomy. No TTP on palpation of abdomen. Positive HBsAb, remainder of acute hepatitis panel negative. Cannot exclude hypoperfusion as above or congestive hepatopathy. 8. HLD - on atorvastatin at home  9. Psoriasis - not on medications at home  10. DDD - on tylenol for pain at home  11. Primary HTN - on lisinopril at home, hold in the setting of DARIUS  12. Colon polyps  13. CVA - no further details on location f/u imaging in EMR, reportedly in 2002 not on ASA/Plavix (D/t h/o GIB)  14. Non-obstructive CAD - [facility] on 08/08/2012 w/ 20-30% obstructive disease and mild luminal irregularities in all arteries. 15. 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 Hiatal hernia w/ GERD / Esophagitis / Ulcer / GIB - on pantoprazole at home  17. Chronic iron deficiency anemia - on supplementation at home  18. H/o bladder repair  19. Code Status - pt has been hospitalized 3 times in the past 4-5 months. Declining appetite and PO intake in the setting of undifferentiated dementia currently on therapy. Recommend palliative care consult for goals of care discussion. Thank you for allowing me to participate in the care of this patient. Please feel free to call me if you have any questions.      Electronically signed by Tj aPlmer MD on 5/5/22 at 6:14 AM EDT    [facility]

## 2022-05-05 NOTE — PROGRESS NOTES
Pharmacy Renal Adjustment    Thania Sheets is a 78 y.o. female. Pharmacy renally adjust the following medications per P&T approved policy: Memantine    Height:   Ht Readings from Last 1 Encounters:   05/04/22 5' 3\" (1.6 m)     Weight:  Wt Readings from Last 1 Encounters:   05/05/22 132 lb 4.4 oz (60 kg)     Recent Labs     05/04/22  2300 05/05/22  0428   BUN 60* 62*   CREATININE 2.4* 2.2*     Estimated Creatinine Clearance: 16 mL/min (A) (based on SCr of 2.2 mg/dL (H)).   Calculated CrCl:    Assessment:  DARISU    Plan:   Decrease memantine from 10mg bid to 5mg bid

## 2022-05-05 NOTE — PROGRESS NOTES
Initial Evaluation          Patient: Arlene Magaña  YOB: 1940  Age:  78 y.o. Room:  [address] [place] [address]  MRN:  [id number]   Acct: [de-identified]    Date of Admission:  5/4/2022 10:46 PM  Date of Service:  5/5/2022  Completed By:  Farheen Vences RN                 Reason for Palliative Care Evaluation:-             [x] Code Status Discussion              [x] Goals of Care              [] Pain/Symptom Management               [] Emotional Support              [] Other:                   Current Issues:-  []  Pain  [x]  Fatigue  []  Nausea  []  Anxiety  []  Depression  [x]  Shortness of Breath  []  Constipation  [x]  Appetite  []  Other: Weakness             Advance Directives:-  [] [place] DNR Form  [] Living Will  [x] Medical POA             Current Code Status:-  [x] Full Resuscitation  [] DNR-Comfort Care-Arrest  [] DNR-Comfort Care       [] Limited Resuscitation             [] No CPR            [] No shock            [] No ET intubation/reintubation            [] No resuscitative medications            [] Other limitation:              Palliative Performance Status:          [] 60%  Ambulation reduced; Significant disease;Can't do hobbies/housework; intake normal or reduced; occasional assist; LOC full/confusion        [x] 50%  Mainly sit/lie; Extensive disease; Can't do any work; Considerable assist; intake normal or reduced; LOC full/confusion        [] 40%  Mainly in bed; Extensive disease; Mainly assist; intake normal or reduced; LOC full/confusion         [] 30%  Bed Bound; Extensive disease; Total care; intake reduced; LOC full/confusion        [] 20%  Bed Bound; Extensive disease; Total care; intake minimal; Drowsy/coma        [] 10%  Bed Bound; Extensive disease;  Total care; Mouth care only; Drowsy/coma        [] 0  Death        Goals of care evaluation:-        The patient goals of care are to provide comfort care/supportive services/palliation & relieve suffering:  Goals of care discussed with:  [] Patient independently  [] Patient and Family  [x] Family or Healthcare DPOA independently   [] Unable to discuss with patient, family/DPOA not present         Family/Patient Discussion:  Called patients ANA Gonzalez on her cell phone at number [phone]. Reviewing the chart patient is not able to make decisions on her own behalf which is why her POA was contacted. Attempted to arrange a meeting with Milka Hardy to further discuss with her patients goals of care and current condition. She was not able to commit to a scheduled time at this point in her day. She is willing to meet possibly tomorrow morning if her work schedule allows. She was here at the hospital on 3B however needs to attend some personal matters outside the hospital. She was given my contact information and I encouraged her to contact me as soon as she could to schedule and arrange a meeting to discuss patients goals of care, which she sounded open to discussion when able. Milka Hardy states she may return to the hospital however it will likely be later this evening after 4 or 5 pm.          Plan/Follow-Up:  Palliative care will attempt to contact POA later today or early tomorrow morning and either set up a phone meeting or face-to-face meeting for tomorrow morning to discuss patients goals of care. Update: 5/5/22 at 1400 - Phone call received by patients POA. Anticipated Family meeting tomorrow morning at 9am to discuss goals of care.        Electronically signed by Jazzmine Soler RN on 5/5/2022 at 12:47 PM           Palliative Care Office: [phone]

## 2022-05-05 NOTE — PROGRESS NOTES
Called [facility] regarding PICC order. Patient is not receiving central line medication that requires a PICC. Gian Villagran is ok with attempting a Midline for access.

## 2022-05-05 NOTE — PROGRESS NOTES
Attempted to call numbers listed for family to obtain consent for Midline. No success informed primary nurse.

## 2022-05-05 NOTE — PROGRESS NOTES
Physician Progress Note      Riccardo Uribe  CSN #:                  [id number]  :                       1940  ADMIT DATE:       2022 10:46 PM  DISCH DATE:  RESPONDING  PROVIDER #:        ZAIN PICHARDO          QUERY TEXT:    Attending,    Patient admitted with DARIUS and acidosis. H&P noted, HFpEF and \"BNP 9935 (above   her baseline but no obvious signs of fluid overload currently). \"     Nephrology consult note states, \"Acute on Chronic G1DD HF EF. \"  If possible,   please respond below and document in the progress notes and discharge summary   if you are evaluating and /or treating any of the following: The medical record reflects the following:  Risk Factors: DARIUS on CKD 3a, HTN, afib with RVR, advanced age  Clinical Indicators: H&P noted, HFpEF and \"BNP 9935 (above her baseline but no   obvious signs of fluid overload currently). \"   Nephrology consult note   states, \"Acute on Chronic G1DD HF EF\"; CXR: \"Cardiomegaly present. Mild to   moderate patchy opacities bilateral lung bases, increased\"; no LE edema;   diminished lung sounds  Treatment: no diuresis, labs, imaging, Cardiology consult    Thank you! Delfino Dale RN, BSN, RHIT, CCDS  Clinical   Options provided:  -- Chronic HFpEF confirmed, and acute on chronic HFpEF ruled out  -- Acute on chronic HFpEF confirmed  -- Other - I will add my own diagnosis  -- Disagree - Not applicable / Not valid  -- Disagree - Clinically unable to determine / Unknown  -- Refer to Clinical Documentation Reviewer    PROVIDER RESPONSE TEXT:    After study, chronic HFpEF was confirmed and acute on chronic HFpEF was ruled   out.     Query created by: Shelly Christensen on 2022 12:06 PM      Electronically signed by:  Yfn Form 2022 3:11 PM

## 2022-05-05 NOTE — PROGRESS NOTES
Patient is not cooperative with staffing. She is not following commands. She is not alert and oriented. A sitter was placed at the bedside this morning due to the patient pulling two IVs out, climbing out of bed, and pulling at boles catheter. We tried multiple attempts at an IV. Patient was not cooperative with this. Patient not keeping gown or heart monitor on. Tech at bedside continues to redirect her to distract her from pulling on her IVs.        Spoke with staff from the [facility]. They stated that she is normally confused but has recently been refusing her medications and has been refusing to eat. At this time, her oral medications have not been given due to the patient not being awake enough to drink/swallow.

## 2022-05-05 NOTE — CARE COORDINATION
5/5/22, 9:54 AM EDT  DISCHARGE PLANNING EVALUATION:    Sampson Zhao       Admitted: 5/4/2022/ 2246   Hospital day: 0   Location: -27/027-A Reason for admit: Metabolic acidosis [P31.2]  Pneumonia [J18.9]  Acute renal failure (ARF) (HCC) [N17.9]  Acute renal failure, unspecified acute renal failure type ([address] [N17.9]  Congestive heart failure, unspecified HF chronicity, unspecified heart failure type (HCC) [I50.9]   PMH:  has a past medical history of Atrial fibrillation ([facility] [address] CAD (coronary artery disease), Colon polyps, CVA (cerebral vascular accident) ([facility] [address] Degenerative arthritis of cervical spine, Dementia ([facility] [address] Hiatal hernia with gastroesophageal reflux disease and esophagitis and ulcer, Hyperlipidemia, Hypertension, and Psoriasis. Procedure:   5/4 CXR: Cardiomegaly present. Mild to moderate patchy opacities bilateral lung bases, increased. 5/5 Echo: to be done. 5/5 US Liver: Mild hepatomegaly. No focal liver lesion. Status post cholecystectomy. Barriers to Discharge:  Admitted through ED with SOB with activity. BNP 9935.0. Troponins 0.023, 0.019 and 0.015. Sodium 149 and 148; BUN 60 and 62, creatinine 2.4 and 2.2. Lactic acid 7.1, 5.6, 5.2 and 4.1. Alk Phos 269. , . Bilirubin 2.9, direct bili 1.2. Consulting GI, Nephrology and Cardiology. Rocephin iv daily, po Zithromax daily, DuoNeb q4hr WA, Sodium bicarb gtt. PT/OT. Palliative Care consulted. PCP: Bronson Amanda MD  Readmission Risk Score: 22.1 ( )%  Patient's Healthcare Decision Maker: Legal Next of Kin, pt is confused and unable to answer questions. Patient Goals/Plan/Treatment Preferences: Pt is from [facility]. Family member take pt to all appts. Pt has all her equipment and her needs are met at the [facility]. Verified PCP and insurance with family. SW consulted. Transportation/Food Security/Housekeeping Addressed:  No issues identified.

## 2022-05-05 NOTE — PROGRESS NOTES
Physician Progress Note      Lupe Leo  CSN #:                  [id number]  :                       1940  ADMIT DATE:       2022 10:46 PM  DISCH DATE:  RESPONDING  PROVIDER #:        ZAIN PICHARDO          QUERY TEXT:    Attending,    Pt admitted with DARIUS and acidosis. H&P states, \"Apparantly she was having SOB   with minimal exertion and a supposed 66% SPO2 on room air at AL; now she is   100% on room air; She does not seem to be in any obvious distress although her   HR is elevated at 140. \"   Nephrology note states, \"Acute Hypoxic   Respiratory Failure, resolved - 2/2 aspiration. \"  If possible, please respond   below and document in the progress notes and discharge summary:    The medical record reflects the following:  Risk Factors: possible PNA, afib with RVR, dementia, HFpEF, advanced age  Clinical Indicators: H&P states, \"Apparantly she was having SOB with minimal   exertion and a supposed 66% SPO2 on room air at [facility]; now she is 100% on room   air; She does not seem to be in any obvious distress although her HR is   elevated at 140. \"   Nephrology note states, \"Acute Hypoxic Respiratory   Failure, resolved - 2/2 aspiration\"  Treatment: room air, IV Rocephin, Duonebs, VBG, labs imaging    Acute Respiratory Failure Clinical Indicators per [employer] Training Guide and   Quick Reference Guide:  pO2 < 60 mmHg or SpO2 (pulse oximetry) < 91% breathing room air  pCO2 > 50 and pH < 7.35  P/F ratio (pO2 / FIO2) < 300  pO2 decrease or pCO2 increase by 10 mmHg from baseline (if known)  Supplemental oxygen of 40% or more  Presence of respiratory distress, tachypnea, dyspnea, shortness of breath,   wheezing  Unable to speak in complete sentences  Use of accessory muscles to breathe  Extreme anxiety and feeling of impending doom  Tripod position  Confusion/altered mental status/obtunded    Thank you!     Kalpana López, RN, BSN, RHIT, CCDS  Clinical Documentation Specialist  Options provided:  -- Acute hypoxic respiratory failure ruled out  -- Acute hypoxic respiratory failure confirmed present on admission, please   specify. -- Other - I will add my own diagnosis  -- Disagree - Not applicable / Not valid  -- Disagree - Clinically unable to determine / Unknown  -- Refer to Clinical Documentation Reviewer    PROVIDER RESPONSE TEXT:    The diagnosis of acute hypoxic respiratory failure was ruled out.     Query created by: Qasim Flores on 5/5/2022 11:53 AM      Electronically signed by:  Jayleen Ramos 5/5/2022 11:57 AM

## 2022-05-05 NOTE — ED NOTES
ED to inpatient nurses report    Chief Complaint   Patient presents with    Shortness of Breath     with movement      Present to ED from [address] [place] [address]  LOC: alert to only name  Vital signs   Vitals:    05/04/22 2257 05/05/22 0056 05/05/22 0115   BP: 114/86 (!) 116/91 (!) 110/91   Pulse: 94 110 130   Resp: 27 24 21   Temp:  97.9 °F (36.6 °C)    TempSrc:  Axillary    SpO2: 100% 98% 94%   Weight: 136 lb (61.7 kg)     Height: 5' 3\" (1.6 m)        Oxygen Baseline 0    Current needs required 0 Bipap/Cpap No  LDAs:   Peripheral IV 05/04/22 Right Antecubital (Active)     Mobility: Requires assistance * 1  Pending ED orders: needs second set of blood cultures; Giacomo Sears with phlebotomy aware.    Present condition: continuous monitoring    Electronically signed by Salome Nelson RN on 5/5/2022 at [place]. Jesse Machado RN  05/05/22 0155

## 2022-05-05 NOTE — TELEPHONE ENCOUNTER
I spoke to Wallycasandra Betancourt on the phone about the concerns she has with her mother's condition and we agreed that she should go to the ED now. I called the [facility] and spoke to her nurse who mentioned that the pulse ox was 66%. She had no oxygen to place on the patient and she will call the squad.

## 2022-05-05 NOTE — CARE COORDINATION
5/5/22, 4:18 PM EDT    DISCHARGE PLANNING EVALUATION    Called and spoke with Bertin at [facility]. They plan to take Dilia Kirk back at discharge. SW called and left a message for patients daughter to verify plan with her.

## 2022-05-05 NOTE — CARE COORDINATION
05/05/22 0957   Readmission Assessment   Number of Days since last admission? 8-30 days   Previous Disposition Other (comment)  (Ginger SMITH)   Who is being Interviewed Caregiver  (Pt confused.)   What was the patient's/caregiver's perception as to why they think they needed to return back to the hospital? Other (Comment)  (SOB with activity)   Did you visit your Primary Care Physician after you left the hospital, before you returned this time? Yes   Did you see a specialist, such as Cardiac, Pulmonary, Orthopedic Physician, etc. after you left the hospital? Yes   Who advised the patient to return to the hospital? Caregiver; Other (Comment)  (Ginger SMITH)   Does the patient report anything that got in the way of taking their medications? No   In our efforts to provide the best possible care to you and others like you, can you think of anything that we could have done to help you after you left the hospital the first time, so that you might not have needed to return so soon?  Other (Comment)  (Nothing)

## 2022-05-06 NOTE — PROGRESS NOTES
INTERNAL MEDICINE SPECIALTIES  Progress Note For Dr Nishi Alejandre       Patient: Arlene Magaña  YOB: 1940  Date of Service: 5/6/2022  MRN: [id number]   Acct:  [de-identified]   Primary Care Physician: Zeynep Neff MD    SUBJECTIVE: Awake and more responsive today. Home Medications:   No current facility-administered medications on file prior to encounter.      Current Outpatient Medications on File Prior to Encounter   Medication Sig Dispense Refill    acetaminophen (AMINOFEN) 325 MG tablet Take 2 tablets by mouth every 6 hours as needed for Pain or Fever (may cut or crush medications) (Patient not taking: Reported on 5/5/2022) 120 tablet 0    metoprolol tartrate (LOPRESSOR) 50 MG tablet Take 1 tablet by mouth 2 times daily 60 tablet 5    Elastic Bandages & Supports (MEDICAL COMPRESSION STOCKINGS) MISC 1 each by Does not apply route daily as needed (Bite to the lower leg daily for swelling) 2 each 1    pantoprazole (PROTONIX) 40 MG tablet Take 1 tablet by mouth 2 times daily (before meals) 60 tablet 3    loperamide (IMODIUM) 2 MG capsule Take 2 mg by mouth daily as needed for Diarrhea      ferrous sulfate (IRON 325) 325 (65 Fe) MG tablet Take 1 tablet by mouth daily (with breakfast) 90 tablet 1    lisinopril (PRINIVIL;ZESTRIL) 10 MG tablet Take 1 tablet by mouth once daily 90 tablet 1    donepezil (ARICEPT) 10 MG tablet Take 1 tablet by mouth nightly 90 tablet 1    vitamin D (ERGOCALCIFEROL) 1.25 MG (45420 UT) CAPS capsule TAKE 1 CAPSULE BY MOUTH TWICE A WEEK (Patient taking differently: Take 50,000 Units by mouth twice a week On Mondays and Thursdays) 24 capsule 1    atorvastatin (LIPITOR) 20 MG tablet Take 1 tablet by mouth once daily 90 tablet 1    memantine (NAMENDA) 10 MG tablet Take 1 tablet by mouth twice daily 180 tablet 1    calcium carbonate 600 MG TABS tablet Take 1 tablet by mouth daily      fluticasone (FLONASE) 50 MCG/ACT nasal spray 2 sprays by Nasal route daily 1 Bottle No; Protuberant: No  Neurological - alert, not oriented, does not follow basic commands, agitated   Musculoskeletal - no joint tenderness, deformity or swelling  Extremities - peripheral pulses normal, no pedal edema, no clubbing or cyanosis  Skin - normal coloration and turgor, no rashes, no suspicious skin lesions noted      Review of Labs and Diagnostic Testing:    Recent Results (from the past 24 hour(s))   Basic Metabolic Panel    Collection Time: 05/06/22  3:59 AM   Result Value Ref Range    Sodium 140 135 - 145 meq/L    Potassium 4.4 3.5 - 5.2 meq/L    Chloride 103 98 - 111 meq/L    CO2 12 (L) 23 - 33 meq/L    Glucose 287 (H) 70 - 108 mg/dL    BUN 60 (H) 7 - 22 mg/dL    CREATININE 2.4 (H) 0.4 - 1.2 mg/dL    Calcium 9.0 8.5 - 10.5 mg/dL   Hepatic function panel    Collection Time: 05/06/22  3:59 AM   Result Value Ref Range    Albumin 3.5 3.5 - 5.1 g/dL    Total Bilirubin 2.8 (H) 0.3 - 1.2 mg/dL    Bilirubin, Direct 1.5 (H) 0.0 - 0.3 mg/dL    Alkaline Phosphatase 309 (H) 38 - 126 U/L     (H) 5 - 40 U/L     (H) 11 - 66 U/L    Total Protein 6.2 6.1 - 8.0 g/dL   Procalcitonin    Collection Time: 05/06/22  3:59 AM   Result Value Ref Range    Procalcitonin 0.35 (H) 0.01 - 0.09 ng/mL   Lactic Acid    Collection Time: 05/06/22  3:59 AM   Result Value Ref Range    Lactic Acid 8.7 (H) 0.5 - 2.0 mmol/L   Anion Gap    Collection Time: 05/06/22  3:59 AM   Result Value Ref Range    Anion Gap 25.0 (H) 8.0 - 16.0 meq/L   Glomerular Filtration Rate, Estimated    Collection Time: 05/06/22  3:59 AM   Result Value Ref Range    Est, Glom Filt Rate 23 (A) ml/min/1.73m2   Lactate, Sepsis    Collection Time: 05/06/22  8:22 AM   Result Value Ref Range    Lactic Acid, Sepsis 9.0 (H) 0.5 - 1.9 mmol/L   Lactate, Sepsis    Collection Time: 05/06/22 10:55 AM   Result Value Ref Range    Lactic Acid, Sepsis 10.9 (H) 0.5 - 1.9 mmol/L   Blood gas, venous    Collection Time: 05/06/22 11:13 AM   Result Value Ref Range    PH MIXED 7.48 (H) 7.31 - 7.41    PCO2, MIXED VENOUS 13 (L) 41 - 51 mmhg    PO2, Mixed 41 (H) 25 - 40 mmhg    HCO3, Mixed 9 (L) 23 - 28 mmol/l    Base Exc, Mixed -12.8 (L) -2.0 - 3.0 mmol/l    O2 Sat, Mixed 82 %    FIO2, MIXED VENOUS 21     COLLECTED BY: [id number]     DEVICE Room Air        Radiology:     ECHO Limited    Result Date: 5/5/2022  Transthoracic Echocardiography Report (TTE)  Demographics   Patient Name    Taya Duncan Gender               Female   MR #            [id number]      Race                 Black                                  Ethnicity   Account #       [de-identified]      Room Number          3866   Accession       [id number]     Date of Study        05/05/2022  Number   Date of Birth   1940     Referring Physician  Bora Garcia MD   Age             78 year(s)     Sonographer          Zack Gallegos RDCS,                                                      RVT                                  Interpreting         Cindy Hale MD                                 Physician  Procedure Type of Study   TTE procedure:ECHOCARDIOGRAM LIMITED. Procedure Date Date: 05/05/2022 Start: 11:11 AM Study Location: Bedside Technical Quality: Limited visualization Indications:Elevated troponin and Elevated BNP. Additional Medical History:Coronary artery disease, Anemia, CVA, Hypertenison, Hyperlipidemia, Atrial fibrillation. Patient Status: Routine Height: 63 inches Weight: 136 pounds BSA: 1.64 m^2 BMI: 24.09 kg/m^2 BP: 136/92 mmHg  Conclusions   Summary  Technically difficult examination due to underlying tachycardia. Left ventricular size and systolic function appears normal. Ejection  fraction was estimated at 50%. LV wall thickness is within normal limits. The right ventricular size was normal with normal systolic function and  wall thickness. Severely dilated left atrium. Moderately enlarged right atrium size.    Signature ----------------------------------------------------------------  Electronically signed by Deloris Boo MD (Interpreting  physician) on 05/05/2022 at 02:21 PM  ----------------------------------------------------------------   Findings   Mitral Valve  The mitral valve structure was normal with normal leaflet separation. Aortic Valve  The aortic valve was trileaflet with normal thickness and cuspal  separation. Tricuspid Valve  The tricuspid valve structure was normal with normal leaflet separation. Pulmonic Valve  The pulmonic valve was not well visualized . Left Atrium  Severely dilated left atrium. Left Ventricle  Left ventricular size and systolic function appears normal. Ejection  fraction was estimated at 50%. LV wall thickness is within normal limits. Right Atrium  Moderately enlarged right atrium size. Right Ventricle  The right ventricular size was normal with normal systolic function and  wall thickness. Pericardial Effusion  The pericardium was normal in appearance with no evidence of a pericardial  effusion. Pleural Effusion  No evidence of pleural effusion. Aorta / Great Vessels  -Aortic root dimension within normal limits. -IVC size is within normal limits with normal respiratory phasic changes. M-Mode/2D Measurements & Calculations   LV Diastolic      LV Systolic Dimension: 3.3 cm    LA Dimension: 3.8 cmLA  Dimension: 4.2 cm LV Volume Diastolic: 63.6 ml     Area: 32.5 cm^2  LV FS:21.4 %      LV Volume Systolic: 83.6 ml  LV PW Diastolic:  LV EDV/LV EDV Index: 78.6 ml/48  0.9 cm            m^2LV ESV/LV ESV Index: 09.6  Septum Diastolic: MA/16 m^2                        RV Diastolic Dimension:  0.8 cm            EF Calculated: 43.9 %            2 cm                                                      LA volume/Index: 124.3                                                     ml /76m^2  [email or web address] [id number]    CT ABDOMEN PELVIS WO CONTRAST Additional Contrast? None    Result Date: 5/5/2022  PROCEDURE: CT ABDOMEN PELVIS WO CONTRAST CLINICAL INFORMATION: Elevated liver function tests. COMPARISON: No prior study. TECHNIQUE: 5 mm axial imaging through the abdomen and pelvis without IV contrast.  Coronal and sagittal reconstructions were performed. All CT scans at this facility use dose modulation, iterative reconstruction, and/or weight based dosing when appropriate to reduce the radiation dose to as low as reasonably achievable. FINDINGS: LIMITATIONS: The evaluation of the solid organs is limited without IV contrast. Lung bases: Small bilateral pleural effusions and bilateral lower lobe airspace opacity, left greater than right, are observed. Cardiomegaly and small pericardial effusion is identified Liver/gallbladder/bilary tree: The gallbladder absent. No biliary ductal dilatation is observed. The noncontrast liver is unremarkable. Pancreas: Unremarkable noncontrast CT appearance. Spleen : Unremarkable noncontrast CT appearance. Adrenal glands: Unremarkable. Kidneys/ ureters/ bladder: Numerous simple bilateral measure 2 cm in the upper pole of the right kidney. Evaluation is limited without IV contrast. A Arguelles catheter decompresses the urinary bladder. Gastrointestinal:  Colonic diverticulosis without diverticulitis is observed. Trace free fluid is noted in the dependent portion of the pelvis. No bowel obstruction,, fluid collection, or free air is identified secondary signs of acute appendicitis are observed. Retroperitoneum / lymph nodes: The aorta is not dilated. No lymphadenopathy is present. Pelvis: The uterus is surgically absent. No adnexal lesions are observed. Musculoskeletal: Diffuse osteopenia is present. Multilevel degenerative disc disease in the thoracic and lumbar spine is observed. The visualized skeletal structures appear intact.      1. Small bilateral pleural effusions and bilateral lower lobe airspace opacities, left greater than right, can indicate pneumonia the appropriate clinical setting. Cardiomegaly and a small pericardial effusion is partially visualized. 2. The noncontrast appearance of the liver is unremarkable. No biliary ductal dilatation is observed. 3. Colonic diverticulosis without diverticulitis. No acute findings are noted in the abdomen/pelvis. Multiple chronic findings are discussed. **This report has been created using voice recognition software. It may contain minor errors which are inherent in voice recognition technology. ** Final report electronically signed by Dr Eugenio Parham on 5/5/2022 11:19 AM    US RENAL COMPLETE    Result Date: 5/5/2022  RENAL ULTRASOUND COMPARISON: None. FINDINGS: Renal cortical echogenicity is normal bilaterally. Right kidney measures 9.7 cm. Left kidney measures 9.1 cm. There is no renal stone or hydronephrosis bilaterally. Multiple renal cysts are noted, for example in the right kidney is a 2.4 x 1.7 x 1.5 cm cyst on image 21. Urinary bladder is decompressed with a Arguelles catheter. 1. No acute disease in the bilateral kidneys. No renal stone or hydronephrosis. This document has been electronically signed by: Blaze Florez M.D. on 05/05/2022 05:08 AM    US LIVER    Result Date: 5/5/2022  ULTRASOUND ABDOMEN LIMITED COMPARISON: None. FINDINGS: The gallbladder is absent. Mild prominence of the pancreatic duct is noted without focal lesion. There are no focal liver lesions. Liver is enlarged and measures 18.7 cm on image 28. Common bile duct measures 6-7 mm in caliber by my measurements. There are no fluid collections. 1. Mild hepatomegaly. No focal liver lesion. 2. Status post cholecystectomy.  This document has been electronically signed by: Blaze Florez M.D. on 05/05/2022 05:04 AM    XR CHEST PORTABLE    Result Date: 5/4/2022  Single view of the chest Comparison:  [facility], - XR CHEST PORTABLE - 04/05/2022 06:28 PM EDT Findings: Mild to moderate patchy opacities bilateral lung bases, increased. This could represent aspiration, pneumonia or edema versus atelectasis. Cardiomegaly present. Impression: Cardiomegaly present. Mild to moderate patchy opacities bilateral lung bases, increased This document has been electronically signed by: Cindy Cedeno MD on 05/04/2022 11:42 PM    CT ABDOMEN A CONTRAST :    1. Small bilateral pleural effusions and bilateral lower lobe airspace opacities, left greater than right, can indicate pneumonia the appropriate clinical setting. Cardiomegaly and a small pericardial effusion is partially visualized.       2. The noncontrast appearance of the liver is unremarkable. No biliary ductal dilatation is observed.       3. Colonic diverticulosis without diverticulitis. No acute findings are noted in the abdomen/pelvis. Multiple chronic findings are discussed. ASSESMENT:      Principal Problem:    Acute renal failure (ARF) (HCC)  Active Problems:    Hypernatremia    Pneumonia    High anion gap metabolic acidosis    Acute-on-chronic kidney injury ([address]    Lactic acidosis    Hepatic dysfunction  Resolved Problems:    * No resolved hospital problems. *      PLAN:       Assessment/Plan:     1. Primary respiratory alkalosis with high anion gap metabolic acidosis from Lactic acidosis / DARIUS   a. Cont IVF Trend lactic acid, lactic acid etiology unclear, ?hypoperfusion ,    b. CT abdomen WO contrast   Unrevealing for structural hepatic pathology. Trend LFTs    2. Pneumonia  Cannot rule out aspiration  a. Switch Rocephin to Zosyn, ID service to see, speech therapist to see; check procalcitonin. b.  use supplemental O2 as needed,   c. Strep pneumo and legionella antigens negative  3. A fib RVR, not on anticoag due to recent hx GIB  a. Cardiology on board  b.  continue IV beta blockers and oral calcium channel blockers as able   c. No anticoag due to hx GIB  d. Cont tele monitoring   4.  CAD with elevated trops   a. Trops stable  b. No further workup per cardio  5. DARIUS on CKD with metabolic acidosis   a. Nephrology following  b. S/p   Bicarb drip, stopped due to respiratory alkalosis. hypernatremia  has improved. Hyperglycemia likely from D5 W switched to NS  c. Urine eosinophils are positive; UA negative for infection   6. Hx Dementia  a. Noted   7. HTN  a. Lisinopril on hold due to DARIUS   b. BP stable now   8. HLD  a. Holding Statin due to  transaminitis  9. Hx CVA  a. Not on ASA/Plavix due to GIB hx   10. PT/OT  11. 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 Follow-up ROSS, F actin antibodies, ceruloplasmin, mitochondrial antibodies. Ferritin levels are not suggestive. Statin may have aggravated the situation. GI Service following. Check ammonia. 12. DVT prophylaxis  a. SCDs        Appreciate Palliative  Service input     Discussed  with daughter and son today, all questions answered to their satisfaction. State they have her living will at home and will bring it in if we are unable to get copy from [address] [place] [address].      Time spent 45 mins    DVT prophylaxis: [] Lovenox                                 [] SCDs                                 [] SQ Heparin                                 [] Encourage ambulation, low risk for DVT, no chemical or mechanical prophylaxis necessary              [] Already on Anticoagulation                Anticipated Disposition upon discharge: [] Home                                                                         [] Home with Home Health                                                                         [] [facility]                                                                         [] [address] [place] [address],[address]          Electronically signed by Atul Fenton MD on 5/6/2022 at 12:39 PM

## 2022-05-06 NOTE — PLAN OF CARE
Problem: Respiratory - Adult  Goal: Achieves optimal ventilation and oxygenation  5/6/2022 0257 by Venkata Davis RCP  Outcome: Progressing

## 2022-05-06 NOTE — PROGRESS NOTES
Renal Progress Note  [facility]    Patient : Thania Sheets; 78 y.o. MRN# [id number]  Location:  3B-27/027-A  Attending:  Kathy Jones MD  Admit Date:  Jaama 45 Day: 2      Subjective:     Nephrology is following the patient for DARIUS on CKD IIIA 2/2 volume depletion, hypernatremia, AGMA 2/2 lactic acidosis with primary respiratory alkalosis.     -0620: has received at least 1L of D5W  -Only 250 cc UOP over past 24h (0.17 cc/kg/hr)  -Pt pulled her IV 3 times yesterday; unable to place PICC  -Family meeting today    Outpatient Medications:     Medications Prior to Admission: acetaminophen (AMINOFEN) 325 MG tablet, Take 2 tablets by mouth every 6 hours as needed for Pain or Fever (may cut or crush medications) (Patient not taking: Reported on 5/5/2022)  metoprolol tartrate (LOPRESSOR) 50 MG tablet, Take 1 tablet by mouth 2 times daily  Elastic Bandages & Supports ([address]) MISC, 1 each by Does not apply route daily as needed (Bite to the lower leg daily for swelling)  pantoprazole (PROTONIX) 40 MG tablet, Take 1 tablet by mouth 2 times daily (before meals)  loperamide (IMODIUM) 2 MG capsule, Take 2 mg by mouth daily as needed for Diarrhea  ferrous sulfate (IRON 325) 325 (65 Fe) MG tablet, Take 1 tablet by mouth daily (with breakfast)  lisinopril (PRINIVIL;ZESTRIL) 10 MG tablet, Take 1 tablet by mouth once daily  donepezil (ARICEPT) 10 MG tablet, Take 1 tablet by mouth nightly  vitamin D (ERGOCALCIFEROL) 1.25 MG (47245 UT) CAPS capsule, TAKE 1 CAPSULE BY MOUTH TWICE A WEEK (Patient taking differently: Take 50,000 Units by mouth twice a week On Mondays and Thursdays)  atorvastatin (LIPITOR) 20 MG tablet, Take 1 tablet by mouth once daily  memantine (NAMENDA) 10 MG tablet, Take 1 tablet by mouth twice daily  calcium carbonate 600 MG TABS tablet, Take 1 tablet by mouth daily  fluticasone (FLONASE) 50 MCG/ACT nasal spray, 2 sprays by Nasal route daily  Carboxymethylcell-Hypromellose (GENTEAL OP), Apply 1 drop to eye daily 1 drop each eye daily    Current Medications:     Scheduled Meds:    [Held by provider] atorvastatin  20 mg Oral Nightly    calcium elemental  1 tablet Oral Daily    polyvinyl alcohol  1 drop Ophthalmic Daily    donepezil  10 mg Oral Nightly    ferrous sulfate  325 mg Oral Daily with breakfast    fluticasone  2 spray Nasal Daily    memantine  5 mg Oral BID    metoprolol tartrate  50 mg Oral BID    pantoprazole  40 mg Oral BID AC    vitamin D  50,000 Units Oral Once per day on     sodium chloride flush  5-40 mL IntraVENous 2 times per day    ipratropium-albuterol  1 ampule Inhalation Q4H WA    cefTRIAXone (ROCEPHIN) IV  1,000 mg IntraVENous Q24H    And    azithromycin  500 mg Oral Q24H     Continuous Infusions:    sodium chloride      dextrose 75 mL/hr at 22 2338     PRN Meds:  acetaminophen, sodium chloride flush, sodium chloride, ondansetron **OR** ondansetron, metoprolol    Input/Output:       I/O last 3 completed shifts: In: 102.4 [I.V.:102.4]  Out: 250 [Urine:250]. Patient Vitals for the past 96 hrs (Last 3 readings):   Weight   22 0530 134 lb 0.6 oz (60.8 kg)   22 0300 132 lb 4.4 oz (60 kg)   22 2257 136 lb (61.7 kg)       Vital Signs:   Temperature:  Temp: 96.6 °F (35.9 °C)  TMax:   Temp (24hrs), Av.5 °F (36.4 °C), Min:96.6 °F (35.9 °C), Max:97.9 °F (36.6 °C)    Respirations:  Resp: 22  Pulse:   Pulse: 125  BP:    BP: 130/82  BP Range: Systolic (06IUD), YAW:199 , Min:127 , WUP:249       Diastolic (31EQW), DSV:72, Min:82, Max:97      Physical Examination:     General:  Awake, intermittently lucid, alert  HEENT: NC/AT/ MMM  Chest:              Clear B/L no W/R/R  Cardiac:  S1 S2   Abdomen: Soft, non-tender,  Neuro:  No facial droop, No Asterixis  SKIN:  No rashes, good skin turgor.   Extremities:  No edema, no cyanosis    Labs:       Recent Labs     22  2300 22  8405 WBC 5.9 5.9   RBC 3.74* 3.75*   HGB 10.1* 10.0*   HCT 35.2* 34.8*   MCV 94.1 92.8   MCH 27.0 26.7   MCHC 28.7* 28.7*    144   MPV 12.4 12.6*      BMP:   Recent Labs     05/04/22  2300 05/04/22  2300 05/05/22  0428 05/05/22  0700 05/06/22  0359   *   < > 148* 146* 140   K 4.5  --  4.2  --  4.4     --  111  --  103   CO2 16*  --  17*  --  12*   BUN 60*  --  62*  --  60*   CREATININE 2.4*  --  2.2*  --  2.4*   GLUCOSE 165*  --  127*  --  287*   CALCIUM 9.7  --  9.5  --  9.0    < > = values in this interval not displayed. Phosphorus:     Recent Labs     05/05/22 0428   PHOS 4.4     Magnesium:  No results for input(s): MG in the last 72 hours.   Albumin:    Recent Labs     05/04/22 2300 05/05/22 0428 05/06/22  0359   LABALBU 4.1 3.8 3.5     BNP:    No results found for: BNP  ROSS:      Lab Results   Component Value Date    ROSS Positive 06/29/2020     SPEP:  Lab Results   Component Value Date    PROT 6.2 05/06/2022     UPEP:   No results found for: LABPE  C3:   No results found for: C3  C4:   No results found for: C4  MPO ANCA:   No results found for: MPO  PR3 ANCA:   No results found for: PR3  Anti-GBM:   No results found for: GBMABIGG  Hep BsAg:         Lab Results   Component Value Date    HEPBSAG Negative 05/05/2022     Hep C AB:          Lab Results   Component Value Date    HEPCAB Negative 05/05/2022       Urinalysis/Chemistries:      Lab Results   Component Value Date    NITRU NEGATIVE 05/05/2022    COLORU DK YELLOW 05/05/2022    PHUR 5.0 05/05/2022    WBCUA 0-2 05/05/2022    RBCUA 0-2 05/05/2022    YEAST NONE SEEN 05/05/2022    BACTERIA NONE SEEN 05/05/2022    CLARITYU Cloudy 04/21/2022    SPECGRAV 1.020 04/21/2022    LEUKOCYTESUR NEGATIVE 05/05/2022    UROBILINOGEN 1.0 05/05/2022    BILIRUBINUR SMALL 05/05/2022    BLOODU NEGATIVE 05/05/2022    GLUCOSEU NEGATIVE 05/05/2022    KETUA TRACE 05/05/2022     Urine Sodium:   No results found for: SAMEER  Urine Potassium:  No results found for: KUR  Urine Chloride:  No results found for: CLUR  Urine Osmolarity: No results found for: OSMOU  Urine Protein:   No components found for: TOTALPROTEIN, URINE   Urine Creatinine:   No results found for: LABCREA  Urine Eosinophils:  No components found for: UEOS      Impression and Plan:  1. 1. Oliguric Pre-renal DARIUS Stage I on CKD Stage IIIA eGFR 34 mL/min/1.73m2- P/w sCr of 2.4 mg/dL on baseline 1.3 mg/dL. BUN:sCr >20 and FENa 0.5%. Poor PO fluid intake + Poor CO from RVR. +sola urine eosinophils may suggest AIN however UA w/o WBCs. Started on pantoprazole for GIB during previous hospitalization. · Discontinue D5W @ 75 cc/h  · 1L IVNS bolus x1. · Daily BMP  · Strict I&Os  · Avoid nephrotoxic substances  · Hold pantoprazole  2. Primary anion gap metabolic acidosis 2/2 lactic acidosis w/ Primary respiratory alkalosis - likely from significant dehydration causing tissue hypoperfusion resulting in lactic acidosis. Differential also includes starvation ketosis, uremic acidosis, or worsening renal failure. · VBG  · Repeat lactate   3. Hypovolemic Hypernatremia 2/2 Hemoconcentration from Poor PO Fluid Intake, Resolved - continue to monitor. UOP low which makes diabetes insipidus less likely. Plan as above. 4. Long-term Persistent Afib on metoprolol tartrate not on OAC (d/t h/o GIB) currently in RVR  5. Acute on Chronic G1DD HF EF 55% 02/28/22  6. Acute Hypoxic Respiratory Failure, resolved - 2/2 aspiration  7. Elevated LFTs w/ Hyperbilirubinemia - H/o cholecystectomy. No TTP on palpation of abdomen. Positive HBsAb, remainder of acute hepatitis panel negative. Cannot exclude hypoperfusion as above or congestive hepatopathy. 8. HLD - on atorvastatin at home  9. Psoriasis - not on medications at home  10. DDD - on tylenol for pain at home  11. Primary HTN - on lisinopril at home, hold in the setting of DARIUS  12. Colon polyps  13.  CVA - no further details on location f/u imaging in EMR, reportedly in 2002 not on ASA/Plavix (D/t h/o GIB)  14. Non-obstructive CAD - [facility] on 08/08/2012 w/ 20-30% obstructive disease and mild luminal irregularities in all arteries. 15. 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 Hiatal hernia w/ GERD / Esophagitis / Ulcer / GIB - on pantoprazole at home  17. Chronic iron deficiency anemia - on supplementation at home  18. H/o bladder repair  19. Code Status - pt has been hospitalized 3 times in the past 4-5 months. Declining appetite and PO intake in the setting of undifferentiated dementia currently on therapy. Recommend palliative care consult for goals of care discussion. Please don't hesitate to call with any questions.   Electronically signed by Carleen Kwok MD on 5/6/2022 at 6:15 AM

## 2022-05-06 NOTE — PLAN OF CARE
Problem: Respiratory - Adult  Goal: Achieves optimal ventilation and oxygenation  5/6/2022 0257 by Jose Antonio Delacruz RCP  Outcome: Progressing  5/5/2022 1417 by Oseas Castro RCP  Flowsheets (Taken 5/5/2022 1417)  Achieves optimal ventilation and oxygenation:   Assess for changes in respiratory status   Respiratory therapy support as indicated     Problem: Safety - Adult  Goal: Free from fall injury  Note: Bed locked & in low position, call light in reach, side-rails up x2, bed/chair alarm utilized, non-slip socks on when ambulating, reminded patient to use call light to call for assistance. Problem: ABCDS Injury Assessment  Goal: Absence of physical injury  Recent Flowsheet Documentation  Taken 5/6/2022 0149 by Tiffani Domínguez RN  Absence of Physical Injury: Implement safety measures based on patient assessment     Problem: Skin/Tissue Integrity  Goal: Absence of new skin breakdown  Description: 1. Monitor for areas of redness and/or skin breakdown  2. Assess vascular access sites hourly  3. Every 4-6 hours minimum:  Change oxygen saturation probe site  4. Every 4-6 hours:  If on nasal continuous positive airway pressure, respiratory therapy assess nares and determine need for appliance change or resting period. Note: Ongoing assessment & interventions provided throughout shift. Skin assessments provided. Encouraging/assisting patient to turn as needed. Problem: Pain  Goal: Verbalizes/displays adequate comfort level or baseline comfort level  Note: Patient does not appear to be in distress. Care plan reviewed with patient. Patient verbalizes understanding of the care plan and contributed to goal setting.

## 2022-05-06 NOTE — CARE COORDINATION
5/6/22, 12:42 PM EDT    DISCHARGE ON GOING EVALUATION    Emre Julian day: 2  Location: [facility]27/027-A Reason for admit: Metabolic acidosis [T41.5]  Pneumonia [J18.9]  Acute renal failure (ARF) (HCC) [N17.9]  Acute renal failure, unspecified acute renal failure type ([address] [N17.9]  Congestive heart failure, unspecified HF chronicity, unspecified heart failure type ([address] [I50.9]   Procedure:   5/4 CXR: Cardiomegaly present. Mild to moderate patchy opacities bilateral lung bases, increased. 5/5 Echo: EF 50%. Severely dilated left atrium. Moderately enlarged right atrium. 5/5 US Liver: Mild hepatomegaly. No focal liver lesion. Status post cholecystectomy. Barriers to Discharge: IM, Nephrology, GI and Cardiology following. Palliative Care consulted. HR remains tachycardic 111-125. Pt is refusing meds, pulling at iv's. Sitter at bedside. BUN 60, creatinine 2.4. Lactic acid 9.0 and 10.9. Alk Phos 309, , , Bilirubin 2.8, direct Bili 1.5. Fluid bolus, IVF. Rocephin iv daily, Zithromax po daily, DuoNeb BID and q4hr PRN, Namenda, Lopressor po bid and iv prn, Given iv Lopressor x1 this am. PT/OT/SLP. Afebrile. PCP: Jose Enrique Beltran MD  Readmission Risk Score: 23.5 ( )%  Patient Goals/Plan/Treatment Preferences: Pt is from [facility] [place] SW following.

## 2022-05-06 NOTE — CONSULTS
CONSULTATION NOTE :ID       Patient - Adam Preston,  Age - 78 y.o.    - 1940      Room Number - 3B-27/027-A   N -  [id number]   Acct # - [de-identified]  Date of Admission -  2022 10:46 PM  Patient's PCP: Zollie Leyden, MD     Requesting Physician: Boo Davis MD    REASON FOR CONSULTATION   Elevated lactic acid  CHIEF COMPLAINT   Shortness of breath    HISTORY OF PRESENT ILLNESS       This is a very pleasant 78 y.o. female who was admitted to the hospital with a chief complaints of shortness of breath. She has hx of CHF,CAD and atrial fibrillation. She was recently hospitalized for atrial fibrillation. She was found to have DARIUS and elevated lactic acid. Found to have elevated lactic acid and renal function test. She has dementia. She was given antibiotics and fluid. she has no fever, no leukocytosis. ID was asked to evaluate patient for elevated lactic aci  d. Has mildly elevated procalcitonin. All her cultures are negative. Her lactic acid and creatining  is worsening  Patient is a poor historian.     PAST MEDICAL  HISTORY       Past Medical History:   Diagnosis Date    [facility]     CAD (coronary artery disease) 12    non-obstructive 20-30% stenosis LAD and circomflex    Colon polyps 12    Tubular adenoma Dr. aLst Parker CVA (cerebral vascular accident) [facility])     Degenerative arthritis of cervical spine     Dementia ([facility]     Hiatal hernia with gastroesophageal reflux disease and esophagitis and ulcer 3/7/2022    Hyperlipidemia     Hypertension     Psoriasis        PAST SURGICAL HISTORY     Past Surgical History:   Procedure Laterality Date    BLADDER REPAIR      CARDIAC CATHETERIZATION  12    CHOLECYSTECTOMY      COLONOSCOPY  2012    Dr. Alan Jackson  2016    corneal related - Dr Domitila Edouard GASTROINTESTINAL ENDOSCOPY N/A 3/7/2022    EGD BIOPSY performed by Marcelina Dominguez MD at [address] [facility]         MEDICATIONS:       Scheduled Meds:   metoprolol tartrate  100 mg Oral BID    sodium chloride  1,000 mL IntraVENous Once    ipratropium-albuterol  1 ampule Inhalation BID    piperacillin-tazobactam  2,250 mg IntraVENous Q8H    insulin lispro  0-6 Units SubCUTAneous TID WC    insulin lispro  0-3 Units SubCUTAneous Nightly    [Held by provider] atorvastatin  20 mg Oral Nightly    calcium elemental  1 tablet Oral Daily    polyvinyl alcohol  1 drop Ophthalmic Daily    donepezil  10 mg Oral Nightly    ferrous sulfate  325 mg Oral Daily with breakfast    fluticasone  2 spray Nasal Daily    memantine  5 mg Oral BID    [Held by provider] pantoprazole  40 mg Oral BID AC    vitamin D  50,000 Units Oral Once per day on Mon Thu    sodium chloride flush  5-40 mL IntraVENous 2 times per day    azithromycin  500 mg Oral Q24H     Continuous Infusions:   sodium chloride 125 mL/hr at 05/06/22 1241    dextrose      sodium chloride       PRN Meds:ipratropium-albuterol, glucose, dextrose, glucagon (rDNA), dextrose, acetaminophen, sodium chloride flush, sodium chloride, ondansetron **OR** ondansetron, metoprolol  Allergies:   ALLERGIES:    Morphine        SOCIAL HISTORY:     TOBACCO:   reports that she has never smoked. She has never used smokeless tobacco.     ETOH:   reports no history of alcohol use. FAMILY HISTORY:         Problem Relation Age of Onset    Heart Disease Brother     Breast Cancer Daughter 54       REVIEW OF SYSTEMS:     Unable to obtain from patient. PHYSICAL EXAM:     /68   Pulse 111   Temp 96.6 °F (35.9 °C) (Axillary)   Resp 22   Ht 5' 3\" (1.6 m)   Wt 134 lb 0.6 oz (60.8 kg)   SpO2 92%   BMI 23.74 kg/m²   General apperance:  Awake, alert,doesnt answer to questions. She was seen holding a toy  HEENT: slighlty pale  conjunctiva, unicteric sclera, dryoral mucosa.   Chest:  Bilateral air entry, diminished at the lung bases. Cardiovascular:  RRR ,S1S2, no murmur or gallop. Abdomen:  Soft, non tender to palpation. Extremities: edema on the lower extremites  Skin:  Warm and dry. CNS:awake, has base line dementia        LABS:     CBC:   Recent Labs     05/04/22 2300 05/05/22  0428   WBC 5.9 5.9   HGB 10.1* 10.0*    144     BMP:    Recent Labs     05/04/22  2300 05/04/22  2300 05/05/22  0428 05/05/22  0700 05/06/22  0359   *   < > 148* 146* 140   K 4.5  --  4.2  --  4.4     --  111  --  103   CO2 16*  --  17*  --  12*   BUN 60*  --  62*  --  60*   CREATININE 2.4*  --  2.2*  --  2.4*   GLUCOSE 165*  --  127*  --  287*    < > = values in this interval not displayed.      Calcium:  Recent Labs     05/06/22 0359   CALCIUM 9.0      Recent Labs     05/05/22 0428   PHOS 4.4      Recent Labs     05/06/22  0359   ALKPHOS 309*   *   *   PROT 6.2   BILITOT 2.8*   BILIDIR 1.5*   LABALBU 3.5     Amylase and Lipase:  Recent Labs     05/06/22 0359   LACTA 8.7*     Lactic Acid:   Recent Labs     05/06/22 0359   LACTA 8.7*     Troponin:   Recent Labs     05/05/22 0428   CKTOTAL 151*        UA:   Recent Labs     05/05/22  0050   PHUR 5.0   COLORU DK YELLOW*   PROTEINU 100*   BLOODU NEGATIVE   RBCUA 0-2   WBCUA 0-2   BACTERIA NONE SEEN   NITRU NEGATIVE   GLUCOSEU NEGATIVE   BILIRUBINUR SMALL*   UROBILINOGEN 1.0   KETUA TRACE*         IMAGING:    Micro: No results found for: [facility]    Problem list of patient      Patient Active Problem List   Diagnosis Code    CVA (cerebral vascular accident)-2002 I63.9    CAD (coronary artery disease)-mild NONobstructive max 30% ostail, LCX, mid lad and diffuse mild luminal irregularites-per cath 08/08/12 I25.10    S/P cardiac cath 2012- mild nonobstructive CAD Z98.890    Essential hypertension I10    Hyperlipidemia E78.5    Dementia without behavioral disturbance (HCC) F03.90    DARIUS (acute kidney injury) ([facility] N17.9    Anemia D64.9    New onset atrial fibrillation (HCC) w/ RVR now sinus Bradycardia I48.91    Hiatal hernia with gastroesophageal reflux disease and esophagitis and ulcer K44.9, K21.00    Garrison's esophagus with esophagitis K22.70, K20.90    Atrial fibrillation with rapid ventricular response ([facility]) I48.91    Paroxysmal atrial fibrillation with RVR ([facility]) I48.0    Acute diastolic congestive heart failure (HCC) I50.31    Acute renal failure (ARF) ([facility]) N17.9    Hypernatremia E87.0    Pneumonia J18.9    High anion gap metabolic acidosis U31.3    Acute-on-chronic kidney injury (HCC) N17.9, N18.9    Lactic acidosis E87.2    Hepatic dysfunction K76.9           Impression and Recommendation:   Shortness of breath likely to CHF  Elevated lactic acid likely to hypoperfusion:less likely to be related to infectious process  Elevated lFT to above  DARIUS   Dementia with poor oral intake  If cx remains negative by am ,consider stopping antibiotic and observe patient   Thank you Herson Frank MD for allowing me to participate in this patient's care.     Akilah Hoskins MD, MD,FACP 5/6/2022 1:16 PM

## 2022-05-06 NOTE — PROGRESS NOTES
Family called and was updated. Explained that speech was ordered to ensure that she was swallowing okay. I educated the family that I do not want her to aspirate her food into her lungs. I informed them that she would be on a puree diet. The daughter was very agreeable to this. I let her know that speech would follow with her throughout her hospital stay and make adjustments as needed. Dr. Maddie Francois is aware that the family would like an update from him. Daughter would prefer a phone call as she has some things going on at home and is unable to make it up to the hospital.     Daughter told that she is able to call any any time with questions.

## 2022-05-06 NOTE — CARE COORDINATION
DISCHARGE/PLANNING EVALUATION  5/6/22, 8:22 AM EDT    Reason for Referral: Patients is from [facility]. Mental Status: Dementia  Decision Making: Daughters help with decision making. Spoke with michelle Devi. Family/Social/Home Environment: Lives at [address] [place] [address] Road living. Current Services including food security, transportation and housekeeping: From assisted living. Current Equipment:From assisted living. Payment Source:Medicare   Concerns or Barriers to Discharge: None  Post acute provider list with quality measures, geographic area and applicable managed care information provided. Questions regarding selection process answered:from [facility]. Teach Back Method used with Nicol's daughter Marito regarding care plan and discharge planing. Patient's daughter verbalizes understanding of the plan of care and contribute to goal setting. Patient goals, treatment preferences and discharge plan: Michelle Devi would like [facility] [place] to return to [facility]. JOE called Dayami at the facility yesterday and she verified she can return. Electronically signed by JORDANA Bazzi on 5/6/2022 at 8:22 AM

## 2022-05-06 NOTE — PROGRESS NOTES
Follow Up / Progress Note        Patient: Jessica Emerson  YOB: 1940  Age:  78 y.o. Room:  [address] [place] [address]-A  MRN:  [id number]              Reason for Palliative Care Evaluation:-             [x] Code Status Discussion              [x] Goals of Care              [] Pain/Symptom Management               [] Emotional Support              [] Other:            Goals of care evaluation:-        The patient goals of care are to provide comfort care/supportive services/palliation & relieve suffering:  Goals of care discussed with:  [] Patient independently  [] Patient and Family  [x] Family or Healthcare DPOA independently    [] Unable to discuss with patient, family/DPOA not present      Family/Patient Discussion: Family meeting with Patients Daughter & Jeffrey Wright along with patients Son Martha. Patient condition and life limiting severe illness discussed. Much discussion was focused toward quality of life, preparedness & advocacy for patients goals of care. Ronn Mccray states that her Mother Bertha Welch does have a living will and plans to bring this in the next time she comes to visit. We currently don't have it on file within epic. Patient is currently a Full Code and after our 60 minute conversation Ronn Mccray and family are requesting to continue with this code status. They are very understanding regarding the patients declining condition and frail state and can speak to possible future needs their mother will likely require, such as an ECF. Family has recently noticed change in condition and believe it maybe related to some medication changes that took place a few weeks ago. They also want to give the antibiotics a chance to take affect if there is an underlying infection. We discussed may concerns regarding the patients recent frequent admissions, state of confusion (dementia) & possible prognosis if symptoms continue.  Family is realistic that this may be the patients new baseline and that a cure isn't possible. Encouraged family to have further discussions regarding choices they may need to take if future events continue. Much emotional support given. Offered spiritual support from our  services however at this time family declined. Plan/Follow-Up: Palliative care will plan to revisit on Monday 5/9/22. Patient does have my direct contact and was encouraged to call if they have any questions or concerns. At the beginning of our conversation Alfredito Hewitt voiced some complaints regarding the admission process from the ER to the 3B unit. I placed a call to [address] within patient relations to further discuss these concerns. [facility] will plan on meeting with them today.      Electronically signed by Facundo Ames RN on 5/6/2022 at 10:21 AM                 Palliative Care Office: [phone]

## 2022-05-06 NOTE — PROGRESS NOTES
Percy Cespedes 58  INPATIENT OCCUPATIONAL THERAPY  [facility] CCU-STEPDOWN 3B  EVALUATION    Time:   Time In: 1483  Time Out: 1330  Timed Code Treatment Minutes: 15 Minutes  Minutes: 35     Variance: 5 (pt having help with her IV being redone while sitting at the edge of bed)    Date: 2022  Patient Name: Umair Tanner,   Gender: female      MRN: [id number]  : 1940  (78 y.o.)  Referring Practitioner: Dr. Wilian Hawk MD  Diagnosis: Metabolic Acidosis  Additional Pertinent Hx: Pt with pmhx of A fib not on anticoagulation due to recent hx GIB, dementia, anemia, HTN, HLD, CKD, CAD, CVA, CHF preserved EF who presents with weakness and decreased appetite over the past couple of days--sent in by her AL. Apparantly she was having SOB with minimal exertion and a supposed 66% SPO2 on room air at AL; now she is 100% on room air. Per chart review she was in the hospital less than a month ago with A fib RVR and fluid overload; based on these notes pt has baseline dementia so most likely her mental status today is not too far from her baseline. In the emergency department Cr up to 2.4 (baseline of 1-1.5), Na 149, CO2 16, lactic acid 7.1 (trended down to 4.1 s/p IVF), BNP 9935 (above her baseline but no obvious signs of fluid overload currently), trop 0.023 trended down to 0.015. Patient admitted for lactic acidosis and DARIUS with AMS. Restrictions/Precautions:  Restrictions/Precautions: Fall Risk  Position Activity Restriction  Other position/activity restrictions: Dementia    Subjective  Chart Reviewed: Nitza Castillo and Physical,Other (comment) (PT evaluation)  Patient assessed for rehabilitation services?: Yes    Subjective: Pleasant. Pt agreed to sit up at the edge of the bed with encouragement. Comments: RN approved session. Pt's IV was looking like it was needing to be placed again. Pt sat at the edge of the bed for simple tasks with her baby doll and was waiting for her IV to be redone.   Pt did not C/O pain. Pain: Denied. No facial expressions given regarding being in pain. Vitals: Nurse checked vitals prior to session    Social/Functional History:  Lives With: Alone  Type of Home: Assisted living [facility])  Home Layout: One level  Home Access: Level entry        [place] Help From: Other (comment) (staff as available)  ADL Assistance: Needs assistance  Homemaking Responsibilities: No  Ambulation Assistance: Independent  Transfer Assistance: Independent    Active : No  Patient's  Info: facility provides her with transportation  Leisure & Hobbies: Pt has a baby doll which she likes to look after  Additional Comments: Pt unable to provide PLOF information; per yayo, pt amb without AD at AL    VISION:Corrected; L eye cataracts noted    HEARING:  WFL    COGNITION: Decreased Insight, Impaired Memory, Decreased Safety Awareness and Impaired Attention    RANGE OF MOTION:  Bilateral Upper Extremity:  WFL    STRENGTH:  Bilateral Upper Extremity:  Not Tested; appear deconditioned- 3+/5 grossly    SENSATION:   WFL    ADL:   Lower Extremity Dressing: Moderate Assistance. Pt had help to straighten her slipper socks  Pt did help place footies and a onesy on her baby with assist needed to orient the clothing to the baby so arms were able to go into the sleeves. BALANCE:  Sitting Balance:  Contact Guard Assistance.  pt leaned posteriorly as she became more anxious or tired while sitting at the edge of bed    BED MOBILITY:  Rolling to Right: Minimal Assistance, with rail, with verbal cues   cues for initiating task  Supine to Sit: Minimal Assistance, with rail, with verbal cues - cues for bringing her legs forward over the edge of the bed while starting to bring her trunk upright  Sit to Supine: Minimal Assistance help needed with bringing her feet into the bed  Scooting: Minimal Assistance, with verbal cues  Bringing her hips toward her R side with cues to lean forward before trying to scoot    TRANSFERS:  Not able to demsontrate    Activity Tolerance:  Patient tolerance of  treatment: fair. Pt sat at the edge of bed for 12 minutes. She had fatigue at the end of the period and was ready to lay down. Assessment:  Assessment: Patient would benefit from continued skilled OT services to address above deficits. She presents with metabolic acidosis. Pt has hx of hepatic dysfunction, CVA and dementia. Pt was unable to provide information about her prior level of function before admission. She demonstrated bed mobility with MIN A. She scooted while sitting at the edge of bed with MIN A and verbal cues for bringing her hips to her R side. She was needing cues for task completion. She was able to engage in a dressing task with her baby doll while sitting with CGA for balance at the edge of bed. Performance deficits / Impairments: Decreased functional mobility ,Decreased ADL status,Decreased strength,Decreased safe awareness,Decreased cognition,Decreased endurance  Prognosis: Fair  REQUIRES OT FOLLOW-UP: Yes  Decision Making: Medium Complexity    Treatment Initiated: Treatment and education initiated within context of evaluation. Evaluation time included review of current medical information, gathering information related to past medical, social and functional history, completion of standardized testing, formal and informal observation of tasks, assessment of data and development of plan of care and goals. Treatment time included skilled education and facilitation of tasks to increase safety and independence with ADL's for improved functional independence and quality of life. Pt was more alert at the end of the session. She did indicate feeling tired.       Discharge Recommendations:  Patient would benefit from continued therapy after discharge    Patient Education:     Patient Education  Education Given To: Patient  Education Provided: Role of Therapy,Plan of Care  Education Method: Verbal  Barriers to Learning: Cognition  Education Outcome: Continued education needed,Unable to demonstrate understanding,Unable to verbalize    Equipment Recommendations:  Equipment Needed: No    Plan:  Times per Week: 3-5x  Current Treatment Recommendations: Strengthening,Functional mobility training,Endurance training,Cognitive reorientation,Self-Care / ADL  Plan Comment: Pt would benefit from continued skilled OT services when medically stable and discharged from Acute. OT recommended while at [facility]. Specific Instructions for Next Treatment: Functional mobility as able; ADLs and dynamic sitting balance; reorientation to situation/place and date; task completion. See long-term goal time frame for expected duration of plan of care. If no long-term goals established, a short length of stay is anticipated. Goals:  Patient goals : \"To feel better. \" pt states. Short Term Goals  Time Frame for Short term goals: By discharge  Short Term Goal 1: Pt will demonstrate functional transfers with OTR to prepare for doing ADL while out of bed. Short Term Goal 2: Pt will engage in upper body ADLs while sitting up for 5 minute duration with CGA and cues for sequencing and attention to task to increase her independence with self care. Short Term Goal 3: Pt will be oriented to place and situation and date each day with cues as needed to increase her safety and facilitate progress toward returning to assisted living. Short Term Goal 4: Pt will complete dynamic sitting activities while stabilizing with 1 hand with SBA and verbal cues to return to midline to increase her safety with doing spongebaths or dressing. Additional Goals?: No         Following session, patient left in safe position with all fall risk precautions in place.

## 2022-05-06 NOTE — CONSULTS
.      Consult History & Physical      Patient: Spencer Pittman  YOB: 1940  MRN: [id number]     Acct: [de-identified]    Chief Complaint:    Chief Complaint   Patient presents with    Shortness of Breath     with movement       Date of Service: Pt seen/examined in consultation on 5/6/2022    History Of Present Illness:      78 y.o. female who we are asked to see/evaluate by Radha Delgado MD for medical management of liver dysfunction / elevated transaminases. She has a PMH of atrial fibrillation (not on anticoagulation due to recent GIB), dementia, anemia, HTN, HLD, CKD, CAD, CVA, CHF w/ preserved EF. The patient was noncommunicative and did not say a word or respond to me when I attempted to ask questions. She has reported baseline dementia and is not oriented or able to follow commands at baseline. Per Prior chart review, she presented to the ED with Cr up to 2.4 (baseline 1-1.5), Na 149, low CO2, and Lactic acid 7.1 (trended down to 4.1 s/p IVF), BNP 9935 (above baseline with no obvious signs of fluid overload), and troponin 0.023 trended down to 0.023. She was admitted for lactic acidosis and DARIUS with AMS. Per nephrology note, the patient was noted to have oliguric Pre-renal DARIUS likely due to poor oral intake, causing tissue hypoperfusion. There were no obvious signs of fluid overload or congestion noted when the patient was admitted. She has received IVF resuscitation to treat her lactic acidosis and DARIUS. I was unable to obtain ROS due to the patient's encephalopathy.     Past Medical History:    Past Medical History:   Diagnosis Date    Atrial fibrillation ([address]     CAD (coronary artery disease) 8/8/12    non-obstructive 20-30% stenosis LAD and circomflex    Colon polyps 4/25/12    Tubular adenoma Dr. Dominic Meyers CVA (cerebral vascular accident) [facility]) 2004    Degenerative arthritis of cervical spine 2002    Dementia ([address] 12/14    Hiatal hernia with gastroesophageal reflux disease and esophagitis and ulcer 3/7/2022    Hyperlipidemia     Hypertension     Psoriasis        Home Medications:  Prior to Admission medications    Medication Sig Start Date End Date Taking?  Authorizing Provider   acetaminophen (AMINOFEN) 325 MG tablet Take 2 tablets by mouth every 6 hours as needed for Pain or Fever (may cut or crush medications)  Patient not taking: Reported on 5/5/2022 4/29/22   Anat Tiwari MD   metoprolol tartrate (LOPRESSOR) 50 MG tablet Take 1 tablet by mouth 2 times daily 4/21/22   Anat Tiwari MD   Elastic Bandages & Supports ([address]) [address] 1 each by Does not apply route daily as needed (Bite to the lower leg daily for swelling) 3/17/22   Anat Tiwari MD   pantoprazole (PROTONIX) 40 MG tablet Take 1 tablet by mouth 2 times daily (before meals) 3/9/22   Anat Tiwari MD   loperamide (IMODIUM) 2 MG capsule Take 2 mg by mouth daily as needed for Diarrhea    Historical Provider, MD   ferrous sulfate (IRON 325) 325 (65 Fe) MG tablet Take 1 tablet by mouth daily (with breakfast) 2/10/22   Anat Tiwari MD   lisinopril (PRINIVIL;ZESTRIL) 10 MG tablet Take 1 tablet by mouth once daily 12/30/21   Anat Tiwari MD   donepezil (ARICEPT) 10 MG tablet Take 1 tablet by mouth nightly 9/14/21   Anat Tiwari MD   vitamin D (ERGOCALCIFEROL) 1.25 MG (20312 UT) CAPS capsule TAKE 1 CAPSULE BY MOUTH TWICE A WEEK  Patient taking differently: Take 50,000 Units by mouth twice a week On Mondays and Thursdays 8/30/21   Anat Tiwari MD   atorvastatin (LIPITOR) 20 MG tablet Take 1 tablet by mouth once daily 8/30/21   Anat Tiwari MD   memantine [facility]) 10 MG tablet Take 1 tablet by mouth twice daily 7/26/21   Anat Tiwari MD   calcium carbonate 600 MG TABS tablet Take 1 tablet by mouth daily    Historical Provider, MD   fluticasone [facility]) 50 MCG/ACT nasal spray 2 sprays by Nasal route daily 5/28/20   Anat Tiwari MD Carboxymethylcell-Hypromellose (GENTEAL OP) Apply 1 drop to eye daily 1 drop each eye daily    Historical Provider, MD       Surgical History:  Past Surgical History:   Procedure Laterality Date    BLADDER REPAIR      CARDIAC CATHETERIZATION  8-8-12    CHOLECYSTECTOMY      COLONOSCOPY  4/24/2012    Dr. Yvonne Malave  07/2016    corneal related - Dr Hudson Saldana GASTROINTESTINAL ENDOSCOPY N/A 3/7/2022    EGD BIOPSY performed by Soha Crain MD at [facility]       Family History:  Family History   Problem Relation Age of Onset    Heart Disease Brother     Breast Cancer Daughter 54       Past GI History:  Last EGD 3/7/22 - Small hiatal hernia with worrying nodularity distal to Schatzki ring, diffuse gastritis in stomach. · Biopsy demonstrated chronic inactive gastritis with intestinal metaplasia at GE junction (Garrison's mucosa). NEGATIVE for H. Pylori, dysplasia, malignancy  Last Colonoscopy 5/20/15 - Diverticulosis, mild-moderate intestinal hemorrhoids  Tubular Adenoma - 4/25/12 (Dr. Olga Lidia Castelan)  Cholecystectomy (unspecified date)    Allergies:  Morphine    Social History:   TOBACCO:   reports that she has never smoked. She has never used smokeless tobacco.  ETOH:   reports no history of alcohol use. Review Of Systems  Unable to obtain, patient is nonverbal/encephalopathic    PHYSICAL EXAM:  /68   Pulse 111   Temp 96.6 °F (35.9 °C) (Axillary)   Resp 22   Ht 5' 3\" (1.6 m)   Wt 134 lb 0.6 oz (60.8 kg)   SpO2 93%   BMI 23.74 kg/m²     General appearance: Acute on chronically ill-appearing. No apparent distress. HEENT: Normal cephalic, atraumatic without obvious deformity. Pupils equal, round, and reactive to light. Neck: Supple, with full range of motion. No jugular venous distention. Trachea midline. Respiratory:  Normal respiratory effort.  Clear to auscultation, bilaterally without Rales/Wheezes/Rhonchi. Cardiovascular: Irregularly irregular HR with tachycardia, without murmurs, rubs or gallops. Abdomen: RUQ hernia with clean, dry surgical scar noted. Otherwise soft, non-tender, non-distended with active bowel sounds. No hepatomegaly. Musculoskeletal: No clubbing, cyanosis or edema bilaterally. Skin: Pink, warm, dry. No rashes or lesions. Psychiatric: Alert and oriented x3, thought content appropriate, normal insight    Labs:   Recent Labs     05/05/22  0428   WBC 5.9   HGB 10.0*   HCT 34.8*        Recent Labs     05/05/22  0428 05/05/22  0700 05/06/22  0359   *   < > 140   K 4.2   < > 4.4      < > 103   CO2 17*   < > 12*   BUN 62*   < > 60*   CREATININE 2.2*   < > 2.4*   CALCIUM 9.5   < > 9.0   PHOS 4.4  --   --     < > = values in this interval not displayed. Recent Labs     05/06/22  0359   *   *   BILIDIR 1.5*   BILITOT 2.8*   ALKPHOS 309*     No results for input(s): INR in the last 72 hours. Hepatitis Panel - Positive only for HBsAb, negative otherwise    Radiology:   CT Abdomen Pelvis W/WO Contrast 5/5/22:  Unremarkable appearance of non-contrast liver. Diverticulosis without diverticulitis. No acute findings noted in the abdomen and pelvis. US Liver 5/5/22:  Mild hepatomegaly. No focal liver lesion. Surgically absent gallbladder    Code Status: Full Code    ASSESSMENT:  Hepatic dysfunction  Acute transaminitis, conjugated hyperbilirubinemia  Given patient's presentation and information gathered from chart review, suspect liver damage from hypoperfusion 2/2 volume depletion and tachycardia  No abnormalities apart from mild hepatomegaly noted on imaging  No amidarone listed on home medications. Patient on ceftriaxone and azithromycin, transaminitis was present prior to initiation.   Patient has acetaminophen listed in home meds, but has not been taking it during hospitalization  Low suspicion for viral infection at this time, patient afebrile and without leukocytosis  No evidence of gallbladder stones noted on US  Hepatitis panel positive only for HBsAb, negative otherwise  No reported history of recent alcoholism  S/p Cholecystectomy  Hx Heart Failure with Preserved Ejection Fraction  Stage 1 DARIUS on CKD IIIA  HAGMA 2/2 Lactic Acidosis  A. Fib with RVR    PLAN:  Anticipate improvement of LFTs following adequate fluid hydration and treatment of patient's tachycardia  Supportive care per primary team  Salicylate level  PT-INR level  MRCP not indicated at this time, patient has normal common bile duct and no evidence of pancreatitis. Will consider at a later date if patient's presentation does not improve. Will continue to follow. Case reviewed and impression/plan reviewed in collaboration with Dr. Keyona Gamboa  Electronically signed by Kevin Love DO on 5/6/2022 at 10:18 AM    [facility]  Thank you for the consultation.

## 2022-05-06 NOTE — PROGRESS NOTES
[facility]  INPATIENT PHYSICAL THERAPY  EVALUATION  [facility] CCU-STEPDOWN 3B - 3B-27/027-A    Time In: 6266  Time Out: 2796  Timed Code Treatment Minutes: 10 Minutes  Minutes: 22          Date: 2022  Patient Name: Lashanda Rose,  Gender:  female        MRN: [id number]  : 1940  (78 y.o.)      Referring Practitioner: Guido Gold MD  Diagnosis: Metabolic acidosis  Additional Pertinent Hx: The patient is a 78 y.o. female with pmhx of A fib not on anticoagulation due to recent hx GIB, dementia, anemia, HTN, HLD, CKD, CAD, CVA, CHF preserved EF who presents with weakness and decreased appetite over the past couple of days--sent in by her AL. Apparantly she was having SOB with minimal exertion and a supposed 66% SPO2 on room air at AL; now she is 100% on room air. Per chart review she was in the hospital less than a month ago with A fib RVR and fluid overload; based on these notes pt has baseline dementia so most likely her mental status today is not too far from her baseline. In the emergency department Cr up to 2.4 (baseline of 1-1.5), Na 149, CO2 16, lactic acid 7.1 (trended down to 4.1 s/p IVF), BNP 9935 (above her baseline but no obvious signs of fluid overload currently), trop 0.023 trended down to 0.015. Patient admitted for lactic acidosis and DARIUS with AMS. On exam today she is not oriented, unable to follow basic commands but is alert and moves around the bed spontaneously. She has pulled out multiple IVs in the night. Unable to assess a full ROS due to her AMS. She does not seem to be in any obvious distress although her HR is elevated at 140. Restrictions/Precautions:  Restrictions/Precautions: Fall Risk  Position Activity Restriction  Other position/activity restrictions: Dementia    Subjective:  Chart Reviewed: Yes  Patient assessed for rehabilitation services?: Yes  Family / Caregiver Present: Yes (sitter)  Subjective: RN approved session, sitter present.   Pt is supine in bed, oriented to person only, does not provide meaningful conversation, difficult to direct, has dementia. General:  Overall Orientation Status: Impaired  Orientation Level: Oriented to person,Disoriented to place,Disoriented to time,Disoriented to situation     Vision  Vision: Within Functional Limits  Hearing  Hearing: Exceptions to [facility]  Hearing Exceptions: Hard of hearing/hearing concerns  Hearing: Exceptions to [facility]  Hearing Exceptions: Hard of hearing/hearing concerns       Pain: no pain behaviors noted during session    Vitals: Vitals not assessed per clinical judgement, see nursing flowsheet    Social/Functional History:    Type of Home: Assisted living [facility])  Home Layout: One level  Home Access: Level entry      Ambulation Assistance: Independent  Transfer Assistance: Independent  Additional Comments: Pt unable to provide PLOF information; per yayo, pt amb without AD at AL    OBJECTIVE:  Balance:  Static Sitting Balance:  Contact Guard Assistance, Minimal Assistance  Static Standing Balance: Minimal Assistance, Moderate Assistance, X 1  Dynamic Standing Balance: Minimal Assistance, X 1    Bed Mobility:  Supine to Sit: Moderate Assistance, X 1, with head of bed raised, with rail, with verbal cues , with increased time for completion  Sit to Supine: Moderate Assistance, X 1, with rail, with verbal cues    Scooting: Moderate Assistance, X 1    Transfers:  Sit to Stand: Minimal Assistance, X 1, with increased time for completion, cues for hand placement  Stand to Sit:Minimal Assistance, X 1  **Pt transfers 3 trials prior to being able to ambulate, first 2 trials heavy L sided lean with mod A for balance, third trial, improved balance, pt able to step along EOB    Ambulation:  Minimal Assistance, X 1  Distance: 3 feet to the R along EOB, 3 feet to the L along EOB  Surface: Level Tile  Device:Rolling Walker  Gait Deviations:   Forward Flexed Posture, Slow Becky, Decreased Step Length Bilaterally and Decreased Gait Speed  **Constant verbal cues for direction and to step, manual assist to guide walker    Exercise:  Patient unable to follow directions to perform    Functional Outcome Measures: Completed  AM-PAC Inpatient Mobility without Stair Climbing Raw Score : 12  AM-PAC Inpatient without Stair Climbing T-Scale Score : 37.26    ASSESSMENT:  Activity Tolerance:  Patient tolerance of  treatment: fair. Treatment Initiated: Treatment and education initiated within context of evaluation. Evaluation time included review of current medical information, gathering information related to past medical, social and functional history, completion of standardized testing, formal and informal observation of tasks, assessment of data and development of plan of care and goals. Treatment time included skilled education and facilitation of tasks to increase safety and independence with functional mobility for improved independence and quality of life. Assessment: Body Structures, Functions, Activity Limitations Requiring Skilled Therapeutic Intervention: Decreased functional mobility ,Decreased safe awareness,Decreased cognition,Decreased endurance,Decreased strength,Decreased balance  Assessment: Pt tolerates session fair, limited by weakness, cognition, h/o dementia, difficult to direct and impaired ability to follow commands. PT to continue to progress strength and functional mobility. Therapy Prognosis: Fair    Requires PT Follow-Up: Yes    Discharge Recommendations:  Discharge Recommendations: 24 hour supervision or assist    Patient Education:      .     Patient Education  Education Given To: Merlyn Choudhary 80  Education Provided: Role of Therapy,Plan of Care  Education Method: Verbal  Barriers to Learning: Cognition  Education Outcome: Continued education needed       Equipment Recommendations:  Equipment Needed: No    Plan:  Current Treatment Recommendations: Strengthening,Balance training,Functional mobility training,Transfer training,Neuromuscular re-education,Gait training,Patient/Caregiver education & training  Plan:  (3-5x GM)    Goals:  Patient goals : unable to state- plan is for pt to return to [facility]  Short Term Goals  Time Frame for Short term goals: by discharge  Short term goal 1: Pt to transfer supine <--> sit SBA to enable pt to get in/out of bed. Short term goal 2: Pt to transfer sit <--> stand SBA for increased functional mobility. Short term goal 3: Pt to ambulate >100 feet with or without AD SBA for household ambulation. Long Term Goals  Time Frame for Long term goals : NA due to short length of stay    Following session, patient left in safe position with all fall risk precautions in place.

## 2022-05-06 NOTE — PLAN OF CARE
Problem: Respiratory - Adult  Goal: Clear lung sounds  Outcome: Progressing   Pt on duoneb aerosol txs Bid and Q4 hr prn to improve breath sounds/aeration. Pt currently on RA.

## 2022-05-06 NOTE — RT PROTOCOL NOTE
RT Nebulizer Bronchodilator Protocol Note    There is a bronchodilator order in the chart from a provider indicating to follow the RT Bronchodilator Protocol and there is an Initiate RT Bronchodilator Protocol order as well (see protocol at bottom of note). CXR Findings:  XR CHEST PORTABLE    Result Date: 5/4/2022  Impression: Cardiomegaly present. Mild to moderate patchy opacities bilateral lung bases, increased This document has been electronically signed by: Vikki House MD on 05/04/2022 11:42 PM      The findings from the last RT Protocol Assessment were as follows:  Smoking: None or smoker <15 pack years  Respiratory Pattern: Regular pattern and RR 12-20 bpm  Breath Sounds: Slightly diminished and/or crackles  Cough: Weak, non-productive  Indication for Bronchodilator Therapy: Decreased or absent breath sounds  Bronchodilator Assessment Score: 5    Aerosolized bronchodilator medication orders have been revised according to the RT Nebulizer Bronchodilator Protocol below. Respiratory Therapist to perform RT Therapy Protocol Assessment initially then follow the protocol. Repeat RT Therapy Protocol Assessment PRN for score 0-3 or on second treatment, BID, and PRN for scores above 3. No Indications - adjust the frequency to every 6 hours PRN wheezing or bronchospasm, if no treatments needed after 48 hours then discontinue using Per Protocol order mode. If indication present, adjust the RT bronchodilator orders based on the Bronchodilator Assessment Score as indicated below. If a patient is on this medication at home then do not decrease Frequency below that used at home. 0-3 - enter or revise RT bronchodilator order(s) to equivalent RT Bronchodilator order with Frequency of every 4 hours PRN for wheezing or increased work of breathing using Per Protocol order mode.        4-6 - enter or revise RT Bronchodilator order(s) to two equivalent RT bronchodilator orders with one order with BID Frequency and one order with Frequency of every 4 hours PRN wheezing or increased work of breathing using Per Protocol order mode. 7-10 - enter or revise RT Bronchodilator order(s) to two equivalent RT bronchodilator orders with one order with TID Frequency and one order with Frequency of every 4 hours PRN wheezing or increased work of breathing using Per Protocol order mode. 11-13 - enter or revise RT Bronchodilator order(s) to one equivalent RT bronchodilator order with QID Frequency and an Albuterol order with Frequency of every 4 hours PRN wheezing or increased work of breathing using Per Protocol order mode. Greater than 13 - enter or revise RT Bronchodilator order(s) to one equivalent RT bronchodilator order with every 4 hours Frequency and an Albuterol order with Frequency of every 2 hours PRN wheezing or increased work of breathing using Per Protocol order mode. RT to enter RT Home Evaluation for COPD & MDI Assessment order using Per Protocol order mode.     Electronically signed by Lisa Frazier RCP on 5/6/2022 at 10:16 AM

## 2022-05-06 NOTE — PROGRESS NOTES
Cardiology Progress Note      Patient: Anamaria March  YOB: 1940  MRN: [id number]   Acct: [de-identified]  Admit Date:  5/4/2022  Primary Cardiologist: Brian Lewis MD  Per DR Mcfarlane's note:  \"Reason for Consultation:  Elevated troponins        History Of Present Illness:    78 y. o.female with history of dementia, nonobstructive CAD, CVA, hypertension, GI bleed, atrial fibrillation not on [address] who was brought in to the hospital for increased weakness, hypoxic respiratory failure and decreased Appetite. Patient Cannot Provide Any History, Most of the History Was Obtained from the Chart. In the ER She Was Noted to Be in Acute on Chronic Renal Failure. Has Mildly Elevated Downtrending Troponins and Abnormal liver function tests. Electrocardiogram shows atrial fibrillation with right bundle branch block. Echocardiogram on 3/1/2022 showed ejection fraction of 55% with grade 1 diastolic dysfunction and mild to moderately dilated LA. Cardiology was consulted for elevated troponins. Her proBNP is elevated to 9935. On recent admission in April it was 4091.     All labs, EKG's, diagnostic testing and images as well as cardiac cath, stress testing were reviewed during this encounter. \"    Subjective (Events in last 24 hours):   Pt confused, not oriented. Sitter at bedside, no acute issues. No history or symptoms able to be elicited given patient's mental status.      Objective:   /68   Pulse 111   Temp 96.6 °F (35.9 °C) (Axillary)   Resp 22   Ht 5' 3\" (1.6 m)   Wt 134 lb 0.6 oz (60.8 kg)   SpO2 92%   BMI 23.74 kg/m²      vss  TELEMETRY: afib cvr mostly, runs     Physical Exam:  General Appearance: confused   Cardiovascular: normal rate, irregularly irregular rhythm, normal S1 and S2, no murmurs, rubs, clicks, or gallops, distal pulses intact, no carotid bruits, no JVD  Pulmonary/Chest: clear to auscultation bilaterally- no wheezes, rales or rhonchi, normal air movement, no respiratory distress  Abdomen: soft, non-tender, non-distended, normal bowel sounds, no masses   Extremities: no cyanosis, clubbing or edema, pulse   Skin: warm and dry, no rash or erythema      Medications:    metoprolol tartrate  100 mg Oral BID    sodium chloride  1,000 mL IntraVENous Once    ipratropium-albuterol  1 ampule Inhalation BID    [Held by provider] atorvastatin  20 mg Oral Nightly    calcium elemental  1 tablet Oral Daily    polyvinyl alcohol  1 drop Ophthalmic Daily    donepezil  10 mg Oral Nightly    ferrous sulfate  325 mg Oral Daily with breakfast    fluticasone  2 spray Nasal Daily    memantine  5 mg Oral BID    [Held by provider] pantoprazole  40 mg Oral BID AC    vitamin D  50,000 Units Oral Once per day on Mon Thu    sodium chloride flush  5-40 mL IntraVENous 2 times per day    cefTRIAXone (ROCEPHIN) IV  1,000 mg IntraVENous Q24H    And    azithromycin  500 mg Oral Q24H      sodium chloride 75 mL/hr at 05/06/22 1030    sodium chloride       ipratropium-albuterol, 1 ampule, Q4H PRN  acetaminophen, 650 mg, Q6H PRN  sodium chloride flush, 5-40 mL, PRN  sodium chloride, , PRN  ondansetron, 4 mg, Q8H PRN   Or  ondansetron, 4 mg, Q6H PRN  metoprolol, 5 mg, Q6H PRN        Diagnostics:  EKG:     Echo:     Stress:     Left Heart Cath:     Lab Data:    Cardiac Enzymes:  Recent Labs     05/05/22  0428   CKTOTAL 151*       CBC:   Lab Results   Component Value Date    WBC 5.9 05/05/2022    RBC 3.75 05/05/2022    RBC 2.96 03/17/2022    HGB 10.0 05/05/2022    HCT 34.8 05/05/2022     05/05/2022       CMP:    Lab Results   Component Value Date     05/06/2022    K 4.4 05/06/2022    K 4.5 05/04/2022     05/06/2022    CO2 12 05/06/2022    BUN 60 05/06/2022    CREATININE 2.4 05/06/2022    LABGLOM 23 05/06/2022    GLUCOSE 287 05/06/2022    GLUCOSE 78 03/17/2022    CALCIUM 9.0 05/06/2022       Hepatic Function Panel:    Lab Results   Component Value Date    ALKPHOS 309 05/06/2022     05/06/2022     05/06/2022    PROT 6.2 05/06/2022    BILITOT 2.8 05/06/2022    BILIDIR 1.5 05/06/2022    LABALBU 3.5 05/06/2022    LABALBU 4.8 03/01/2012       Magnesium:    Lab Results   Component Value Date    MG 1.8 04/05/2022       PT/INR:    Lab Results   Component Value Date    INR 1.17 04/05/2022       HgBA1c:    Lab Results   Component Value Date    LABA1C 5.9 04/29/2021       FLP:    Lab Results   Component Value Date    TRIG 70 01/27/2022    HDL 36 01/27/2022    LDLCALC 85 01/27/2022    LABVLDL 23 08/09/2021       TSH:    Lab Results   Component Value Date    TSH 2.280 04/05/2022         Assessment:  afib with RVR--no OAC d/t GIB--started on lopressor, HR better, ucrrently controlled rate   Dementia  Prior CVA  HTN  DARIUS on CKD    Plan:  Patient's HR has been up and down, running 80s-120. Started on lopressor today, has been better throughout the morning. If HR stays controlled, cardiology will see PRN. Call with any furhter questions/concerns. Patient can f/u with Dr Adrienne Mcdaniel in 6-8 weeks.       Electronically signed by Talisha Mott PA-C on 5/6/2022 at 12:29 PM

## 2022-05-06 NOTE — PROGRESS NOTES
[address] [address]  SPEECH THERAPY  [facility] CCU-STEPDOWN 3B  Clinical Swallow Evaluation      SLP Individual Minutes  Time In: 5322  Time Out: 3445  Minutes: 16  Timed Code Treatment Minutes: 0 Minutes       Date: 2022  Patient Name: Ivelisse Marin      CSN: [id number]   : 1940  (78 y.o.)  Gender: female   Referring Physician:  Herson Frank MD  Diagnosis: Acute renal failure (ARF) ([facility] [address]    History of Present Illness/Injury: Patient admitted with above diagnosis. Per chart review, \"The patient is a 78 y.o. female with pmhx of A fib not on anticoagulation due to recent hx GIB, dementia, anemia, HTN, HLD, CKD, CAD, CVA, CHF preserved EF who presents with weakness and decreased appetite over the past couple of days--sent in by her AL. Apparantly she was having SOB with minimal exertion and a supposed 66% SPO2 on room air at AL; now she is 100% on room air. Per chart review she was in the hospital less than a month ago with A fib RVR and fluid overload; based on these notes pt has baseline dementia so most likely her mental status today is not too far from her baseline. In the emergency department Cr up to 2.4 (baseline of 1-1.5), Na 149, CO2 16, lactic acid 7.1 (trended down to 4.1 s/p IVF), BNP 9935 (above her baseline but no obvious signs of fluid overload currently), trop 0.023 trended down to 0.015. Patient admitted for lactic acidosis and DARIUS with AMS. On exam today she is not oriented, unable to follow basic commands but is alert and moves around the bed spontaneously. She has pulled out multiple IVs in the night. Unable to assess a full ROS due to her AMS. She does not seem to be in any obvious distress although her HR is elevated at 140. \" ST consulted to complete clinical swallow evaluation to assess current swallow function and recommend safest level of po intake and/or need for instrumental evaluation d/t concerns for safe po consumption.      Past Medical History:   Diagnosis Date    Atrial fibrillation ([facility] [address]     CAD (coronary artery disease) 8/8/12    non-obstructive 20-30% stenosis LAD and circomflex    Colon polyps 4/25/12    Tubular adenoma Dr. Evelia Jacobo CVA (cerebral vascular accident) [facility]) 2004    Degenerative arthritis of cervical spine 2002    Dementia ([facility] [address] 12/14    Hiatal hernia with gastroesophageal reflux disease and esophagitis and ulcer 3/7/2022    Hyperlipidemia     Hypertension     Psoriasis        SUBJECTIVE:  JEWELS Frye approved completion of clinical swallow evaluation this date. Upon arrival to room, patient awake in bed with live sitter at bedside. Patient with confusion and poor mentation s/t dementia diagnosis. Adequate alertness throughout with nonsensical speech. OBJECTIVE:    Pain:  No pain reported. Current Diet: NPO pending completion of CSE     Respiratory Status:  Room Air    Behavioral Observation:  Alert, Confused and Limited Direction Following    CRANIAL NERVE ASSESSMENT   CN V (Trigeminal) Closes and Opens Mandible WFL    Rotary Jaw Movement Impaired      CN VII (Facial) Cheeks Hold Food out of Sulci Impaired: Bilateral    Opens, Closes/Seals, Protrudes, Retracts Lips WFL    General Appearance WFL    Sensation Not Tested      CN X (Vagus - Pharyngeal) Raises Back of Tongue Not Tested      CN XI (Accessory) Lifts Soft Palate Not Tested      CN XII (Hypoglossal) Elevates Tongue Up and Back Not Tested    Lateralizes Tongue Not Tested    Sensation Not Tested      Other Observations Dentition edentulous    Vocal Quality No Dysphonia/Aphonia    Cough WFL     Patient Evaluated Using:  Thin Liquids and Puree    Oral Phase:  Impaired:  Impaired AP Movement, Reduced Bolus Formation and Impaired Oral Initiation    Pharyngeal Phase: Impaired:  Delayed Swallow, Audible Swallow and Suspected Decreased Airway Protection    Signs and Symptoms of Laryngeal Penetration/Aspiration: Throat Clear    Impressions: Patient presents with moderate-severe oral dysphagia with inability to fully discern potential presence of pharyngeal phase deficits without formal instrumentation. Patient demonstrated evidence of suspected decreased bolus control and manipulation, presence of oral holding with delayed oral initiation, decreased bolus formation, slow ap transit with eventual complete bolus clearance, suspected delayed swallow initiation d/t presence of audible swallow, and concerns for decreased airway protection s/t presence of immediate throat clear following 3/3 trials of thin liquids via straw. No further overt s/s of aspiration observed with small, single sips of thin liquids via cup. Patient benefited from utilization of 1 bite, 1 drink, and then presentation of dry spoon to assist with initiating swallow. Of course pharyngeal dysfunction and totality of airway invasion events cannot be formally assessed without presence of instrumental evaluation; however, further instrumental evaluation is not warranted at this time. Recommend patient consume a puree diet with thin liquids (no straw) with direct 1:1 assistance and implementation of 1 bite, 1 drink, and presentation of dry spoon to assist with initiating of swallow. RN Jen Winn update re: results and recommendations from CSE with verbal receptiveness noted. RECOMMENDATIONS/ASSESSMENT:  Instrumental Evaluation: Instrumental evaluation not indicated at this time.   Diet Recommendations:  Puree with thin liquids (no straw)  Strategies:  Full Upright Position, Small Bite/Sip, No Straw, Pulmonary Monitoring, Medications Crushed with Puree, Direct 1:1 Supervision, Alternate Solids and Liquids, Limit Distractions and Monitor for Fatigue   Rehabilitation Potential: poor    EDUCATION:  Learner: Patient  Education:  Reviewed results and recommendations of this evaluation, Reviewed diet and strategies, Reviewed signs, symptoms and risks of aspiration, Reviewed ST goals and Plan of Care, Education Related to Potential Risks and Complications Due to Impairment/Illness/Injury, Education Related to Prevention of Recurrence of Impairment/Illness/Injury and Education Related to [employer] and Wellness  Evaluation of Education: Needs further instruction, No evidence of learning and Family not present    PLAN:  Skilled SLP intervention on acute care 3-5 x per week or until goals met and/or pt plateaus in function. Specific interventions for next session may include: PO trials . PATIENT GOAL:    Did not state. Will further assess during treatment. SHORT TERM GOALS:  Short-term Goals  Timeframe for Short-term Goals: 2 weeks  Goal 1: Patient will consume a puree diet with thin liquids (no straws) with direct 1:1 assistance with no overt s/s of aspiration and adequate endurance to assist with meeting nutrition/hydration  Goal 2: Patient will complete advanced PO trials as clinically indicated with consistent swallow initiation, no overt s/s of aspiration, and adequate endurance to determine readiness for diet initiation.   Goal 3: Monitor need for MBS    LONG TERM GOALS:  No long term goals recommended d/t short FAY Mathew ([facility] [address] [place]

## 2022-05-07 NOTE — PROGRESS NOTES
Gi Progress Note  Subjective:  CC:  Elevated liver tests  Nursing reports: Restless    Diet:  ADULT DIET; Dysphagia - Pureed;  No Drinking Straws      Medications:  Scheduled Meds:   piperacillin-tazobactam  3,375 mg IntraVENous Q12H    metoprolol tartrate  100 mg Oral BID    sodium chloride  1,000 mL IntraVENous Once    ipratropium-albuterol  1 ampule Inhalation BID    insulin lispro  0-6 Units SubCUTAneous TID WC    insulin lispro  0-3 Units SubCUTAneous Nightly    pantoprazole  40 mg IntraVENous Daily    [Held by provider] atorvastatin  20 mg Oral Nightly    calcium elemental  1 tablet Oral Daily    polyvinyl alcohol  1 drop Ophthalmic Daily    donepezil  10 mg Oral Nightly    fluticasone  2 spray Nasal Daily    memantine  5 mg Oral BID    vitamin D  50,000 Units Oral Once per day on Mon Thu    sodium chloride flush  5-40 mL IntraVENous 2 times per day    azithromycin  500 mg Oral Q24H     Continuous Infusions:   dextrose      dextrose 5 % and 0.9 % NaCl 125 mL/hr at 05/07/22 0544    sodium chloride       PRN Meds:ipratropium-albuterol, glucagon (rDNA), dextrose, glucose, dextrose bolus **OR** dextrose bolus, acetaminophen, sodium chloride flush, sodium chloride, ondansetron **OR** ondansetron, metoprolol    Objective:    Lab Results   Component Value Date    WBC 5.9 05/05/2022    RBC 3.75 05/05/2022    RBC 2.96 03/17/2022    HGB 10.0 05/05/2022    HCT 34.8 05/05/2022    MCV 92.8 05/05/2022    MCH 26.7 05/05/2022    MCHC 28.7 05/05/2022    RDW 16.0 03/17/2022     05/05/2022    MPV 12.6 05/05/2022     Lab Results   Component Value Date     05/07/2022    K 4.7 05/07/2022    K 4.5 05/04/2022     05/07/2022    CO2 12 05/07/2022    BUN 68 05/07/2022    CREATININE 2.7 05/07/2022    GLUCOSE 170 05/07/2022    GLUCOSE 78 03/17/2022    CALCIUM 8.6 05/07/2022     Lab Results   Component Value Date    CALCIUM 8.6 05/07/2022     No results found for: [facility]  Lab Results   Component Value Date    MG 1.8 04/05/2022     Lab Results   Component Value Date    PHOS 4.4 05/05/2022     No results found for: BNP  Lab Results   Component Value Date    ALKPHOS 304 05/07/2022    ALT 1,262 05/07/2022    AST 2,762 05/07/2022    PROT 6.0 05/07/2022    BILITOT 3.2 05/07/2022    BILIDIR 2.0 05/07/2022    LABALBU 3.2 05/07/2022    LABALBU 4.8 03/01/2012     Lab Results   Component Value Date    LACTA 7.9 05/07/2022     Lab Results   Component Value Date    AMYLASE 134 06/29/2020     Lab Results   Component Value Date    LIPASE 144.4 05/05/2022     Lab Results   Component Value Date    CHOL 135 01/27/2022    TRIG 70 01/27/2022    HDL 36 01/27/2022    LDLCALC 85 01/27/2022     Recent Labs     05/06/22 2052 05/07/22  0632 05/07/22  0754   POCGLU 118* 151* 160*     Recent Labs     05/05/22  0428   CKTOTAL 151*     Lab Results   Component Value Date    LABA1C 5.9 04/29/2021     Lab Results   Component Value Date    INR 2.31 05/06/2022     No results found for: PHART, PO2ART, OPK7RWV, YSQ6OVP, BEART      Physical Exam:    Vitals: BP (!) 112/92   Pulse 124   Temp 97.6 °F (36.4 °C) (Axillary)   Resp 22   Ht 5' 3\" (1.6 m)   Wt 134 lb 0.6 oz (60.8 kg)   SpO2 98%   BMI 23.74 kg/m²   24 hour intake/output:    Intake/Output Summary (Last 24 hours) at 5/7/2022 1054  Last data filed at 5/6/2022 2000  Gross per 24 hour   Intake 130 ml   Output 100 ml   Net 30 ml     Last 3 weights:   Wt Readings from Last 3 Encounters:   05/06/22 134 lb 0.6 oz (60.8 kg)   04/21/22 136 lb 2 oz (61.7 kg)   04/12/22 129 lb 9.6 oz (58.8 kg)         General appearance -  in mild distress  Abdomen - soft, nontender, nondistended, no masses or organomegaly  Neurological - not oriented  Assessment:  Patient Active Problem List   Diagnosis    CVA (cerebral vascular accident)-2002    CAD (coronary artery disease)-mild NONobstructive max 30% ostail, LCX, mid lad and diffuse mild luminal irregularites-per cath 08/08/12    S/P cardiac cath 2012- mild nonobstructive CAD    Essential hypertension    Hyperlipidemia    D[facility] without behavioral disturbance [facility] [address]    DARIUS (acute kidney injury) ([facility] [address]    Anemia    New onset atrial fibrillation (HCC) w/ RVR now sinus Bradycardia    Hiatal hernia with gastroesophageal reflux disease and esophagitis and ulcer    Garrison's esophagus with esophagitis    Atrial fibrillation with rapid ventricular response (HCC)    Paroxysmal atrial fibrillation with RVR (HCC)    Acute diastolic congestive heart failure (HCC)    Acute renal failure (ARF) (HCC)    Hypernatremia    Pneumonia    High anion gap metabolic acidosis    Acute-on-chronic kidney injury ([facility] [address]    Lactic acidosis    Hepatic dysfunction     A:  1. Elevated liver transaminases  2. Sepsis  3. DARIUS  4. Dementia  5. Afib      Plan:  1. Elevated liver tests:  levels rising today. Liver US and dopplers unremarkable. Hepatitis viral tests negative. Renal service questions hypoperfusion; The picture looks more like shock liver at this time. Drug induced liver injury roz likely. Continue to monitor liver tests with supportive care. 2.  DARIUS:  Per Renal service  3. Sepsis:  ID following  4. Prior GI bleed;  Holding anti coagulation  5. Dementia  6.   Afib  Total time 35 minutes    Isabella Hoffmann MD  [facility]

## 2022-05-07 NOTE — PROGRESS NOTES
INTERNAL MEDICINE SPECIALTIES  Progress Note For Dr Maddie Francois       Patient: Umair Tanner  YOB: 1940  Date of Service: 5/7/2022  MRN: [id number]   Acct:  [de-identified]   Primary Care Physician: Billy Smith MD    SUBJECTIVE: Had issues with pain during the night , was hypoglycemic earlier    Home Medications:   No current facility-administered medications on file prior to encounter.      Current Outpatient Medications on File Prior to Encounter   Medication Sig Dispense Refill    acetaminophen (AMINOFEN) 325 MG tablet Take 2 tablets by mouth every 6 hours as needed for Pain or Fever (may cut or crush medications) (Patient not taking: Reported on 5/5/2022) 120 tablet 0    metoprolol tartrate (LOPRESSOR) 50 MG tablet Take 1 tablet by mouth 2 times daily 60 tablet 5    Elastic Bandages & Supports (MEDICAL COMPRESSION STOCKINGS) MISC 1 each by Does not apply route daily as needed (Bite to the lower leg daily for swelling) 2 each 1    pantoprazole (PROTONIX) 40 MG tablet Take 1 tablet by mouth 2 times daily (before meals) 60 tablet 3    loperamide (IMODIUM) 2 MG capsule Take 2 mg by mouth daily as needed for Diarrhea      ferrous sulfate (IRON 325) 325 (65 Fe) MG tablet Take 1 tablet by mouth daily (with breakfast) 90 tablet 1    lisinopril (PRINIVIL;ZESTRIL) 10 MG tablet Take 1 tablet by mouth once daily 90 tablet 1    donepezil (ARICEPT) 10 MG tablet Take 1 tablet by mouth nightly 90 tablet 1    vitamin D (ERGOCALCIFEROL) 1.25 MG (35401 UT) CAPS capsule TAKE 1 CAPSULE BY MOUTH TWICE A WEEK (Patient taking differently: Take 50,000 Units by mouth twice a week On Mondays and Thursdays) 24 capsule 1    atorvastatin (LIPITOR) 20 MG tablet Take 1 tablet by mouth once daily 90 tablet 1    memantine (NAMENDA) 10 MG tablet Take 1 tablet by mouth twice daily 180 tablet 1    calcium carbonate 600 MG TABS tablet Take 1 tablet by mouth daily      fluticasone (FLONASE) 50 MCG/ACT nasal spray 2 sprays by Nasal route daily 1 Bottle 3    Carboxymethylcell-Hypromellose (GENTEAL OP) Apply 1 drop to eye daily 1 drop each eye daily           Scheduled Meds:   metoprolol tartrate  100 mg Oral BID    sodium chloride  1,000 mL IntraVENous Once    ipratropium-albuterol  1 ampule Inhalation BID    piperacillin-tazobactam  2,250 mg IntraVENous Q8H    insulin lispro  0-6 Units SubCUTAneous TID WC    insulin lispro  0-3 Units SubCUTAneous Nightly    pantoprazole  40 mg IntraVENous Daily    [Held by provider] atorvastatin  20 mg Oral Nightly    calcium elemental  1 tablet Oral Daily    polyvinyl alcohol  1 drop Ophthalmic Daily    donepezil  10 mg Oral Nightly    fluticasone  2 spray Nasal Daily    memantine  5 mg Oral BID    vitamin D  50,000 Units Oral Once per day on Mon Thu    sodium chloride flush  5-40 mL IntraVENous 2 times per day    azithromycin  500 mg Oral Q24H     Continuous Infusions:   dextrose      dextrose 5 % and 0.9 % NaCl 125 mL/hr at 05/07/22 0544    sodium chloride       PRN Meds:ipratropium-albuterol, glucagon (rDNA), dextrose, glucose, dextrose bolus **OR** dextrose bolus, acetaminophen, sodium chloride flush, sodium chloride, ondansetron **OR** ondansetron, metoprolol        Allergies:  Morphine    OBJECTIVE:    Vitals:   Vitals:    05/07/22 0601   BP:    Pulse:    Resp: 18   Temp:    SpO2:       BMI: Body mass index is 23.74 kg/m².     PHYSICAL EXAMINATION:               General appearance - alert, ill appearing, and in no distress  Eyes - pupils equal and reactive, extraocular eye movements intact  Ears - bilateral TM's and external ear canals normal  Nose - normal and patent, no erythema, discharge or polyps  Mouth - mucous membranes moist, pharynx normal without lesions  Neck - supple, no significant adenopathy  Lymphatics - no palpable lymphadenopathy, no hepatosplenomegaly  Chest - clear to auscultation, no wheezes, rales or rhonchi, symmetric air entry  Distant Breath Sounds: No  Heart - A fib rate of 140, normal S1, S2, no murmurs, rubs, clicks or gallops  Abdomen - soft, nontender, nondistended, no masses or organomegaly  Obese: No; Protuberant: No  Neurological - alert, not oriented, does not follow basic commands, agitated   Musculoskeletal - no joint tenderness, deformity or swelling  Extremities - peripheral pulses normal, no pedal edema, no clubbing or cyanosis  Skin - normal coloration and turgor, no rashes, no suspicious skin lesions noted      Review of Labs and Diagnostic Testing:    Recent Results (from the past 24 hour(s))   Lactate, Sepsis    Collection Time: 05/06/22  8:22 AM   Result Value Ref Range    Lactic Acid, Sepsis 9.0 (H) 0.5 - 1.9 mmol/L   Lactate, Sepsis    Collection Time: 05/06/22 10:55 AM   Result Value Ref Range    Lactic Acid, Sepsis 10.9 (H) 0.5 - 1.9 mmol/L   Blood gas, venous    Collection Time: 05/06/22 11:13 AM   Result Value Ref Range    PH MIXED 7.48 (H) 7.31 - 7.41    PCO2, MIXED VENOUS 13 (L) 41 - 51 mmhg    PO2, Mixed 41 (H) 25 - 40 mmhg    HCO3, Mixed 9 (L) 23 - 28 mmol/l    Base Exc, Mixed -12.8 (L) -2.0 - 3.0 mmol/l    O2 Sat, Mixed 82 %    FIO2, MIXED VENOUS 21     COLLECTED BY: [id number]     DEVICE Room Air    Protime-INR    Collection Time: 05/06/22  2:07 PM   Result Value Ref Range    INR 2.31 (H) 0.85 - 1.13   Lactic Acid    Collection Time: 05/06/22  7:45 PM   Result Value Ref Range    Lactic Acid 12.1 (H) 0.5 - 2.0 mmol/L   POCT glucose    Collection Time: 05/06/22  7:55 PM   Result Value Ref Range    POC Glucose 49 (L) 70 - 108 mg/dl   POCT glucose    Collection Time: 05/06/22  8:20 PM   Result Value Ref Range    POC Glucose 111 (H) 70 - 108 mg/dl   POCT glucose    Collection Time: 05/06/22  8:52 PM   Result Value Ref Range    POC Glucose 118 (H) 70 - 108 mg/dl   Lactic Acid    Collection Time: 05/07/22  1:06 AM   Result Value Ref Range    Lactic Acid 10.8 (H) 0.5 - 2.0 mmol/L   Ammonia    Collection Time: 05/07/22  1:06 AM Result Value Ref Range    Ammonia 29 11 - 60 umol/L   POCT glucose    Collection Time: 05/07/22  6:32 AM   Result Value Ref Range    POC Glucose 151 (H) 70 - 108 mg/dl       Radiology:     ECHO Limited    Result Date: 5/5/2022  Transthoracic Echocardiography Report (TTE)  Demographics   Patient Name    Jake Reddy Gender               Female   MR #            [id number]      Race                 Black                                  Ethnicity   Account #       [de-identified]      Room Number          6845   Accession       [id number]     Date of Study        05/05/2022  Number   Date of Birth   1940     Referring Physician  Paty Desouza MD   Age             78 year(s)     Sonographer          Shamar Glover [facility],                                                      RVT                                  Interpreting         Zainab Garcia MD                                 Physician  Procedure Type of Study   TTE procedure:ECHOCARDIOGRAM LIMITED. Procedure Date Date: 05/05/2022 Start: 11:11 AM Study Location: Bedside Technical Quality: Limited visualization Indications:Elevated troponin and Elevated BNP. Additional Medical History:Coronary artery disease, Anemia, CVA, Hypertenison, Hyperlipidemia, Atrial fibrillation. Patient Status: Routine Height: 63 inches Weight: 136 pounds BSA: 1.64 m^2 BMI: 24.09 kg/m^2 BP: 136/92 mmHg  Conclusions   Summary  Technically difficult examination due to underlying tachycardia. Left ventricular size and systolic function appears normal. Ejection  fraction was estimated at 50%. LV wall thickness is within normal limits. The right ventricular size was normal with normal systolic function and  wall thickness. Severely dilated left atrium. Moderately enlarged right atrium size.    Signature   ----------------------------------------------------------------  Electronically signed by Zainab Garcia MD (Interpreting  physician) on 05/05/2022 at 02:21 PM  ----------------------------------------------------------------   Findings   Mitral Valve  The mitral valve structure was normal with normal leaflet separation. Aortic Valve  The aortic valve was trileaflet with normal thickness and cuspal  separation. Tricuspid Valve  The tricuspid valve structure was normal with normal leaflet separation. Pulmonic Valve  The pulmonic valve was not well visualized . Left Atrium  Severely dilated left atrium. Left Ventricle  Left ventricular size and systolic function appears normal. Ejection  fraction was estimated at 50%. LV wall thickness is within normal limits. Right Atrium  Moderately enlarged right atrium size. Right Ventricle  The right ventricular size was normal with normal systolic function and  wall thickness. Pericardial Effusion  The pericardium was normal in appearance with no evidence of a pericardial  effusion. Pleural Effusion  No evidence of pleural effusion. Aorta / Great Vessels  -Aortic root dimension within normal limits. -IVC size is within normal limits with normal respiratory phasic changes. M-Mode/2D Measurements & Calculations   LV Diastolic      LV Systolic Dimension: 3.3 cm    LA Dimension: 3.8 cmLA  Dimension: 4.2 cm LV Volume Diastolic: 75.2 ml     Area: 32.5 cm^2  LV FS:21.4 %      LV Volume Systolic: 72.3 ml  LV PW Diastolic:  LV EDV/LV EDV Index: 78.6 ml/48  0.9 cm            m^2LV ESV/LV ESV Index: 72.0  Septum Diastolic: UI/32 m^2                        RV Diastolic Dimension:  0.8 cm            EF Calculated: 43.9 %            2 cm                                                      LA volume/Index: 124.3                                                     ml /76m^2  [email or web address] [id number]    CT ABDOMEN PELVIS WO CONTRAST Additional Contrast? pneumonia the appropriate clinical setting. Cardiomegaly and a small pericardial effusion is partially visualized. 2. The noncontrast appearance of the liver is unremarkable. No biliary ductal dilatation is observed. 3. Colonic diverticulosis without diverticulitis. No acute findings are noted in the abdomen/pelvis. Multiple chronic findings are discussed. **This report has been created using voice recognition software. It may contain minor errors which are inherent in voice recognition technology. ** Final report electronically signed by Dr Antoinette Ni on 5/5/2022 11:19 AM    US RENAL COMPLETE    Result Date: 5/5/2022  RENAL ULTRASOUND COMPARISON: None. FINDINGS: Renal cortical echogenicity is normal bilaterally. Right kidney measures 9.7 cm. Left kidney measures 9.1 cm. There is no renal stone or hydronephrosis bilaterally. Multiple renal cysts are noted, for example in the right kidney is a 2.4 x 1.7 x 1.5 cm cyst on image 21. Urinary bladder is decompressed with a Arguelles catheter. 1. No acute disease in the bilateral kidneys. No renal stone or hydronephrosis. This document has been electronically signed by: Daniel Bhatt M.D. on 05/05/2022 05:08 AM    US LIVER    Result Date: 5/5/2022  ULTRASOUND ABDOMEN LIMITED COMPARISON: None. FINDINGS: The gallbladder is absent. Mild prominence of the pancreatic duct is noted without focal lesion. There are no focal liver lesions. Liver is enlarged and measures 18.7 cm on image 28. Common bile duct measures 6-7 mm in caliber by my measurements. There are no fluid collections. 1. Mild hepatomegaly. No focal liver lesion. 2. Status post cholecystectomy. This document has been electronically signed by: Daniel Bhatt M.D. on 05/05/2022 05:04 AM    XR CHEST PORTABLE    Result Date: 5/4/2022  Single view of the chest Comparison:  CR,SR - XR CHEST PORTABLE - 04/05/2022 06:28 PM EDT Findings: Mild to moderate patchy opacities bilateral lung bases, increased.  This could represent aspiration, pneumonia or edema versus atelectasis. Cardiomegaly present. Impression: Cardiomegaly present. Mild to moderate patchy opacities bilateral lung bases, increased This document has been electronically signed by: Gio Hines MD on 05/04/2022 11:42 PM    CT ABDOMEN A CONTRAST :    1. Small bilateral pleural effusions and bilateral lower lobe airspace opacities, left greater than right, can indicate pneumonia the appropriate clinical setting. Cardiomegaly and a small pericardial effusion is partially visualized.       2. The noncontrast appearance of the liver is unremarkable. No biliary ductal dilatation is observed.       3. Colonic diverticulosis without diverticulitis. No acute findings are noted in the abdomen/pelvis. Multiple chronic findings are discussed. ASSESMENT:      Principal Problem:    Acute renal failure (ARF) (HCC)  Active Problems:    Hypernatremia    Pneumonia    High anion gap metabolic acidosis    Acute-on-chronic kidney injury ([facility] [address]    Lactic acidosis    Hepatic dysfunction  Resolved Problems:    * No resolved hospital problems. *      PLAN:       Assessment/Plan:     1. Primary respiratory alkalosis with high anion gap metabolic acidosis from Lactic acidosis / DARIUS   a. Cont IVF and Trend lactic acid, lactic acid etiology unclear, ?hypoperfusion ,    b. CT abdomen WO contrast   Unrevealing for structural hepatic pathology. Trend LFTs, f/u labs    2. Pneumonia  Cannot rule out aspiration  a. On Zosyn, ID service following, speech therapist following check procalcitonin. b.  use supplemental O2 as needed,   c. Strep pneumo and legionella antigens negative  3. A fib RVR, not on anticoag due to recent hx GIB  a. Cardiology on board  b.  continue IV beta blockers and oral calcium channel blockers as able   c. No anticoag due to hx GIB  d. Cont tele monitoring   4. CAD with elevated trops   a. Trops stable  b. No further workup per cardio  5.  DARIUS on CKD with metabolic acidosis   a. Nephrology following  b. S/p   Bicarb drip, stopped due to respiratory alkalosis. Await labs hypernatremia  has improved. Hypoglycemia - fluids switched to D5NS  c. Urine eosinophils are positive; UA negative for infection   6. Hx Dementia  a. Noted   7. HTN  a. Lisinopril on hold due to DARIUS   b. BP stable now   8. HLD  a. Holding Statin due to  transaminitis  9. Hx CVA  a. Not on ASA/Plavix due to GIB hx   10. PT/OT  11. 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 Follow-up ROSS, F actin antibodies, ceruloplasmin, mitochondrial antibodies. Ferritin levels are not suggestive. Statin may have aggravated the situation. GI Service following. Check ammonia. 12. DVT prophylaxis  a.  SCDs        Appreciate Palliative  Service input     Time spent 45 mins    DVT prophylaxis: [] Lovenox                                 [] SCDs                                 [] SQ Heparin                                 [] Encourage ambulation, low risk for DVT, no chemical or mechanical prophylaxis necessary              [] Already on Anticoagulation                Anticipated Disposition upon discharge: [] Home                                                                         [] Home with Home Health                                                                         [] [facility]                                                                         [] [address]          Electronically signed by Estephanie Spicer MD on 5/7/2022 at 7:00 AM

## 2022-05-07 NOTE — PROGRESS NOTES
MSG to Dr Ethan Pacheco: Patient noted with only 50 ml of urine out put in 6 hours. She is on IVF @125- D5NS.

## 2022-05-07 NOTE — RT PROTOCOL NOTE
RT Nebulizer Bronchodilator Protocol Note    There is a bronchodilator order in the chart from a provider indicating to follow the RT Bronchodilator Protocol and there is an Initiate RT Bronchodilator Protocol order as well (see protocol at bottom of note). CXR Findings:  No results found. The findings from the last RT Protocol Assessment were as follows:  Smoking: None or smoker <15 pack years  Respiratory Pattern: Regular pattern and RR 12-20 bpm  Breath Sounds: Slightly diminished and/or crackles  Cough: Strong, productive  Indication for Bronchodilator Therapy: Decreased or absent breath sounds  Bronchodilator Assessment Score: 3    Aerosolized bronchodilator medication orders have been revised according to the RT Nebulizer Bronchodilator Protocol below. Respiratory Therapist to perform RT Therapy Protocol Assessment initially then follow the protocol. Repeat RT Therapy Protocol Assessment PRN for score 0-3 or on second treatment, BID, and PRN for scores above 3. No Indications - adjust the frequency to every 6 hours PRN wheezing or bronchospasm, if no treatments needed after 48 hours then discontinue using Per Protocol order mode. If indication present, adjust the RT bronchodilator orders based on the Bronchodilator Assessment Score as indicated below. If a patient is on this medication at home then do not decrease Frequency below that used at home. 0-3 - enter or revise RT bronchodilator order(s) to equivalent RT Bronchodilator order with Frequency of every 4 hours PRN for wheezing or increased work of breathing using Per Protocol order mode. 4-6 - enter or revise RT Bronchodilator order(s) to two equivalent RT bronchodilator orders with one order with BID Frequency and one order with Frequency of every 4 hours PRN wheezing or increased work of breathing using Per Protocol order mode.          7-10 - enter or revise RT Bronchodilator order(s) to two equivalent RT bronchodilator orders with one order with TID Frequency and one order with Frequency of every 4 hours PRN wheezing or increased work of breathing using Per Protocol order mode. 11-13 - enter or revise RT Bronchodilator order(s) to one equivalent RT bronchodilator order with QID Frequency and an Albuterol order with Frequency of every 4 hours PRN wheezing or increased work of breathing using Per Protocol order mode. Greater than 13 - enter or revise RT Bronchodilator order(s) to one equivalent RT bronchodilator order with every 4 hours Frequency and an Albuterol order with Frequency of every 2 hours PRN wheezing or increased work of breathing using Per Protocol order mode. RT to enter RT Home Evaluation for COPD & MDI Assessment order using Per Protocol order mode.     Electronically signed by Naheed Casillas RCP on 5/7/2022 at 3:13 PM

## 2022-05-07 NOTE — PLAN OF CARE
Problem: Discharge Planning  Goal: Discharge to home or other facility with appropriate resources  Outcome: Progressing     Problem: Safety - Adult  Goal: Free from fall injury  Outcome: Progressing  Note: Bed locked & in low position, call light in reach, side-rails up x2, bed/chair alarm utilized, non-slip socks on when ambulating, reminded patient to use call light to call for assistance. Problem: ABCDS Injury Assessment  Goal: Absence of physical injury  Outcome: Progressing  Flowsheets (Taken 5/7/2022 0146)  Absence of Physical Injury: Implement safety measures based on patient assessment     Problem: Skin/Tissue Integrity  Goal: Absence of new skin breakdown  Description: 1. Monitor for areas of redness and/or skin breakdown  2. Assess vascular access sites hourly  3. Every 4-6 hours minimum:  Change oxygen saturation probe site  4. Every 4-6 hours:  If on nasal continuous positive airway pressure, respiratory therapy assess nares and determine need for appliance change or resting period. Outcome: Progressing     Problem: Chronic Conditions and Co-morbidities  Goal: Patient's chronic conditions and co-morbidity symptoms are monitored and maintained or improved  Outcome: Progressing  Flowsheets (Taken 5/6/2022 2000)  Care Plan - Patient's Chronic Conditions and Co-Morbidity Symptoms are Monitored and Maintained or Improved: Monitor and assess patient's chronic conditions and comorbid symptoms for stability, deterioration, or improvement  Note: Ongoing assessment & interventions provided throughout shift. Skin assessments provided. Encouraging/assisting patient to turn as needed. Problem: Respiratory - Adult  Goal: Achieves optimal ventilation and oxygenation  Outcome: Progressing     Problem: Pain  Goal: Verbalizes/displays adequate comfort level or baseline comfort level  Outcome: Progressing  Note: No signs of pain this shift. Comfort measure provided.  Sitter at the bedside   Care plan reviewed with patient. Patient verbalizes understanding of the care plan and contributed to goal setting.

## 2022-05-07 NOTE — PROGRESS NOTES
MSG to Dr Manish Angeles;  [phone], Patient remains lethargic. Unable to take her po meds. BS: 49, D10% administered per protocol and BS: 118 now. She remains on [employer] with very little urine out put. Last lactic : 12.1. Patient has CHF.

## 2022-05-07 NOTE — PLAN OF CARE
Problem: Respiratory - Adult  Goal: Achieves optimal ventilation and oxygenation  5/7/2022 1511 by Marium Covington RCP  Outcome: Completed     Problem: Respiratory - Adult  Goal: Clear lung sounds  Outcome: Progressing  Note: Room air SpO2 WNL.  BID bronchodilator to maximize aeration

## 2022-05-07 NOTE — PROGRESS NOTES
Pharmacy Renal Adjustment    Spencer Pittman is a 78 y.o. female. Pharmacy renally adjust the following medications per P&T approved policy: zosyn    Height:   Ht Readings from Last 1 Encounters:   05/04/22 5' 3\" (1.6 m)     Weight:  Wt Readings from Last 1 Encounters:   05/06/22 134 lb 0.6 oz (60.8 kg)     Recent Labs     05/06/22  0359 05/07/22  0755   BUN 60* 68*   CREATININE 2.4* 2.7*     Estimated Creatinine Clearance: 13 mL/min (A) (based on SCr of 2.7 mg/dL (H)). Calculated CrCl:    Assessment:  DARIUS on CKD    Plan:   Zosyn 2.25 grams IV q8h over 4 hours ordered. Will adjust dose to zosyn 3.375 grams q12h over 4 hours for CrCl less than 20 ml/min.     Fanny Poole, PharmD, BCPS   5/7/2022  9:46 AM

## 2022-05-07 NOTE — PROGRESS NOTES
[facility]         Renal Inpatient Follow-Up note         5/7/2022 7:53 AM    Pt Name:   Theola Babinski  YOB: 1940  Attending:   Ida Mendes MD    Chief Complaint : Theola Babinski is a 78 y.o. female being followed by nephrology for DARIUS/CKD/metabolic acidosis    Interval History :   Patient seen and examined by me. No distress  Resting comfortably not communicative  Cannot accurately assess history or review of systems     Scheduled Medications :    metoprolol tartrate  100 mg Oral BID    sodium chloride  1,000 mL IntraVENous Once    ipratropium-albuterol  1 ampule Inhalation BID    piperacillin-tazobactam  2,250 mg IntraVENous Q8H    insulin lispro  0-6 Units SubCUTAneous TID WC    insulin lispro  0-3 Units SubCUTAneous Nightly    pantoprazole  40 mg IntraVENous Daily    [Held by provider] atorvastatin  20 mg Oral Nightly    calcium elemental  1 tablet Oral Daily    polyvinyl alcohol  1 drop Ophthalmic Daily    donepezil  10 mg Oral Nightly    fluticasone  2 spray Nasal Daily    memantine  5 mg Oral BID    vitamin D  50,000 Units Oral Once per day on Mon Thu    sodium chloride flush  5-40 mL IntraVENous 2 times per day    azithromycin  500 mg Oral Q24H      dextrose      dextrose 5 % and 0.9 % NaCl 125 mL/hr at 05/07/22 0544    sodium chloride         Vitals :  /83   Pulse 67   Temp 98.6 °F (37 °C) (Rectal)   Resp 18   Ht 5' 3\" (1.6 m)   Wt 134 lb 0.6 oz (60.8 kg)   SpO2 100%   BMI 23.74 kg/m²     24HR INTAKE/OUTPUT:      Intake/Output Summary (Last 24 hours) at 5/7/2022 0753  Last data filed at 5/6/2022 2000  Gross per 24 hour   Intake 280 ml   Output 100 ml   Net 180 ml     Last 3 weights  Wt Readings from Last 3 Encounters:   05/06/22 134 lb 0.6 oz (60.8 kg)   04/21/22 136 lb 2 oz (61.7 kg)   04/12/22 129 lb 9.6 oz (58.8 kg)           Physical Exam :  General Appearance:  Well developed.  No distress  Mouth/Throat:  Oral mucosa moist  Neck:  Supple, no JVD  Lungs:  Breath sounds: clear  Heart[de-identified]  S1,S2 heard  Abdomen:  Soft, non - tender  Musculoskeletal:  Edema -no significant edema noted         Last 3 CBC   Recent Labs     05/04/22 2300 05/05/22  0428   WBC 5.9 5.9   RBC 3.74* 3.75*   HGB 10.1* 10.0*   HCT 35.2* 34.8*    144     Last 3 CMP  Recent Labs     05/04/22  2300 05/04/22  2300 05/05/22  0428 05/05/22  0700 05/06/22  0359   *   < > 148* 146* 140   K 4.5  --  4.2  --  4.4     --  111  --  103   CO2 16*  --  17*  --  12*   BUN 60*  --  62*  --  60*   CREATININE 2.4*  --  2.2*  --  2.4*   CALCIUM 9.7  --  9.5  --  9.0   LABALBU 4.1  --  3.8  --  3.5   BILITOT 3.1*  --  2.9*  --  2.8*    < > = values in this interval not displayed. ASSESSMENT / Plan   1 Renal -acute kidney injury on chronic kidney disease stage III thought to be due to hypoperfusion  ? Urine lites show more prerenal etiology. ? No new labs available at this time we will check labs now  ? Continue IV fluids    2 Electrolytes -hypernatremia appears to be getting better  3 Acid-base status patient has some high anion gap metabolic acidosis and elevated lactic acidosis, check a venous pH and repeat lactic acid this morning  4 Hx of diabetes mellitus  5 Vance hypertension running well  6 Meds reviewed     Dr. Lucretia Naidu MD, M,D.  [facility].

## 2022-05-08 NOTE — PROGRESS NOTES
Messaged Dr. Beata Nickerson about changing PO tylenol to supp do to not taking PO medications. See MAR for changes.

## 2022-05-08 NOTE — PROGRESS NOTES
[facility]  Progress Note For Dr Daxa Gary       Patient: Anamaria March  YOB: 1940  Date of Service: 5/8/2022  MRN: [id number]   Acct:  [de-identified]   Primary Care Physician: Noe Navarrete MD    SUBJECTIVE:  Overall remains lethargic. Home Medications:   No current facility-administered medications on file prior to encounter.      Current Outpatient Medications on File Prior to Encounter   Medication Sig Dispense Refill    acetaminophen (AMINOFEN) 325 MG tablet Take 2 tablets by mouth every 6 hours as needed for Pain or Fever (may cut or crush medications) (Patient not taking: Reported on 5/5/2022) 120 tablet 0    metoprolol tartrate (LOPRESSOR) 50 MG tablet Take 1 tablet by mouth 2 times daily 60 tablet 5    Elastic Bandages & Supports (MEDICAL COMPRESSION STOCKINGS) MISC 1 each by Does not apply route daily as needed (Bite to the lower leg daily for swelling) 2 each 1    pantoprazole (PROTONIX) 40 MG tablet Take 1 tablet by mouth 2 times daily (before meals) 60 tablet 3    loperamide (IMODIUM) 2 MG capsule Take 2 mg by mouth daily as needed for Diarrhea      ferrous sulfate (IRON 325) 325 (65 Fe) MG tablet Take 1 tablet by mouth daily (with breakfast) 90 tablet 1    lisinopril (PRINIVIL;ZESTRIL) 10 MG tablet Take 1 tablet by mouth once daily 90 tablet 1    donepezil (ARICEPT) 10 MG tablet Take 1 tablet by mouth nightly 90 tablet 1    vitamin D (ERGOCALCIFEROL) 1.25 MG (96317 UT) CAPS capsule TAKE 1 CAPSULE BY MOUTH TWICE A WEEK (Patient taking differently: Take 50,000 Units by mouth twice a week On Mondays and Thursdays) 24 capsule 1    atorvastatin (LIPITOR) 20 MG tablet Take 1 tablet by mouth once daily 90 tablet 1    memantine (NAMENDA) 10 MG tablet Take 1 tablet by mouth twice daily 180 tablet 1    calcium carbonate 600 MG TABS tablet Take 1 tablet by mouth daily      fluticasone (FLONASE) 50 MCG/ACT nasal spray 2 sprays by Nasal route daily 1 Bottle 3    Carboxymethylcell-Hypromellose (GENTEAL OP) Apply 1 drop to eye daily 1 drop each eye daily           Scheduled Meds:   pantoprazole  40 mg IntraVENous BID    piperacillin-tazobactam  3,375 mg IntraVENous Q12H    metoprolol tartrate  100 mg Oral BID    sodium chloride  1,000 mL IntraVENous Once    insulin lispro  0-6 Units SubCUTAneous TID WC    insulin lispro  0-3 Units SubCUTAneous Nightly    [Held by provider] atorvastatin  20 mg Oral Nightly    calcium elemental  1 tablet Oral Daily    polyvinyl alcohol  1 drop Ophthalmic Daily    donepezil  10 mg Oral Nightly    fluticasone  2 spray Nasal Daily    memantine  5 mg Oral BID    vitamin D  50,000 Units Oral Once per day on Mon Thu    sodium chloride flush  5-40 mL IntraVENous 2 times per day     Continuous Infusions:   sodium chloride      sodium bicarbonate infusion 100 mL/hr at 05/08/22 1342    dextrose      sodium chloride       PRN Meds:acetaminophen, sodium chloride, ipratropium-albuterol, glucagon (rDNA), dextrose, glucose, dextrose bolus **OR** dextrose bolus, acetaminophen, sodium chloride flush, sodium chloride, ondansetron **OR** ondansetron, metoprolol        Allergies:  Morphine    OBJECTIVE:    Vitals:   Vitals:    05/08/22 1111   BP: 138/87   Pulse: 63   Resp: 26   Temp: 97.4 °F (36.3 °C)   SpO2: 97%      BMI: Body mass index is 24.29 kg/m².     PHYSICAL EXAMINATION:               General appearance - alert, ill appearing, and in no distress  Eyes - pupils equal and reactive, extraocular eye movements intact  Ears - bilateral TM's and external ear canals normal  Nose - normal and patent, no erythema, discharge or polyps  Mouth - mucous membranes moist, pharynx normal without lesions  Neck - supple, no significant adenopathy  Lymphatics - no palpable lymphadenopathy, no hepatosplenomegaly  Chest - clear to auscultation, no wheezes, rales or rhonchi, symmetric air entry  Distant Breath Sounds: No  Heart - A fib rate of 140, normal S1, S2, no murmurs, rubs, clicks or gallops  Abdomen - soft, nontender, nondistended, no masses or organomegaly  Obese: No; Protuberant: No  Neurological -lethargic today, not oriented, does not follow basic commands, agitated   Musculoskeletal - no joint tenderness, deformity or swelling  Extremities - peripheral pulses normal, no pedal edema, no clubbing or cyanosis  Skin - normal coloration and turgor, no rashes, no suspicious skin lesions noted      Review of Labs and Diagnostic Testing:    Recent Results (from the past 24 hour(s))   POCT Glucose    Collection Time: 05/07/22  4:45 PM   Result Value Ref Range    POC Glucose 175 (H) 70 - 108 mg/dl   POCT glucose    Collection Time: 05/07/22  8:44 PM   Result Value Ref Range    POC Glucose 140 (H) 70 - 108 mg/dl   Hepatic function panel    Collection Time: 05/08/22  3:37 AM   Result Value Ref Range    Albumin 3.2 (L) 3.5 - 5.1 g/dL    Total Bilirubin 3.4 (H) 0.3 - 1.2 mg/dL    Bilirubin, Direct 2.5 (H) 0.0 - 0.3 mg/dL    Alkaline Phosphatase 262 (H) 38 - 126 U/L    AST 2,486 (H) 5 - 40 U/L    ALT 1,364 (H) 11 - 66 U/L    Total Protein 5.5 (L) 6.1 - 8.0 g/dL   Comprehensive Metabolic Panel w/ Reflex to MG    Collection Time: 05/08/22  3:37 AM   Result Value Ref Range    Potassium reflex Magnesium 4.3 3.5 - 5.2 meq/L   Basic Metabolic Panel    Collection Time: 05/08/22  3:37 AM   Result Value Ref Range    Sodium 144 135 - 145 meq/L    Potassium 4.3 3.5 - 5.2 meq/L    Chloride 112 (H) 98 - 111 meq/L    CO2 10 (LL) 23 - 33 meq/L    Glucose 204 (H) 70 - 108 mg/dL    BUN 69 (H) 7 - 22 mg/dL    CREATININE 2.9 (H) 0.4 - 1.2 mg/dL    Calcium 8.0 (L) 8.5 - 10.5 mg/dL   Ph Venous    Collection Time: 05/08/22  3:37 AM   Result Value Ref Range    pH, Stephen 7.19 (L) 7.31 - 7.41   Protime-INR    Collection Time: 05/08/22  3:37 AM   Result Value Ref Range    INR 4.29 (H) 0.85 - 1.13   Ammonia    Collection Time: 05/08/22  3:37 AM   Result Value Ref Range    Ammonia 18 11 - 60 umol/L Anion Gap    Collection Time: 05/08/22  3:37 AM   Result Value Ref Range    Anion Gap 22.0 (H) 8.0 - 16.0 meq/L   Glomerular Filtration Rate, Estimated    Collection Time: 05/08/22  3:37 AM   Result Value Ref Range    Est, Glom Filt Rate 19 (A) ml/min/1.73m2   Blood Gas, Arterial    Collection Time: 05/08/22  5:02 AM   Result Value Ref Range    pH, Blood Gas 7.32 (L) 7.35 - 7.45    PCO2 16 (LL) 35 - 45 mmhg    PO2 101 71 - 104 mmhg    HCO3 8 (L) 23 - 28 mmol/l    Base Excess (Calculated) -15.8 (L) -2.5 - 2.5 mmol/l    O2 Sat 98 %    DEVICE Room Air     Pacheco Test Positive     Source: R Radial     COLLECTED BY: [id number]    Glucose, Whole Blood    Collection Time: 05/08/22  5:02 AM   Result Value Ref Range    Glucose, Whole Blood 204 (H) 70 - 108 mg/dl   POCT glucose    Collection Time: 05/08/22  7:49 AM   Result Value Ref Range    POC Glucose 203 (H) 70 - 108 mg/dl   TYPE AND SCREEN    Collection Time: 05/08/22 10:55 AM   Result Value Ref Range    ABO A     Rh Factor POS     Antibody Screen NEG    POCT glucose    Collection Time: 05/08/22 11:42 AM   Result Value Ref Range    POC Glucose 173 (H) 70 - 108 mg/dl       Radiology:     ECHO Limited    Result Date: 5/5/2022  Transthoracic Echocardiography Report (TTE)  Demographics   Patient Name    Alex Alvarado Gender               Female   MR #            [id number]      Race                 Black                                  Ethnicity   Account #       [de-identified]      Room Number          5005   Accession       [id number]     Date of Study        05/05/2022  Number   Date of Birth   1940     Referring Physician  Cathy Wilkins MD   Age             78 year(s)     Sonographer          Petr Bennett, EVELYNCS,                                                      RVT                                  Interpreting         Brian Huang MD                                 Physician  Procedure Type of Study   TTE procedure:ECHOCARDIOGRAM LIMITED. Procedure Date Date: 05/05/2022 Start: 11:11 AM Study Location: Bedside Technical Quality: Limited visualization Indications:Elevated troponin and Elevated BNP. Additional Medical History:Coronary artery disease, Anemia, CVA, Hypertenison, Hyperlipidemia, Atrial fibrillation. Patient Status: Routine Height: 63 inches Weight: 136 pounds BSA: 1.64 m^2 BMI: 24.09 kg/m^2 BP: 136/92 mmHg  Conclusions   Summary  Technically difficult examination due to underlying tachycardia. Left ventricular size and systolic function appears normal. Ejection  fraction was estimated at 50%. LV wall thickness is within normal limits. The right ventricular size was normal with normal systolic function and  wall thickness. Severely dilated left atrium. Moderately enlarged right atrium size. Signature   ----------------------------------------------------------------  Electronically signed by Suzy Domínguez MD (Interpreting  physician) on 05/05/2022 at 02:21 PM  ----------------------------------------------------------------   Findings   Mitral Valve  The mitral valve structure was normal with normal leaflet separation. Aortic Valve  The aortic valve was trileaflet with normal thickness and cuspal  separation. Tricuspid Valve  The tricuspid valve structure was normal with normal leaflet separation. Pulmonic Valve  The pulmonic valve was not well visualized . Left Atrium  Severely dilated left atrium. Left Ventricle  Left ventricular size and systolic function appears normal. Ejection  fraction was estimated at 50%. LV wall thickness is within normal limits. Right Atrium  Moderately enlarged right atrium size. Right Ventricle  The right ventricular size was normal with normal systolic function and  wall thickness. Pericardial Effusion  The pericardium was normal in appearance with no evidence of a pericardial  effusion.    Pleural Effusion  No evidence of pleural effusion. Aorta / Great Vessels  -Aortic root dimension within normal limits. -IVC size is within normal limits with normal respiratory phasic changes. M-Mode/2D Measurements & Calculations   LV Diastolic      LV Systolic Dimension: 3.3 cm    LA Dimension: 3.8 cmLA  Dimension: 4.2 cm LV Volume Diastolic: 71.1 ml     Area: 32.5 cm^2  LV FS:21.4 %      LV Volume Systolic: 40.6 ml  LV PW Diastolic:  LV EDV/LV EDV Index: 78.6 ml/48  0.9 cm            m^2LV ESV/LV ESV Index: 93.2  Septum Diastolic: EL/00 m^2                        RV Diastolic Dimension:  0.8 cm            EF Calculated: 43.9 %            2 cm                                                      LA volume/Index: 124.3                                                     ml /76m^2  [email or web address] [id number]    CT ABDOMEN PELVIS WO CONTRAST Additional Contrast? None    Result Date: 5/5/2022  PROCEDURE: CT ABDOMEN PELVIS WO CONTRAST CLINICAL INFORMATION: Elevated liver function tests. COMPARISON: No prior study. TECHNIQUE: 5 mm axial imaging through the abdomen and pelvis without IV contrast.  Coronal and sagittal reconstructions were performed. All CT scans at this facility use dose modulation, iterative reconstruction, and/or weight based dosing when appropriate to reduce the radiation dose to as low as reasonably achievable. FINDINGS: LIMITATIONS: The evaluation of the solid organs is limited without IV contrast. Lung bases: Small bilateral pleural effusions and bilateral lower lobe airspace opacity, left greater than right, are observed. Cardiomegaly and small pericardial effusion is identified Liver/gallbladder/bilary tree: The gallbladder absent. No biliary ductal dilatation is observed. The noncontrast liver is unremarkable. Pancreas: Unremarkable noncontrast CT appearance. Spleen : Unremarkable noncontrast CT appearance.  Adrenal glands: Unremarkable. Kidneys/ ureters/ bladder: Numerous simple bilateral measure 2 cm in the upper pole of the right kidney. Evaluation is limited without IV contrast. A Arguelles catheter decompresses the urinary bladder. Gastrointestinal:  Colonic diverticulosis without diverticulitis is observed. Trace free fluid is noted in the dependent portion of the pelvis. No bowel obstruction,, fluid collection, or free air is identified secondary signs of acute appendicitis are observed. Retroperitoneum / lymph nodes: The aorta is not dilated. No lymphadenopathy is present. Pelvis: The uterus is surgically absent. No adnexal lesions are observed. Musculoskeletal: Diffuse osteopenia is present. Multilevel degenerative disc disease in the thoracic and lumbar spine is observed. The visualized skeletal structures appear intact. 1. Small bilateral pleural effusions and bilateral lower lobe airspace opacities, left greater than right, can indicate pneumonia the appropriate clinical setting. Cardiomegaly and a small pericardial effusion is partially visualized. 2. The noncontrast appearance of the liver is unremarkable. No biliary ductal dilatation is observed. 3. Colonic diverticulosis without diverticulitis. No acute findings are noted in the abdomen/pelvis. Multiple chronic findings are discussed. **This report has been created using voice recognition software. It may contain minor errors which are inherent in voice recognition technology. ** Final report electronically signed by Dr Tiera Aburto on 5/5/2022 11:19 AM    US RENAL COMPLETE    Result Date: 5/5/2022  RENAL ULTRASOUND COMPARISON: None. FINDINGS: Renal cortical echogenicity is normal bilaterally. Right kidney measures 9.7 cm. Left kidney measures 9.1 cm. There is no renal stone or hydronephrosis bilaterally. Multiple renal cysts are noted, for example in the right kidney is a 2.4 x 1.7 x 1.5 cm cyst on image 21.  Urinary bladder is decompressed with a Arguelles catheter. 1. No acute disease in the bilateral kidneys. No renal stone or hydronephrosis. This document has been electronically signed by: Makayla Portillo M.D. on 05/05/2022 05:08 AM    US LIVER    Result Date: 5/5/2022  ULTRASOUND ABDOMEN LIMITED COMPARISON: None. FINDINGS: The gallbladder is absent. Mild prominence of the pancreatic duct is noted without focal lesion. There are no focal liver lesions. Liver is enlarged and measures 18.7 cm on image 28. Common bile duct measures 6-7 mm in caliber by my measurements. There are no fluid collections. 1. Mild hepatomegaly. No focal liver lesion. 2. Status post cholecystectomy. This document has been electronically signed by: Makayla Portillo M.D. on 05/05/2022 05:04 AM    XR CHEST PORTABLE    Result Date: 5/4/2022  Single view of the chest Comparison:  CR,SR - XR CHEST PORTABLE - 04/05/2022 06:28 PM EDT Findings: Mild to moderate patchy opacities bilateral lung bases, increased. This could represent aspiration, pneumonia or edema versus atelectasis. Cardiomegaly present. Impression: Cardiomegaly present. Mild to moderate patchy opacities bilateral lung bases, increased This document has been electronically signed by: Imelda Schroeder MD on 05/04/2022 11:42 PM    CT ABDOMEN A CONTRAST :    1. Small bilateral pleural effusions and bilateral lower lobe airspace opacities, left greater than right, can indicate pneumonia the appropriate clinical setting. Cardiomegaly and a small pericardial effusion is partially visualized.       2. The noncontrast appearance of the liver is unremarkable. No biliary ductal dilatation is observed.       3. Colonic diverticulosis without diverticulitis. No acute findings are noted in the abdomen/pelvis. Multiple chronic findings are discussed.          ASSESMENT:      Principal Problem:    Acute renal failure (ARF) (HCC)  Active Problems:    Hypernatremia    Pneumonia    High anion gap metabolic acidosis    Acute-on-chronic kidney injury ([facility] [address]    Lactic acidosis    Hepatic dysfunction  Resolved Problems:    * No resolved hospital problems. *      PLAN:       Assessment/Plan:     1. Now primarily with high anion gap metabolic acidosis from Lactic acidosis / DARIUS   a. Fluid switched to bicarb and Trend lactic acid, lactic acid etiology unclear, ?hypoperfusion ,    b. CT abdomen WO contrast   Unrevealing for structural hepatic pathology. Trend LFTs, f/u labs    2. Pneumonia  Cannot rule out aspiration  a. On Zosyn, ID service following, speech therapist following check procalcitonin. b.  use supplemental O2 as needed,   c. Strep pneumo and legionella antigens negative  3. A fib RVR, not on anticoag due to recent hx GIB  a. Cardiology on board  b.  continue IV beta blockers and oral calcium channel blockers as able   c. No anticoag due to hx GIB  d. Cont tele monitoring   4. CAD with elevated trops   a. Trops stable  b. No further workup per cardio  5. DARIUS on CKD with metabolic acidosis   a. Nephrology following  b. Back on bicarb drip, due to primary metabolic acidosis. Await labs hypernatremia  has improved. Hypoglycemia - fluids switched to D5NS  c. Urine eosinophils are positive; UA negative for infection   6. Hx Dementia  a. Noted   7. HTN  a. Lisinopril on hold due to DARIUS   b. BP stable now   8. HLD  a. Holding Statin due to  transaminitis  9. Hx CVA  a. Not on ASA/Plavix due to GIB hx   10. PT/OT  11. 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 Follow-up ROSS, F actin antibodies however neg, ceruloplasmin (-), mitochondrial antibodies (-). Ferritin levels are not suggestive. Statin may have aggravated the situation. GI Service following. Suspected to have shock liver, continue to trend transaminase Ammonia normal.  12. DVT prophylaxis  a.  SCDs        Appreciate Palliative  Service input       DVT prophylaxis: [] Lovenox                                 [] SCDs [] SQ Heparin                                 [] Encourage ambulation, low risk for DVT, no chemical or mechanical prophylaxis necessary              [] Already on Anticoagulation                Anticipated Disposition upon discharge: [] Home                                                                         [] Home with Home Health                                                                         [] [facility]                                                                         [] [address]          Electronically signed by Hamida Pearson MD on 5/8/2022 at 2:02 PM

## 2022-05-08 NOTE — PROGRESS NOTES
Gi Progress Note  Subjective:  CC:  Elevated liver tests  Nursing reports: No taking oral nutrition; Has pulled out multiple IVs    Diet:  ADULT DIET; Dysphagia - Pureed;  No Drinking Straws      Medications:  Scheduled Meds:   piperacillin-tazobactam  3,375 mg IntraVENous Q12H    metoprolol tartrate  100 mg Oral BID    sodium chloride  1,000 mL IntraVENous Once    insulin lispro  0-6 Units SubCUTAneous TID WC    insulin lispro  0-3 Units SubCUTAneous Nightly    pantoprazole  40 mg IntraVENous Daily    [Held by provider] atorvastatin  20 mg Oral Nightly    calcium elemental  1 tablet Oral Daily    polyvinyl alcohol  1 drop Ophthalmic Daily    donepezil  10 mg Oral Nightly    fluticasone  2 spray Nasal Daily    memantine  5 mg Oral BID    vitamin D  50,000 Units Oral Once per day on Mon Thu    sodium chloride flush  5-40 mL IntraVENous 2 times per day     Continuous Infusions:   dextrose      dextrose 5 % and 0.9 % NaCl 125 mL/hr at 05/08/22 0747    sodium chloride       PRN Meds:acetaminophen, ipratropium-albuterol, glucagon (rDNA), dextrose, glucose, dextrose bolus **OR** dextrose bolus, acetaminophen, sodium chloride flush, sodium chloride, ondansetron **OR** ondansetron, metoprolol    Objective:    Lab Results   Component Value Date    WBC 5.9 05/05/2022    RBC 3.75 05/05/2022    RBC 2.96 03/17/2022    HGB 10.0 05/05/2022    HCT 34.8 05/05/2022    MCV 92.8 05/05/2022    MCH 26.7 05/05/2022    MCHC 28.7 05/05/2022    RDW 16.0 03/17/2022     05/05/2022    MPV 12.6 05/05/2022     Lab Results   Component Value Date     05/08/2022    K 4.3 05/08/2022    K 4.3 05/08/2022     05/08/2022    CO2 10 05/08/2022    BUN 69 05/08/2022    CREATININE 2.9 05/08/2022    GLUCOSE 204 05/08/2022    GLUCOSE 78 03/17/2022    CALCIUM 8.0 05/08/2022     Lab Results   Component Value Date    CALCIUM 8.0 05/08/2022     No results found for: IONCA  Lab Results   Component Value Date    MG 1.8 04/05/2022 Lab Results   Component Value Date    PHOS 4.4 05/05/2022     No results found for: BNP  Lab Results   Component Value Date    ALKPHOS 262 05/08/2022    ALT 1,364 05/08/2022    AST 2,486 05/08/2022    PROT 5.5 05/08/2022    BILITOT 3.4 05/08/2022    BILIDIR 2.5 05/08/2022    LABALBU 3.2 05/08/2022    LABALBU 4.8 03/01/2012     Lab Results   Component Value Date    LACTA 9.0 05/07/2022     Lab Results   Component Value Date    AMYLASE 134 06/29/2020     Lab Results   Component Value Date    LIPASE 144.4 05/05/2022     Lab Results   Component Value Date    CHOL 135 01/27/2022    TRIG 70 01/27/2022    HDL 36 01/27/2022    LDLCALC 85 01/27/2022     Recent Labs     05/07/22  1645 05/07/22  2044 05/08/22  0749   POCGLU 175* 140* 203*     No results for input(s): CKTOTAL, CKMB, TROPONINI in the last 72 hours. Lab Results   Component Value Date    LABA1C 5.9 04/29/2021     Lab Results   Component Value Date    INR 4.29 05/08/2022     No results found for: PHART, PO2ART, IVA4GDC, NOF3DCL, BEART      Physical Exam:    Vitals: /77   Pulse 120   Temp 96.7 °F (35.9 °C) (Rectal)   Resp (!) 34   Ht 5' 3\" (1.6 m)   Wt 137 lb 2 oz (62.2 kg)   SpO2 96%   BMI 24.29 kg/m²   24 hour intake/output:    Intake/Output Summary (Last 24 hours) at 5/8/2022 1021  Last data filed at 5/7/2022 1459  Gross per 24 hour   Intake 0 ml   Output 75 ml   Net -75 ml     Last 3 weights:   Wt Readings from Last 3 Encounters:   05/08/22 137 lb 2 oz (62.2 kg)   04/21/22 136 lb 2 oz (61.7 kg)   04/12/22 129 lb 9.6 oz (58.8 kg)         General appearance -  in no distress  Neurological -  sleeping  Assessment:  Patient Active Problem List   Diagnosis    CVA (cerebral vascular accident)-2002    CAD (coronary artery disease)-mild NONobstructive max 30% ostail, LCX, mid lad and diffuse mild luminal irregularites-per cath 08/08/12    S/P cardiac cath 2012- mild nonobstructive CAD    Essential hypertension    Hyperlipidemia    Dementia without behavioral disturbance [facility] [address]    DARIUS (acute kidney injury) ([facility] [address]    Anemia    New onset atrial fibrillation (HCC) w/ RVR now sinus Bradycardia    Hiatal hernia with gastroesophageal reflux disease and esophagitis and ulcer    Garrison's esophagus with esophagitis    Atrial fibrillation with rapid ventricular response (HCC)    Paroxysmal atrial fibrillation with RVR (HCC)    Acute diastolic congestive heart failure (HCC)    Acute renal failure (ARF) (HCC)    Hypernatremia    Pneumonia    High anion gap metabolic acidosis    Acute-on-chronic kidney injury ([facility] [address]    Lactic acidosis    Hepatic dysfunction     A:  1. Hepatic dysfunction   2. Sepsis  3. DARIUS  4. Dementia  5. Prior GI bleed    Plan:  1. Elevated liver tests;  Probable shock liver. Transaminases may be peaking,  But the PT INR is rising. Continue to follow labs. 2.  Prior gi bleed; Increase Protonix to BID; Hold anti coagulation. Will give FFP today  (Vit K probably ineffective with hepatic dysfunction). 3.  Dementia; Has \"pulled out\" IVs per Nursing staff. (PEG tube would have high risk of being dislodged). 4.  Sepsis  5.   DARIUS  Total time 25 minutes    Alba Bryant MD  [facility]

## 2022-05-08 NOTE — PROGRESS NOTES
Messaged Dr. Amita Nelson about critical co2 of 10. No orders for stat ABG- ph 7.32 bicarb of 8, co2 16. Also updated nephrology per Dr. Amita Nelson. No new orders at this time.

## 2022-05-08 NOTE — PROGRESS NOTES
Perfect serve to Dr. Paula Diez who is on call for cardiology to inform him of patient's run of vtach early this a.m. Also informed Dr. Paula Diez that patient's heart rate is running 120-130 and she only has at this time lopressor 5mg IV Q6 available.

## 2022-05-08 NOTE — PROGRESS NOTES
[facility]         Renal Inpatient Follow-Up note         5/8/2022 12:18 PM    Pt Name:   Sylvia Fleming  YOB: 1940  Attending:   Connie Garrett MD    Chief Complaint : Sylvia Fleming is a 78 y.o. female being followed by nephrology for DARIUS/CKD/metabolic acidosis    Interval History :   Patient seen and examined by me. No distress  Resting comfortably not communicative  Cannot accurately assess history or review of systems     Scheduled Medications :    pantoprazole  40 mg IntraVENous BID    piperacillin-tazobactam  3,375 mg IntraVENous Q12H    metoprolol tartrate  100 mg Oral BID    sodium chloride  1,000 mL IntraVENous Once    insulin lispro  0-6 Units SubCUTAneous TID WC    insulin lispro  0-3 Units SubCUTAneous Nightly    [Held by provider] atorvastatin  20 mg Oral Nightly    calcium elemental  1 tablet Oral Daily    polyvinyl alcohol  1 drop Ophthalmic Daily    donepezil  10 mg Oral Nightly    fluticasone  2 spray Nasal Daily    memantine  5 mg Oral BID    vitamin D  50,000 Units Oral Once per day on Mon Thu    sodium chloride flush  5-40 mL IntraVENous 2 times per day      sodium chloride      dextrose      dextrose 5 % and 0.9 % NaCl 125 mL/hr at 05/08/22 0747    sodium chloride         Vitals :  /87   Pulse 63   Temp 97.4 °F (36.3 °C) (Rectal)   Resp 26   Ht 5' 3\" (1.6 m)   Wt 137 lb 2 oz (62.2 kg)   SpO2 97%   BMI 24.29 kg/m²     24HR INTAKE/OUTPUT:      Intake/Output Summary (Last 24 hours) at 5/8/2022 1218  Last data filed at 5/7/2022 1459  Gross per 24 hour   Intake 0 ml   Output 75 ml   Net -75 ml     Last 3 weights  Wt Readings from Last 3 Encounters:   05/08/22 137 lb 2 oz (62.2 kg)   04/21/22 136 lb 2 oz (61.7 kg)   04/12/22 129 lb 9.6 oz (58.8 kg)           Physical Exam :  General Appearance:  Well developed.  No distress  Mouth/Throat:  Oral mucosa moist  Neck:  Supple, no JVD  Lungs:  Breath sounds: clear  Heart[de-identified] S1,S2 heard  Abdomen:  Soft, non - tender  Musculoskeletal:  Edema -no significant edema noted         Last 3 CBC   No results for input(s): WBC, RBC, HGB, HCT, PLT in the last 72 hours. Last 3 CMP  Recent Labs     05/06/22  0359 05/06/22  0359 05/07/22  0755 05/08/22  0337     --  141 144   K 4.4   < > 4.7  4.7 4.3  4.3     --  107 112*   CO2 12*  --  12* 10*   BUN 60*  --  68* 69*   CREATININE 2.4*  --  2.7* 2.9*   CALCIUM 9.0  --  8.6 8.0*   LABALBU 3.5  --  3.2* 3.2*   BILITOT 2.8*  --  3.2* 3.4*    < > = values in this interval not displayed. ASSESSMENT / Plan   1 Renal -acute kidney injury on chronic kidney disease stage III thought to be due to hypoperfusion  ? Urine lites show more prerenal etiology. ? Creatinine continues to rise, may be going towards ATN  ? If continues to get worse may need renal replacement therapy    2 Electrolytes -hypernatremia appears to be getting better currently at 144 with mild hyperchloremia we will switch to hypotonic fluids  3 Acid-base status patient has some high anion gap metabolic acidosis and elevated lactic acidosis, venous blood gas shows a pH of 7.32 we will switch to a bicarbonate infusion  4 Hx of diabetes mellitus  5 Essential hypertension running well  6 Meds reviewed     Dr. Brain Castaneda MD, M,D.  [facility].

## 2022-05-08 NOTE — PROGRESS NOTES
Perfect serve to Dr. Crain Butler him that patient had 30+ run of vtach this a.m. at approximately 2567. Writer inquired if physician wanted to have mag and phos levels drawn - since they were not drawn this morning with labs.

## 2022-05-08 NOTE — PROGRESS NOTES
Approval for administration of one unit of plasma obtained from patient's POA - Ji Alvarado - via telephone. Writezay and JEWELS Ruiz, spoke with patient. Appropriate forms signed by both RN's.

## 2022-05-08 NOTE — RT PROTOCOL NOTE
RT Nebulizer Bronchodilator Protocol Note    There is a bronchodilator order in the chart from a provider indicating to follow the RT Bronchodilator Protocol and there is an Initiate RT Bronchodilator Protocol order as well (see protocol at bottom of note). CXR Findings:  No results found. The findings from the last RT Protocol Assessment were as follows:  Smoking: None or smoker <15 pack years  Respiratory Pattern: Regular pattern and RR 12-20 bpm  Breath Sounds: Slightly diminished and/or crackles  Cough: Strong, spontaneous, non-productive  Indication for Bronchodilator Therapy: None  Bronchodilator Assessment Score: 2    Aerosolized bronchodilator medication orders have been revised according to the RT Nebulizer Bronchodilator Protocol below. Respiratory Therapist to perform RT Therapy Protocol Assessment initially then follow the protocol. Repeat RT Therapy Protocol Assessment PRN for score 0-3 or on second treatment, BID, and PRN for scores above 3. No Indications - adjust the frequency to every 6 hours PRN wheezing or bronchospasm, if no treatments needed after 48 hours then discontinue using Per Protocol order mode. If indication present, adjust the RT bronchodilator orders based on the Bronchodilator Assessment Score as indicated below. If a patient is on this medication at home then do not decrease Frequency below that used at home. 0-3 - enter or revise RT bronchodilator order(s) to equivalent RT Bronchodilator order with Frequency of every 4 hours PRN for wheezing or increased work of breathing using Per Protocol order mode. 4-6 - enter or revise RT Bronchodilator order(s) to two equivalent RT bronchodilator orders with one order with BID Frequency and one order with Frequency of every 4 hours PRN wheezing or increased work of breathing using Per Protocol order mode.          7-10 - enter or revise RT Bronchodilator order(s) to two equivalent RT bronchodilator orders with one order with TID Frequency and one order with Frequency of every 4 hours PRN wheezing or increased work of breathing using Per Protocol order mode. 11-13 - enter or revise RT Bronchodilator order(s) to one equivalent RT bronchodilator order with QID Frequency and an Albuterol order with Frequency of every 4 hours PRN wheezing or increased work of breathing using Per Protocol order mode. Greater than 13 - enter or revise RT Bronchodilator order(s) to one equivalent RT bronchodilator order with every 4 hours Frequency and an Albuterol order with Frequency of every 2 hours PRN wheezing or increased work of breathing using Per Protocol order mode. RT to enter RT Home Evaluation for COPD & MDI Assessment order using Per Protocol order mode.     Electronically signed by Carleen Hawkins RCP on 5/8/2022 at 2:06 PM

## 2022-05-09 NOTE — PROGRESS NOTES
just fyi her sys is 200/94 at this time, i did give 10 mg of lopressor IV as well at the same time since she is tachy. would you like anything else. I will recheck bp in 15 mins      Recheck BP was 173/115 resp are 28-34.      No new orders at this time

## 2022-05-09 NOTE — PROGRESS NOTES
Cardiology Progress Note      Patient: Thania Sheets  YOB: 1940  MRN: [id number]   Acct: [de-identified]  Admit Date:  5/4/2022  Primary Cardiologist: Alyse Rowell MD  Per DR Mcfarlane's note:  \"Reason for Consultation:  Elevated troponins        History Of Present Illness:    78 y. o.female with history of dementia, nonobstructive CAD, CVA, hypertension, GI bleed, atrial fibrillation not on [address] who was brought in to the hospital for increased weakness, hypoxic respiratory failure and decreased Appetite. Patient Cannot Provide Any History, Most of the History Was Obtained from the Chart. In the ER She Was Noted to Be in Acute on Chronic Renal Failure. Has Mildly Elevated Downtrending Troponins and Abnormal liver function tests. Electrocardiogram shows atrial fibrillation with right bundle branch block. Echocardiogram on 3/1/2022 showed ejection fraction of 55% with grade 1 diastolic dysfunction and mild to moderately dilated LA. Cardiology was consulted for elevated troponins. Her proBNP is elevated to 9935. On recent admission in April it was 4091.     All labs, EKG's, diagnostic testing and images as well as cardiac cath, stress testing were reviewed during this encounter. \"    Subjective (Events in last 24 hours):   Pt is lethargic, non responsive except to pain. Now with acute liver, not a candidate for amiodarone. Difficulty in initiating any antiarrythmics d/t frailty, underlying conditions. Case was discussed with Dr Adebayo Stringer. Objective:   BP (!) 184/102   Pulse 96   Temp 97.5 °F (36.4 °C) (Axillary)   Resp 16   Ht 5' 3\" (1.6 m)   Wt 145 lb 15.1 oz (66.2 kg)   SpO2 99%   BMI 25.85 kg/m²      vss  TELEMETRY: afib cvr mostly, runs 60-80 primarily, episodes of rvr.      Physical Exam:  General Appearance: lethargic   Cardiovascular: normal rate, irregularly irregular rhythm, normal S1 and S2, no murmurs, rubs, clicks, or gallops, distal pulses intact, no carotid bruits, no JVD  Pulmonary/Chest: clear to auscultation bilaterally- no wheezes, rales or rhonchi, normal air movement, no respiratory distress  Abdomen: soft, non-tender, non-distended, normal bowel sounds, no masses   Extremities: no cyanosis, clubbing or edema, pulse   Skin: warm and dry, no rash or erythema      Medications:    cloNIDine  1 patch TransDERmal Weekly    pantoprazole  40 mg IntraVENous BID    piperacillin-tazobactam  3,375 mg IntraVENous Q12H    metoprolol tartrate  100 mg Oral BID    sodium chloride  1,000 mL IntraVENous Once    insulin lispro  0-6 Units SubCUTAneous TID WC    insulin lispro  0-3 Units SubCUTAneous Nightly    [Held by provider] atorvastatin  20 mg Oral Nightly    calcium elemental  1 tablet Oral Daily    polyvinyl alcohol  1 drop Ophthalmic Daily    donepezil  10 mg Oral Nightly    fluticasone  2 spray Nasal Daily    memantine  5 mg Oral BID    vitamin D  50,000 Units Oral Once per day on Mon Thu    sodium chloride flush  5-40 mL IntraVENous 2 times per day      sodium chloride      sodium bicarbonate infusion 100 mL/hr at 05/09/22 0104    dextrose      sodium chloride       sodium chloride, , PRN  metoprolol, 10 mg, Q4H PRN  ipratropium-albuterol, 1 ampule, Q4H PRN  glucagon (rDNA), 1 mg, PRN  dextrose, 100 mL/hr, PRN  glucose, 4 tablet, PRN  dextrose bolus, 125 mL, PRN   Or  dextrose bolus, 250 mL, PRN  sodium chloride flush, 5-40 mL, PRN  sodium chloride, , PRN  ondansetron, 4 mg, Q8H PRN   Or  ondansetron, 4 mg, Q6H PRN        Diagnostics:  EKG:   Atrial fibrillation  Right bundle branch block  T wave abnormality, consider inferior ischemia  Abnormal ECG  When compared with ECG of 05-APR-2022 17:51,  Criteria for Septal infarct are no longer Present  Confirmed by Amy Aburto MD, [facility] ([id number]) on 5/4/2022   Echo:   Conclusions      Summary   Technically difficult examination due to underlying tachycardia.    Left ventricular size and systolic function appears normal. Ejection fraction was estimated at 50%. LV wall thickness is within normal limits. The right ventricular size was normal with normal systolic function and   wall thickness. Severely dilated left atrium. Moderately enlarged right atrium size. Signature      ----------------------------------------------------------------   Electronically signed by Madeline Browning MD (Interpreting   physician) on 05/05/2022   Stress:     Left Heart Cath:     Lab Data:    Cardiac Enzymes:  No results for input(s): CKTOTAL, CKMB, CKMBINDEX, TROPONINI in the last 72 hours.     CBC:   Lab Results   Component Value Date    WBC 5.9 05/05/2022    RBC 3.75 05/05/2022    RBC 2.96 03/17/2022    HGB 10.0 05/05/2022    HCT 34.8 05/05/2022     05/05/2022       CMP:    Lab Results   Component Value Date     05/09/2022    K 3.7 05/09/2022    K 3.7 05/09/2022     05/09/2022    CO2 16 05/09/2022    BUN 59 05/09/2022    CREATININE 2.6 05/09/2022    LABGLOM 21 05/09/2022    GLUCOSE 166 05/09/2022    GLUCOSE 78 03/17/2022    CALCIUM 8.2 05/09/2022       Hepatic Function Panel:    Lab Results   Component Value Date    ALKPHOS 280 05/09/2022    ALT 1,842 05/09/2022    AST 3,329 05/09/2022    PROT 5.6 05/09/2022    BILITOT 5.1 05/09/2022    BILIDIR 3.1 05/09/2022    LABALBU 3.2 05/09/2022    LABALBU 4.8 03/01/2012       Magnesium:    Lab Results   Component Value Date    MG 2.1 05/08/2022       PT/INR:    Lab Results   Component Value Date    INR 4.27 05/09/2022       HgBA1c:    Lab Results   Component Value Date    LABA1C 5.9 04/29/2021       FLP:    Lab Results   Component Value Date    TRIG 70 01/27/2022    HDL 36 01/27/2022    LDLCALC 85 01/27/2022    LABVLDL 23 08/09/2021       TSH:    Lab Results   Component Value Date    TSH 2.280 04/05/2022         Assessment:  afib with RVR--no [address] d/t GIB--started on lopressor IV, HR better, controlled rate at present  Dementia/AMS- lethargic, responds to pain, not much else  Prior CVA  HTN-elevated today, unable to tolerate PO intake  DARIUS on CKD  Acute liver- LFTs with significant elevation   Lactic acidosis    Plan:  Patient's HR has been up and down, running 80s-120. Started on lopressor today, has been better throughout the morning. Continue IV lopressor PRN as indicated if patient cannot tolerate PO intake to keep HR below 100. Amiodarone not indicated d/t to LFTs. Difficulty in initiating any other antiarrhythmics d/t frailty, condition etc.     Would recommend palliative/attending have discussion with family about goals of care again. Cardiology will monitor HR.     Electronically signed by Jarek Mcghee PA-C on 5/9/2022 at 9:26 AM

## 2022-05-09 NOTE — FLOWSHEET NOTE
05/09/22 0935   Encounter Summary   Encounter Overview/Reason  Spiritual/Emotional Needs   Service Provided For: Patient   Referral/Consult From: [address] Children;Family members; [facility]   Last Encounter  05/09/22   Complexity of Encounter Low   Begin Time 0935   End Time  0939   Total Time Calculated 4 min   Encounter    Type Initial Screen/Assessment   Spiritual/Emotional needs   Type Spiritual Support   Assessment/Intervention/Outcome   Assessment Unable to assess   Intervention Prayer (assurance of)/[place]   Outcome Did not respond   During my encounter with the 78  yr old patient, I attempted to visit with the pt on 3B. The patient appears to be resting now and I didnt want to disturb the patient. I or another Chiara Bonner will attempt to visit the patient or the family at another time.

## 2022-05-09 NOTE — CARE COORDINATION
5/9/22, 2:16 PM EDT    DISCHARGE ON GOING EVALUATION    [place] day: 8  Location: -27/027-A Reason for admit: Metabolic acidosis [J56.0]  Pneumonia [J18.9]  Acute renal failure (ARF) (HCC) [N17.9]  Acute renal failure, unspecified acute renal failure type ([address] [N17.9]  Congestive heart failure, unspecified HF chronicity, unspecified heart failure type ([address] [I50.9]   Procedure:   5/4 CXR: Cardiomegaly present. Mild to moderate patchy opacities bilateral lung bases, increased. 5/5 Echo: to be done. 5/5 US Liver: Mild hepatomegaly. No focal liver lesion. Status post cholecystectomy  Barriers to Discharge: Guarded prognosis. Transaminitis. Worsening labs. Abdominal scan to r/o ischemic bowel. INR elevated so would be risky for surgery. INR 4.27. Lactic 6. Creatinine 2.6. Bilirubin 5.1. Na+ bicarb gtt. IV lopressor. IV protonix. PCP: Cindy Glass MD  Readmission Risk Score: 21.4 ( )%  Patient Goals/Plan/Treatment Preferences: Possible transfer to tertiary facility per notes. Attending did attempt to speak with family, awaiting call back.

## 2022-05-09 NOTE — PROGRESS NOTES
Called Palliative and updated them on what is going on. Dania Pena will be out to talk to the family sometimes today. They said they would call the day shift nurse when they know a time.

## 2022-05-09 NOTE — PROGRESS NOTES
Follow Up / Progress Note        Patient: Pedro Medeiros  YOB: 1940  Age:  78 y.o. Room:  [facility]27/027-A  MRN:  [id number]            Family/Patient Discussion:  Attempted to contact patients ANA Morton by phone. Voice mail message left to call this writer and further discuss patients goals of care/code status. Michelle Lao sent a text message to my work cell phone and we have arranged a meeting. The meeting will be at 0800 tomorrow morning 5/10/22 with the patients ANA Morton and patients Son El Rockwell          Plan/Follow-Up: Family Meeting tomorrow morning to further discuss goals of care/code status.        Electronically signed by Kvng Yanes RN on 5/9/2022 at 11:00 AM             Palliative Care Office: [phone]

## 2022-05-09 NOTE — PROGRESS NOTES
Messaged Dr. Apolinar Blankenship about HR being back in the 120-130's, the extra dose of lopressor lasted 2 hours.      New orders received, See [facility]

## 2022-05-09 NOTE — PROGRESS NOTES
Progress note: Infectious diseases    Patient - Ivelisse Marin,  Age - 78 y.o.    - 1940      Room Number - 3B-27/027-A   MRN -  [id number]   Acct # - [de-identified]  Date of Admission -  2022 10:46 PM    SUBJECTIVE:   No new complaints. Cultures are negative. OBJECTIVE   VITALS    height is 5' 3\" (1.6 m) and weight is 145 lb 15.1 oz (66.2 kg). Her axillary temperature is 97.5 °F (36.4 °C). Her blood pressure is 184/102 (abnormal) and her pulse is 96. Her respiration is 16 and oxygen saturation is 99%. Wt Readings from Last 3 Encounters:   22 145 lb 15.1 oz (66.2 kg)   22 136 lb 2 oz (61.7 kg)   22 129 lb 9.6 oz (58.8 kg)       I/O (24 Hours)    Intake/Output Summary (Last 24 hours) at 2022 1108  Last data filed at 2022 0854  Gross per 24 hour   Intake 943.33 ml   Output 3700 ml   Net -2756.67 ml       General Appearance  Awake, chronically sick looking. HEENT - normocephalic, atraumatic, slightly pale conjunctiva,  anicteric sclera, dry oral mucosa  Neck - Supple, no mass  Lungs -  Bilateral  air entry, no rhonchi, no wheeze  Cardiovascular - Heart sounds are normal.  .  Abdomen - soft, not distended, nontender,   Neurologic -awake does not follow command  Skin - No bruising or bleeding  Extremities - No edema, compression hose on the right lower leg.   MEDICATIONS:      cloNIDine  1 patch TransDERmal Weekly    metoprolol (LOPRESSOR) ivpb  10 mg IntraVENous Q6H    pantoprazole  40 mg IntraVENous BID    piperacillin-tazobactam  3,375 mg IntraVENous Q12H    [Held by provider] metoprolol tartrate  100 mg Oral BID    sodium chloride  1,000 mL IntraVENous Once    insulin lispro  0-6 Units SubCUTAneous TID WC    insulin lispro  0-3 Units SubCUTAneous Nightly    [Held by provider] atorvastatin  20 mg Oral Nightly    calcium elemental  1 tablet Oral Daily    polyvinyl alcohol  1 drop Ophthalmic Daily    donepezil  10 mg Oral Nightly    fluticasone  2 spray Nasal Daily    memantine  5 mg Oral BID    vitamin D  50,000 Units Oral Once per day on Mon Thu    sodium chloride flush  5-40 mL IntraVENous 2 times per day      sodium chloride      sodium bicarbonate infusion 100 mL/hr at 05/09/22 0104    dextrose      sodium chloride       sodium chloride, ipratropium-albuterol, glucagon (rDNA), dextrose, glucose, dextrose bolus **OR** dextrose bolus, sodium chloride flush, sodium chloride, ondansetron **OR** ondansetron      LABS:     CBC: No results for input(s): WBC, HGB, PLT in the last 72 hours. BMP:    Recent Labs     05/07/22 0755 05/07/22 0755 05/08/22 0337 05/09/22 0333     --  144 146*   K 4.7  4.7   < > 4.3  4.3 3.7  3.7     --  112* 111   CO2 12*  --  10* 16*   BUN 68*  --  69* 59*   CREATININE 2.7*  --  2.9* 2.6*   GLUCOSE 170*  --  204* 166*    < > = values in this interval not displayed. Calcium:  Recent Labs     05/09/22 0333   CALCIUM 8.2*     Ionized Calcium:No results for input(s): IONCA in the last 72 hours. Magnesium:  Recent Labs     05/08/22  1803   MG 2.1     Phosphorus:  Recent Labs     05/08/22  1803   PHOS 3.9     BNP:No results for input(s): BNP in the last 72 hours. Glucose:  Recent Labs     05/08/22 1952 05/09/22  0609 05/09/22  0752   POCGLU 139* 176* 141*     HgbA1C: No results for input(s): LABA1C in the last 72 hours.   INR:   Recent Labs     05/08/22 0337 05/08/22 1956 05/09/22 0333   INR 4.29* 3.90* 4.27*     Hepatic:   Recent Labs     05/07/22 0755 05/08/22 0337 05/09/22  0333   ALKPHOS 304* 262* 280*   ALT 1,262* 1,364* 1,842*   AST 2,762* 2,486* 3,329*   PROT 6.0* 5.5* 5.6*   BILITOT 3.2* 3.4* 5.1*   BILIDIR 2.0* 2.5* 3.1*   LABALBU 3.2* 3.2* 3.2*     Amylase and Lipase:  Recent Labs     05/09/22 0922   LACTA 6.0*     Lactic Acid:   Recent Labs     05/09/22 0922   LACTA 6.0*     Troponin: No results for input(s): CKTOTAL, CKMB, TROPONINI in the last 72 hours. BNP: No results for input(s): BNP in the last 72 hours. CULTURES:   UA: No results for input(s): SPECGRAV, PHUR, COLORU, CLARITYU, MUCUS, PROTEINU, BLOODU, RBCUA, WBCUA, BACTERIA, NITRU, GLUCOSEU, BILIRUBINUR, UROBILINOGEN, KETUA, LABCAST, LABCASTTY, AMORPHOS in the last 72 hours. Invalid input(s): CRYSTALS  Micro:   Lab Results   Component Value Date    BC Culture in progress: preliminary report-negative. 05/05/2022         IMAGING:         Problem list of patient:     Patient Active Problem List   Diagnosis Code    CVA (cerebral vascular accident)-2002 I63.9    CAD (coronary artery disease)-mild NONobstructive max 30% ostail, LCX, mid lad and diffuse mild luminal irregularites-per cath 08/08/12 I25.10    S/P cardiac cath 2012- mild nonobstructive CAD Z98.890    Essential hypertension I10    Hyperlipidemia E78.5    Dementia without behavioral disturbance ([facility] F03.90    DARIUS (acute kidney injury) ([facility] [address] N17.9    Anemia D64.9    New onset atrial fibrillation (HCC) w/ RVR now sinus Bradycardia I48.91    Hiatal hernia with gastroesophageal reflux disease and esophagitis and ulcer K44.9, K21.00    Garrison's esophagus with esophagitis K22.70, K20.90    Atrial fibrillation with rapid ventricular response (HCC) I48.91    Paroxysmal atrial fibrillation with RVR (HCC) I48.0    Acute diastolic congestive heart failure (HCC) I50.31    Acute renal failure (ARF) (HCC) N17.9    Hypernatremia E87.0    Pneumonia J18.9    High anion gap metabolic acidosis A83.9    Acute-on-chronic kidney injury (HCC) N17.9, N18.9    Lactic acidosis E87.2    Hepatic dysfunction K76.9         ASSESSMENT/PLAN     Lactic acidosis likely due to hypotension. Acute kidney injury on chronic  Elevated liver function test likely related to shock  Diabetes mellitus  Cultures remain negative. We will stop antibiotic and observe patient.     Natacha Salvador MD, MD, Nabeel Hammond 5/9/2022

## 2022-05-09 NOTE — PROGRESS NOTES
Updated Dr. Adele Mirza about INR being 4.29 he stated his NP would be rounding shortly, and that he thinks [place]us may be peaking and not sure if there is more they can do, but will see what his NP says this morning.

## 2022-05-09 NOTE — PROGRESS NOTES
Renal Progress Note  [facility]    Patient : Joesph Mack; 78 y.o. MRN# [id number]  Location:  3B-27/027-A  Attending:  Carlos Matos MD  Admit Date:  Ja[place] 45 Day: 8      Subjective:     Nephrology is following the patient for DARIUS. Patient seen and examined. She is lethargic. Low grade fever. tachypneic and intermittently tachycardic and hypertensive. Lopressor adjusted and clonidine patch added. Urine output better overnight.     Outpatient Medications:     Medications Prior to Admission: acetaminophen (AMINOFEN) 325 MG tablet, Take 2 tablets by mouth every 6 hours as needed for Pain or Fever (may cut or crush medications) (Patient not taking: Reported on 5/5/2022)  metoprolol tartrate (LOPRESSOR) 50 MG tablet, Take 1 tablet by mouth 2 times daily  Elastic Bandages & Supports (MEDICAL COMPRESSION STOCKINGS) MISC, 1 each by Does not apply route daily as needed (Bite to the lower leg daily for swelling)  pantoprazole (PROTONIX) 40 MG tablet, Take 1 tablet by mouth 2 times daily (before meals)  loperamide (IMODIUM) 2 MG capsule, Take 2 mg by mouth daily as needed for Diarrhea  ferrous sulfate (IRON 325) 325 (65 Fe) MG tablet, Take 1 tablet by mouth daily (with breakfast)  lisinopril (PRINIVIL;ZESTRIL) 10 MG tablet, Take 1 tablet by mouth once daily  donepezil (ARICEPT) 10 MG tablet, Take 1 tablet by mouth nightly  vitamin D (ERGOCALCIFEROL) 1.25 MG (71652 UT) CAPS capsule, TAKE 1 CAPSULE BY MOUTH TWICE A WEEK (Patient taking differently: Take 50,000 Units by mouth twice a week On Mondays and Thursdays)  atorvastatin (LIPITOR) 20 MG tablet, Take 1 tablet by mouth once daily  memantine (NAMENDA) 10 MG tablet, Take 1 tablet by mouth twice daily  calcium carbonate 600 MG TABS tablet, Take 1 tablet by mouth daily  fluticasone (FLONASE) 50 MCG/ACT nasal spray, 2 sprays by Nasal route daily  Carboxymethylcell-Hypromellose (GENTEAL OP), Apply 1 drop to eye daily 1 drop each eye daily    Current Medications:     Scheduled Meds:    cloNIDine  1 patch TransDERmal Weekly    pantoprazole  40 mg IntraVENous BID    piperacillin-tazobactam  3,375 mg IntraVENous Q12H    metoprolol tartrate  100 mg Oral BID    sodium chloride  1,000 mL IntraVENous Once    insulin lispro  0-6 Units SubCUTAneous TID WC    insulin lispro  0-3 Units SubCUTAneous Nightly    [Held by provider] atorvastatin  20 mg Oral Nightly    calcium elemental  1 tablet Oral Daily    polyvinyl alcohol  1 drop Ophthalmic Daily    donepezil  10 mg Oral Nightly    fluticasone  2 spray Nasal Daily    memantine  5 mg Oral BID    vitamin D  50,000 Units Oral Once per day on     sodium chloride flush  5-40 mL IntraVENous 2 times per day     Continuous Infusions:    sodium chloride      sodium bicarbonate infusion 100 mL/hr at 22 0104    dextrose      sodium chloride       PRN Meds:  sodium chloride, metoprolol, ipratropium-albuterol, glucagon (rDNA), dextrose, glucose, dextrose bolus **OR** dextrose bolus, sodium chloride flush, sodium chloride, ondansetron **OR** ondansetron    Input/Output:       I/O last 3 completed shifts: In: 943.3 [I.V.:700; Blood:188.3; IV Piggyback:55]  Out: 9852 [Urine:1575].       Patient Vitals for the past 96 hrs (Last 3 readings):   Weight   22 0344 145 lb 15.1 oz (66.2 kg)   22 0506 137 lb 2 oz (62.2 kg)   22 0530 134 lb 0.6 oz (60.8 kg)       Vital Signs:   Temperature:  Temp: 99.8 °F (37.7 °C)  TMax:   Temp (24hrs), Av.8 °F (37.1 °C), Min:96.7 °F (35.9 °C), Max:100.2 °F (37.9 °C)    Respirations:  Resp: 26  Pulse:   Pulse: 107  BP:    BP: (!) 158/87  BP Range: Systolic (78PJM), IDP:643 , Min:114 , GNC:558       Diastolic (15SQE), TZN:86, Min:77, Max:115      Physical Examination:     General:  Asleep, not very arousable  HEENT: NC/AT/ MMM  Chest:               Diminished, tachypneic  Cardiac:  irregular  Abdomen: Soft, non-tender,  Neuro: confused  SKIN:  No rashes,   Extremities:  Compression stockings, no significant edema noted    Labs:     No results for input(s): WBC, RBC, HGB, HCT, MCV, MCH, MCHC, RDW, PLT, MPV in the last 72 hours. BMP:   Recent Labs     05/07/22 0755 05/07/22 0755 05/08/22  0337 05/09/22  0333     --  144 146*   K 4.7  4.7   < > 4.3  4.3 3.7  3.7     --  112* 111   CO2 12*  --  10* 16*   BUN 68*  --  69* 59*   CREATININE 2.7*  --  2.9* 2.6*   GLUCOSE 170*  --  204* 166*   CALCIUM 8.6  --  8.0* 8.2*    < > = values in this interval not displayed.       Phosphorus:     Recent Labs     05/08/22  1803   PHOS 3.9     Magnesium:    Recent Labs     05/08/22  1803   MG 2.1     Albumin:    Recent Labs     05/07/22 0755 05/08/22 0337 05/09/22  0333   LABALBU 3.2* 3.2* 3.2*     BNP:    No results found for: BNP  ROSS:      Lab Results   Component Value Date    ROSS Positive 06/29/2020     SPEP:  Lab Results   Component Value Date    PROT 5.6 05/09/2022     UPEP:   No results found for: LABPE  C3:   No results found for: C3  C4:   No results found for: C4  MPO ANCA:   No results found for: MPO  PR3 ANCA:   No results found for: PR3  Anti-GBM:   No results found for: GBMABIGG  Hep BsAg:         Lab Results   Component Value Date    HEPBSAG Negative 05/05/2022     Hep C AB:          Lab Results   Component Value Date    HEPCAB Negative 05/05/2022       Urinalysis/Chemistries:      Lab Results   Component Value Date    NITRU NEGATIVE 05/05/2022    COLORU DK YELLOW 05/05/2022    PHUR 5.0 05/05/2022    WBCUA 0-2 05/05/2022    RBCUA 0-2 05/05/2022    YEAST NONE SEEN 05/05/2022    BACTERIA NONE SEEN 05/05/2022    CLARITYU Cloudy 04/21/2022    SPECGRAV 1.020 04/21/2022    LEUKOCYTESUR NEGATIVE 05/05/2022    UROBILINOGEN 1.0 05/05/2022    BILIRUBINUR SMALL 05/05/2022    BLOODU NEGATIVE 05/05/2022    GLUCOSEU NEGATIVE 05/05/2022    KETUA TRACE 05/05/2022     Urine Sodium:   No results found for: SAMEER  Urine Potassium:  No results found for: KUR  Urine Chloride:  No results found for: CLUR  Urine Osmolarity: No results found for: OSMOU  Urine Protein:   No components found for: TOTALPROTEIN, URINE   Urine Creatinine:   No results found for: LABCREA  Urine Eosinophils:  No components found for: UEOS        Impression and Plan:  1. DARIUS on CKD III likely from renal hypoperfusion: urine output improving, creat better today. 2. Lactic acidosis, unclear etiology. PH dropped over the weekend and started on bicarb drip. Bicarb better. Repeat lactic acid. Continue bicarb drip  3. Hypernatremia: continue with hypotonic bicarb drip  4. Transaminitis, worsening  5. Afib intermittent RVR  6. HTN: clonidine patch added  7. DM  8. Dementia        Please don't hesitate to call with any questions.   Electronically signed by Gualberto Hutchins DO on 5/9/2022 at 7:17 AM

## 2022-05-09 NOTE — PROGRESS NOTES
Progress note      [facility]             Patient: Sampson Zhao  YOB: 1940    MRN: [id number]   Acct:  [de-identified]   3B-27/027-A  Primary Care Physician: Bronson Amanda MD    Admit Date: 5/4/2022           Subjective: Pt is pretty much unresponsive. Liver failure and kidney failure continue to worsen. She is responsive to pain only. Attempted to reach family and unable to get a hold of them.     Objective:      Physical Exam:    Vitals:Patient Vitals for the past 24 hrs:   BP Temp Temp src Pulse Resp SpO2 Weight   05/09/22 1215 (!) 168/77 97.9 °F (36.6 °C) Oral 78 18 100 % --   05/09/22 0800 (!) 184/102 97.5 °F (36.4 °C) Axillary 96 16 -- --   05/09/22 0601 (!) 158/87 -- -- -- -- -- --   05/09/22 0344 -- -- -- -- -- -- 145 lb 15.1 oz (66.2 kg)   05/09/22 0329 (!) 168/95 99.8 °F (37.7 °C) Rectal 107 26 99 % --   05/09/22 0129 (!) 170/94 -- -- -- -- -- --   05/09/22 0107 (!) 173/115 -- -- -- 28 -- --   05/09/22 0027 (!) 200/94 -- -- -- 23 96 % --   05/08/22 2255 (!) 154/94 100.2 °F (37.9 °C) Rectal 115 23 100 % --   05/08/22 2024 (!) 143/95 -- -- -- -- -- --   05/08/22 1957 (!) 138/106 99.8 °F (37.7 °C) Rectal 130 22 100 % --   05/08/22 1547 (!) 137/101 98.6 °F (37 °C) Rectal 68 24 100 % --     Weight: Weight: 145 lb 15.1 oz (66.2 kg)     24 hour intake/output:    Intake/Output Summary (Last 24 hours) at 5/9/2022 1225  Last data filed at 5/9/2022 0854  Gross per 24 hour   Intake 943.33 ml   Output 3700 ml   Net -2756.67 ml       General appearance - not alert, ill appearing, and in no distress  Eyes - pupils equal and reactive, extraocular eye movements intact  Mouth - mucous membranes moist, pharynx normal without lesions  Neck - supple, no significant adenopathy  Chest - clear to auscultation, no wheezes, rales or rhonchi, symmetric air entry  Heart - normal rate, regular rhythm, normal S1, S2, no murmurs, rubs, clicks or gallops  Abdomen - soft, tender to palpation, nondistended, no masses or organomegaly, pos bs. Neurological -Does not follow commands, responds to pain only   Musculoskeletal - no joint tenderness, deformity or swelling  Extremities - peripheral pulses normal, no pedal edema, no clubbing or cyanosis  Skin - normal coloration and turgor, no rashes, no suspicious skin lesions noted    Review of Labs and Diagnostic Testing:    CBC: No results for input(s): WBC, HGB, HCT, MCV, PLT in the last 72 hours. BMP:   Recent Labs     05/08/22  0337 05/08/22  1803 05/09/22 0333   NA   < >  --  146*   K   < >  --  3.7  3.7   CL   < >  --  111   CO2   < >  --  16*   PHOS  --  3.9  --    BUN   < >  --  59*   CREATININE   < >  --  2.6*   CALCIUM   < >  --  8.2*   GLUCOSE   < >  --  166*    < > = values in this interval not displayed. PT/INR:   Recent Labs     05/09/22 0333   INR 4.27*     APTT: No results for input(s): APTT in the last 72 hours. Lipids:   Recent Labs     05/09/22 0333   ALKPHOS 280*   ALT 1,842*   AST 3,329*   BILITOT 5.1*   BILIDIR 3.1*   LABALBU 3.2*     Troponin: No results for input(s): TROPONINT in the last 72 hours. Imaging:  [unfilled]    EKG:      Diet: ADULT DIET; Dysphagia - Pureed;  No Drinking Straws        Data:   Scheduled Meds: Scheduled Meds:   cloNIDine  1 patch TransDERmal Weekly    metoprolol (LOPRESSOR) ivpb  10 mg IntraVENous Q6H    pantoprazole  40 mg IntraVENous BID    piperacillin-tazobactam  3,375 mg IntraVENous Q12H    [Held by provider] metoprolol tartrate  100 mg Oral BID    sodium chloride  1,000 mL IntraVENous Once    insulin lispro  0-6 Units SubCUTAneous TID WC    insulin lispro  0-3 Units SubCUTAneous Nightly    [Held by provider] atorvastatin  20 mg Oral Nightly    calcium elemental  1 tablet Oral Daily    polyvinyl alcohol  1 drop Ophthalmic Daily    donepezil  10 mg Oral Nightly    fluticasone  2 spray Nasal Daily    memantine  5 mg Oral BID    vitamin D  50,000 Units Oral Once per day on Mon Thu    sodium chloride flush  5-40 mL IntraVENous 2 times per day     Continuous Infusions:   sodium chloride      sodium bicarbonate infusion 100 mL/hr at 05/09/22 0104    dextrose      sodium chloride       PRN Meds:.sodium chloride, ipratropium-albuterol, glucagon (rDNA), dextrose, glucose, dextrose bolus **OR** dextrose bolus, sodium chloride flush, sodium chloride, ondansetron **OR** ondansetron  Continuous Infusions:   sodium chloride      sodium bicarbonate infusion 100 mL/hr at 05/09/22 0104    dextrose      sodium chloride           Assessment   1. Now primarily with high anion gap metabolic acidosis from Lactic acidosis / DARIUS   a. Fluid switched to bicarb and Trend lactic acid, lactic acid etiology unclear, ?hypoperfusion   b. Lactic level is coming down slowly but still elevated; ID on board and does not believe to be infectious; abx stopped  c. Pt with abdominal pain now; will recommend abdominal scan to r/o ischemic bowel but tricky situation with DARIUS, elevated INR so unable to tx and high risk for surgery--will discuss with family goals   d. Conts on Bicarb drip  2. Transaminitis  1. Worsening LFTs  2. Worsening INR  3. CT abdomen WO contrast   Unrevealing for structural hepatic pathology. Trend LFTs, f/u labs  4. Unclear etiology; GI recommending transfer to tertiary center if family wants to continue aggressive measures   3. Pneumonia  Cannot rule out aspiration  a. S/P abx  b. ID following; and does not believe to be infectious   c. On room air   d. Strep pneumo and legionella antigens negative  4. A fib RVR, not on anticoag due to recent hx GIB  a. Cardiology on board  b. Continue IV beta blockers as pt unable to take by mouth due to AMS   c. No anticoag due to hx GIB  d. Cont tele monitoring   5. CAD with elevated trops   a. Trops stable  b. No further workup per cardio  6. Hx Dementia  a. Noted   7. HTN  a.  On Clonidine patch and IV Lopressor   8. HLD  a. Holding Statin due to  transaminitis  9. Hx CVA  a. Not on ASA/Plavix due to GIB hx   10. PT/OT  11. DVT prophylaxis  a. SCDs and auto anticoagulated     Attempted to get a hold of daughter to discuss goals of care and she did not answer. Discussed with palliative. Pt appears to be in multi-system organ failure. Prognosis is guarded. If family wants to continue aggressive measures we will initiate transfer to tertiary center. Assessment and plan of care discussed with supervising physician, Dr Shanique Pérez. Electronically signed by LUZ Barrientos CNP on 5/9/2022 at 12:25 PM  Addendum/attestation by Saint Jakob, MD:  I have seen and examined the patient independently. Face to face evaluation and examination was performed. The above evaluation and note has been reviewed. Laboratory and radiological data were reviewed. I Have discussed with Francine Cox CNP about this patient in detail. The above assessment and plan has been reviewed. Please see my modifications mentioned below. My modifications:  Concerning for developing multiorgan failure. [place] poor, we don't have the living will from [place].  Continue to try and contact the family  Electronically signed by Jie Boo MD on 5/9/2022 at 1:44 PM

## 2022-05-09 NOTE — PROGRESS NOTES
Messaged Dr. Nixon Forward : a run of v tach of 18 beats. the dig was given at 640pm. and metorpolol was given prior to that.  HR still in the 130's     alts are in the 1000s    New orders received see [facility]

## 2022-05-09 NOTE — PROGRESS NOTES
Gastroenterology Progress Note:     Patient Name:  Pedro Medeiros   MRN: [id number]  [id number]  YOB: 1940  Admit Date: 5/4/2022 10:46 PM  Primary Care Physician: Roderick Horton MD   3B-27/027-A     Patient seen and examined. 24 hours events and chart reviewed. Subjective: Patient sleeping in bed, lethargic. Arouses slightly with exam. Does not answer questions or follow commands. Low grade fever last night. Objective:  BP (!) 184/102   Pulse 96   Temp 97.5 °F (36.4 °C) (Axillary)   Resp 16   Ht 5' 3\" (1.6 m)   Wt 145 lb 15.1 oz (66.2 kg)   SpO2 99%   BMI 25.85 kg/m²     Physical Exam:    General:  Acute on chronically ill appearing female  HEENT: Atraumatic, normocephalic. Dry oral mucous membranes. Neck: Supple without adenopathy, JVD, thyromegaly or masses. Trachea midline. CV: Heart irregular, no murmurs, rubs, gallops. Resp: Even, easy without cough or accessory use. Lungs clear, diminished to ascultation bilaterally. Abd: Round, soft, nontender. No hepatosplenomegaly or mass present. Active bowel sounds heard. No distention noted. Ext:  Without cyanosis, clubbing, edema.    Skin: Pink, warm, dry  Neuro:  Lethargic, confused      Rectal: deferred    Labs:   CBC:   Lab Results   Component Value Date    WBC 5.9 05/05/2022    HGB 10.0 05/05/2022    HCT 34.8 05/05/2022    MCV 92.8 05/05/2022     05/05/2022     BMP:   Lab Results   Component Value Date     05/09/2022    K 3.7 05/09/2022    K 3.7 05/09/2022     05/09/2022    CO2 16 05/09/2022    PHOS 3.9 05/08/2022    BUN 59 05/09/2022    CREATININE 2.6 05/09/2022    CALCIUM 8.2 05/09/2022     PT/INR:   Lab Results   Component Value Date    INR 4.27 05/09/2022     Lipids:   Lab Results   Component Value Date    ALKPHOS 280 05/09/2022    ALT 1,842 05/09/2022    AST 3,329 05/09/2022    BILITOT 5.1 05/09/2022    BILIDIR 3.1 05/09/2022    LABALBU 3.2 05/09/2022    LABALBU 4.8 03/01/2012    AMYLASE 134 06/29/2020 LIPASE 144.4 05/05/2022     Significant Diagnostic Studies: none for 24 hours    Current Meds:  Scheduled Meds:   cloNIDine  1 patch TransDERmal Weekly    pantoprazole  40 mg IntraVENous BID    piperacillin-tazobactam  3,375 mg IntraVENous Q12H    metoprolol tartrate  100 mg Oral BID    sodium chloride  1,000 mL IntraVENous Once    insulin lispro  0-6 Units SubCUTAneous TID WC    insulin lispro  0-3 Units SubCUTAneous Nightly    [Held by provider] atorvastatin  20 mg Oral Nightly    calcium elemental  1 tablet Oral Daily    polyvinyl alcohol  1 drop Ophthalmic Daily    donepezil  10 mg Oral Nightly    fluticasone  2 spray Nasal Daily    memantine  5 mg Oral BID    vitamin D  50,000 Units Oral Once per day on Mon Thu    sodium chloride flush  5-40 mL IntraVENous 2 times per day     Continuous Infusions:   sodium chloride      sodium bicarbonate infusion 100 mL/hr at 05/09/22 0104    dextrose      sodium chloride         Assessment:  79 yo F admitted 05/04/22 for weakness, decreased appetite, from assisted living. Noted to have elevated creatinine & lactic acid. H/O dementia. She has had worsening mental status & has pulled out multiple IVs. GI consulted for elevated LFTs. US liver, doppler, & CT A/P benign for liver disease. Hepatitis panel negative except for possible prior Hepatitis B infection or vaccination. 1. Hepatic dysfunction/acute liver failure- probable shock liver  2. Dementia- seems to be worsening  3. DARIUS on CKD- nephro following  4. Sepsis  5. Afib with RVR- no OAC due to h/o GIB  6. Multiple run of vtach- noted on telemetry monitor  7. H/O HTN  8. H/O CVA- no longer on ASA/Plavix due to h/o GIB  9. Hypernatremia  10. Coagulopathy- secondary to #1  11.  H/O GIB- on PPI    Plan:     Monitor H & H, transfuse prn   Continue IVP PPI BID   Monitor HFP, INR   Avoid hepatotoxic meds   ATBs per primary   Diet as tolerated, however she has been refusing PO intake   Per RN, palliative care meeting scheduled today with family   Not a candidate for PEG tube given dementia & having pulled out multiple IVs, high risk of PEG being dislodged   If patient's family wish to continue with full code & aggressive measures, will transfuse 1 unit FFP due to history of GIB, & would recommend transfer to tertiary center for acute liver failure   Recommend hospice care   RN updated   [facility] Nephrology & [facility] on board   Case discussed with cardiology   Supportive care per primary team  Will follow    Case reviewed and impression/plan reviewed in collaboration with Dr. Caroline Belle  Electronically signed by LUZ Joregnsen CNP on 5/9/2022 at 9:31 AM    [facility]

## 2022-05-09 NOTE — PLAN OF CARE
Problem: Discharge Planning  Goal: Discharge to home or other facility with appropriate resources  Outcome: Progressing  Flowsheets (Taken 5/9/2022 0800)  Discharge to home or other facility with appropriate resources:   Identify barriers to discharge with patient and caregiver   Arrange for needed discharge resources and transportation as appropriate   Identify discharge learning needs (meds, wound care, etc)   Refer to discharge planning if patient needs post-hospital services based on physician order or complex needs related to functional status, cognitive ability or social support system     Problem: Safety - Adult  Goal: Free from fall injury  Outcome: Progressing  Flowsheets (Taken 5/9/2022 1101)  Free From Fall Injury:   Instruct family/caregiver on patient safety   Based on caregiver fall risk screen, instruct family/caregiver to ask for assistance with transferring infant if caregiver noted to have fall risk factors     Problem: ABCDS Injury Assessment  Goal: Absence of physical injury  Outcome: Progressing     Problem: Skin/Tissue Integrity  Goal: Absence of new skin breakdown  Description: 1. Monitor for areas of redness and/or skin breakdown  2. Assess vascular access sites hourly  3. Every 4-6 hours minimum:  Change oxygen saturation probe site  4. Every 4-6 hours:  If on nasal continuous positive airway pressure, respiratory therapy assess nares and determine need for appliance change or resting period.   Outcome: Progressing     Problem: Chronic Conditions and Co-morbidities  Goal: Patient's chronic conditions and co-morbidity symptoms are monitored and maintained or improved  Outcome: Progressing  Flowsheets (Taken 5/9/2022 0800)  Care Plan - Patient's Chronic Conditions and Co-Morbidity Symptoms are Monitored and Maintained or Improved:   Monitor and assess patient's chronic conditions and comorbid symptoms for stability, deterioration, or improvement   Collaborate with multidisciplinary team to address chronic and comorbid conditions and prevent exacerbation or deterioration   Update acute care plan with appropriate goals if chronic or comorbid symptoms are exacerbated and prevent overall improvement and discharge     Problem: Pain  Goal: Verbalizes/displays adequate comfort level or baseline comfort level  Outcome: Progressing  Care plan reviewed with daughter Emanuel Rogel. Emanuel Rogel verbalizes understanding of the plan of care and contribute to goal setting.

## 2022-05-10 PROBLEM — E43 SEVERE MALNUTRITION (HCC): Status: ACTIVE | Noted: 2022-01-01

## 2022-05-10 NOTE — PLAN OF CARE
Problem: Discharge Planning  Goal: Discharge to home or other facility with appropriate resources  Outcome: Progressing     Problem: Safety - Adult  Goal: Free from fall injury  Outcome: Progressing  Flowsheets (Taken 5/10/2022 0101)  Free From Fall Injury:   Based on caregiver fall risk screen, instruct family/caregiver to ask for assistance with transferring infant if caregiver noted to have fall risk factors   Instruct family/caregiver on patient safety  Note: Bed locked & in low position, call light in reach, side-rails up x2, bed/chair alarm utilized, non-slip socks on when ambulating, reminded patient to use call light to call for assistance. Problem: ABCDS Injury Assessment  Goal: Absence of physical injury  Outcome: Progressing  Flowsheets (Taken 5/10/2022 0101)  Absence of Physical Injury: Implement safety measures based on patient assessment     Problem: Skin/Tissue Integrity  Goal: Absence of new skin breakdown  Description: 1. Monitor for areas of redness and/or skin breakdown  2. Assess vascular access sites hourly  3. Every 4-6 hours minimum:  Change oxygen saturation probe site  4. Every 4-6 hours:  If on nasal continuous positive airway pressure, respiratory therapy assess nares and determine need for appliance change or resting period. Outcome: Progressing  Note: Ongoing assessment & interventions provided throughout shift. Skin assessments provided. Encouraging/assisting patient to turn as needed. Problem: Chronic Conditions and Co-morbidities  Goal: Patient's chronic conditions and co-morbidity symptoms are monitored and maintained or improved  Outcome: Progressing     Problem: Pain  Goal: Verbalizes/displays adequate comfort level or baseline comfort level  Outcome: Progressing  Note: Ongoing assessment & interventions provided throughout shift. Reminded patient to report any pain, pressure, or shortness of breath to the nurse.   Pain medications provided per physician's orders. Care plan reviewed with patient. Patient verbalizes understanding of the care plan and contributed to goal setting.

## 2022-05-10 NOTE — PROGRESS NOTES
Gastroenterology Progress Note:     Patient Name:  Collette Smack   MRN: [id number]  [id number]  YOB: 1940  Admit Date: 5/4/2022 10:46 PM  Primary Care Physician: Parisa Mckeon MD   3B-27/027-A     Patient seen and examined. 24 hours events and chart reviewed. Subjective: Patient sleeping in bed, arouses to stimuli. Does not answer questions or follow commands, but she does appear somewhat more alert. Objective:  BP (!) 159/86   Pulse 69   Temp 97.4 °F (36.3 °C) (Axillary)   Resp 18   Ht 5' 3\" (1.6 m)   Wt 124 lb 5.4 oz (56.4 kg)   SpO2 99%   BMI 22.03 kg/m²     Physical Exam:    General:  Acute on chronically ill appearing female  HEENT: Atraumatic, normocephalic. Dry oral mucous membranes. Neck: Supple without adenopathy, JVD, thyromegaly or masses. Trachea midline. CV: Heart irregular, no murmurs, rubs, gallops. Resp: Even, easy without cough or accessory use. Lungs clear, diminished to ascultation bilaterally. Abd: Round, soft, tender to palpation. No hepatosplenomegaly or mass present. Active bowel sounds heard. No distention noted. Ext:  Without cyanosis, clubbing, edema.    Skin: Pink, warm, dry  Neuro:  Alert at times, confused  Rectal: deferred    Labs:   CBC:   Lab Results   Component Value Date    WBC 5.9 05/05/2022    HGB 10.0 05/05/2022    HCT 34.8 05/05/2022    MCV 92.8 05/05/2022     05/05/2022     BMP:   Lab Results   Component Value Date     05/10/2022    K 2.6 05/10/2022     05/10/2022    CO2 26 05/10/2022    PHOS 3.9 05/08/2022    BUN 41 05/10/2022    CREATININE 1.6 05/10/2022    CALCIUM 8.2 05/10/2022     PT/INR:   Lab Results   Component Value Date    INR 3.37 05/10/2022     Lipids:   Lab Results   Component Value Date    ALKPHOS 321 05/10/2022    ALT 2,026 05/10/2022    AST 2,677 05/10/2022    BILITOT 7.8 05/10/2022    BILIDIR 3.9 05/10/2022    LABALBU 3.1 05/10/2022    LABALBU 4.8 03/01/2012    AMYLASE 134 06/29/2020    LIPASE 144.4 05/05/2022     Significant Diagnostic Studies: none for 24 hours    Current Meds:  Scheduled Meds:   potassium chloride  10 mEq IntraVENous Q1H    magnesium sulfate  2,000 mg IntraVENous Once    bisacodyl  10 mg Rectal Daily    cloNIDine  1 patch TransDERmal Weekly    metoprolol (LOPRESSOR) ivpb  10 mg IntraVENous Q6H    pantoprazole  40 mg IntraVENous BID    [Held by provider] metoprolol tartrate  100 mg Oral BID    sodium chloride  1,000 mL IntraVENous Once    insulin lispro  0-6 Units SubCUTAneous TID WC    insulin lispro  0-3 Units SubCUTAneous Nightly    [Held by provider] atorvastatin  20 mg Oral Nightly    calcium elemental  1 tablet Oral Daily    polyvinyl alcohol  1 drop Ophthalmic Daily    donepezil  10 mg Oral Nightly    fluticasone  2 spray Nasal Daily    memantine  5 mg Oral BID    vitamin D  50,000 Units Oral Once per day on Mon Thu    sodium chloride flush  5-40 mL IntraVENous 2 times per day     Continuous Infusions:   0.45 % NaCl with KCl 20 mEq      sodium chloride      dextrose      sodium chloride         Assessment:  77 yo F admitted 05/04/22 for weakness, decreased appetite, from assisted living. Noted to have elevated creatinine & lactic acid. H/O dementia. She has had worsening mental status & has pulled out multiple IVs. GI consulted for elevated LFTs. US liver, doppler, & CT A/P benign for liver disease. Hepatitis panel negative except for possible prior Hepatitis B infection or vaccination.      1. Hepatic dysfunction/acute liver failure- probable shock liver  2. Dementia- seems to be worsening  3. DARIUS on CKD- nephro following  4. Sepsis  5. Afib with RVR- no OAC due to h/o GIB  6. Multiple run of vtach- noted on telemetry monitor  7. H/O HTN  8. H/O CVA- no longer on ASA/Plavix due to h/o GIB  9. Hypernatremia  10. Coagulopathy- secondary to #1  11.  H/O GIB- on PPI     Plan:    · Monitor H & H, transfuse prn  · Continue IVP PPI BID  · Monitor HFP, INR  · Avoid hepatotoxic meds  · LFTs, INR may be peaking  · Dulcolax suppository daily for 3 days  · Diet as tolerated, however she has been refusing PO intake  · Family in palliative care meeting this morning during this evaluation  · Not a candidate for PEG tube given dementia & having pulled out multiple IVs, high risk of PEG being dislodged  · If patient's family wish to continue with full code & aggressive measures would consider transfer to tertiary center for acute liver failure  · Recommend hospice care  · RN updated   · Nephrology & cardiology on board  · Case discussed with cardiology  · Supportive care per primary team  Will follow    Case reviewed and impression/plan reviewed in collaboration with Dr. Tiana Malik  Electronically signed by LUZ Barreto CNP on 5/10/2022 at 8:58 AM    [facility]

## 2022-05-10 NOTE — CARE COORDINATION
5/10/22, 2:04 PM EDT    DISCHARGE ON GOING EVALUATION    Caio Booth day: 10  Location: [facility]27/027-A Reason for admit: Metabolic acidosis [D27.9]  Pneumonia [J18.9]  Acute renal failure (ARF) (HCC) [N17.9]  Acute renal failure, unspecified acute renal failure type ([address] [N17.9]  Congestive heart failure, unspecified HF chronicity, unspecified heart failure type ([address] [I50.9]   Procedure:   5/4 CXR: Cardiomegaly present. Mild to moderate patchy opacities bilateral lung bases, increased. 5/5 Echo: to be done. 5/5 US Liver: Mild hepatomegaly. No focal liver lesion. Status post cholecystectomy  Barriers to Discharge: Hospice consult. Will follow for additional plans/needs. PCP: Florence Carrion MD  Readmission Risk Score: 20.8 ( )%  Patient Goals/Plan/Treatment Preferences: Await Hospice consult.

## 2022-05-10 NOTE — PROGRESS NOTES
Follow Up / Progress Note        Patient: Shakira Lou  YOB: 1940  Age:  78 y.o. Room:  [facility]/027-A  MRN:  [id number]            Family/Patient Discussion:  Patients POA requested to speak with Palliative Care regarding this mornings discussion with Dr. Joya Pleitez. After much discussion and open conversation POA is willing to meet with Hospice to discuss goals of care. Notified Dr. Joya Pleitez and he was in favor of this plan. [facility] [place] Liaison informed of this request for hospice information and she plans to reach out to [facility] [place] to further discuss. Marisue Denver is not ready yet to change patients code status without meeting with family first. Much Emotional Support provided during this discussion and encouraged her to contact us if she has any further questions or needs. Plan/Follow-Up: Order placed for Hospice Referral and called request to [phone].  Palliative Care will remain supportive       Electronically signed by Tomasa Winn RN on 5/10/2022 at 11:04 AM             Palliative Care Office: [phone]

## 2022-05-10 NOTE — PROGRESS NOTES
Comprehensive Nutrition Assessment    Type and Reason for Visit:  Initial,RD Nutrition Re-Screen/LOS    Nutrition Recommendations/Plan:   1. Recommend diet as tolerated. 2. Will send Magic Cups TID. 3. Will monitor POC vs. Need for nutrition support (not a PEG canidate - suspect would pull NGT as well). Malnutrition Assessment:  Malnutrition Status:  Severe malnutrition (05/10/22 1457)    Context:  Acute Illness     Findings of the 6 clinical characteristics of malnutrition:  Energy Intake:  50% or less of estimated energy requirements for 5 or more days  Weight Loss:  5% over 1 month (-12% in 1.5 months)     Body Fat Loss: Moderate body fat loss Triceps   Muscle Mass Loss: Moderate muscle mass loss Calf (gastrocnemius)  Fluid Accumulation:  Unable to assess     Strength:  Not Performed    Nutrition Assessment:      Pt. severely malnourished AEB criteria listed above. At risk for further nutritional compromise r/t dementia, transaminitis, hepatic dysfunction, DARIUS on CKD and underlying medical condition (hx CAD, CVA, dementia, HLD, HTN). Nutrition Related Findings:      Wound Type: None     Pt. Report/Treatments/Miscellaneous: pt. Seen - appears awake but does not answer questions; intake minimal - did consume 4 bites this am but had been refusing to eat; GI notes pt. Pulled out multiple IVs - not a canidate for PEG tube; possible hospice; noted if aggressive treatment desired - needs tertiary center  GI Status: BM 5/t  Pertinent Labs: 5/10: Glucose 129, BUN 41, Cr 1.6, Sodium 146  Pertinent Meds: PPI, Insulin, Zofran, Lipitor, Vitamin D, Dulcolax, Aricept      Current Nutrition Intake & Therapies:    Average Meal Intake: 0%- during LOS     ADULT DIET; Dysphagia - Pureed;  No Drinking Straws  ADULT ORAL NUTRITION SUPPLEMENT; Breakfast, Lunch, Dinner; Frozen Oral Supplement    Anthropometric Measures:  Height: 5' 3\" (160 cm)  Ideal Body Weight (IBW): 115 lbs (52 kg)    Admission Body Weight: 133 lb 4.4 oz (60.5 kg) (5/5 trace edema)  Current Body Weight: 124 lb 5.4 oz (56.4 kg) (5/10 no edema),   IBW. Current BMI (kg/m2): 22  Usual Body Weight:  (pt. u/a to state- per EMR: 2/27/20: 142# 4 oz, 8/12/21: 147# 4 oz, 4/5/22: 141# 9.6 oz)                       BMI Categories: Normal Weight (BMI 22.0 to 24.9) age over 72    Estimated Daily Nutrient Needs:  Energy Requirements Based On: Kcal/kg  Weight Used for Energy Requirements: Other (Comment) (56 kgm)  Energy (kcal/day): 4951-1436 kcals (25-30)  Weight Used for Protein Requirements: Other (Comment) (56 kgm)  Protein (g/day): ~56 gm (1/kgm)       Nutrition Diagnosis:   · Severe malnutrition related to cognitive or neurological impairment as evidenced by intake 0-25%,weight loss,moderate loss of subcutaneous fat,moderate muscle loss      Nutrition Interventions:   Food and/or Nutrient Delivery: Continue Current Diet,Start Oral Nutrition Supplement  Nutrition Education/Counseling: Education not appropriate  Coordination of Nutrition Care: Continue to monitor while inpatient       Goals:     Goals: PO intake 75% or greater,by next RD assessment       Nutrition Monitoring and Evaluation:   Behavioral-Environmental Outcomes: None Identified  Food/Nutrient Intake Outcomes: Diet Advancement/Tolerance,Food and Nutrient Intake,Supplement Intake  Physical Signs/Symptoms Outcomes: Biochemical Data,Chewing or Swallowing,GI Status,Fluid Status or Edema,Nutrition Focused Physical Findings,Skin,Weight    Discharge Planning:     Too soon to determine     Kevin Everett RD, LD  Contact: [phone]

## 2022-05-10 NOTE — PROGRESS NOTES
Hospice referral completed wit Shahida Smith POA/daughter and Phil Sosa POA/son in family meeting area. Patient lying in bed pleasantly confused with no S/S of distress or discomfort noted. Daughter told nurse that she had went to [facility] to see if they would be able to provide care that patient needing for hospice, she had been told by provider that they could not. Ginger had told her that they can. This nurse assured her that [facility] has had many hospice patients and they meet their needs. Hospice concepts, philosophies, and services explained. Let them know that hospice is extra layer of care for patient comfort and emotional support for Mai Amos as well as family. Asked if questions about patient condition had been answered. Voiced that Dr. Don Celeste was very informative and they wish for patient comfort. Talked with them about equipment at facility, they are going to take patient personal bed out of Senior living, so hospital bed can be delivered(discussed how care needs can be met better with this type of bed). Will also have tray table and oxygen delivered. Discussed code status and patient current status. After discussion they wish for patient treatment care to continue as it is till discharge but agreeable to Limited x 4 and wish for no heroic measures should patient condition decline further in hospital. Patient will be [facility]ed to [facility] when discharged from hospital. Did review use of comfort medication with family and agreeable to starting these medications prn. Patient has had rash type effect over whole body with itching  with morphine usage   Updated Dr Don Celeste of meeting discussion. Code status adjusted to Limited x 4 and addition of oxycodone 20 mg/ml 2.5 mg q2hrs prn pain or dyspnea , benadryl 12.5 mg Q 6 hrs rash or itching, and ativan 2 mg/ml 0.5 mg  prn anxiety or restlessness. Call placed to [facility] per request of daughter Key Wakefield.  Told this nurse that [facility] employee wished to talk to hospice nurse. No answer, message left. Rayne Hutchinson will call this nurse in the morning and hospice to coordinate admission at [address] when discharge from hospital which will likely be on 05.11.2022. Will contact  in morning to assist with setting up transport. Primary nurse Renu updated of hospice meeting outcome and new orders.

## 2022-05-10 NOTE — PROGRESS NOTES
Follow Up / Progress Note        Patient: Umair Tanner  YOB: 1940  Age:  78 y.o. Room:  [facility]27/027-A  MRN:  [id number]            Family/Patient Discussion:  Spoke with family regarding patients condition and to review goals of care and discuss code status. Met with patients ANA Weems and patients son Danni Pardo and his wife Lenny Ken, in the 3B family room at 65. David [place] and [place] are well informed and able to speak to the patients current condition. Much discussion regarding goals of care and a focus toward quality of life and management of symptoms was highlighted. Also discussed Hospice Care & possible need for a tertiary center ([facility] that can provide Hospice services). Family requesting to have more discussion between themselves before they make any changes. I discussed the differences between Full [facility], [address]. Family wants to continue the current plan of care. Encouraged family to discuss and offered our contact number for any additional questions or concerns they may have. Plan/Follow-Up: Palliative Care will attempt to contact POA later today. Please contact palliative care when needed.        Electronically signed by Barby Kumar RN on 5/10/2022 at 8:54 AM             Palliative Care Office: [phone]

## 2022-05-10 NOTE — PROGRESS NOTES
Progress note: Infectious diseases    Patient - Megan Taylor,  Age - 78 y.o.    - 1940      Room Number - 3B-27/027-A   MRN -  [id number]   Acct # - [de-identified]  Date of Admission -  2022 10:46 PM    SUBJECTIVE:   No new complaints. Worsening lft  OBJECTIVE   VITALS    height is 5' 3\" (1.6 m) and weight is 124 lb 5.4 oz (56.4 kg). Her axillary temperature is 97 °F (36.1 °C). Her blood pressure is 128/75 and her pulse is 64. Her respiration is 16 and oxygen saturation is 99%. Wt Readings from Last 3 Encounters:   05/10/22 124 lb 5.4 oz (56.4 kg)   22 136 lb 2 oz (61.7 kg)   22 129 lb 9.6 oz (58.8 kg)       I/O (24 Hours)    Intake/Output Summary (Last 24 hours) at 5/10/2022 1222  Last data filed at 2022 1954  Gross per 24 hour   Intake 0 ml   Output 2400 ml   Net -2400 ml       General Appearance  Awake, chronically sick looking. HEENT - normocephalic, atraumatic, slightly pale conjunctiva,  anicteric sclera, dry oral mucosa  Neck - Supple, no mass  Lungs -  Bilateral  air entry, no rhonchi, no wheeze  Cardiovascular - Heart sounds are normal.  .  Abdomen - soft, not distended, nontender,   Neurologic -awake does not follow command  Skin - No bruising or bleeding  Extremities - No edema, compression hose on the right lower leg.   MEDICATIONS:      potassium chloride  10 mEq IntraVENous Q1H    magnesium sulfate  2,000 mg IntraVENous Once    bisacodyl  10 mg Rectal Daily    cloNIDine  1 patch TransDERmal Weekly    metoprolol (LOPRESSOR) ivpb  10 mg IntraVENous Q6H    pantoprazole  40 mg IntraVENous BID    [Held by provider] metoprolol tartrate  100 mg Oral BID    sodium chloride  1,000 mL IntraVENous Once    insulin lispro  0-6 Units SubCUTAneous TID     insulin lispro  0-3 Units SubCUTAneous Nightly    [Held by provider] atorvastatin  20 mg Oral Nightly    calcium elemental  1 tablet Oral Daily    polyvinyl alcohol  1 drop Ophthalmic Daily    donepezil  10 mg Oral Nightly    fluticasone  2 spray Nasal Daily    memantine  5 mg Oral BID    vitamin D  50,000 Units Oral Once per day on Mon Thu    sodium chloride flush  5-40 mL IntraVENous 2 times per day      0.45 % NaCl with KCl 20 mEq 100 mL/hr at 05/10/22 0955    sodium chloride      dextrose      sodium chloride       sodium chloride, ipratropium-albuterol, glucagon (rDNA), dextrose, glucose, dextrose bolus **OR** dextrose bolus, sodium chloride flush, sodium chloride, ondansetron **OR** ondansetron      LABS:     CBC: No results for input(s): WBC, HGB, PLT in the last 72 hours. BMP:    Recent Labs     05/08/22  0337 05/08/22  0337 05/09/22  0333 05/10/22  0334     --  146* 146*   K 4.3  4.3   < > 3.7  3.7 2.6*   *  --  111 104   CO2 10*  --  16* 26   BUN 69*  --  59* 41*   CREATININE 2.9*  --  2.6* 1.6*   GLUCOSE 204*  --  166* 129*    < > = values in this interval not displayed. Calcium:  Recent Labs     05/10/22  0334   CALCIUM 8.2*     Ionized Calcium:No results for input(s): IONCA in the last 72 hours. Magnesium:  Recent Labs     05/10/22  0334   MG 1.4*     Phosphorus:  Recent Labs     05/08/22  1803   PHOS 3.9     BNP:No results for input(s): BNP in the last 72 hours. Glucose:  Recent Labs     05/09/22  1957 05/10/22  0731 05/10/22  1130   POCGLU 144* 113* 153*     HgbA1C: No results for input(s): LABA1C in the last 72 hours.   INR:   Recent Labs     05/08/22  1956 05/09/22  0333 05/10/22  0334   INR 3.90* 4.27* 3.37*     Hepatic:   Recent Labs     05/08/22  0337 05/09/22  0333 05/10/22  0334   ALKPHOS 262* 280* 321*   ALT 1,364* 1,842* 2,026*   AST 2,486* 3,329* 2,677*   PROT 5.5* 5.6* 5.7*   BILITOT 3.4* 5.1* 7.8*   BILIDIR 2.5* 3.1* 3.9*   LABALBU 3.2* 3.2* 3.1*     Amylase and Lipase:  Recent Labs     05/09/22 0922   LACTA 6.0*     Lactic Acid:   Recent Labs     05/09/22 0922   LACTA 6.0* Troponin: No results for input(s): CKTOTAL, CKMB, TROPONINI in the last 72 hours. BNP: No results for input(s): BNP in the last 72 hours. CULTURES:   UA: No results for input(s): SPECGRAV, PHUR, COLORU, CLARITYU, MUCUS, PROTEINU, BLOODU, RBCUA, WBCUA, BACTERIA, NITRU, GLUCOSEU, BILIRUBINUR, UROBILINOGEN, KETUA, LABCAST, LABCASTTY, AMORPHOS in the last 72 hours. Invalid input(s): CRYSTALS  Micro:   Lab Results   Component Value Date    BC Culture in progress: preliminary report-negative. 05/05/2022        Problem list of patient:     Patient Active Problem List   Diagnosis Code    CVA (cerebral vascular accident)-2002 I63.9    CAD (coronary artery disease)-mild NONobstructive max 30% ostail, LCX, mid lad and diffuse mild luminal irregularites-per cath 08/08/12 I25.10    S/P cardiac cath 2012- mild nonobstructive CAD Z98.890    Essential hypertension I10    Hyperlipidemia E78.5    Dementia without behavioral disturbance ([address] F03.90    DARIUS (acute kidney injury) ([address] N17.9    Anemia D64.9    New onset atrial fibrillation ([address] w/ RVR now sinus Bradycardia I48.91    Hiatal hernia with gastroesophageal reflux disease and esophagitis and ulcer K44.9, K21.00    Garrison's esophagus with esophagitis K22.70, K20.90    Atrial fibrillation with rapid ventricular response (HCC) I48.91    Paroxysmal atrial fibrillation with RVR ([facility]) I48.0    Acute diastolic congestive heart failure (HCC) I50.31    Acute renal failure (ARF) (HCC) N17.9    Hypernatremia E87.0    Pneumonia J18.9    High anion gap metabolic acidosis G19.0    Acute-on-chronic kidney injury ([address] N17.9, N18.9    Lactic acidosis E87.2    Hepatic dysfunction K76.9         ASSESSMENT/PLAN     Lactic acidosis likely due to hypotension. Acute kidney injury on chronic  Elevated liver function test likely related to shock  Diabetes mellitus  Continue current treatment.   Oliver Thomas MD, MD, FACP 5/10/2022 12:22 PM

## 2022-05-10 NOTE — PROGRESS NOTES
Renal Progress Note  [facility]    Patient : Megan Taylor; 78 y.o. MRN# [id number]  Location:  3B-27/027-A  Attending:  Carie Bruce MD  Admit Date:  [place] 45 Day: 10      Subjective:     Nephrology is following the patient for DARIUS. Patient seen and examined. she is much more awake and alert today. Having a lot of urine output.     Outpatient Medications:     Medications Prior to Admission: acetaminophen (AMINOFEN) 325 MG tablet, Take 2 tablets by mouth every 6 hours as needed for Pain or Fever (may cut or crush medications) (Patient not taking: Reported on 5/5/2022)  metoprolol tartrate (LOPRESSOR) 50 MG tablet, Take 1 tablet by mouth 2 times daily  Elastic Bandages & Supports ([address]) MISC, 1 each by Does not apply route daily as needed (Bite to the lower leg daily for swelling)  pantoprazole (PROTONIX) 40 MG tablet, Take 1 tablet by mouth 2 times daily (before meals)  loperamide (IMODIUM) 2 MG capsule, Take 2 mg by mouth daily as needed for Diarrhea  ferrous sulfate (IRON 325) 325 (65 Fe) MG tablet, Take 1 tablet by mouth daily (with breakfast)  lisinopril (PRINIVIL;ZESTRIL) 10 MG tablet, Take 1 tablet by mouth once daily  donepezil (ARICEPT) 10 MG tablet, Take 1 tablet by mouth nightly  vitamin D (ERGOCALCIFEROL) 1.25 MG (49725 UT) CAPS capsule, TAKE 1 CAPSULE BY MOUTH TWICE A WEEK (Patient taking differently: Take 50,000 Units by mouth twice a week On Mondays and Thursdays)  atorvastatin (LIPITOR) 20 MG tablet, Take 1 tablet by mouth once daily  memantine (NAMENDA) 10 MG tablet, Take 1 tablet by mouth twice daily  calcium carbonate 600 MG TABS tablet, Take 1 tablet by mouth daily  fluticasone (FLONASE) 50 MCG/ACT nasal spray, 2 sprays by Nasal route daily  Carboxymethylcell-Hypromellose (GENTEAL OP), Apply 1 drop to eye daily 1 drop each eye daily    Current Medications:     Scheduled Meds:    potassium chloride  10 mEq IntraVENous Q1H    magnesium sulfate  2,000 mg IntraVENous Once    cloNIDine  1 patch TransDERmal Weekly    metoprolol (LOPRESSOR) ivpb  10 mg IntraVENous Q6H    pantoprazole  40 mg IntraVENous BID    [Held by provider] metoprolol tartrate  100 mg Oral BID    sodium chloride  1,000 mL IntraVENous Once    insulin lispro  0-6 Units SubCUTAneous TID WC    insulin lispro  0-3 Units SubCUTAneous Nightly    [Held by provider] atorvastatin  20 mg Oral Nightly    calcium elemental  1 tablet Oral Daily    polyvinyl alcohol  1 drop Ophthalmic Daily    donepezil  10 mg Oral Nightly    fluticasone  2 spray Nasal Daily    memantine  5 mg Oral BID    vitamin D  50,000 Units Oral Once per day on     sodium chloride flush  5-40 mL IntraVENous 2 times per day     Continuous Infusions:    sodium chloride 100 mL/hr at 05/10/22 0618    sodium chloride      dextrose      sodium chloride       PRN Meds:  sodium chloride, ipratropium-albuterol, glucagon (rDNA), dextrose, glucose, dextrose bolus **OR** dextrose bolus, sodium chloride flush, sodium chloride, ondansetron **OR** ondansetron    Input/Output:       I/O last 3 completed shifts: In: 943.3 [I.V.:700; Blood:188.3; IV Piggyback:55]  Out: 6100 [Urine:6100].       Patient Vitals for the past 96 hrs (Last 3 readings):   Weight   05/10/22 0600 124 lb 5.4 oz (56.4 kg)   22 0344 145 lb 15.1 oz (66.2 kg)   22 0506 137 lb 2 oz (62.2 kg)       Vital Signs:   Temperature:  Temp: 97.4 °F (36.3 °C)  TMax:   Temp (24hrs), Av.5 °F (36.4 °C), Min:97 °F (36.1 °C), Max:97.9 °F (36.6 °C)    Respirations:  Resp: 18  Pulse:   Pulse: 69  BP:    BP: (!) 159/86  BP Range: Systolic (72TII), YSM:186 , Min:149 , YRO:238       Diastolic (52FQD), LVY:05, Min:77, Max:102      Physical Examination:     General:  Awake and more alert  HEENT: NC/AT/ MMM  Chest:               Diminished, no rales  Cardiac:  irregular  Abdomen: Soft, non-tender,  Neuro:  confused  SKIN:  No rashes, Extremities:  Compression stockings, no significant edema noted    Labs:     No results for input(s): WBC, RBC, HGB, HCT, MCV, MCH, MCHC, RDW, PLT, MPV in the last 72 hours. BMP:   Recent Labs     05/08/22  0337 05/08/22  0337 05/09/22  0333 05/10/22  0334     --  146* 146*   K 4.3  4.3   < > 3.7  3.7 2.6*   *  --  111 104   CO2 10*  --  16* 26   BUN 69*  --  59* 41*   CREATININE 2.9*  --  2.6* 1.6*   GLUCOSE 204*  --  166* 129*   CALCIUM 8.0*  --  8.2* 8.2*    < > = values in this interval not displayed.       Phosphorus:     Recent Labs     05/08/22  1803   PHOS 3.9     Magnesium:    Recent Labs     05/08/22  1803 05/10/22  0334   MG 2.1 1.4*     Albumin:    Recent Labs     05/08/22  0337 05/09/22  0333 05/10/22  0334   LABALBU 3.2* 3.2* 3.1*     BNP:    No results found for: BNP  ROSS:      Lab Results   Component Value Date    ROSS Positive 06/29/2020     SPEP:  Lab Results   Component Value Date    PROT 5.7 05/10/2022     UPEP:   No results found for: LABPE  C3:   No results found for: C3  C4:   No results found for: C4  MPO ANCA:   No results found for: MPO  PR3 ANCA:   No results found for: PR3  Anti-GBM:   No results found for: GBMABIGG  Hep BsAg:         Lab Results   Component Value Date    HEPBSAG Negative 05/05/2022     Hep C AB:          Lab Results   Component Value Date    HEPCAB Negative 05/05/2022       Urinalysis/Chemistries:      Lab Results   Component Value Date    NITRU NEGATIVE 05/05/2022    COLORU DK YELLOW 05/05/2022    PHUR 5.0 05/05/2022    WBCUA 0-2 05/05/2022    RBCUA 0-2 05/05/2022    YEAST NONE SEEN 05/05/2022    BACTERIA NONE SEEN 05/05/2022    CLARITYU Cloudy 04/21/2022    SPECGRAV 1.020 04/21/2022    LEUKOCYTESUR NEGATIVE 05/05/2022    UROBILINOGEN 1.0 05/05/2022    BILIRUBINUR SMALL 05/05/2022    BLOODU NEGATIVE 05/05/2022    GLUCOSEU NEGATIVE 05/05/2022    KETUA TRACE 05/05/2022     Urine Sodium:   No results found for: SAMEER  Urine Potassium:  No results found for: KUR  Urine Chloride:  No results found for: CLUR  Urine Osmolarity: No results found for: OSMOU  Urine Protein:   No components found for: TOTALPROTEIN, URINE   Urine Creatinine:   No results found for: LABCREA  Urine Eosinophils:  No components found for: UEOS        Impression and Plan:  1. DARIUS on CKD III d/t ATN from hypoperfusion: improving. Now in diuretic phase of ATN. Having some hypokalemia, monitor closely and replace aggressively  2. Lactic acidosis, unclear etiology. Bicarb is better. Stop bicarb drip  3. Hypernatremia/hypokalemia: d/t polyuria/diuretic phase of ATN. Change IV fluids to 0.45 with 20 meq KCl @ 100 ml/hr. Replace K and mag IV. Repeat this afternoon  4. Transaminitis, worsening  5. Afib  6. HTN  7. DM  8. Dementia        Please don't hesitate to call with any questions.   Electronically signed by Juan Mederos DO on 5/10/2022 at 6:52 AM

## 2022-05-10 NOTE — PLAN OF CARE
Problem: Discharge Planning  Goal: Discharge to home or other facility with appropriate resources  5/10/2022 1603 by Geoff Velazquez RN  Outcome: Progressing  5/10/2022 0310 by Benjamin Ozuna RN  Outcome: Progressing     Problem: Safety - Adult  Goal: Free from fall injury  5/10/2022 1603 by Geoff Velazquez RN  Outcome: Progressing  5/10/2022 0310 by Benjamin Ozuna RN  Outcome: Progressing  Flowsheets (Taken 5/10/2022 0101)  Free From Fall Injury:   Based on caregiver fall risk screen, instruct family/caregiver to ask for assistance with transferring infant if caregiver noted to have fall risk factors   Instruct family/caregiver on patient safety  Note: Bed locked & in low position, call light in reach, side-rails up x2, bed/chair alarm utilized, non-slip socks on when ambulating, reminded patient to use call light to call for assistance. Problem: ABCDS Injury Assessment  Goal: Absence of physical injury  5/10/2022 1603 by Geoff Velazquez RN  Outcome: Progressing  5/10/2022 0310 by Benjamin Ozuna RN  Outcome: Progressing  Flowsheets (Taken 5/10/2022 0101)  Absence of Physical Injury: Implement safety measures based on patient assessment     Problem: Skin/Tissue Integrity  Goal: Absence of new skin breakdown  Description: 1. Monitor for areas of redness and/or skin breakdown  2. Assess vascular access sites hourly  3. Every 4-6 hours minimum:  Change oxygen saturation probe site  4. Every 4-6 hours:  If on nasal continuous positive airway pressure, respiratory therapy assess nares and determine need for appliance change or resting period. 5/10/2022 1603 by Geoff Velazquez RN  Outcome: Progressing  5/10/2022 0310 by Benjamin Ozuna RN  Outcome: Progressing  Note: Ongoing assessment & interventions provided throughout shift. Skin assessments provided. Encouraging/assisting patient to turn as needed.       Problem: Chronic Conditions and Co-morbidities  Goal: Patient's chronic conditions and co-morbidity symptoms are monitored and maintained or improved  5/10/2022 1603 by Remigio Villareal RN  Outcome: Progressing  5/10/2022 0310 by Gino Sanabria RN  Outcome: Progressing     Problem: Pain  Goal: Verbalizes/displays adequate comfort level or baseline comfort level  5/10/2022 1603 by Remigio Villareal RN  Outcome: Progressing  5/10/2022 0310 by Gino Sanabria RN  Outcome: Progressing  Note: Ongoing assessment & interventions provided throughout shift. Reminded patient to report any pain, pressure, or shortness of breath to the nurse. Pain medications provided per physician's orders. Problem: Nutrition Deficit:  Goal: Optimize nutritional status  5/10/2022 1603 by Remigio Villareal RN  Outcome: Progressing  5/10/2022 1506 by Tirso Patten RD, LD  Outcome: Progressing   Care plan reviewed with family. Family verbalizes understanding of the plan of care and contribute to goal setting.

## 2022-05-10 NOTE — PROGRESS NOTES
tenderness, deformity or swelling  Extremities - peripheral pulses normal, no pedal edema, no clubbing or cyanosis  Skin - normal coloration and turgor, no rashes, no suspicious skin lesions noted    Review of Labs and Diagnostic Testing:    CBC: No results for input(s): WBC, HGB, HCT, MCV, PLT in the last 72 hours. BMP:   Recent Labs     05/08/22  1803 05/09/22 0333 05/10/22  0334   NA  --    < > 146*   K  --    < > 2.6*   CL  --    < > 104   CO2  --    < > 26   PHOS 3.9  --   --    BUN  --    < > 41*   CREATININE  --    < > 1.6*   CALCIUM  --    < > 8.2*   GLUCOSE  --    < > 129*    < > = values in this interval not displayed. PT/INR:   Recent Labs     05/10/22  0334   INR 3.37*     APTT: No results for input(s): APTT in the last 72 hours. Lipids:   Recent Labs     05/10/22  0334   ALKPHOS 321*   ALT 2,026*   AST 2,677*   BILITOT 7.8*   BILIDIR 3.9*   LABALBU 3.1*     Troponin: No results for input(s): TROPONINT in the last 72 hours. Imaging:  [unfilled]    EKG:      Diet: ADULT DIET; Dysphagia - Pureed;  No Drinking Straws        Data:   Scheduled Meds: Scheduled Meds:   potassium chloride  10 mEq IntraVENous Q1H    magnesium sulfate  2,000 mg IntraVENous Once    bisacodyl  10 mg Rectal Daily    cloNIDine  1 patch TransDERmal Weekly    metoprolol (LOPRESSOR) ivpb  10 mg IntraVENous Q6H    pantoprazole  40 mg IntraVENous BID    [Held by provider] metoprolol tartrate  100 mg Oral BID    sodium chloride  1,000 mL IntraVENous Once    insulin lispro  0-6 Units SubCUTAneous TID     insulin lispro  0-3 Units SubCUTAneous Nightly    [Held by provider] atorvastatin  20 mg Oral Nightly    calcium elemental  1 tablet Oral Daily    polyvinyl alcohol  1 drop Ophthalmic Daily    donepezil  10 mg Oral Nightly    fluticasone  2 spray Nasal Daily    memantine  5 mg Oral BID    vitamin D  50,000 Units Oral Once per day on Mon Thu    sodium chloride flush  5-40 mL IntraVENous 2 times per day Continuous Infusions:   0.45 % NaCl with KCl 20 mEq 100 mL/hr at 05/10/22 0955    sodium chloride      dextrose      sodium chloride       PRN Meds:.sodium chloride, ipratropium-albuterol, glucagon (rDNA), dextrose, glucose, dextrose bolus **OR** dextrose bolus, sodium chloride flush, sodium chloride, ondansetron **OR** ondansetron  Continuous Infusions:   0.45 % NaCl with KCl 20 mEq 100 mL/hr at 05/10/22 0955    sodium chloride      dextrose      sodium chloride           Assessment   1. Now primarily with high anion gap metabolic acidosis from Lactic acidosis / DARIUS   a.  lactic acid felt to be related to hypoperfusion and is on downward trend. No further areas of abdominal tenderness. Acidosis improved and so bicarb drip was discontinued replaced with D5W. And       ID on board and does not believe to be infectious; abx     stopped    2. Transaminitis  1. Worsening LFTs / INR  2. Worsening INR  3. CT abdomen WO contrast   Unrevealing for structural hepatic pathology. Trend LFTs, f/u labs,  Suspected she has shock liver  3. ?Pneumonia  Cannot rule out aspiration  a. S/P abx  b. ID following; and does not believe to be infectious   c. On room air   d. Strep pneumo and legionella antigens negative  4. A fib RVR, not on anticoag due to recent hx GIB  a. Cardiology on board  b. Continue IV beta blockers as pt unable to take by mouth due to AMS   c. No anticoag due to hx GIB  d. Cont tele monitoring   5. CAD with elevated trops   a. Trops stable  b. No further workup per cardio  6. Hx Dementia  a. Noted   7. HTN  a. On Clonidine patch and IV Lopressor   8. HLD  a. Holding Statin due to  transaminitis  9. Hx CVA  a. Not on ASA/Plavix due to GIB hx   10. PT/OT  11. DVT prophylaxis  a. SCDs and auto anticoagulated      I discussed extensively with the patient's daughter in the patient's room today all questions were answered to her satisfaction.   At this time she wants to talk with the family and also would like to have a discussion with hospice. At this point they are not ready to change anything within the code status until further discussions with the family is concluded. I did explain that should did decide a full code status is to remain we would need to transfer her to a tertiary care center.         Electronically signed by Fidela Litten, MD on 5/10/2022 at 12:34 PM

## 2022-05-11 NOTE — PROGRESS NOTES
Renal Progress Note  [facility]    Patient : Joesph Mack; 78 y.o. MRN# [id number]  Location:  3B-27/027-A  Attending:  Carlos Matos MD  Admit Date:  Jaama 45 Day: 12      Subjective:     Nephrology is following the patient for DARIUS. Patient seen and examined this morning. She is being discharged with hospice likely today.     Outpatient Medications:     Medications Prior to Admission: acetaminophen (AMINOFEN) 325 MG tablet, Take 2 tablets by mouth every 6 hours as needed for Pain or Fever (may cut or crush medications) (Patient not taking: Reported on 5/5/2022)  metoprolol tartrate (LOPRESSOR) 50 MG tablet, Take 1 tablet by mouth 2 times daily  Elastic Bandages & Supports ([address]) MISC, 1 each by Does not apply route daily as needed (Bite to the lower leg daily for swelling)  pantoprazole (PROTONIX) 40 MG tablet, Take 1 tablet by mouth 2 times daily (before meals)  loperamide (IMODIUM) 2 MG capsule, Take 2 mg by mouth daily as needed for Diarrhea  ferrous sulfate (IRON 325) 325 (65 Fe) MG tablet, Take 1 tablet by mouth daily (with breakfast)  lisinopril (PRINIVIL;ZESTRIL) 10 MG tablet, Take 1 tablet by mouth once daily  donepezil (ARICEPT) 10 MG tablet, Take 1 tablet by mouth nightly  vitamin D (ERGOCALCIFEROL) 1.25 MG (55190 UT) CAPS capsule, TAKE 1 CAPSULE BY MOUTH TWICE A WEEK (Patient taking differently: Take 50,000 Units by mouth twice a week On Mondays and Thursdays)  atorvastatin (LIPITOR) 20 MG tablet, Take 1 tablet by mouth once daily  memantine (NAMENDA) 10 MG tablet, Take 1 tablet by mouth twice daily  calcium carbonate 600 MG TABS tablet, Take 1 tablet by mouth daily  fluticasone (FLONASE) 50 MCG/ACT nasal spray, 2 sprays by Nasal route daily  Carboxymethylcell-Hypromellose (GENTEAL OP), Apply 1 drop to eye daily 1 drop each eye daily    Current Medications:     Scheduled Meds:    bisacodyl  10 mg Rectal Daily    cloNIDine  1 patch TransDERmal Weekly    metoprolol (LOPRESSOR) ivpb  10 mg IntraVENous Q6H    pantoprazole  40 mg IntraVENous BID    [Held by provider] metoprolol tartrate  100 mg Oral BID    sodium chloride  1,000 mL IntraVENous Once    insulin lispro  0-6 Units SubCUTAneous TID WC    insulin lispro  0-3 Units SubCUTAneous Nightly    [Held by provider] atorvastatin  20 mg Oral Nightly    calcium elemental  1 tablet Oral Daily    polyvinyl alcohol  1 drop Ophthalmic Daily    donepezil  10 mg Oral Nightly    fluticasone  2 spray Nasal Daily    memantine  5 mg Oral BID    vitamin D  50,000 Units Oral Once per day on     sodium chloride flush  5-40 mL IntraVENous 2 times per day     Continuous Infusions:    0.45 % NaCl with KCl 20 mEq 50 mL/hr at 22 0820    sodium chloride      dextrose      sodium chloride       PRN Meds:  LORazepam, oxyCODONE, diphenhydrAMINE, sodium chloride, ipratropium-albuterol, glucagon (rDNA), dextrose, glucose, dextrose bolus **OR** dextrose bolus, sodium chloride flush, sodium chloride, ondansetron **OR** ondansetron    Input/Output:       No intake/output data recorded. .      Patient Vitals for the past 96 hrs (Last 3 readings):   Weight   22 0457 128 lb 8.5 oz (58.3 kg)   05/10/22 0600 124 lb 5.4 oz (56.4 kg)   22 0344 145 lb 15.1 oz (66.2 kg)       Vital Signs:   Temperature:  Temp: 97.1 °F (36.2 °C)  TMax:   Temp (24hrs), Av.3 °F (36.3 °C), Min:96.3 °F (35.7 °C), Max:97.9 °F (36.6 °C)    Respirations:  Resp: 18  Pulse:   Pulse: 118  BP:    BP: 132/80  BP Range: Systolic (98HSV), GUP:299 , Min:117 , WDS:481       Diastolic (19VAP), ZEO:50, Min:68, Max:80      Physical Examination:     General:  Asleep, no distress  HEENT: NC/AT/ MMM  Chest:               Diminished, no rales  Cardiac:  irregular  Abdomen: Soft, non-tender,  Neuro:  asleep  SKIN:  No rashes,   Extremities:  , no significant edema noted    Labs:     No results for input(s): WBC, RBC, HGB, HCT, MCV, MCH, MCHC, RDW, PLT, MPV in the last 72 hours. BMP:   Recent Labs     05/09/22  0333 05/09/22  0333 05/10/22  0334 05/10/22  1542 05/11/22  0422   *   < > 146* 138 137   K 3.7  3.7   < > 2.6* 4.2 4.1      < > 104 102 103   CO2 16*   < > 26 22* 21*   BUN 59*  --  41*  --  35*   CREATININE 2.6*  --  1.6*  --  1.3*   GLUCOSE 166*  --  129*  --  90   CALCIUM 8.2*  --  8.2*  --  7.7*    < > = values in this interval not displayed.       Phosphorus:     Recent Labs     05/08/22 1803   PHOS 3.9     Magnesium:    Recent Labs     05/08/22  1803 05/10/22  0334   MG 2.1 1.4*     Albumin:    Recent Labs     05/09/22  0333 05/10/22  0334 05/11/22  0422   LABALBU 3.2* 3.1* 2.8*     BNP:    No results found for: BNP  ROSS:      Lab Results   Component Value Date    ROSS Positive 06/29/2020     SPEP:  Lab Results   Component Value Date    PROT 5.5 05/11/2022     UPEP:   No results found for: LABPE  C3:   No results found for: C3  C4:   No results found for: C4  MPO ANCA:   No results found for: MPO  PR3 ANCA:   No results found for: PR3  Anti-GBM:   No results found for: GBMABIGG  Hep BsAg:         Lab Results   Component Value Date    HEPBSAG Negative 05/05/2022     Hep C AB:          Lab Results   Component Value Date    HEPCAB Negative 05/05/2022       Urinalysis/Chemistries:      Lab Results   Component Value Date    NITRU NEGATIVE 05/05/2022    COLORU DK YELLOW 05/05/2022    PHUR 5.0 05/05/2022    WBCUA 0-2 05/05/2022    RBCUA 0-2 05/05/2022    YEAST NONE SEEN 05/05/2022    BACTERIA NONE SEEN 05/05/2022    CLARITYU Cloudy 04/21/2022    SPECGRAV 1.020 04/21/2022    LEUKOCYTESUR NEGATIVE 05/05/2022    UROBILINOGEN 1.0 05/05/2022    BILIRUBINUR SMALL 05/05/2022    BLOODU NEGATIVE 05/05/2022    GLUCOSEU NEGATIVE 05/05/2022    KETUA TRACE 05/05/2022     Urine Sodium:   No results found for: SAMEER  Urine Potassium:  No results found for: KUR  Urine Chloride:  No results found for: CLUR  Urine Osmolarity: No results found for: OSMOU  Urine Protein:   No components found for: TOTALPROTEIN, URINE   Urine Creatinine:   No results found for: LABCREA  Urine Eosinophils:  No components found for: UEOS        Impression and Plan:  1. DARIUS on CKD III d/t ATN from hypoperfusion: improving. 2. Lactic acidosis, unclear etiology. better  3. Hypernatremia/hypokalemia:better. 4. Transaminitis  5. Afib  6. HTN  7. DM  8. Dementia    Renal fxn significantly improving as well as electrolyte, acid/base derangements. Stop fluids. Please call nephrology if needed. Please don't hesitate to call with any questions.   Electronically signed by Alexandro Navarrete DO on 5/11/2022 at 9:03 AM

## 2022-05-11 NOTE — PROGRESS NOTES
Patient left unit by stretchana m and LISA for transport to [facility]. Patient's daughter Milka Carbajalft at bedside and aware of transport.

## 2022-05-11 NOTE — DISCHARGE INSTR - COC
Continuity of Care Form    Patient Name: Tk Barone   :  1940  MRN:  [id number]    Admit date:  2022  Discharge date:  22    Code Status Order: Limited   Advance Directives:      Admitting Physician:  David Theodore MD  PCP: Florence Carrion MD    Discharging Nurse: [facility] Unit/Room#: 3B-27/027-A  Discharging Unit Phone Number: [phone]    Emergency Contact:   Extended Emergency Contact Information  Primary Emergency Contact: Marleni Stuart ([facility])Tia  Address: [address] [facility], [address] [place] [address] Phone: [phone]  Relation: Child  Secondary Emergency Contact: Marleni Stuart [facility] [address] [facility] Phone: [phone]  Mobile Phone: [phone]  Relation: Child   needed?  No    Past Surgical History:  Past Surgical History:   Procedure Laterality Date    BLADDER REPAIR      CARDIAC CATHETERIZATION  12    CHOLECYSTECTOMY      COLONOSCOPY  2012    Dr. Caputo Warriormine  2016    corneal related - Dr Kaia Muñoz ENDOSCOPY N/A 3/7/2022    EGD BIOPSY performed by Aleyda Brooks MD at [facility]       Immunization History:   Immunization History   Administered Date(s) Administered    COVID-19, J&J, PF, 0.5 mL 03/15/2021    COVID-19, Pfizer Purple top, DILUTE for use, 12+ yrs, 30mcg/0.3mL dose 2021    Influenza 2012    Influenza Virus Vaccine 2011, 10/05/2013, 2014, 2015    Influenza, MDCK Quadv, IM, PF (Flucelvax 2 yrs and older) 10/19/2017    Influenza, MDCK Quadv, with preserv IM (Flucelvax 2 yrs and older) 2019    Influenza, Quadv, IM, (6 mo and older Fluzone, Flulaval, Fluarix and 3 yrs and older Afluria) 2018, 10/01/2020, 10/14/2021    Influenza, Quadv, IM, PF (6 mo and older Fluzone, Flulaval, Fluarix, and 3 yrs and older Afluria) 2016    Pneumococcal Conjugate 13-valent (Lindberg Lot) 12/03/2015    Pneumococcal Polysaccharide ([id number]) 08/23/2011       Active Problems:  Patient Active Problem List   Diagnosis Code    CVA (cerebral vascular accident)-2002 I63.9    CAD (coronary artery disease)-mild NONobstructive max 30% ostail, LCX, mid lad and diffuse mild luminal irregularites-per cath 08/08/12 I25.10    S/P cardiac cath 2012- mild nonobstructive CAD Z98.890    Essential hypertension I10    Hyperlipidemia E78.5    Dementia without behavioral disturbance (HCC) F03.90    DARIUS (acute kidney injury) ([facility] N17.9    Anemia D64.9    New onset atrial fibrillation (HCC) w/ RVR now sinus Bradycardia I48.91    Hiatal hernia with gastroesophageal reflux disease and esophagitis and ulcer K44.9, K21.00    Garrison's esophagus with esophagitis K22.70, K20.90    Atrial fibrillation with rapid ventricular response (HCC) I48.91    Paroxysmal atrial fibrillation with RVR ([facility]) O58.2    Acute diastolic congestive heart failure (HCC) I50.31    Acute renal failure (ARF) ([facility]) N17.9    Hypernatremia E87.0    Pneumonia J18.9    High anion gap metabolic acidosis C50.1    Acute-on-chronic kidney injury ([facility] N17.9, N18.9    Lactic acidosis E87.2    Hepatic dysfunction K76.9    Severe malnutrition ([facility] E43       Isolation/Infection:   Isolation            No Isolation          Patient Infection Status       Infection Onset Added Last Indicated Last Indicated By Review Planned Expiration Resolved Resolved By    None active    Resolved    COVID-19 (Rule Out) 05/05/22 05/05/22 05/05/22 COVID-19, Rapid (Ordered)   05/05/22 Rule-Out Test Resulted            Nurse Assessment:  Last Vital Signs: /85   Pulse 124   Temp 97.3 °F (36.3 °C) (Axillary)   Resp 18   Ht 5' 3\" (1.6 m)   Wt 128 lb 8.5 oz (58.3 kg)   SpO2 99%   BMI 22.77 kg/m²     Last documented pain score (0-10 scale): Pain Level: 0  Last Weight:   Wt Readings from Last 1 Encounters:   05/11/22 128 lb 8.5 oz (58.3 kg)     Mental Status:  disoriented    IV Access:  - None    Nursing Mobility/ADLs:  Walking   Dependent  Transfer  Dependent  Bathing  Dependent  Dressing  Dependent  Toileting  Dependent  Feeding  Dependent  Med Admin  Dependent  Med Delivery   crushed    Wound Care Documentation and Therapy:        Elimination:  Continence: Bowel: No  Bladder: No  Urinary Catheter: none  Colostomy/Ileostomy/Ileal Conduit: No       Date of Last BM: ***    Intake/Output Summary (Last 24 hours) at 5/11/2022 1225  Last data filed at 5/11/2022 0941  Gross per 24 hour   Intake 0 ml   Output --   Net 0 ml     No intake/output data recorded. Safety Concerns: At Risk for Falls    Impairments/Disabilities:      Fall risk    Nutrition Therapy:  Current Nutrition Therapy:   - Oral Diet:  Dysphagia 1 pureed    Routes of Feeding: Oral  Liquids: Thin Liquids  Daily Fluid Restriction: no  Last Modified Barium Swallow with Video (Video Swallowing Test): not done    Treatments at the Time of Hospital Discharge:   Respiratory Treatments: see MAR  Oxygen Therapy:  is not on home oxygen therapy.   Ventilator:    - No ventilator support    Rehab Therapies: Physical Therapy and Occupational Therapy if indicated   Weight Bearing Status/Restrictions: No weight bearing restrictions  Other Medical Equipment (for information only, NOT a DME order):  wheelchair, walker, bath bench, bedside commode, and hospital bed  Other Treatments: none    Patient's personal belongings (please select all that are sent with patient):  None    RN SIGNATURE:  Electronically signed by Abilio Nieto RN on 5/11/22 at 12:38 PM EDT    CASE MANAGEMENT/SOCIAL WORK SECTION    Inpatient Status Date: ***    Readmission Risk Assessment Score:  Readmission Risk              Risk of Unplanned Readmission:  39           Discharging to Facility/ Agency   Name:   Address:  Phone:  Fax:    Dialysis Facility (if applicable)   Name:  Address:  Dialysis Schedule:  Phone:  Fax:    / signature: {Esignature:[id number]}    PHYSICIAN SECTION    Prognosis: Guarded    Condition at Discharge: Stable    Rehab Potential (if transferring to Rehab): Guarded    Recommended Labs or Other Treatments After Discharge:    Physician Certification: I certify the above information and transfer of Court Fent  is necessary for the continuing treatment of the diagnosis listed and that she requires Assisted Living for greater 30 days.      Update Admission H&P: Changes in H&P as follows - see  d/c summary    PHYSICIAN SIGNATURE:  Electronically signed by Jeremias Gaines MD on 5/11/22 at 12:28 PM EDT

## 2022-05-11 NOTE — PROGRESS NOTES
Progress note: Infectious diseases    Patient - Joesph Mack,  Age - 78 y.o.    - 1940      Room Number - 3B-27/027-A   MRN -  [id number]   Acct # - [de-identified]  Date of Admission -  2022 10:46 PM    SUBJECTIVE:   No new issues. OBJECTIVE   VITALS    height is 5' 3\" (1.6 m) and weight is 128 lb 8.5 oz (58.3 kg). Her axillary temperature is 97.1 °F (36.2 °C). Her blood pressure is 132/80 and her pulse is 118. Her respiration is 18 and oxygen saturation is 93%. Wt Readings from Last 3 Encounters:   22 128 lb 8.5 oz (58.3 kg)   22 136 lb 2 oz (61.7 kg)   22 129 lb 9.6 oz (58.8 kg)     General Appearance  Awake, chronically sick looking. HEENT - normocephalic, atraumatic, slightly pale conjunctiva,  anicteric sclera, dry oral mucosa opacity on the left sclera.   Neck - Supple, no mass  Lungs -  Bilateral  air entry, no rhonchi, no wheeze  Cardiovascular - Heart sounds are normal.  .  Abdomen - soft, not distended, nontender,   Neurologic -awake does not follow command  Skin - No bruising or bleeding  Extremities - No edema,   MEDICATIONS:      bisacodyl  10 mg Rectal Daily    cloNIDine  1 patch TransDERmal Weekly    metoprolol (LOPRESSOR) ivpb  10 mg IntraVENous Q6H    pantoprazole  40 mg IntraVENous BID    [Held by provider] metoprolol tartrate  100 mg Oral BID    sodium chloride  1,000 mL IntraVENous Once    insulin lispro  0-6 Units SubCUTAneous TID     insulin lispro  0-3 Units SubCUTAneous Nightly    [Held by provider] atorvastatin  20 mg Oral Nightly    calcium elemental  1 tablet Oral Daily    polyvinyl alcohol  1 drop Ophthalmic Daily    donepezil  10 mg Oral Nightly    fluticasone  2 spray Nasal Daily    memantine  5 mg Oral BID    vitamin D  50,000 Units Oral Once per day on Mon Thu    sodium chloride flush  5-40 mL IntraVENous 2 times per day      sodium chloride  dextrose      sodium chloride       LORazepam, oxyCODONE, diphenhydrAMINE, sodium chloride, ipratropium-albuterol, glucagon (rDNA), dextrose, glucose, dextrose bolus **OR** dextrose bolus, sodium chloride flush, sodium chloride, ondansetron **OR** ondansetron      LABS:     CBC: No results for input(s): WBC, HGB, PLT in the last 72 hours. BMP:    Recent Labs     05/09/22  0333 05/09/22  0333 05/10/22  0334 05/10/22  1542 05/11/22  0422   *   < > 146* 138 137   K 3.7  3.7   < > 2.6* 4.2 4.1      < > 104 102 103   CO2 16*   < > 26 22* 21*   BUN 59*  --  41*  --  35*   CREATININE 2.6*  --  1.6*  --  1.3*   GLUCOSE 166*  --  129*  --  90    < > = values in this interval not displayed. Calcium:  Recent Labs     05/11/22 0422   CALCIUM 7.7*     Ionized Calcium:No results for input(s): IONCA in the last 72 hours. Magnesium:  Recent Labs     05/10/22  0334   MG 1.4*     Phosphorus:  Recent Labs     05/08/22  1803   PHOS 3.9      Recent Labs     05/10/22  1632 05/10/22  2302 05/11/22  0731   POCGLU 100 82 97     HgbA1C: No results for input(s): LABA1C in the last 72 hours.   INR:   Recent Labs     05/09/22  0333 05/10/22  0334 05/11/22  0422   INR 4.27* 3.37* 1.73*     Hepatic:   Recent Labs     05/09/22  0333 05/10/22  0334 05/11/22  0422   ALKPHOS 280* 321* 329*   ALT 1,842* 2,026* 1,363*   AST 3,329* 2,677* 1,036*   PROT 5.6* 5.7* 5.5*   BILITOT 5.1* 7.8* 7.7*   BILIDIR 3.1* 3.9* 4.2*   LABALBU 3.2* 3.1* 2.8*     Amylase and Lipase:  Recent Labs     05/09/22  0105   LACTA 6.0*     Lactic Acid:   Recent Labs     05/09/22  0922   LACTA 6.0*       Problem list of patient:     Patient Active Problem List   Diagnosis Code    CVA (cerebral vascular accident)-2002 I63.9    CAD (coronary artery disease)-mild NONobstructive max 30% ostail, LCX, mid lad and diffuse mild luminal irregularites-per cath 08/08/12 I25.10    S/P cardiac cath 2012- mild nonobstructive CAD Z98.890    Essential hypertension I10  Hyperlipidemia E78.5    Dementia without behavioral disturbance (HCC) F03.90    DARIUS (acute kidney injury) ([facility] N17.9    Anemia D64.9    New onset atrial fibrillation (HCC) w/ RVR now sinus Bradycardia I48.91    Hiatal hernia with gastroesophageal reflux disease and esophagitis and ulcer K44.9, K21.00    Garrison's esophagus with esophagitis K22.70, K20.90    Atrial fibrillation with rapid ventricular response (HCC) I48.91    Paroxysmal atrial fibrillation with RVR ([facility]) I48.0    Acute diastolic congestive heart failure (HCC) I50.31    Acute renal failure (ARF) (HCC) N17.9    Hypernatremia E87.0    Pneumonia J18.9    High anion gap metabolic acidosis J09.2    Acute-on-chronic kidney injury (HCC) N17.9, N18.9    Lactic acidosis E87.2    Hepatic dysfunction K76.9    Severe malnutrition (HCC) E43         ASSESSMENT/PLAN     Lactic acidosis likely due to hypotension. Acute kidney injury on chronic: stable  Elevated liver function test likely related to shock: better today  Diabetes mellitus  Continue current treatment.   Sonu Aquino MD, MD, FACP 5/11/2022 9:20 AM

## 2022-05-11 NOTE — PALLIATIVE CARE
Follow Up / Progress Note        Patient: Gris Mott  YOB: 1940  Age:  78 y.o. Room:  [facility]27/027-A  MRN:  [id number]            Family/Patient Discussion:  Chart reviewed. Hospice seen. Plan for patient to return to [facility] with Hospice care today. Noted Code Status change, currently Limited Code, No to Intubation, No chest compressions, No to rescue medications, and No to cardioversion/defibrilation. Plan/Follow-Up:  Palliative Care will be available if needed.        Electronically signed by Elaina Covington RN on 5/11/2022 at 10:15 AM             Palliative Care Office: [phone]

## 2022-05-11 NOTE — CARE COORDINATION
5/11/22, 7:48 AM EDT    DISCHARGE PLANNING EVALUATION      Received call from SONYA [facility] with hospice, she states patient will likely be discharged today to [address] [place] [address] with hospice care. [facility] asked for a 2:00PM transport to be set up to the facility and to drop a Private Duty list off in the patient's room. Transport is set for 1:30PM today by stretcher to [facility]. Made Simi aware and faxed transport information to [facility]. Spoke to Bertin at [facility], they are agreeable to 1:30 transport today. Let her know the nurse would fax information and call report later today. 5/11/22, 2:43 PM EDT    Patient goals/plan/ treatment preferences discussed by  and . Patient goals/plan/ treatment preferences reviewed with patient/ family. Patient/ family verbalize understanding of discharge plan and are in agreement with goal/plan/treatment preferences. Understanding was demonstrated using the teach back method. AVS provided by RN at time of discharge, which includes all necessary medical information pertaining to the patients current course of illness, treatment, post-discharge goals of care, and treatment preferences. Services At/After Discharge: Assisted Living, Hospice and In ambulance- [facility]       IMM Letter  IMM Letter given to Patient/Family/Significant other/Guardian/POA/by[de-identified] Philip Pena RN   IMM Letter date given[de-identified] 05/06/22  IMM Letter time given[de-identified] 0510       Juma Maravilla will be discharged to [address] [place] [address] on hospice. She is scheduled for a 1:30PM stretcher transport with [facility] and the information has been faxed over and placed on her chart. Dayami at [facility] is agreeable to her returning today under hospice. [facility]ovall [facility] aware of transport plan.

## 2022-05-11 NOTE — DISCHARGE SUMMARY
Discharge Summary  5/11/2022  10:19 AM    Patient: Martha Koch  YOB: 1940    MRN: [id number]   Acct: [de-identified]   [id number]   Primary Care Physician: Ramesh Murdock MD    Admit date:  5/4/2022    Discharge date:  5/11/2022    Discharge Diagnoses:    Transaminitis  Pneumonia  High anion gap metabolic acidosis from lactic acidosis  DARIUS  A fib RVR  CAD  HTN  HLD  Dementia  CVA        Discharge Medications:         Medication List      START taking these medications    bisacodyl 10 MG suppository  Commonly known as: DULCOLAX  Place 1 suppository rectally daily for 2 doses  Start taking on: May 12, 2022     cloNIDine 0.1 MG/24HR Ptwk  Commonly known as: CATAPRES  Place 1 patch onto the skin once a week  Start taking on:  May 16, 2022        CHANGE how you take these medications    metoprolol 100 MG tablet  Commonly known as: LOPRESSOR  Take 1 tablet by mouth 2 times daily  What changed:   · medication strength  · how much to take     vitamin D 1.25 MG (07142 UT) Caps capsule  Commonly known as: ERGOCALCIFEROL  TAKE 1 CAPSULE BY MOUTH TWICE A WEEK  What changed: additional instructions        CONTINUE taking these medications    acetaminophen 325 MG tablet  Commonly known as: Aminofen  Take 2 tablets by mouth every 6 hours as needed for Pain or Fever (may cut or crush medications)     calcium carbonate 600 MG Tabs tablet     donepezil 10 MG tablet  Commonly known as: ARICEPT  Take 1 tablet by mouth nightly     ferrous sulfate 325 (65 Fe) MG tablet  Commonly known as: IRON 325  Take 1 tablet by mouth daily (with breakfast)     fluticasone 50 MCG/ACT nasal spray  Commonly known as: Flonase  2 sprays by Nasal route daily     GENTEAL OP     loperamide 2 MG capsule  Commonly known as: IMODIUM     Medical Compression Stockings Misc  1 each by Does not apply route daily as needed (Bite to the lower leg daily for swelling)     memantine 10 MG tablet  Commonly known as: NAMENDA  Take 1 tablet by mouth twice daily     pantoprazole 40 MG tablet  Commonly known as: PROTONIX  Take 1 tablet by mouth 2 times daily (before meals)        STOP taking these medications    atorvastatin 20 MG tablet  Commonly known as: LIPITOR     lisinopril 10 MG tablet  Commonly known as: PRINIVIL;ZESTRIL           Where to Get Your Medications      These medications were sent to [phone]  [address], [place] [address]    Phone: [phone]   · bisacodyl 10 MG suppository  · cloNIDine 0.1 MG/24HR Ptwk  · metoprolol 100 MG tablet       Scheduled Meds: Scheduled Meds:   bisacodyl  10 mg Rectal Daily    cloNIDine  1 patch TransDERmal Weekly    metoprolol (LOPRESSOR) ivpb  10 mg IntraVENous Q6H    pantoprazole  40 mg IntraVENous BID    [Held by provider] metoprolol tartrate  100 mg Oral BID    sodium chloride  1,000 mL IntraVENous Once    insulin lispro  0-6 Units SubCUTAneous TID WC    insulin lispro  0-3 Units SubCUTAneous Nightly    [Held by provider] atorvastatin  20 mg Oral Nightly    calcium elemental  1 tablet Oral Daily    polyvinyl alcohol  1 drop Ophthalmic Daily    donepezil  10 mg Oral Nightly    fluticasone  2 spray Nasal Daily    memantine  5 mg Oral BID    vitamin D  50,000 Units Oral Once per day on Mon Thu    sodium chloride flush  5-40 mL IntraVENous 2 times per day     Continuous Infusions:   sodium chloride      dextrose      sodium chloride       PRN Meds:. LORazepam, oxyCODONE, diphenhydrAMINE, sodium chloride, ipratropium-albuterol, glucagon (rDNA), dextrose, glucose, dextrose bolus **OR** dextrose bolus, sodium chloride flush, sodium chloride, ondansetron **OR** ondansetron  Continuous Infusions:   sodium chloride      dextrose      sodium chloride         Intake/Output Summary (Last 24 hours) at 5/11/2022 1019  Last data filed at 5/11/2022 0941  Gross per 24 hour   Intake 0 ml   Output --   Net 0 ml       Diet:  ADULT DIET; Dysphagia - Pureed; No Drinking Straws  ADULT ORAL NUTRITION SUPPLEMENT; Breakfast, Lunch, Dinner; Frozen Oral Supplement    Activity:  Activity as tolerated (Patient may move about with assist as indicated or with supervision.)    Follow-up:  in the next few weeks with Roderick Horton MD,    Consultants:  Nephrology, GI, ID, Hospice, cardiology     Procedures:      Diagnostic Test:    Objective:  Lab Results   Component Value Date    WBC 5.9 05/05/2022    RBC 3.75 05/05/2022    RBC 2.96 03/17/2022    HGB 10.0 05/05/2022    HCT 34.8 05/05/2022    MCV 92.8 05/05/2022    MCH 26.7 05/05/2022    MCHC 28.7 05/05/2022    RDW 16.0 03/17/2022     05/05/2022    MPV 12.6 05/05/2022     Lab Results   Component Value Date     05/11/2022    K 4.1 05/11/2022     05/11/2022    CO2 21 05/11/2022    BUN 35 05/11/2022    CREATININE 1.3 05/11/2022    GLUCOSE 90 05/11/2022    GLUCOSE 78 03/17/2022    CALCIUM 7.7 05/11/2022     Lab Results   Component Value Date    CALCIUM 7.7 05/11/2022     No results found for: IONCA  Lab Results   Component Value Date    MG 1.4 05/10/2022     Lab Results   Component Value Date    PHOS 3.9 05/08/2022     No results found for: BNP  Lab Results   Component Value Date    ALKPHOS 329 05/11/2022    ALT 1,363 05/11/2022    AST 1,036 05/11/2022    PROT 5.5 05/11/2022    BILITOT 7.7 05/11/2022    BILIDIR 4.2 05/11/2022    LABALBU 2.8 05/11/2022    LABALBU 4.8 03/01/2012     Lab Results   Component Value Date    LACTA 6.0 05/09/2022     Lab Results   Component Value Date    AMYLASE 134 06/29/2020     Lab Results   Component Value Date    LIPASE 144.4 05/05/2022     Lab Results   Component Value Date    CHOL 135 01/27/2022    TRIG 70 01/27/2022    HDL 36 01/27/2022    LDLCALC 85 01/27/2022     Recent Labs     05/10/22  1632 05/10/22  2302 05/11/22  0731   POCGLU 100 82 97     No results for input(s): CKTOTAL, CKMB, TROPONINI in the last 72 hours.   Lab Results   Component Value Date LABA1C 5.9 04/29/2021     Lab Results   Component Value Date    INR 1.73 05/11/2022     No results found for: PHART, PO2ART, XRQ8ENK, POV5BYK, [facility] Course: clinical course has not improved    Initial H+P: The patient is a 78 y.o. female with pmhx of A fib not on anticoagulation due to recent hx GIB, dementia, anemia, HTN, HLD, CKD, CAD, CVA, CHF preserved EF who presents with weakness and decreased appetite over the past couple of days--sent in by her AL. Apparantly she was having SOB with minimal exertion and a supposed 66% SPO2 on room air at AL; now she is 100% on room air. Per chart review she was in the hospital less than a month ago with A fib RVR and fluid overload; based on these notes pt has baseline dementia so most likely her mental status today is not too far from her baseline. In the emergency department Cr up to 2.4 (baseline of 1-1.5), Na 149, CO2 16, lactic acid 7.1 (trended down to 4.1 s/p IVF), BNP 9935 (above her baseline but no obvious signs of fluid overload currently), trop 0.023 trended down to 0.015. Patient admitted for lactic acidosis and DARIUS with AMS. On exam today she is not oriented, unable to follow basic commands but is alert and moves around the bed spontaneously. She has pulled out multiple IVs in the night. Unable to assess a full ROS due to her AMS. She does not seem to be in any obvious distress although her HR is elevated at 140. Unfortunately pt's clinical status continued to decline. Her LFTs continued to rise with no clear etiology, her mental status worsened, and her kidney function did not improve. She was tx with full course of abx with no improvement for possible aspiration pneumonia. Her HR did improve on IV push medications. Ultimately her family decided that it was in the best interest of the pt to transition to hospice. She will discharge today back to AL with hospice on board. Comfort care meds orders signed prior to d/c.         Vitals: /80 Pulse 118   Temp 97.1 °F (36.2 °C) (Axillary)   Resp 18   Ht 5' 3\" (1.6 m)   Wt 128 lb 8.5 oz (58.3 kg)   SpO2 93%   BMI 22.77 kg/m²   Physical Exam:  General appearance - Sleeping, ill appearing, and in no distress  Eyes - pupils equal and reactive, extraocular eye movements intact  Neck - supple, no significant adenopathy  Chest - clear to auscultation, no wheezes, rales or rhonchi, symmetric air entry  Heart - normal rate, regular rhythm, normal S1, S2, no murmurs, rubs, clicks or gallops  Abdomen - soft, nontender, nondistended, no masses or organomegaly pos bs:   Neurological - Sleeping, does not wake to name   Extremities - peripheral pulses normal, no pedal edema,       Disposition: home    Condition: Stable but terminal    Over 35 minutes spent on encounter    Assessment and plan of care discussed with supervising physician, Dr Joya Pleitez. LUZ Lomax - TAMY,   Addendum/attestation by Terra Hercules MD:  I have seen and examined the patient independently. Face to face evaluation and examination was performed. The above evaluation and note has been reviewed. Laboratory and radiological data were reviewed. I Have discussed with Bird Esparza CNP about this patient in detail. The above assessment and plan has been reviewed. Please see my modifications mentioned below.       My modifications:  Electronically signed by Mansi Resendiz MD on 5/11/2022 at 12:31 PM       Copy: Primary Care Physician: Rob Lew MD  Internal Medicine

## 2022-05-11 NOTE — PLAN OF CARE
Problem: Discharge Planning  Goal: Discharge to home or other facility with appropriate resources  5/11/2022 0327 by Lorin Rockwell RN  Outcome: Progressing  Flowsheets (Taken 5/10/2022 1945)  Discharge to home or other facility with appropriate resources: Identify barriers to discharge with patient and caregiver  5/10/2022 1603 by Rodney Farias RN  Outcome: Progressing     Problem: Safety - Adult  Goal: Free from fall injury  5/11/2022 0327 by Lorin Rockwell RN  Outcome: Progressing  Flowsheets (Taken 5/11/2022 0055)  Free From Fall Injury:   Instruct family/caregiver on patient safety   Based on caregiver fall risk screen, instruct family/caregiver to ask for assistance with transferring infant if caregiver noted to have fall risk factors  Note: Bed locked & in low position, call light in reach, side-rails up x2, bed/chair alarm utilized, non-slip socks on when ambulating, reminded patient to use call light to call for assistance. 5/10/2022 1603 by Rodney Farias RN  Outcome: Progressing     Problem: ABCDS Injury Assessment  Goal: Absence of physical injury  5/11/2022 0327 by Lorin Rockwell RN  Outcome: Progressing  5/10/2022 1603 by Rodney Farias RN  Outcome: Progressing     Problem: Skin/Tissue Integrity  Goal: Absence of new skin breakdown  Description: 1. Monitor for areas of redness and/or skin breakdown  2. Assess vascular access sites hourly  3. Every 4-6 hours minimum:  Change oxygen saturation probe site  4. Every 4-6 hours:  If on nasal continuous positive airway pressure, respiratory therapy assess nares and determine need for appliance change or resting period. 5/11/2022 0327 by Lorin Rockwell RN  Outcome: Progressing  Note: Ongoing assessment & interventions provided throughout shift. Skin assessments provided. Encouraging/assisting patient to turn as needed.    5/10/2022 1603 by Rodney Farias RN  Outcome: Progressing     Problem: Chronic Conditions and Co-morbidities  Goal: Patient's chronic conditions and co-morbidity symptoms are monitored and maintained or improved  5/11/2022 0327 by Ulises Colmenares RN  Outcome: Progressing  Flowsheets (Taken 5/10/2022 1945)  Care Plan - Patient's Chronic Conditions and Co-Morbidity Symptoms are Monitored and Maintained or Improved: Monitor and assess patient's chronic conditions and comorbid symptoms for stability, deterioration, or improvement  5/10/2022 1603 by Kristen Ledezma RN  Outcome: Progressing     Problem: Pain  Goal: Verbalizes/displays adequate comfort level or baseline comfort level  5/11/2022 0327 by Ulises Colmenares RN  Outcome: Progressing  Note: Ongoing assessment & interventions provided throughout shift. 5/10/2022 1603 by Kristen Ledezma RN  Outcome: Progressing     Problem: Nutrition Deficit:  Goal: Optimize nutritional status  5/11/2022 0327 by Ulises Colmenares RN  Outcome: Progressing  5/10/2022 1603 by Kristen Ledezma RN  Outcome: Progressing  5/10/2022 1506 by Natalia Boxer, RD, LD  Outcome: Progressing     Problem: Respiratory - Adult  Goal: Achieves optimal ventilation and oxygenation  Recent Flowsheet Documentation  Taken 5/10/2022 1945 by Ulises Colmenares RN  Achieves optimal ventilation and oxygenation: Assess for changes in respiratory status   Care plan reviewed with patient. Patient verbalizes understanding of the care plan and contributed to goal setting.

## 2022-05-11 NOTE — PROGRESS NOTES
Gastroenterology Progress Note:     Patient Name:  Tyler Serrano   MRN: [id number]  [id number]  YOB: 1940  Admit Date: 5/4/2022 10:46 PM  Primary Care Physician: Jose Enrique Beltran MD   3B-27/027-A     Patient seen and examined. 24 hours events and chart reviewed. Subjective: Patient resting in bed. She is much more alert today. She does not follow commands or answer questions. LFTs & INR trending down. Patient's family changed her code status to limited no x 4 yesterday. They are planning for the patient to go to a facility today on hospice. Objective:  /80   Pulse 118   Temp 97.1 °F (36.2 °C) (Axillary)   Resp 18   Ht 5' 3\" (1.6 m)   Wt 128 lb 8.5 oz (58.3 kg)   SpO2 93%   BMI 22.77 kg/m²     Physical Exam:    General:  Chronically ill appearing female  HEENT: Atraumatic, normocephalic. Dry oral mucous membranes. Neck: Supple without adenopathy, JVD, thyromegaly or masses. Trachea midline. CV: Heart RRR, no murmurs, rubs, gallops. Resp: Even, easy without cough or accessory use. Lungs clear to ascultation bilaterally. Abd: Round, soft, nontender. No hepatosplenomegaly or mass present. Active bowel sounds heard. No distention noted. Ext:  Without cyanosis, clubbing, edema.    Skin: Pink, warm, dry  Neuro:  Alert, confused, does not follow commands or answer questions Rectal: deferred    Labs:   CBC:   Lab Results   Component Value Date    WBC 5.9 05/05/2022    HGB 10.0 05/05/2022    HCT 34.8 05/05/2022    MCV 92.8 05/05/2022     05/05/2022     BMP:   Lab Results   Component Value Date     05/11/2022    K 4.1 05/11/2022     05/11/2022    CO2 21 05/11/2022    PHOS 3.9 05/08/2022    BUN 35 05/11/2022    CREATININE 1.3 05/11/2022    CALCIUM 7.7 05/11/2022     PT/INR:   Lab Results   Component Value Date    INR 1.73 05/11/2022     Lipids:   Lab Results   Component Value Date    ALKPHOS 329 05/11/2022    ALT 1,363 05/11/2022    AST 1,036 05/11/2022    BILITOT 7.7 05/11/2022    BILIDIR 4.2 05/11/2022    LABALBU 2.8 05/11/2022    LABALBU 4.8 03/01/2012    AMYLASE 134 06/29/2020    LIPASE 144.4 05/05/2022     Significant Diagnostic Studies: none    Current Meds:  Scheduled Meds:   bisacodyl  10 mg Rectal Daily    cloNIDine  1 patch TransDERmal Weekly    metoprolol (LOPRESSOR) ivpb  10 mg IntraVENous Q6H    pantoprazole  40 mg IntraVENous BID    [Held by provider] metoprolol tartrate  100 mg Oral BID    sodium chloride  1,000 mL IntraVENous Once    insulin lispro  0-6 Units SubCUTAneous TID WC    insulin lispro  0-3 Units SubCUTAneous Nightly    [Held by provider] atorvastatin  20 mg Oral Nightly    calcium elemental  1 tablet Oral Daily    polyvinyl alcohol  1 drop Ophthalmic Daily    donepezil  10 mg Oral Nightly    fluticasone  2 spray Nasal Daily    memantine  5 mg Oral BID    vitamin D  50,000 Units Oral Once per day on Mon Thu    sodium chloride flush  5-40 mL IntraVENous 2 times per day     Continuous Infusions:   0.45 % NaCl with KCl 20 mEq 50 mL/hr at 05/11/22 0820    sodium chloride      dextrose      sodium chloride         Assessment:  79 yo F admitted 05/04/22 for weakness, decreased appetite, from assisted living. Noted to have elevated creatinine & lactic acid. H/O dementia. She has had worsening mental status & has pulled out multiple IVs. GI consulted for elevated LFTs. US liver, doppler, & CT A/P benign for liver disease. Hepatitis panel negative except for possible prior Hepatitis B infection or vaccination.      1. Hepatic dysfunction/acute liver failure- probable shock liver, improving  2. Dementia- seems to be worsening  3. DARIUS on CKD- nephro following  4. Sepsis  5. Afib with RVR- no OAC due to h/o GIB  6. Multiple run of vtach- noted on telemetry monitor  7. H/O HTN  8. H/O CVA- no longer on ASA/Plavix due to h/o GIB  9. Hypernatremia  10. Coagulopathy- secondary to #1, trending down, improving  11.  H/O GIB- on PPI      Plan: · Monitor H & H, transfuse prn  · Continue IVP PPI BID, can switch to PO at discharge  · Monitor HFP, INR  · Avoid hepatotoxic meds  · LFTs, INR are improving  · Dulcolax suppository daily for 3 days  · Diet as tolerated, however she has been refusing PO intake  · Nephrology, cardiology, & ID on board  · Supportive care per primary team   Planning discharge today on hospice   Follow-up with GI as needed  GI signing off    Case reviewed and impression/plan reviewed in collaboration with Dr. Adele Mirza  Electronically signed by LUZ Cid CNP on 5/11/2022 at 8:56 AM    [facility]

## 2022-05-12 NOTE — TELEPHONE ENCOUNTER
The  contacted Nicol's son Martha and he shared the present condition of his mother. Melly Quinonez is back in the nursing home and resting. When Melly Quinonez was hospitalized she was supported by palliative care. He also stated that he has a great support system. The family has made Melly Quinonez comfortable and welcomed prayer.

## 2022-05-18 NOTE — PROGRESS NOTES
Physician Progress Note      Shyam Dacosta  CSN #:                  [id number]  :                       1940  ADMIT DATE:       2022 10:46 PM  [address] DATE:        2022 2:00 PM  RESPONDING  PROVIDER #:        Sowmya Rand MD          QUERY TEXT:    Pt admitted with DARIUS/ATN. Per dietitian, pt meets ASPEN criteria for severe   malnutrition. If possible, please respond below and document in the progress   notes and discharge summary:    The medical record reflects the following:  Risk Factors: dementia, DARIUS on CKD, acidosis, afib with RVR, HFpEF, GERD,   esophagitis, hiatal hernia, acute liver failure, DM 2, advanced age  Clinical Indicators: meets ASPEN criteria for severe malnutrition: 1) Energy   Intake: 50% or less of estimated energy requirements for 5 or more days, 2)  Weight Loss: 12% in 1.5 months, 3) Moderate body fat loss of triceps, 3)   Moderate muscle mass loss of calves; BMI 22; chronic iron deficiency anemia  Treatment: dietitian consult, [employer] tid, IVF, Dulcolax, Protonix, KCl,   bicarb, labs, d/c on Hospice    Thank you! Tarun Cordon, RN, BSN, RHIT, CCDS  Clinical   Options provided:  -- Severe malnutrition confirmed  -- Severe malnutrition ruled out  -- Other - I will add my own diagnosis  -- Disagree - Not applicable / Not valid  -- Disagree - Clinically unable to determine / Unknown  -- Refer to Clinical Documentation Reviewer    PROVIDER RESPONSE TEXT:    The diagnosis of severe malnutrition was confirmed. Query created by: Shameka Duran on 2022 11:53 AM      QUERY TEXT:    Attending,    Patient admitted with DARIUS/ATN. 5/10 IM note states, \"? Pneumonia Cannot rule   out aspiration. jC Marmolejo S/P abx. [place] Jaleel Cj Founds ID following; and does not believe to be   infectious. \"  Antibiotics were stopped. Discharge summary states,   \"Pneumonia. Cj Marmolejo .he was tx with full course of abx with no improvement for   possible aspiration pneumonia. \" If possible, please respond below and   document in the progress notes and discharge summary if you are evaluating and   /or treating any of the following: The medical record reflects the following:  Risk Factors: advanced age, dementia, hiatal hernia, GERD  Clinical Indicators: 5/10 IM note states, \"? Pneumonia Cannot rule out   aspiration. Rick Tomlin S/P abx. Rick Tomlin ID following; and does not believe to be infectious. \"    Antibiotics were stopped. Discharge summary states, \"Pneumonia. Rick Tomlin .he was tx   with full course of abx with no improvement for possible aspiration   pneumonia. \"  Treatment: IV ATBs, ID consult, labs, imaging, dysphagia/pureed diet    Thank you! Kelvin Antoine RN, BSN, RHIT, CCDS  Clinical   Options provided:  -- Aspiration pneumonia confirmed  -- Possible aspiration pneumonia  -- Aspiration pneumonia ruled out  -- Other - I will add my own diagnosis  -- Disagree - Not applicable / Not valid  -- Disagree - Clinically unable to determine / Unknown  -- Refer to Clinical Documentation Reviewer    PROVIDER RESPONSE TEXT:    This patient has possible aspiration pneumonia. Query created by: Lennox Bourbon on 5/11/2022 11:59 AM      QUERY TEXT:    Attending,    Pt admitted with DARIUS/ATN.  5/7-5/11 GI notes state, \"sepsis. \"  On admission,   pt noted to be afebrile, P > 100, R 20s, WBC wnl, lactic acid 7.1, procal   0.35, DARIUS. If possible, please respond below and document in the progress   notes and discharge summary:    The medical record reflects the following:  Risk Factors: possible aspiration PNA, advanced age  Clinical Indicators: 5/7-5/11 GI notes state, \"sepsis. \"  On admission, pt   noted to be afebrile, P > 100, R 20s, WBC wnl, lactic acid 7.1, procal 0.35,   DARIUS  Treatment: IV ATBs, IVF, Tylenol, ID consult, labs, imaging    Thank you!     Kelvin Antoine RN, BSN, RHIT, CCDS  Clinical   Options provided:  -- Sepsis ruled out  -- Sepsis confirmed present on admission  -- Sepsis confirmed not present on admission  -- Other - I will add my own diagnosis  -- Disagree - Not applicable / Not valid  -- Disagree - Clinically unable to determine / Unknown  -- Refer to Clinical Documentation Reviewer    PROVIDER RESPONSE TEXT:    Possible sepsis    Query created by: Devin Vu on 5/11/2022 12:04 PM      Electronically signed by:  Peri Obrien MD 5/18/2022 8:48 AM

## 2022-12-17 NOTE — CONSULTS
Consult History & Physical      Patient: Pedro Medeiros  YOB: 1940  MRN: [id number]     Acct: [de-identified]    Chief Complaint:      Date of Service: Pt seen/examined in consultation on 4/7/2022    History Of Present Illness:  Afib, history of bleeding ulcer. 78 y.o. female who we are asked to see/evaluate by Roderick Horton MD for medical management of afib, history of bleeding ulcer. HPI from chart review and RN. She was admitted 04/5/22 due to being in afib with RVR at her PCP office. She was also noted to have bilateral lower extremity edema. She was placed on metoprolol. Cardiology was consulted. Cardiology recommended continuation of beta-blockers and to continue to hold anticoagulation due to recent GI bleeding and history of falls. GI was consulted due to history of recent GI bleeding and recommendation of if the patient can go back on anticoagulation. Recently admitted 02/28/22- 03/22/22 for being found down on the floor at the [facility] facility. GI was consulted for anemia and she underwent EGD 03/07/22. EGD demonstrated small hiatal hernia with ulcer with worrisome appearing nodularity distal to Schatzki ring unusual for reflux esophagitis, diffuse gastritis, NSAID use. It was recommended she stay off anticoagulation and antiplatelet. It was recommended she have a repeat EGD in 2 months. Past Medical History:    Past Medical History:   Diagnosis Date    CAD (coronary artery disease) 8/8/12    non-obstructive 20-30% stenosis LAD and circomflex    Colon polyps 4/25/12    Tubular adenoma Dr. Herbert Cevallos CVA (cerebral vascular accident) [facility]) 2004    Degenerative arthritis of cervical spine 2002    Dementia ([facility] [address] 12/14    Hiatal hernia with gastroesophageal reflux disease and esophagitis and ulcer 3/7/2022    Hyperlipidemia     Hypertension     Psoriasis        Home Medications:  Prior to Admission medications    Medication Sig Start Date End Date Taking?  Authorizing Provider   Elastic Bandages & Supports (MEDICAL COMPRESSION STOCKINGS) MISC 1 each by Does not apply route daily as needed (Bite to the lower leg daily for swelling) 3/17/22   Lewayne Siemens, MD   acetaminophen (TYLENOL) 500 MG tablet Take 1 tablet by mouth every 6 hours as needed for Pain 3/9/22   Lewayne Siemens, MD   polyethylene glycol (GLYCOLAX) 17 g packet Take 17 g by mouth daily as needed for Constipation 3/9/22 4/8/22  Lewayne Siemens, MD   pantoprazole (PROTONIX) 40 MG tablet Take 1 tablet by mouth 2 times daily (before meals) 3/9/22   Lewayne Siemens, MD   loperamide (IMODIUM) 2 MG capsule Take 2 mg by mouth daily as needed for Diarrhea    Historical Provider, MD   ferrous sulfate (IRON 325) 325 (65 Fe) MG tablet Take 1 tablet by mouth daily (with breakfast) 2/10/22   Lewayne Siemens, MD   lisinopril (PRINIVIL;ZESTRIL) 10 MG tablet Take 1 tablet by mouth once daily 12/30/21   Lewayne Siemens, MD   donepezil (ARICEPT) 10 MG tablet Take 1 tablet by mouth nightly 9/14/21   Lewayne Siemens, MD   vitamin D (ERGOCALCIFEROL) 1.25 MG (15766 UT) CAPS capsule TAKE 1 CAPSULE BY MOUTH TWICE A WEEK  Patient taking differently: Take 50,000 Units by mouth twice a week On Mondays and Thursdays 8/30/21   Lewayne Siemens, MD   atorvastatin (LIPITOR) 20 MG tablet Take 1 tablet by mouth once daily 8/30/21   Lewayne Siemens, MD   memantine [facility]) 10 MG tablet Take 1 tablet by mouth twice daily 7/26/21   Lewayne Siemens, MD   calcium carbonate 600 MG TABS tablet Take 1 tablet by mouth daily    Historical Provider, MD   fluticasone Marda Gasman) 50 MCG/ACT nasal spray 2 sprays by Nasal route daily 5/28/20   Lewayne Siemens, MD   Carboxymethylcell-Hypromellose (GENTEAL OP) Apply 1 drop to eye daily 1 drop each eye daily    Historical Provider, MD       Surgical History:  Past Surgical History:   Procedure Laterality Date    BLADDER REPAIR      CARDIAC CATHETERIZATION  8-8-12    CHOLECYSTECTOMY      COLONOSCOPY  4/24/2012    Dr. Salinas Confer  07/2016    corneal related - Dr Evelina Leary GASTROINTESTINAL ENDOSCOPY N/A 3/7/2022    EGD BIOPSY performed by Kacie Hardy MD at [facility]       Family History:  Family History   Problem Relation Age of Onset    Heart Disease Brother     Breast Cancer Daughter 54       Past GI History:  Sigmoid diverticulosis, internal hemorrhoids, EGD, colonoscopy, hiatal hernia with ulcer with nodularity, Schatzki ring, gastritis    Last colonoscopy 2015- sigmoid diverticulosis, internal hemorrhoids  Last EGD 03/07/22- small hiatal hernia with ulcer with worrisome appearing nodularity distal to Schatzki ring unusual for reflux esophagitis, diffuse gastritis, NSAID use    Dr. Layla Torrez patient    Allergies:  Morphine    Social History:   TOBACCO:   reports that she has never smoked. She has never used smokeless tobacco.  ETOH:   reports no history of alcohol use. Review Of Systems  GENERAL: No fever, chills or weight loss. EYES:  No  blurred vision, double vision   CARDIOVASCULAR: No chest pain or palpitations. RESPIRATORY:  No dyspnea or cough. GI:  See HPI  MUSCULOSKELETAL: No new painful or swollen joints or myalgias. :   No dysuria or hematuria. SKIN:  No rashes or jaundice. NEUROLOGIC:  +dementia  PSYCH:  No anxiety or depression. ENDOCRINE:  No polyuria or polydipsia. BLOOD:  +anemia    PHYSICAL EXAM:  /83   Pulse 75   Temp 98.4 °F (36.9 °C) (Oral)   Resp 16   Ht 5' 3\" (1.6 m)   Wt 136 lb (61.7 kg)   SpO2 99%   BMI 24.09 kg/m²     General appearance: No apparent distress, appears stated age and cooperative. HEENT: Normal cephalic, atraumatic without obvious deformity. Pupils equal, round, and reactive to light. Neck: Supple, with full range of motion. No jugular venous distention. Trachea midline. Respiratory:  Normal respiratory effort.  Clear to auscultation, bilaterally without Rales/Wheezes/Rhonchi. Cardiovascular: Regular rate and rhythm without murmurs, rubs or gallops. Abdomen: Soft, non-tender, non-distended with active bowel sounds. Musculoskeletal: No clubbing, cyanosis or edema bilaterally. Skin: Pink, warm, dry. No rashes or lesions. Psychiatric: Alert and oriented to self only    Labs:   Recent Labs     04/07/22  0843   WBC 4.9   HGB 9.2*   HCT 31.1*        Recent Labs     04/07/22  0843      K 3.7      CO2 22*   BUN 18   CREATININE 1.5*   CALCIUM 9.1     Recent Labs     04/05/22  1800   AST 20   ALT 23   BILITOT 0.8   ALKPHOS 73     Recent Labs     04/05/22  1946   INR 1.17*       Radiology:   CXR 04/05/22      Impression   Cardiomegaly with possible tiny bilateral pleural effusions. Code Status: Full Code    ASSESSMENT:  1. Afib with RVR  2. Normocytic anemia  3. DARIUS  4. Dementia  5. CAD s/p PCI   6. H/O CVA  7. CHF, no exacerbated  8. Hiatal hernia with ulcer with nodularity noted on EGD 03/07/22  9. H/O HTN    PLAN:     Monitor H & H, transfuse prn   Diet as tolerated   Nursing to monitor stool output & document   Do not recommend resuming anticoagulation or antiplatelet at this time given recent ulcer with nodularity on EGD 03/07/22 as well as recent falls. Cardiology is in agreement.  RN updated   Cardiology on board   Supportive care per primary team   Okay to discharge from GI standpoint   Keep EGD appointment with Dr. Apolinar Blankenship 05/19/22       Case reviewed and impression/plan reviewed in collaboration with Dr. Malachi Thornton  Electronically signed by LUZ Singh - CNP on 4/7/2022 at 12:35 PM    [facility]  Thank you for the consultation. Patient is a 66 year old male, vaccinated, from [facility] with PMHx of DM, HTN, HLD, AF on Eliquis, PVD, presents with c/o vomiting, found to have elevated LFts. CT scan  showed dilated CBD up to 1.7 cm with abrupt cut off of the distal CBD and mild pancreatic duct dilatation, suggestive of possible Choledocholithiasis. Plan for MRCP/MRI showed Common bile duct is markedly dilated measuring 1.6 cm with mild to moderate intrahepatic biliary dilatation as well as dilatation of the cystic duct stump. Plan for ERCp 12/16. GI following
